# Patient Record
Sex: FEMALE | Race: WHITE | NOT HISPANIC OR LATINO
[De-identification: names, ages, dates, MRNs, and addresses within clinical notes are randomized per-mention and may not be internally consistent; named-entity substitution may affect disease eponyms.]

---

## 2017-01-10 ENCOUNTER — APPOINTMENT (OUTPATIENT)
Dept: OTOLARYNGOLOGY | Facility: CLINIC | Age: 67
End: 2017-01-10

## 2017-01-10 VITALS
DIASTOLIC BLOOD PRESSURE: 72 MMHG | BODY MASS INDEX: 20.38 KG/M2 | HEART RATE: 76 BPM | WEIGHT: 115 LBS | TEMPERATURE: 97.8 F | SYSTOLIC BLOOD PRESSURE: 108 MMHG | HEIGHT: 63 IN

## 2017-01-10 DIAGNOSIS — J34.89 OTHER SPECIFIED DISORDERS OF NOSE AND NASAL SINUSES: ICD-10-CM

## 2017-01-10 DIAGNOSIS — J34.2 DEVIATED NASAL SEPTUM: ICD-10-CM

## 2017-01-10 PROBLEM — Z00.00 ENCOUNTER FOR PREVENTIVE HEALTH EXAMINATION: Status: ACTIVE | Noted: 2017-01-10

## 2017-01-10 RX ORDER — FLUTICASONE PROPIONATE 50 UG/1
50 SPRAY, METERED NASAL TWICE DAILY
Qty: 6 | Refills: 3 | Status: ACTIVE | COMMUNITY
Start: 2017-01-10 | End: 1900-01-01

## 2025-01-31 ENCOUNTER — INPATIENT (INPATIENT)
Facility: HOSPITAL | Age: 75
LOS: 24 days | Discharge: NURSING, REHAB OR LONG-TERM CARE | DRG: 870 | End: 2025-02-25
Attending: INTERNAL MEDICINE | Admitting: STUDENT IN AN ORGANIZED HEALTH CARE EDUCATION/TRAINING PROGRAM
Payer: MEDICARE

## 2025-01-31 VITALS
TEMPERATURE: 99 F | RESPIRATION RATE: 20 BRPM | DIASTOLIC BLOOD PRESSURE: 77 MMHG | OXYGEN SATURATION: 97 % | WEIGHT: 119.93 LBS | SYSTOLIC BLOOD PRESSURE: 116 MMHG | HEART RATE: 113 BPM

## 2025-01-31 DIAGNOSIS — R13.10 DYSPHAGIA, UNSPECIFIED: ICD-10-CM

## 2025-01-31 DIAGNOSIS — R53.2 FUNCTIONAL QUADRIPLEGIA: ICD-10-CM

## 2025-01-31 DIAGNOSIS — N39.0 URINARY TRACT INFECTION, SITE NOT SPECIFIED: ICD-10-CM

## 2025-01-31 DIAGNOSIS — C71.9 MALIGNANT NEOPLASM OF BRAIN, UNSPECIFIED: ICD-10-CM

## 2025-01-31 DIAGNOSIS — I48.20 CHRONIC ATRIAL FIBRILLATION, UNSPECIFIED: ICD-10-CM

## 2025-01-31 DIAGNOSIS — Z51.5 ENCOUNTER FOR PALLIATIVE CARE: ICD-10-CM

## 2025-01-31 DIAGNOSIS — G93.41 METABOLIC ENCEPHALOPATHY: ICD-10-CM

## 2025-01-31 DIAGNOSIS — Z29.9 ENCOUNTER FOR PROPHYLACTIC MEASURES, UNSPECIFIED: ICD-10-CM

## 2025-01-31 DIAGNOSIS — R65.10 SYSTEMIC INFLAMMATORY RESPONSE SYNDROME (SIRS) OF NON-INFECTIOUS ORIGIN WITHOUT ACUTE ORGAN DYSFUNCTION: ICD-10-CM

## 2025-01-31 DIAGNOSIS — R56.9 UNSPECIFIED CONVULSIONS: ICD-10-CM

## 2025-01-31 LAB
-  CTX-M RESISTANCE MARKER: SIGNIFICANT CHANGE UP
-  ESBL: SIGNIFICANT CHANGE UP
A1C WITH ESTIMATED AVERAGE GLUCOSE RESULT: 6.3 % — HIGH (ref 4–5.6)
ADD ON TEST-SPECIMEN IN LAB: SIGNIFICANT CHANGE UP
ALBUMIN SERPL ELPH-MCNC: 3.6 G/DL — SIGNIFICANT CHANGE UP (ref 3.3–5)
ALP SERPL-CCNC: 157 U/L — HIGH (ref 40–120)
ALT FLD-CCNC: 40 U/L — SIGNIFICANT CHANGE UP (ref 10–45)
ANION GAP SERPL CALC-SCNC: 11 MMOL/L — SIGNIFICANT CHANGE UP (ref 5–17)
ANION GAP SERPL CALC-SCNC: 11 MMOL/L — SIGNIFICANT CHANGE UP (ref 5–17)
APPEARANCE UR: ABNORMAL
APTT BLD: 32.6 SEC — SIGNIFICANT CHANGE UP (ref 24.5–35.6)
AST SERPL-CCNC: 19 U/L — SIGNIFICANT CHANGE UP (ref 10–40)
BASOPHILS # BLD AUTO: 0.04 K/UL — SIGNIFICANT CHANGE UP (ref 0–0.2)
BASOPHILS # BLD AUTO: 0.05 K/UL — SIGNIFICANT CHANGE UP (ref 0–0.2)
BASOPHILS NFR BLD AUTO: 0.2 % — SIGNIFICANT CHANGE UP (ref 0–2)
BASOPHILS NFR BLD AUTO: 0.3 % — SIGNIFICANT CHANGE UP (ref 0–2)
BILIRUB SERPL-MCNC: 0.6 MG/DL — SIGNIFICANT CHANGE UP (ref 0.2–1.2)
BILIRUB UR-MCNC: NEGATIVE — SIGNIFICANT CHANGE UP
BUN SERPL-MCNC: 20 MG/DL — SIGNIFICANT CHANGE UP (ref 7–23)
BUN SERPL-MCNC: 23 MG/DL — SIGNIFICANT CHANGE UP (ref 7–23)
CALCIUM SERPL-MCNC: 8.2 MG/DL — LOW (ref 8.4–10.5)
CALCIUM SERPL-MCNC: 8.9 MG/DL — SIGNIFICANT CHANGE UP (ref 8.4–10.5)
CHLORIDE SERPL-SCNC: 107 MMOL/L — SIGNIFICANT CHANGE UP (ref 96–108)
CHLORIDE SERPL-SCNC: 108 MMOL/L — SIGNIFICANT CHANGE UP (ref 96–108)
CO2 SERPL-SCNC: 25 MMOL/L — SIGNIFICANT CHANGE UP (ref 22–31)
CO2 SERPL-SCNC: 28 MMOL/L — SIGNIFICANT CHANGE UP (ref 22–31)
COLOR SPEC: YELLOW — SIGNIFICANT CHANGE UP
CREAT SERPL-MCNC: 0.47 MG/DL — LOW (ref 0.5–1.3)
CREAT SERPL-MCNC: 0.52 MG/DL — SIGNIFICANT CHANGE UP (ref 0.5–1.3)
DIFF PNL FLD: ABNORMAL
E COLI DNA BLD POS QL NAA+NON-PROBE: SIGNIFICANT CHANGE UP
EGFR: 100 ML/MIN/1.73M2 — SIGNIFICANT CHANGE UP
EGFR: 97 ML/MIN/1.73M2 — SIGNIFICANT CHANGE UP
EOSINOPHIL # BLD AUTO: 0.02 K/UL — SIGNIFICANT CHANGE UP (ref 0–0.5)
EOSINOPHIL # BLD AUTO: 0.07 K/UL — SIGNIFICANT CHANGE UP (ref 0–0.5)
EOSINOPHIL NFR BLD AUTO: 0.1 % — SIGNIFICANT CHANGE UP (ref 0–6)
EOSINOPHIL NFR BLD AUTO: 0.4 % — SIGNIFICANT CHANGE UP (ref 0–6)
ESTIMATED AVERAGE GLUCOSE: 134 MG/DL — HIGH (ref 68–114)
FLUAV AG NPH QL: SIGNIFICANT CHANGE UP
FLUBV AG NPH QL: SIGNIFICANT CHANGE UP
GAS PNL BLDV: SIGNIFICANT CHANGE UP
GLUCOSE SERPL-MCNC: 118 MG/DL — HIGH (ref 70–99)
GLUCOSE SERPL-MCNC: 133 MG/DL — HIGH (ref 70–99)
GLUCOSE UR QL: NEGATIVE MG/DL — SIGNIFICANT CHANGE UP
GRAM STN FLD: ABNORMAL
HCT VFR BLD CALC: 50.5 % — HIGH (ref 34.5–45)
HCT VFR BLD CALC: 52.3 % — HIGH (ref 34.5–45)
HGB BLD-MCNC: 15.3 G/DL — SIGNIFICANT CHANGE UP (ref 11.5–15.5)
HGB BLD-MCNC: 16.3 G/DL — HIGH (ref 11.5–15.5)
IMM GRANULOCYTES NFR BLD AUTO: 1.1 % — HIGH (ref 0–0.9)
IMM GRANULOCYTES NFR BLD AUTO: 1.5 % — HIGH (ref 0–0.9)
INR BLD: 1.04 — SIGNIFICANT CHANGE UP (ref 0.85–1.16)
KETONES UR-MCNC: NEGATIVE MG/DL — SIGNIFICANT CHANGE UP
LACTATE SERPL-SCNC: 1.5 MMOL/L — SIGNIFICANT CHANGE UP (ref 0.5–2)
LEUKOCYTE ESTERASE UR-ACNC: ABNORMAL
LIDOCAIN IGE QN: 32 U/L — SIGNIFICANT CHANGE UP (ref 7–60)
LYMPHOCYTES # BLD AUTO: 0.73 K/UL — LOW (ref 1–3.3)
LYMPHOCYTES # BLD AUTO: 1.88 K/UL — SIGNIFICANT CHANGE UP (ref 1–3.3)
LYMPHOCYTES # BLD AUTO: 3.9 % — LOW (ref 13–44)
LYMPHOCYTES # BLD AUTO: 9.7 % — LOW (ref 13–44)
MAGNESIUM SERPL-MCNC: 2.1 MG/DL — SIGNIFICANT CHANGE UP (ref 1.6–2.6)
MAGNESIUM SERPL-MCNC: 2.3 MG/DL — SIGNIFICANT CHANGE UP (ref 1.6–2.6)
MCHC RBC-ENTMCNC: 26.9 PG — LOW (ref 27–34)
MCHC RBC-ENTMCNC: 27.1 PG — SIGNIFICANT CHANGE UP (ref 27–34)
MCHC RBC-ENTMCNC: 30.3 G/DL — LOW (ref 32–36)
MCHC RBC-ENTMCNC: 31.2 G/DL — LOW (ref 32–36)
MCV RBC AUTO: 86.4 FL — SIGNIFICANT CHANGE UP (ref 80–100)
MCV RBC AUTO: 89.4 FL — SIGNIFICANT CHANGE UP (ref 80–100)
METHOD TYPE: SIGNIFICANT CHANGE UP
MONOCYTES # BLD AUTO: 0.68 K/UL — SIGNIFICANT CHANGE UP (ref 0–0.9)
MONOCYTES # BLD AUTO: 0.87 K/UL — SIGNIFICANT CHANGE UP (ref 0–0.9)
MONOCYTES NFR BLD AUTO: 3.6 % — SIGNIFICANT CHANGE UP (ref 2–14)
MONOCYTES NFR BLD AUTO: 4.5 % — SIGNIFICANT CHANGE UP (ref 2–14)
NEUTROPHILS # BLD AUTO: 16.25 K/UL — HIGH (ref 1.8–7.4)
NEUTROPHILS # BLD AUTO: 17.15 K/UL — HIGH (ref 1.8–7.4)
NEUTROPHILS NFR BLD AUTO: 83.6 % — HIGH (ref 43–77)
NEUTROPHILS NFR BLD AUTO: 91.1 % — HIGH (ref 43–77)
NITRITE UR-MCNC: POSITIVE
NRBC # BLD: 0 /100 WBCS — SIGNIFICANT CHANGE UP (ref 0–0)
NRBC # BLD: 0 /100 WBCS — SIGNIFICANT CHANGE UP (ref 0–0)
NRBC BLD-RTO: 0 /100 WBCS — SIGNIFICANT CHANGE UP (ref 0–0)
NRBC BLD-RTO: 0 /100 WBCS — SIGNIFICANT CHANGE UP (ref 0–0)
NT-PROBNP SERPL-SCNC: 175 PG/ML — SIGNIFICANT CHANGE UP (ref 0–300)
PH UR: 5.5 — SIGNIFICANT CHANGE UP (ref 5–8)
PHOSPHATE SERPL-MCNC: 2.9 MG/DL — SIGNIFICANT CHANGE UP (ref 2.5–4.5)
PLATELET # BLD AUTO: 236 K/UL — SIGNIFICANT CHANGE UP (ref 150–400)
PLATELET # BLD AUTO: 248 K/UL — SIGNIFICANT CHANGE UP (ref 150–400)
POTASSIUM SERPL-MCNC: 3.8 MMOL/L — SIGNIFICANT CHANGE UP (ref 3.5–5.3)
POTASSIUM SERPL-MCNC: 4.3 MMOL/L — SIGNIFICANT CHANGE UP (ref 3.5–5.3)
POTASSIUM SERPL-SCNC: 3.8 MMOL/L — SIGNIFICANT CHANGE UP (ref 3.5–5.3)
POTASSIUM SERPL-SCNC: 4.3 MMOL/L — SIGNIFICANT CHANGE UP (ref 3.5–5.3)
PROT SERPL-MCNC: 6.3 G/DL — SIGNIFICANT CHANGE UP (ref 6–8.3)
PROT UR-MCNC: NEGATIVE MG/DL — SIGNIFICANT CHANGE UP
PROTHROM AB SERPL-ACNC: 12 SEC — SIGNIFICANT CHANGE UP (ref 9.9–13.4)
RBC # BLD: 5.65 M/UL — HIGH (ref 3.8–5.2)
RBC # BLD: 6.05 M/UL — HIGH (ref 3.8–5.2)
RBC # FLD: 17.6 % — HIGH (ref 10.3–14.5)
RBC # FLD: 18.2 % — HIGH (ref 10.3–14.5)
RSV RNA NPH QL NAA+NON-PROBE: SIGNIFICANT CHANGE UP
SARS-COV-2 RNA SPEC QL NAA+PROBE: SIGNIFICANT CHANGE UP
SODIUM SERPL-SCNC: 144 MMOL/L — SIGNIFICANT CHANGE UP (ref 135–145)
SODIUM SERPL-SCNC: 146 MMOL/L — HIGH (ref 135–145)
SP GR SPEC: 1.02 — SIGNIFICANT CHANGE UP (ref 1–1.03)
SPECIMEN SOURCE: SIGNIFICANT CHANGE UP
SPECIMEN SOURCE: SIGNIFICANT CHANGE UP
TROPONIN T, HIGH SENSITIVITY RESULT: 18 NG/L — SIGNIFICANT CHANGE UP (ref 0–51)
UROBILINOGEN FLD QL: 0.2 MG/DL — SIGNIFICANT CHANGE UP (ref 0.2–1)
VALPROATE SERPL-MCNC: 7.4 UG/ML — LOW (ref 50–100)
WBC # BLD: 18.83 K/UL — HIGH (ref 3.8–10.5)
WBC # BLD: 19.41 K/UL — HIGH (ref 3.8–10.5)
WBC # FLD AUTO: 18.83 K/UL — HIGH (ref 3.8–10.5)
WBC # FLD AUTO: 19.41 K/UL — HIGH (ref 3.8–10.5)

## 2025-01-31 PROCEDURE — 99291 CRITICAL CARE FIRST HOUR: CPT

## 2025-01-31 PROCEDURE — 71045 X-RAY EXAM CHEST 1 VIEW: CPT | Mod: 26

## 2025-01-31 PROCEDURE — 70450 CT HEAD/BRAIN W/O DYE: CPT | Mod: 26

## 2025-01-31 PROCEDURE — 99283 EMERGENCY DEPT VISIT LOW MDM: CPT

## 2025-01-31 PROCEDURE — 99222 1ST HOSP IP/OBS MODERATE 55: CPT

## 2025-01-31 PROCEDURE — 99223 1ST HOSP IP/OBS HIGH 75: CPT

## 2025-01-31 RX ORDER — DEXTROSE 50 % IN WATER 50 %
15 SYRINGE (ML) INTRAVENOUS ONCE
Refills: 0 | Status: DISCONTINUED | OUTPATIENT
Start: 2025-01-31 | End: 2025-02-25

## 2025-01-31 RX ORDER — ACETAMINOPHEN 500 MG/5ML
1000 LIQUID (ML) ORAL ONCE
Refills: 0 | Status: COMPLETED | OUTPATIENT
Start: 2025-01-31 | End: 2025-01-31

## 2025-01-31 RX ORDER — DILTIAZEM HYDROCHLORIDE 240 MG/1
10 TABLET, EXTENDED RELEASE ORAL ONCE
Refills: 0 | Status: COMPLETED | OUTPATIENT
Start: 2025-01-31 | End: 2025-01-31

## 2025-01-31 RX ORDER — INSULIN LISPRO 100 U/ML
INJECTION, SOLUTION INTRAVENOUS; SUBCUTANEOUS
Refills: 0 | Status: DISCONTINUED | OUTPATIENT
Start: 2025-01-31 | End: 2025-02-03

## 2025-01-31 RX ORDER — METOPROLOL SUCCINATE 50 MG/1
5 TABLET, EXTENDED RELEASE ORAL EVERY 8 HOURS
Refills: 0 | Status: DISCONTINUED | OUTPATIENT
Start: 2025-01-31 | End: 2025-01-31

## 2025-01-31 RX ORDER — DEXTROSE 50 % IN WATER 50 %
25 SYRINGE (ML) INTRAVENOUS ONCE
Refills: 0 | Status: DISCONTINUED | OUTPATIENT
Start: 2025-01-31 | End: 2025-02-25

## 2025-01-31 RX ORDER — DEXAMETHASONE 0.5 MG/1
4 TABLET ORAL EVERY 6 HOURS
Refills: 0 | Status: DISCONTINUED | OUTPATIENT
Start: 2025-01-31 | End: 2025-02-25

## 2025-01-31 RX ORDER — INFLUENZA A VIRUS A/IDAHO/07/2018 (H1N1) ANTIGEN (MDCK CELL DERIVED, PROPIOLACTONE INACTIVATED, INFLUENZA A VIRUS A/INDIANA/08/2018 (H3N2) ANTIGEN (MDCK CELL DERIVED, PROPIOLACTONE INACTIVATED), INFLUENZA B VIRUS B/SINGAPORE/INFTT-16-0610/2016 ANTIGEN (MDCK CELL DERIVED, PROPIOLACTONE INACTIVATED), INFLUENZA B VIRUS B/IOWA/06/2017 ANTIGEN (MDCK CELL DERIVED, PROPIOLACTONE INACTIVATED) 15; 15; 15; 15 UG/.5ML; UG/.5ML; UG/.5ML; UG/.5ML
0.5 INJECTION, SUSPENSION INTRAMUSCULAR ONCE
Refills: 0 | Status: DISCONTINUED | OUTPATIENT
Start: 2025-01-31 | End: 2025-02-25

## 2025-01-31 RX ORDER — SODIUM CHLORIDE 9 G/1000ML
1000 INJECTION, SOLUTION INTRAVENOUS
Refills: 0 | Status: DISCONTINUED | OUTPATIENT
Start: 2025-01-31 | End: 2025-02-25

## 2025-01-31 RX ORDER — LORAZEPAM 4 MG/ML
2 VIAL (ML) INJECTION EVERY 4 HOURS
Refills: 0 | Status: DISCONTINUED | OUTPATIENT
Start: 2025-01-31 | End: 2025-01-31

## 2025-01-31 RX ORDER — ERTAPENEM SODIUM 1 G/1
1000 INJECTION, POWDER, LYOPHILIZED, FOR SOLUTION INTRAMUSCULAR; INTRAVENOUS EVERY 24 HOURS
Refills: 0 | Status: DISCONTINUED | OUTPATIENT
Start: 2025-01-31 | End: 2025-02-01

## 2025-01-31 RX ORDER — METOPROLOL SUCCINATE 50 MG/1
5 TABLET, EXTENDED RELEASE ORAL ONCE
Refills: 0 | Status: COMPLETED | OUTPATIENT
Start: 2025-01-31 | End: 2025-01-31

## 2025-01-31 RX ORDER — CEFTRIAXONE 500 MG/1
1000 INJECTION, POWDER, FOR SOLUTION INTRAMUSCULAR; INTRAVENOUS EVERY 24 HOURS
Refills: 0 | Status: DISCONTINUED | OUTPATIENT
Start: 2025-01-31 | End: 2025-01-31

## 2025-01-31 RX ORDER — GLUCAGON 3 MG/1
1 POWDER NASAL ONCE
Refills: 0 | Status: DISCONTINUED | OUTPATIENT
Start: 2025-01-31 | End: 2025-02-25

## 2025-01-31 RX ORDER — LORAZEPAM 4 MG/ML
2 VIAL (ML) INJECTION EVERY 4 HOURS
Refills: 0 | Status: DISCONTINUED | OUTPATIENT
Start: 2025-01-31 | End: 2025-02-06

## 2025-01-31 RX ORDER — ERTAPENEM SODIUM 1 G/1
1000 INJECTION, POWDER, LYOPHILIZED, FOR SOLUTION INTRAMUSCULAR; INTRAVENOUS EVERY 24 HOURS
Refills: 0 | Status: DISCONTINUED | OUTPATIENT
Start: 2025-01-31 | End: 2025-01-31

## 2025-01-31 RX ORDER — CEFTRIAXONE 500 MG/1
2000 INJECTION, POWDER, FOR SOLUTION INTRAMUSCULAR; INTRAVENOUS EVERY 24 HOURS
Refills: 0 | Status: DISCONTINUED | OUTPATIENT
Start: 2025-01-31 | End: 2025-01-31

## 2025-01-31 RX ORDER — DEXAMETHASONE 0.5 MG/1
20 TABLET ORAL ONCE
Refills: 0 | Status: COMPLETED | OUTPATIENT
Start: 2025-01-31 | End: 2025-01-31

## 2025-01-31 RX ORDER — DEXTROSE 50 % IN WATER 50 %
12.5 SYRINGE (ML) INTRAVENOUS ONCE
Refills: 0 | Status: DISCONTINUED | OUTPATIENT
Start: 2025-01-31 | End: 2025-02-25

## 2025-01-31 RX ORDER — METOPROLOL SUCCINATE 50 MG/1
5 TABLET, EXTENDED RELEASE ORAL EVERY 8 HOURS
Refills: 0 | Status: DISCONTINUED | OUTPATIENT
Start: 2025-01-31 | End: 2025-02-01

## 2025-01-31 RX ORDER — HYDROMORPHONE/SOD CHLOR,ISO/PF 2 MG/10 ML
0.5 SYRINGE (ML) INJECTION EVERY 4 HOURS
Refills: 0 | Status: DISCONTINUED | OUTPATIENT
Start: 2025-01-31 | End: 2025-02-06

## 2025-01-31 RX ORDER — CEFTRIAXONE 500 MG/1
1000 INJECTION, POWDER, FOR SOLUTION INTRAMUSCULAR; INTRAVENOUS ONCE
Refills: 0 | Status: COMPLETED | OUTPATIENT
Start: 2025-01-31 | End: 2025-01-31

## 2025-01-31 RX ORDER — INSULIN LISPRO 100 U/ML
INJECTION, SOLUTION INTRAVENOUS; SUBCUTANEOUS AT BEDTIME
Refills: 0 | Status: DISCONTINUED | OUTPATIENT
Start: 2025-01-31 | End: 2025-02-03

## 2025-01-31 RX ADMIN — Medication 1000 MILLIGRAM(S): at 04:08

## 2025-01-31 RX ADMIN — DILTIAZEM HYDROCHLORIDE 10 MILLIGRAM(S): 240 TABLET, EXTENDED RELEASE ORAL at 03:42

## 2025-01-31 RX ADMIN — METOPROLOL SUCCINATE 5 MILLIGRAM(S): 50 TABLET, EXTENDED RELEASE ORAL at 05:46

## 2025-01-31 RX ADMIN — Medication 1000 MILLILITER(S): at 05:37

## 2025-01-31 RX ADMIN — DEXAMETHASONE 4 MILLIGRAM(S): 0.5 TABLET ORAL at 10:09

## 2025-01-31 RX ADMIN — DEXAMETHASONE 110 MILLIGRAM(S): 0.5 TABLET ORAL at 04:15

## 2025-01-31 RX ADMIN — DEXAMETHASONE 4 MILLIGRAM(S): 0.5 TABLET ORAL at 16:47

## 2025-01-31 RX ADMIN — DILTIAZEM HYDROCHLORIDE 10 MILLIGRAM(S): 240 TABLET, EXTENDED RELEASE ORAL at 02:57

## 2025-01-31 RX ADMIN — METOPROLOL SUCCINATE 5 MILLIGRAM(S): 50 TABLET, EXTENDED RELEASE ORAL at 20:25

## 2025-01-31 RX ADMIN — METOPROLOL SUCCINATE 5 MILLIGRAM(S): 50 TABLET, EXTENDED RELEASE ORAL at 07:47

## 2025-01-31 RX ADMIN — Medication 1000 MILLIGRAM(S): at 05:29

## 2025-01-31 RX ADMIN — CEFTRIAXONE 1000 MILLIGRAM(S): 500 INJECTION, POWDER, FOR SOLUTION INTRAMUSCULAR; INTRAVENOUS at 05:30

## 2025-01-31 RX ADMIN — Medication 55 MILLIGRAM(S): at 16:47

## 2025-01-31 RX ADMIN — ERTAPENEM SODIUM 120 MILLIGRAM(S): 1 INJECTION, POWDER, LYOPHILIZED, FOR SOLUTION INTRAMUSCULAR; INTRAVENOUS at 16:59

## 2025-01-31 RX ADMIN — DEXAMETHASONE 4 MILLIGRAM(S): 0.5 TABLET ORAL at 22:09

## 2025-01-31 RX ADMIN — CEFTRIAXONE 100 MILLIGRAM(S): 500 INJECTION, POWDER, FOR SOLUTION INTRAMUSCULAR; INTRAVENOUS at 01:36

## 2025-01-31 RX ADMIN — Medication 1000 MILLILITER(S): at 01:07

## 2025-01-31 RX ADMIN — Medication 40 MILLIGRAM(S): at 10:08

## 2025-01-31 RX ADMIN — Medication 52.5 MILLIGRAM(S): at 10:33

## 2025-01-31 RX ADMIN — Medication 400 MILLIGRAM(S): at 01:36

## 2025-01-31 NOTE — H&P ADULT - ATTENDING COMMENTS
73 yo female w PMH GBM on hospice care (diagnosed in 2024, follows at [facility] under Dr. Hernandez), admitted for encephalopathy likely 2/2 neurological cancer disease progression and UTI.  Per son at bedside the patient was doing okay until yesterday when she was more difficult to arouse and was not taking PO.     Physical exam notable for a elderly female, appears very comfortable, not responsive to verbal stimuli, grimaces to tactile stimuli, PERRL, dry MM, CTABL, no retractions, tachycardiac and irregular, no LE edema, unable to assess orientation. Work up s/f WBC 19.41-->19.83, Hgb 16.3 which is like concentrated, plt 248, Na 146, electrolytes otherwise wnl, normal kidney function, nml liver function, lactate normal, torponin nml, . VBG CO2 36, UA positive w/ nitrate, trace LE and WBC 14. Low valporic acid level.     CTH: Suspicion for a large infiltrating mass in the right cerebral hemisphere, involving right parietal and temporal lobes, with extension into the anterior inferior right frontal lobe, Extensive mass effect in the right cerebral hemisphere with 1.2 cm of  midline shift to the left. Mild dilatation of the left lateral ventricle is suspicious for hydrocephalus due to ventricular entrapment.    #AMS 2/2 Sepsis and GBM disease progression   - c/w ceftriaxone 1g q24   - FU urine culture and blood cx     #GBM - advanced disease as noted by CT scan. Per discussion with neurosurgery this morning who expressed there is no surgical intervention available and this is advanced disease progression. Recommend a conservative approach   - palliative care consult   - agree to c/w steroids to help with swelling   - epilepsy consult for EEG and valproate titration   - Iss while on steroids    #afib w/ RVR - known afib, now in RVR likely i/s/o sepsis   - c/w lopressor 5mg IV q8 hr   - CHADVASC 2 - currently NPO but if within GOC sohuld be on AC, if made hospice will defer AC     Rest of plan as above

## 2025-01-31 NOTE — CONSULT NOTE ADULT - ASSESSMENT
74 F with advanced GBM, previously on hospice for 3-4 weeks, encephalopathy, dysphagia, functional quadriplegia, encounter for palliative care.

## 2025-01-31 NOTE — ED ADULT NURSE NOTE - NSFALLRISKINTERV_ED_ALL_ED
Assistance OOB with selected safe patient handling equipment if applicable/Assistance with ambulation/Communicate fall risk and risk factors to all staff, patient, and family/Monitor gait and stability/Monitor for mental status changes and reorient to person, place, and time, as needed/Provide visual cue: yellow wristband, yellow gown, etc/Reinforce activity limits and safety measures with patient and family/Toileting schedule using arm’s reach rule for commode and bathroom/Use of alarms - bed, stretcher, chair and/or video monitoring/Call bell, personal items and telephone in reach/Instruct patient to call for assistance before getting out of bed/chair/stretcher/Non-slip footwear applied when patient is off stretcher/[place] to call system/Physically safe environment - no spills, clutter or unnecessary equipment/Purposeful Proactive Rounding/Room/bathroom lighting operational, light cord in reach

## 2025-01-31 NOTE — ED PROVIDER NOTE - PROGRESS NOTE DETAILS
spoke with Dr. Berger from [facility] home hosp[facility] - they will reach out to family, as son states he wants to revoke hospice care. NSG consulted - pt with large mass with mass effect pt noted to be tachycardic, repeat EKG - c/w afib. per son at bedside pt had similar problem a few weeks ago and was started on metoprolol   HR improved after some diltiazem per NSG - pt can be admitted to medicine and they will follow. recommend decadron 20mg.

## 2025-01-31 NOTE — H&P ADULT - PROBLEM SELECTOR PLAN 2
-takes valproic acid Pt seizures characterized by right hand shaking. Appears to be having focal seizures.     Plan  -c/w valproic acid   -f/u valproic acid levels  -c/w ativan 0,5mg q4h prn for seizures Pt seizures characterized by right hand shaking. Appears to be having focal seizures.     Plan  -c/w valproic acid   -f/u valproic acid levels 7.4,  01/31 00h55  -c/w ativan 0,5mg q4h prn for seizures  -vEEG w/ epilepsy consult Pt seizures characterized by right hand shaking. Appears to be having focal seizures.     Plan  -c/w valproic acid 500mg BID, switched to 250mg x6h   -f/u valproic acid levels 7.4,  01/31 00h55  -c/w ativan 0,5mg q4h prn for seizures  -vEEG w/ epilepsy consult Meeting SIRS 2/4 (HR>90, WBC>12k) likely iso of UTI vs. cancer. s/p 1L NS in ED. Pt obtunded GSC score 3. VBG CO2 36, not retaining which would be leading to AMS. Pt afebrile       Plan  -c/w CTX 1g x2 more days  -IVF as needed  -f/u blood cultures   -NSGY consult

## 2025-01-31 NOTE — H&P ADULT - NSHPPHYSICALEXAM_GEN_ALL_CORE
T(C): 36.7 (01-31-25 @ 06:17), Max: 38.3 (01-31-25 @ 01:09)  HR: 132 (01-31-25 @ 07:18) (71 - 140)  BP: 127/78 (01-31-25 @ 07:18) (100/74 - 151/112)  RR: 18 (01-31-25 @ 07:18) (16 - 21)  SpO2: 95% (01-31-25 @ 07:18) (94% - 98%)    CONSTITUTIONAL: Well groomed, no apparent distress  EYES: PERRLA and symmetric, EOMI, No conjunctival or scleral injection, non-icteric  ENMT: Oral mucosa with moist membranes. Normal dentition; no pharyngeal injection or exudates             NECK: Supple, symmetric and without tracheal deviation   RESP: No respiratory distress, no use of accessory muscles; CTA b/l, no WRR  CV: RRR, +S1S2, no MRG; no JVD; no peripheral edema  GI: Soft, NT, ND, no rebound, no guarding; no palpable masses; no hepatosplenomegaly; no hernia palpated  LYMPH: No cervical LAD or tenderness; no axillary LAD or tenderness; no inguinal LAD or tenderness  MSK: Normal gait; No digital clubbing or cyanosis; examination of the (head/neck/spine/ribs/pelvis, RUE, LUE, RLE, LLE) without misalignment,            Normal ROM without pain, no spinal tenderness, normal muscle strength/tone  SKIN: No rashes or ulcers noted; no subcutaneous nodules or induration palpable  NEURO: CN II-XII intact; normal reflexes in upper and lower extremities, sensation intact in upper and lower extremities b/l to light touch   PSYCH: Appropriate insight/judgment; A+O x 3, mood and affect appropriate, recent/remote memory intact T(C): 36.7 (01-31-25 @ 06:17), Max: 38.3 (01-31-25 @ 01:09)  HR: 132 (01-31-25 @ 07:18) (71 - 140)  BP: 127/78 (01-31-25 @ 07:18) (100/74 - 151/112)  RR: 18 (01-31-25 @ 07:18) (16 - 21)  SpO2: 95% (01-31-25 @ 07:18) (94% - 98%)    CONSTITUTIONAL: obtunded   EYES: non-icteric, reactive pupils, right-celeste gaze   ENMT: Oral mucosa with moist membranes.   NECK: pt head tilted to right  RESP: No respiratory distress, no use of accessory muscles; CTA b/l, no WRR  CV: irregular rate, no peripheral edema  GI: Soft, NT, ND, no rebound, no guarding; no palpable masses; no hepatosplenomegaly; no hernia palpated  SKIN: No rashes or ulcers noted; no subcutaneous nodules or induration palpable  NEURO: unable to assess

## 2025-01-31 NOTE — ED PROVIDER NOTE - CLINICAL SUMMARY MEDICAL DECISION MAKING FREE TEXT BOX
AMS, lethargic per EMS, hx of GBM, possible ICH vs oversedation with lorazepam, hypoxic, possible aspiration. ~96% on 4L NC  -check labs  -ekg  -cxr  -ct head  -ivf

## 2025-01-31 NOTE — H&P ADULT - NSHPLABSRESULTS_GEN_ALL_CORE
.  LABS:                         16.3   19.41 )-----------( 248      ( 2025 00:49 )             52.3         146[H]  |  107  |  23  ----------------------------<  118[H]  4.3   |  28  |  0.52    Ca    8.9      2025 00:49  Mg     2.3         TPro  6.3  /  Alb  3.6  /  TBili  0.6  /  DBili  x   /  AST  19  /  ALT  40  /  AlkPhos  157[H]      PT/INR - ( 2025 00:49 )   PT: 12.0 sec;   INR: 1.04          PTT - ( 2025 00:49 )  PTT:32.6 sec  Urinalysis Basic - ( 2025 04:07 )    Color: Yellow / Appearance: Cloudy / S.024 / pH: x  Gluc: x / Ketone: Negative mg/dL  / Bili: Negative / Urobili: 0.2 mg/dL   Blood: x / Protein: Negative mg/dL / Nitrite: Positive   Leuk Esterase: Trace / RBC: 3 /HPF / WBC 14 /HPF   Sq Epi: x / Non Sq Epi: 2 /HPF / Bacteria: Many /HPF            Lactate, Blood: 1.5 mmol/L ( @ 00:49)      RADIOLOGY, EKG & ADDITIONAL TESTS: Reviewed.

## 2025-01-31 NOTE — H&P ADULT - PROBLEM SELECTOR PLAN 4
Afib seen on EKG. Home med:   Today, HR ranging . responding to lopressor 5. R/o retention, constipation, pain Afib seen on EKG. Home med:   Today, HR ranging . responding to lopressor 5. R/o retention, constipation, pain    Plan  -c/w home metoprolol, will convert to IV as pt obtunded---> 5mg q8h lopressor IVP  -bladder scans q6h   -consider john (currently on primafit) if pt retaining   -last BM yesterday, make sure pt having BMs  -pain control with dilaudid 0.5 q4h PRN Afib w/ rvr seen on EKG. Home med: metoprolol 12.5 BID   Today, HR ranging . responding to lopressor 5. R/o retention, constipation, pain    Plan  -c/w home metoprolol, will convert to IV as pt obtunded---> 5mg q8h lopressor IVP  -bladder scans q6h   -consider john (currently on primafit) if pt retaining   -last BM yesterday, make sure pt having BMs  -pain control with dilaudid 0.5 q4h PRN UA shows WBC and bacteria.       Plan  -c/w CTX 1g x2 more days   -f/u urine cx  -f/u blood cx

## 2025-01-31 NOTE — CONSULT NOTE ADULT - ASSESSMENT
73 yo female w PMH GBM on hospice care (diagnosed in 2024, follows at [facility] under Dr. Hernandez, with reported attempted resection of tumor, on chemotherapy with last chemo 2 months ago) who presents with obtundation. ?RUE shaking and worsening mental status concerning for seizure. Level subtherapeutic on arrival likely due to underdosing med.    Recommendations:  - Give VPA 1g x1 now  - Continue on VPA 500mg BID  - Start vEEG  - Continue GOC  - Neurosurgery recs re. steroids   - Ativan 2mg IV for GTCs > 2 min only  - Neurological assessment Q8hrs  - Seizure & Fall precautions  - Maintain Mg> 2 mmol/l    Seen and discussed with Dr Kemp  75 yo female w PMH GBM on hospice care (diagnosed in 2024, follows at [facility] under Dr. Hernandez, with reported attempted resection of tumor, on chemotherapy with last chemo 2 months ago) who presents with obtundation. ?RUE shaking and worsening mental status concerning for seizure. Level subtherapeutic on arrival likely due to underdosing med.    Recommendations:  - Give VPA 1g x1 now  - Continue on VPA 500mg BID  - Start vEEG to assess for subcliniacl seizures  - Continue GOC  - Neurosurgery recs re. steroids   - Ativan 2mg IV for GTCs > 2 min only  - Neurological assessment Q8hrs  - Seizure & Fall precautions  - Maintain Mg> 2 mmol/l    Seen and discussed with Dr Kemp

## 2025-01-31 NOTE — ED PROVIDER NOTE - CARE PLAN
1 Principal Discharge DX:	Fever  Secondary Diagnosis:	AMS (altered mental status)  Secondary Diagnosis:	GBM (glioblastoma multiforme)  Secondary Diagnosis:	Rapid atrial fibrillation

## 2025-01-31 NOTE — CONSULT NOTE ADULT - SUBJECTIVE AND OBJECTIVE BOX
NEUROLOGY CONSULT    HPI:   75 yo female w PMH GBM on hospice care (diagnosed in 2024, follows at [facility] under Dr. Hernandez, with reported attempted resection of tumor, on chemotherapy with last chemo 2 months ago). Per son, she was speaking a few words yesterday and was able to eat lunch, but become unresponsive and had iwob yesterday evening. She has intermittent shaking of the right hand and had an episode yesterday for which she was given 1 mg of ativan (instead of 0.5 ativan per son, which he believes is contributing to her AMS, last dose 5:30 pm 1/30).     A decision was made to bring Ms Valentino to the ER due to her being tachypneic, alongside her worsening lethargy. Of note, pt was admitted at [facility] 3 weeks ago for first time seizures, on valproic acid BID, dexamethasone 2 mg BID. Pt originally did not want treatment for GBS 2 months ago but changed her mind 4 weeks ago when she felt that she was worsening.     Epilepsy HPI:  Apparently had new onset seizure in January 3rd and has been on VPA 500mg BID (per son at bedside has been giving once a day) and compliant. No other new medications that could potentially be interacting per son. Oncologist has prescribing the seizure meds, unclear if she has a primary neurologist.       MEDICATIONS  Home Medications:  dexAMETHasone 2 mg oral tablet: 1 tab(s) orally 2 times a day (31 Jan 2025 09:14)  LORazepam 0.5 mg oral tablet: 0.5 tab(s) orally every 4 hours as needed for  agitation (31 Jan 2025 09:10)  metoprolol succinate 25 mg oral capsule, extended release: 0.5 cap(s) orally 2 times a day (31 Jan 2025 09:10)  valproic acid: 500 milligram(s) orally 2 times a day Take 10mL by mouth every 12 hours (31 Jan 2025 09:09)    MEDICATIONS  (STANDING):  cefTRIAXone   IVPB 2000 milliGRAM(s) IV Intermittent every 24 hours  dexAMETHasone  Injectable 4 milliGRAM(s) IV Push every 6 hours  dextrose 5%. 1000 milliLiter(s) (50 mL/Hr) IV Continuous <Continuous>  dextrose 5%. 1000 milliLiter(s) (100 mL/Hr) IV Continuous <Continuous>  dextrose 50% Injectable 25 Gram(s) IV Push once  dextrose 50% Injectable 12.5 Gram(s) IV Push once  dextrose 50% Injectable 25 Gram(s) IV Push once  glucagon  Injectable 1 milliGRAM(s) IntraMuscular once  insulin lispro (ADMELOG) corrective regimen sliding scale   SubCutaneous three times a day before meals  insulin lispro (ADMELOG) corrective regimen sliding scale   SubCutaneous at bedtime  metoprolol tartrate Injectable 5 milliGRAM(s) IV Push every 8 hours  pantoprazole  Injectable 40 milliGRAM(s) IV Push every 24 hours  valproate sodium   IVPB 250 milliGRAM(s) IV Intermittent every 6 hours    MEDICATIONS  (PRN):  dextrose Oral Gel 15 Gram(s) Oral once PRN Blood Glucose LESS THAN 70 milliGRAM(s)/deciliter  HYDROmorphone  Injectable 0.5 milliGRAM(s) IV Push every 4 hours PRN Moderate Pain (4 - 6)  LORazepam   Injectable 2 milliGRAM(s) IV Push every 4 hours PRN seizures      FAMILY HISTORY:    SOCIAL HISTORY: negative for tobacco, alcohol, or ilicit drug use.    Allergies    No Known Allergies    Intolerances        Neurologic:  -Mental status: Awake eyes closed. On initial evaluation able to say "shannon", and follows some commands.   -Cranial nerves:   II: BTT present on the R.  III, IV, VI: R gaze preference. Does not cross midline. Pupils equally round and reactive to light  Motor: LUE, and LLE flaccid 0/5. RUE and RLE 2/5. w/ high frequency low amplitude tremor on the RUE   Sensation: grimace to pain RUE RLE.         LABS:                        15.3   18.83 )-----------( 236      ( 31 Jan 2025 05:30 )             50.5     01-31    144  |  108  |  20  ----------------------------<  133[H]  3.8   |  25  |  0.47[L]    Ca    8.2[L]      31 Jan 2025 05:30  Phos  2.9     01-31  Mg     2.1     01-31    TPro  6.3  /  Alb  3.6  /  TBili  0.6  /  DBili  x   /  AST  19  /  ALT  40  /  AlkPhos  157[H]  01-31    Hemoglobin A1C:   Vitamin B12   PT/INR - ( 31 Jan 2025 00:49 )   PT: 12.0 sec;   INR: 1.04          PTT - ( 31 Jan 2025 00:49 )  PTT:32.6 sec  CAPILLARY BLOOD GLUCOSE      POCT Blood Glucose.: 131 mg/dL (31 Jan 2025 13:01)      Urinalysis Basic - ( 31 Jan 2025 05:30 )    Color: x / Appearance: x / SG: x / pH: x  Gluc: 133 mg/dL / Ketone: x  / Bili: x / Urobili: x   Blood: x / Protein: x / Nitrite: x   Leuk Esterase: x / RBC: x / WBC x   Sq Epi: x / Non Sq Epi: x / Bacteria: x            Microbiology:    Urinalysis with Rflx Culture (collected 31 Jan 2025 04:07)    Culture - Blood (collected 31 Jan 2025 00:43)  Source: .Blood BLOOD  Gram Stain (31 Jan 2025 14:58):    Growth in aerobic bottle: Gram Negative Rods    Growth in anaerobic bottle: Gram Negative Rods  Preliminary Report (31 Jan 2025 14:58):    Growth in aerobic bottle: Gram Negative Rods    Growth in anaerobic bottle: Gram Negative Rods    Culture - Blood (collected 31 Jan 2025 00:43)  Source: .Blood BLOOD  Gram Stain (31 Jan 2025 14:26):    Growth in aerobic bottle: Gram Negative Rods    Growth in anaerobic bottle: Gram Negative Rods  Preliminary Report (31 Jan 2025 14:26):    Growth in aerobic bottle: Gram Negative Rods    Growth in anaerobic bottle: Gram Negative Rods    Direct identification is available within approximately 3-5    hours either by Blood Panel Multiplexed PCR or Direct    MALDI-TOF. Details: [email or web address]        RADIOLOGY, EKG AND ADDITIONAL TESTS: Reviewed.           NEUROLOGY CONSULT    HPI:   75 yo female w PMH GBM on hospice care (diagnosed in 2024, follows at [facility] under Dr. Hernandez, with reported attempted resection of tumor, on chemotherapy with last chemo 2 months ago). Per son, she was speaking a few words yesterday and was able to eat lunch, but become unresponsive and had iwob yesterday evening. She has intermittent shaking of the right hand and had an episode yesterday for which she was given 1 mg of ativan (instead of 0.5 ativan per son, which he believes is contributing to her AMS, last dose 5:30 pm 1/30).     A decision was made to bring Ms Valentino to the ER due to her being tachypneic, alongside her worsening lethargy. Of note, pt was admitted at [facility] 3 weeks ago for first time seizures, on valproic acid BID, dexamethasone 2 mg BID. Pt originally did not want treatment for GBS 2 months ago but changed her mind 4 weeks ago when she felt that she was worsening.     Epilepsy HPI:  First seizure as per family was January 3rd and has been on VPA 500mg BID (per son at bedside has been giving once a day) and compliant. No other new medications that could potentially be interacting per son. Oncologist has prescribing the seizure meds, unclear if she has a primary neurologist.       MEDICATIONS  Home Medications:  dexAMETHasone 2 mg oral tablet: 1 tab(s) orally 2 times a day (31 Jan 2025 09:14)  LORazepam 0.5 mg oral tablet: 0.5 tab(s) orally every 4 hours as needed for  agitation (31 Jan 2025 09:10)  metoprolol succinate 25 mg oral capsule, extended release: 0.5 cap(s) orally 2 times a day (31 Jan 2025 09:10)  valproic acid: 500 milligram(s) orally 2 times a day Take 10mL by mouth every 12 hours (31 Jan 2025 09:09)    MEDICATIONS  (STANDING):  cefTRIAXone   IVPB 2000 milliGRAM(s) IV Intermittent every 24 hours  dexAMETHasone  Injectable 4 milliGRAM(s) IV Push every 6 hours  dextrose 5%. 1000 milliLiter(s) (50 mL/Hr) IV Continuous <Continuous>  dextrose 5%. 1000 milliLiter(s) (100 mL/Hr) IV Continuous <Continuous>  dextrose 50% Injectable 25 Gram(s) IV Push once  dextrose 50% Injectable 12.5 Gram(s) IV Push once  dextrose 50% Injectable 25 Gram(s) IV Push once  glucagon  Injectable 1 milliGRAM(s) IntraMuscular once  insulin lispro (ADMELOG) corrective regimen sliding scale   SubCutaneous three times a day before meals  insulin lispro (ADMELOG) corrective regimen sliding scale   SubCutaneous at bedtime  metoprolol tartrate Injectable 5 milliGRAM(s) IV Push every 8 hours  pantoprazole  Injectable 40 milliGRAM(s) IV Push every 24 hours  valproate sodium   IVPB 250 milliGRAM(s) IV Intermittent every 6 hours    MEDICATIONS  (PRN):  dextrose Oral Gel 15 Gram(s) Oral once PRN Blood Glucose LESS THAN 70 milliGRAM(s)/deciliter  HYDROmorphone  Injectable 0.5 milliGRAM(s) IV Push every 4 hours PRN Moderate Pain (4 - 6)  LORazepam   Injectable 2 milliGRAM(s) IV Push every 4 hours PRN seizures      FAMILY HISTORY:    SOCIAL HISTORY: negative for tobacco, alcohol, or ilicit drug use.    Allergies    No Known Allergies    Intolerances        Neurologic:  -Mental status: Awake eyes closed. On initial evaluation able to say "shannon", and follows some commands.   -Cranial nerves:   II: BTT present on the R.  III, IV, VI: R gaze preference. Does not cross midline. Pupils equally round and reactive to light  Motor: LUE, and LLE flaccid 0/5. RUE and RLE 2/5. w/ high frequency low amplitude tremor on the RUE   Sensation: grimace to pain RUE RLE.         LABS:                        15.3   18.83 )-----------( 236      ( 31 Jan 2025 05:30 )             50.5     01-31    144  |  108  |  20  ----------------------------<  133[H]  3.8   |  25  |  0.47[L]    Ca    8.2[L]      31 Jan 2025 05:30  Phos  2.9     01-31  Mg     2.1     01-31    TPro  6.3  /  Alb  3.6  /  TBili  0.6  /  DBili  x   /  AST  19  /  ALT  40  /  AlkPhos  157[H]  01-31    Hemoglobin A1C:   Vitamin B12   PT/INR - ( 31 Jan 2025 00:49 )   PT: 12.0 sec;   INR: 1.04          PTT - ( 31 Jan 2025 00:49 )  PTT:32.6 sec  CAPILLARY BLOOD GLUCOSE      POCT Blood Glucose.: 131 mg/dL (31 Jan 2025 13:01)      Urinalysis Basic - ( 31 Jan 2025 05:30 )    Color: x / Appearance: x / SG: x / pH: x  Gluc: 133 mg/dL / Ketone: x  / Bili: x / Urobili: x   Blood: x / Protein: x / Nitrite: x   Leuk Esterase: x / RBC: x / WBC x   Sq Epi: x / Non Sq Epi: x / Bacteria: x            Microbiology:    Urinalysis with Rflx Culture (collected 31 Jan 2025 04:07)    Culture - Blood (collected 31 Jan 2025 00:43)  Source: .Blood BLOOD  Gram Stain (31 Jan 2025 14:58):    Growth in aerobic bottle: Gram Negative Rods    Growth in anaerobic bottle: Gram Negative Rods  Preliminary Report (31 Jan 2025 14:58):    Growth in aerobic bottle: Gram Negative Rods    Growth in anaerobic bottle: Gram Negative Rods    Culture - Blood (collected 31 Jan 2025 00:43)  Source: .Blood BLOOD  Gram Stain (31 Jan 2025 14:26):    Growth in aerobic bottle: Gram Negative Rods    Growth in anaerobic bottle: Gram Negative Rods  Preliminary Report (31 Jan 2025 14:26):    Growth in aerobic bottle: Gram Negative Rods    Growth in anaerobic bottle: Gram Negative Rods    Direct identification is available within approximately 3-5    hours either by Blood Panel Multiplexed PCR or Direct    MALDI-TOF. Details: [email or web address]        RADIOLOGY, EKG AND ADDITIONAL TESTS: Reviewed.           NEUROLOGY CONSULT    HPI:   73 yo female w PMH GBM on hospice care (diagnosed in 2024, follows at [facility] under Dr. Hernandez, with reported attempted resection of tumor, on chemotherapy with last chemo 2 months ago). Per son, she was speaking a few words yesterday and was able to eat lunch, but become unresponsive and had iwob yesterday evening. She has intermittent shaking of the right hand and had an episode yesterday for which she was given 1 mg of ativan (instead of 0.5 ativan per son, which he believes is contributing to her AMS, last dose 5:30 pm 1/30).     A decision was made to bring Ms Valentino to the ER due to her being tachypneic, alongside her worsening lethargy. Of note, pt was admitted at [facility] 3 weeks ago for first time seizures, on valproic acid BID, dexamethasone 2 mg BID. Pt originally did not want treatment for GBS 2 months ago but changed her mind 4 weeks ago when she felt that she was worsening.     Epilepsy HPI:  First seizure as per family was January 3rd and has been on VPA 500mg BID (per son at bedside has been giving once a day) and compliant. No other new medications that could potentially be interacting per son. Oncologist has prescribing the seizure meds, unclear if she has a primary neurologist.       MEDICATIONS  Home Medications:  dexAMETHasone 2 mg oral tablet: 1 tab(s) orally 2 times a day (31 Jan 2025 09:14)  LORazepam 0.5 mg oral tablet: 0.5 tab(s) orally every 4 hours as needed for  agitation (31 Jan 2025 09:10)  metoprolol succinate 25 mg oral capsule, extended release: 0.5 cap(s) orally 2 times a day (31 Jan 2025 09:10)  valproic acid: 500 milligram(s) orally 2 times a day Take 10mL by mouth every 12 hours (31 Jan 2025 09:09)    MEDICATIONS  (STANDING):  cefTRIAXone   IVPB 2000 milliGRAM(s) IV Intermittent every 24 hours  dexAMETHasone  Injectable 4 milliGRAM(s) IV Push every 6 hours  dextrose 5%. 1000 milliLiter(s) (50 mL/Hr) IV Continuous <Continuous>  dextrose 5%. 1000 milliLiter(s) (100 mL/Hr) IV Continuous <Continuous>  dextrose 50% Injectable 25 Gram(s) IV Push once  dextrose 50% Injectable 12.5 Gram(s) IV Push once  dextrose 50% Injectable 25 Gram(s) IV Push once  glucagon  Injectable 1 milliGRAM(s) IntraMuscular once  insulin lispro (ADMELOG) corrective regimen sliding scale   SubCutaneous three times a day before meals  insulin lispro (ADMELOG) corrective regimen sliding scale   SubCutaneous at bedtime  metoprolol tartrate Injectable 5 milliGRAM(s) IV Push every 8 hours  pantoprazole  Injectable 40 milliGRAM(s) IV Push every 24 hours  valproate sodium   IVPB 250 milliGRAM(s) IV Intermittent every 6 hours    MEDICATIONS  (PRN):  dextrose Oral Gel 15 Gram(s) Oral once PRN Blood Glucose LESS THAN 70 milliGRAM(s)/deciliter  HYDROmorphone  Injectable 0.5 milliGRAM(s) IV Push every 4 hours PRN Moderate Pain (4 - 6)  LORazepam   Injectable 2 milliGRAM(s) IV Push every 4 hours PRN seizures      FAMILY HISTORY:    SOCIAL HISTORY: negative for tobacco, alcohol, or ilicit drug use.    Allergies    No Known Allergies    Intolerances        Neurologic:  -Mental status: Awake eyes closed. On initial evaluation able to say "shannon", and follows some commands.   -Cranial nerves:   II: BTT present on the R.  III, IV, VI: R gaze deviation. Does not cross midline. Pupils equally round and reactive to light  Motor: LUE, and LLE flaccid 0/5. RUE and RLE 2/5. w/ high frequency low amplitude tremor on the RUE   Sensation: grimace to pain RUE RLE.         LABS:                        15.3   18.83 )-----------( 236      ( 31 Jan 2025 05:30 )             50.5     01-31    144  |  108  |  20  ----------------------------<  133[H]  3.8   |  25  |  0.47[L]    Ca    8.2[L]      31 Jan 2025 05:30  Phos  2.9     01-31  Mg     2.1     01-31    TPro  6.3  /  Alb  3.6  /  TBili  0.6  /  DBili  x   /  AST  19  /  ALT  40  /  AlkPhos  157[H]  01-31    Hemoglobin A1C:   Vitamin B12   PT/INR - ( 31 Jan 2025 00:49 )   PT: 12.0 sec;   INR: 1.04          PTT - ( 31 Jan 2025 00:49 )  PTT:32.6 sec  CAPILLARY BLOOD GLUCOSE      POCT Blood Glucose.: 131 mg/dL (31 Jan 2025 13:01)      Urinalysis Basic - ( 31 Jan 2025 05:30 )    Color: x / Appearance: x / SG: x / pH: x  Gluc: 133 mg/dL / Ketone: x  / Bili: x / Urobili: x   Blood: x / Protein: x / Nitrite: x   Leuk Esterase: x / RBC: x / WBC x   Sq Epi: x / Non Sq Epi: x / Bacteria: x            Microbiology:    Urinalysis with Rflx Culture (collected 31 Jan 2025 04:07)    Culture - Blood (collected 31 Jan 2025 00:43)  Source: .Blood BLOOD  Gram Stain (31 Jan 2025 14:58):    Growth in aerobic bottle: Gram Negative Rods    Growth in anaerobic bottle: Gram Negative Rods  Preliminary Report (31 Jan 2025 14:58):    Growth in aerobic bottle: Gram Negative Rods    Growth in anaerobic bottle: Gram Negative Rods    Culture - Blood (collected 31 Jan 2025 00:43)  Source: .Blood BLOOD  Gram Stain (31 Jan 2025 14:26):    Growth in aerobic bottle: Gram Negative Rods    Growth in anaerobic bottle: Gram Negative Rods  Preliminary Report (31 Jan 2025 14:26):    Growth in aerobic bottle: Gram Negative Rods    Growth in anaerobic bottle: Gram Negative Rods    Direct identification is available within approximately 3-5    hours either by Blood Panel Multiplexed PCR or Direct    MALDI-TOF. Details: [email or web address]        RADIOLOGY, EKG AND ADDITIONAL TESTS: Reviewed.

## 2025-01-31 NOTE — CONSULT NOTE ADULT - CONVERSATION DETAILS
Extensive discussion at bedside regarding advanced nature of GBM, no further viable interventions available and code status. Recommended DNR/DNI. MOLST form discussed. Although family does not feel comfortable filling out form at this time, no overt objections at the time of discussion to a comfort based approach with recommendation for no CPR and no intubation at the time of decompensation and death. Discussed utilizing opiates and benzo's for symptom based care with goals of comfort, with no objections and expressed understanding. MEWS exempt.

## 2025-01-31 NOTE — H&P ADULT - PROBLEM SELECTOR PLAN 1
last chemo ~2months ago, follows at [facility].     Plan  -MRI brain  -palliative care consult   -NSGY recs last chemo ~2months ago, follows at [facility].     Plan  -MRI brain  -f/u records from [facility]   -c/w decadron 4mg q6h (home med: decadron 2mg BID)   -palliative care consult   -NSGY recs last chemo ~2months ago, follows at [facility].     Plan  -MRI brain  -f/u records from [facility]   -c/w decadron 4mg q6h (home med: decadron 2mg BID)   -c/w mISS while inpatient for high sugars 2/2 decadron   -palliative care consult   -NSGY recs

## 2025-01-31 NOTE — H&P ADULT - HISTORY OF PRESENT ILLNESS
73 yo female w PMH GBM on hospice care (diagnosed in 2024, follows at [facility] under Dr. Hernandez, with reported attempted resection of tumor, on chemotherapy with last chemo 2 months ago). Son reports pt has had increasing weakness for 2 weeks, at baseline is conversant and more alert. Since yesterday was noted to be lethargic, and son reports pt has had right hand shaking with pt being given intermittent doses of ativan (unknown total dose, last dose 5:30 pm 1/30). A decision was made to bring Ms Valentino to the ER due to her being tachypneic, alongside her worsening lethargy. Of note, pt was admitted at [facility] 3 weeks ago for first time GTC, on Valproic Acid (unknown dose) BID. Otherwise takes dexamethasone 2 mg BID.       ED course:  Vitals: HR 92, 126/88, 4L NC 94% O2 RR 17   Labs: WBC 19.41, Hgb 16.3, Plt 248. Na 146, , UA pos for bacteria and WBC  VGB: pH 7.54, PCO2 venous 36, pO2 64, 93.6% O2 sat   EKG: afib   Imaging:   CXR clear   CTH shows large infiltrating mass in the right cerebral hemisphere involving right parietal and temporal lobes with extension into the anterior inferior frontal lobe. Extensive mass effect in the right cerebral hemisphere with 1.2 cm of midline shift to the left. Mild dilatation of the left lateral ventricle is suspicious for hydrocephalus due to ventricular entrapment   Interventions: ofirmev x1, CTX 1gx1, decadron 20mg x1, diltiazem 10mg x2, lopressor 5mg x1, 1L NSx1   Consults: MIKE   73 yo female w PMH GBM on hospice care (diagnosed in 2024, follows at [facility] under Dr. Hernandez, with reported attempted resection of tumor, on chemotherapy with last chemo 2 months ago). Son reports pt has had increasing weakness for 2 weeks, at baseline is conversant and more alert. Since yesterday was noted to be lethargic, and son reports pt has had right hand shaking with pt being given intermittent doses of ativan (unknown total dose, last dose 5:30 pm 1/30). A decision was made to bring Ms Valentino to the ER due to her being tachypneic, alongside her worsening lethargy. Of note, pt was admitted at [facility] 3 weeks ago for first time GTC, on Valproic Acid (unknown dose) BID. Otherwise takes dexamethasone 2 mg BID.       ED course:  Vitals: HR 92, 126/88, 4L NC 94% O2 RR 17   Labs: WBC 19.41, Hgb 16.3, Plt 248. Na 146, , UA pos for bacteria and WBC, Full RVP negative   VGB: pH 7.54, PCO2 venous 36, pO2 64, 93.6% O2 sat   EKG: afib   Imaging:   CXR clear   CTH shows large infiltrating mass in the right cerebral hemisphere involving right parietal and temporal lobes with extension into the anterior inferior frontal lobe. Extensive mass effect in the right cerebral hemisphere with 1.2 cm of midline shift to the left. Mild dilatation of the left lateral ventricle is suspicious for hydrocephalus due to ventricular entrapment   Interventions: ofirmev x1, CTX 1gx1, decadron 20mg x1, diltiazem 10mg x2, lopressor 5mg x1, 1L NSx1   Consults: MIKE   73 yo female w PMH GBM on hospice care (diagnosed in 2024, follows at [facility] under Dr. Hernandez, with reported attempted resection of tumor, on chemotherapy with last chemo 2 months ago). Per son, she was speaking a few words yesterday and was able to eat lunch, but become unresponsive and had iwob yesterday evening. She has intermittent shaking of the right hand and had an episode yesterday for which she was given 1 mg of ativan (instead of 0.5 ativan per son, which he believes is contributing to her AMS, last dose 5:30 pm 1/30).     A decision was made to bring Ms Valentino to the ER due to her being tachypneic, alongside her worsening lethargy. Of note, pt was admitted at [facility] 3 weeks ago for first time seizures, on valproic acid BID, dexamethasone 2 mg BID. Pt originally did not want treatment for GBS 2 months ago but changed her mind 4 weeks ago when she felt that she was worsening.     ED course:  Vitals: HR 92, 126/88, 4L NC 94% O2 RR 17   Labs: WBC 19.41, Hgb 16.3, Plt 248. Na 146, , UA pos for bacteria and WBC, Full RVP negative   VGB: pH 7.54, PCO2 venous 36, pO2 64, 93.6% O2 sat   EKG: afib   Imaging:   CXR clear   CTH shows large infiltrating mass in the right cerebral hemisphere involving right parietal and temporal lobes with extension into the anterior inferior frontal lobe. Extensive mass effect in the right cerebral hemisphere with 1.2 cm of midline shift to the left. Mild dilatation of the left lateral ventricle is suspicious for hydrocephalus due to ventricular entrapment   Interventions: ofirmev x1, CTX 1gx1, decadron 20mg x1, diltiazem 10mg x2, lopressor 5mg x1, 1L NSx1   Consults: MIKE   73 yo female w PMH GBM on hospice care (diagnosed in 2024, follows at [facility] under Dr. Hernandez, with reported attempted resection of tumor, on chemotherapy with last chemo 2 months ago). Per son, she was speaking a few words yesterday and was able to eat lunch, but become unresponsive and had iwob yesterday evening. She has intermittent shaking of the right hand and had an episode yesterday for which she was given 1 mg of ativan (instead of 0.5 ativan per son, which he believes is contributing to her AMS, last dose 5:30 pm 1/30).     A decision was made to bring Ms Valentino to the ER due to her being tachypneic, alongside her worsening lethargy. Of note, pt was admitted at [facility] 3 weeks ago for first time seizures, on valproic acid BID, dexamethasone 2 mg BID. Pt originally did not want treatment for GBS 2 months ago but changed her mind 4 weeks ago when she felt that she was worsening.     ED course:  Vitals: HR 92, 126/88, 4L NC 94% O2 RR 17   Labs: WBC 19.41, Hgb 16.3, Plt 248. Na 146, , UA pos for bacteria and WBC, Full RVP negative   VGB: pH 7.54, PCO2 venous 36, pO2 64, 93.6% O2 sat   EKG: afib   Imaging: CXR clear ; CTH shows large infiltrating mass in the right cerebral hemisphere involving right parietal and temporal lobes with extension into the anterior inferior frontal lobe. Extensive mass effect in the right cerebral hemisphere with 1.2 cm of midline shift to the left. Mild dilatation of the left lateral ventricle is suspicious for hydrocephalus due to ventricular entrapment   Interventions: Ofirmev x1, CTX 1gx1, decadron 20mg x1, diltiazem 10mg x2, lopressor 5mg x1, 1L NSx1   Consults: MIKE

## 2025-01-31 NOTE — ED ADULT TRIAGE NOTE - PRO INTERPRETER NEED 2
MEDICATION: Atorvastatin    Hydrochlorothiazide    Metoprolol    Amlodipine    Losartan    LAST SEEN: 1/6/21    NEXT APPOINTMENT: 7/7/21    LAST REFILLED:10/23/20      
English

## 2025-01-31 NOTE — H&P ADULT - ASSESSMENT
75 yo female w PMH GBM on hospice care (diagnosed in 2024, follows at [facility] under Dr. Hernandez, with reported attempted resection of tumor, on chemotherapy with last chemo 2 months ago) 75 yo female w PMH GBM on hospice care (diagnosed in 2024, follows at [facility] under Dr. Hernandez, with reported attempted resection of tumor, on chemotherapy with last chemo 2 months ago), admitted for ams likely 2/2 disease progresion

## 2025-01-31 NOTE — CONSULT NOTE ADULT - PROBLEM SELECTOR RECOMMENDATION 9
- Imaging with extensive GBM.  - Likely progression of underlying disease given overall deterioration.  - NSX input appreciated.  - No NSX interventions offered.  - Prognosis is very poor.  - Patient was previously on home hospice services.   - To be admitted to medicine service for symptom based care and treatment of GNR bacteremia.  - Also started on Decadron for brain shift and significant swelling.  - If condition deteriorates, and patient has respiratory distress, Dilaudid 0.5mg IV Q2 hours prn is appropriate.

## 2025-01-31 NOTE — PATIENT PROFILE ADULT - FALL HARM RISK - HARM RISK INTERVENTIONS

## 2025-01-31 NOTE — CONSULT NOTE ADULT - ATTENDING COMMENTS
Plan as above  Discussed with family member at bedside.  Agreeable to EEG to assess for subclinical seizures

## 2025-01-31 NOTE — ED PROVIDER NOTE - OBJECTIVE STATEMENT
74F hx GBM (last chemo ~2months ago), seizure, BIBEMS for unresponsiveness. per EMS pt lethargic tonight. states earlier in the day she was eating.  EMS states nurse gave pt lorazepam today. unsure how many milligrams were administered or how many times. upon their arrival pt hypoxic to 91%, placed on NRB.   home meds: lorazepam, dexamethasone, valproic acid.

## 2025-01-31 NOTE — ED ADULT TRIAGE NOTE - SPO2 (%)
Patient: Luca Carolina Jr.    Procedure Summary     Date:  03/22/19 Room / Location:   [facility] /  [facility]    Anesthesia Start:  0943 Anesthesia Stop:  1035    Procedures:       ESOPHAGOGASTRODUODENOSCOPY (N/A Esophagus)      COLONOSCOPY to cecum/TI (N/A ) Diagnosis:       Gastroesophageal reflux disease, esophagitis presence not specified      Encounter for screening colonoscopy      (Gastroesophageal reflux disease, esophagitis presence not specified [K21.9])      (Encounter for screening colonoscopy [Z12.11])    Surgeon:  Georges Zacarias Jr., MD Provider:  Shelby Harvey MD    Anesthesia Type:  MAC ASA Status:  2          Anesthesia Type: MAC  Last vitals  BP   118/70 (03/22/19 1034)   Temp   36.7 °C (98 °F) (03/22/19 1034)   Pulse   71 (03/22/19 1034)   Resp   12 (03/22/19 1034)     SpO2   96 % (03/22/19 1034)     Post Anesthesia Care and Evaluation    Patient location during evaluation: PHASE II  Level of consciousness: awake and alert  Anesthetic complications: No anesthetic complications      
97

## 2025-01-31 NOTE — ED ADULT NURSE REASSESSMENT NOTE - NS ED NURSE REASSESS COMMENT FT1
[facility] ([phone])
Pt still non-verbal but withdraws to pain. Bilateral pupils are reactive. Pt still have intermittent fast Afib at 150's, assigned admitting physician aware. Vital signs updated.

## 2025-01-31 NOTE — ED PROVIDER NOTE - CADM POA CENTRAL LINE
72yo M with hx of 12cm hepatic cyst for years followed in [place] with q month imaging.  hx of hpylori gastriti tx with quad tx pateint states he was compliant but breath test still positive.  he no longer has abd pain. There has been no associated  dysphagia,  odynophagia, anorexia, weight loss,  anemia, occult blood in stool, rectal bleeding, melena,  nausea, vomiting, abdominal pain, GERD, postprandial fullness, early satiety, abdominal distention, bloating, change in bowel habits, change in stool caliber, diarrhea, constipation,
No

## 2025-01-31 NOTE — H&P ADULT - PROBLEM SELECTOR PLAN 6
F: tolerating oral  E: replete PRN Mg <2, K > 4  DVT:   N:   GI: none  Code Status: full code F: tolerating oral  E: replete PRN Mg <2, K > 4  DVT:   N: pantoprazole 40mg daily while on steroids   GI: none  Code Status: full code

## 2025-01-31 NOTE — ED ADULT NURSE NOTE - OBJECTIVE STATEMENT
Pt is a 74 year old female BIBEMS from home. Pt has hx of GBM and seizure , baseline orientation is x3. Pt brought in due to altered mental status presented by being lethargic and not responding verbally.    Upon ed arrival, pt hypoxic at 91% and only responds to pain. Pt is also febrile at 100.4F rectally. Dr. Esposito at bedside assessing the pt.

## 2025-01-31 NOTE — CHART NOTE - NSCHARTNOTEFT_GEN_A_CORE
Dr. Honeycutt discussed patient's treatement history and current symptoms with patient's son and with Sheivy via phone. We explained that given patient's decision to forego chemotherapy, progression of GBM and her subsequent decline in neurological exam is expected. Dr. Honeycutt discussed that surgical intervention would likely shorten patient's life, and recommended comfort measures. Patient's son expressed understanding and agreement. Dr. Honeycutt discussed patient's treatment course and current symptoms with patient's son and with Sheivy via phone. We explained that given patient's decision to forego chemotherapy, progression of GBM and her subsequent decline in neurological exam is expected. Dr. Honeycutt discussed that surgical intervention would likely shorten patient's life, and recommended comfort measures. Patient's son expressed understanding and agreement.

## 2025-01-31 NOTE — CONSULT NOTE ADULT - PROBLEM SELECTOR RECOMMENDATION 2
- Currently obtunded.  - Likely multifactorial, in the setting of worsening GBM and new GNR bacteremia.  - Prognosis is very poor.  - Supportive care.  - Patient is receiving IV Ativan prn seizures as well as IV Valproate.
Female

## 2025-01-31 NOTE — H&P ADULT - PROBLEM SELECTOR PLAN 3
UA shows WBC and bacteria.       Plan  -f/u urine cx UA shows WBC and bacteria.       Plan  -c/w CTX 1g x2 more days   -f/u urine cx UA shows WBC and bacteria.       Plan  -c/w CTX 1g x2 more days   -f/u urine cx  -f/u blood cx Pt seizures characterized by right hand shaking. Appears to be having focal seizures.     Plan  -c/w valproic acid 500mg BID, switched to 250mg x6h   -f/u valproic acid levels 7.4,  01/31 00h55  -c/w ativan 0,5mg q4h prn for seizures  -vEEG w/ epilepsy consult

## 2025-01-31 NOTE — ED ADULT TRIAGE NOTE - CHIEF COMPLAINT QUOTE
Son states: "My mother has GBS. Today in the afternoon we noticed she is less responsive. Her breathing is usually 18 breaths per minute, in the afternoon it went up to 36 per minute. She was also given Ativan by mouth multiple times today. She was on hospice care and I want to revoke it."

## 2025-01-31 NOTE — CONSULT NOTE ADULT - SUBJECTIVE AND OBJECTIVE BOX
HISTORY OF PRESENT ILLNESS:   75 yo female w PMH GBM on hospice care (diagnosed in April 2024, follows at [facility] under Dr. Hernandez, with reported attempted resection of tumor, on chemotherapy with last chemo 2 months ago) BIBEMS for AMS since yesterday. Son reports pt has had increasing weakness for 2 weeks, at baseline is conversant and more alert. Since yesterday was noted to be lethargic, and son reports pt has had right hand shaking with pt being given intermittent doses of ativan (unknown total dose, last dose 5:30 pm 1/30). A decision was made to bring Ms Valentino to the ER due to her being tachypneic, alongside her worsening lethargy. Of note, pt was admitted at [facility] 3 weeks ago for first time GTC, on Valproic Acid (unknown dose) BID. Otherwise takes dexamethasone 2 mg BID.     PAST MEDICAL & SURGICAL HISTORY:  GBM (glioblastoma multiforme)  Seizure    FAMILY HISTORY:      SOCIAL HISTORY:  Tobacco Use: Unable to obtain 2/2 mental status  EtOH use: Unable to obtain 2/2 mental status  Substance: Unable to obtain 2/2 mental status    Allergies    No Known Allergies    Intolerances        REVIEW OF SYSTEMS:  General:	no recent illnesses, no recent wt gain/loss, no chills  Skin/Breast:  no rash, lumps, new moles, erythema, tenderness  Ophthalmologic:  no change in vision, diplopia, pain, redness, tearing, dry eyes	  ENMT:	no hearing loss, tinnitus, ear pain, vertigo, nasal congestion, epistaxis, sore throat  Respiratory and Thorax: no coughing, wheezing, recent URI, shortness of breath	  Cardiovascular: no chest pain, QUINTERO, leg swelling, irregular rhythm   Gastrointestinal:	no abd pain, nausea, vomiting, diarrhea, constipation, bloody stool, heartburn  Genitourinary: no frequency, dysuria, hematuria  Musculoskeletal:	no joint pain, no joint swelling, no tenderness  Neurological:	 see HPI  Psychiatric:	no confusion, no anxiousness, no depression   Hematology/Lymphatics:	no brusing, easy bleeding, LAD  Endocrine:  	no excess urination/thirst, heat/cold intolerance  Allergic/Immunologic:  no urticaria, sneezing, recurrent infections      MEDICATIONS:  Antibiotics:    Neuro:    Anticoagulation:    OTHER:  dexAMETHasone  IVPB 20 milliGRAM(s) IV Intermittent Once    IVF:      Vital Signs Last 24 Hrs  T(C): 38.3 (31 Jan 2025 01:09), Max: 38.3 (31 Jan 2025 01:09)  T(F): 100.9 (31 Jan 2025 01:09), Max: 100.9 (31 Jan 2025 01:09)  HR: 97 (31 Jan 2025 02:04) (97 - 113)  BP: 132/72 (31 Jan 2025 02:04) (116/77 - 132/72)  BP(mean): --  RR: 20 (31 Jan 2025 00:38) (20 - 20)  SpO2: 97% (31 Jan 2025 02:04) (97% - 97%)    Parameters below as of 31 Jan 2025 02:04  Patient On (Oxygen Delivery Method): nasal cannula  O2 Flow (L/min): 4      PHYSICAL EXAM:  GEN: laying in bed, appears lethargic and mottled   NEURO:   AOx0, does not follow commands. PERRL 4 mm. LUE withdraws to noxious stimuli. Remainder of extremities with trace spontaneous movements, R>L.   CV: tachycardic  PULM: symmetric chest rise noted    LABS:                        16.3   19.41 )-----------( 248      ( 31 Jan 2025 00:49 )             52.3     01-31    146[H]  |  107  |  23  ----------------------------<  118[H]  4.3   |  28  |  0.52    Ca    8.9      31 Jan 2025 00:49  Mg     2.3     01-31    TPro  6.3  /  Alb  3.6  /  TBili  0.6  /  DBili  x   /  AST  19  /  ALT  40  /  AlkPhos  157[H]  01-31    PT/INR - ( 31 Jan 2025 00:49 )   PT: 12.0 sec;   INR: 1.04          PTT - ( 31 Jan 2025 00:49 )  PTT:32.6 sec  Urinalysis Basic - ( 31 Jan 2025 00:49 )    Color: x / Appearance: x / SG: x / pH: x  Gluc: 118 mg/dL / Ketone: x  / Bili: x / Urobili: x   Blood: x / Protein: x / Nitrite: x   Leuk Esterase: x / RBC: x / WBC x   Sq Epi: x / Non Sq Epi: x / Bacteria: x      CULTURES:      RADIOLOGY & ADDITIONAL STUDIES:  ACC: [id number] EXAM:  CT BRAIN   ORDERED BY: ALEC CORDERO     PROCEDURE DATE:  01/31/2025          INTERPRETATION:  CLINICAL INFORMATION: Altered mental status.    Glioblastoma.    COMPARISON: None.    CONTRAST:  IV Contrast: NONE  .    TECHNIQUE:  Serial axial images were obtained from the skull base to the   vertex using multi-slice helical technique. Sagittal and coronal   reformats were obtained.    FINDINGS:    VENTRICLES AND SULCI: Partial effacement of the right lateral ventricle.    Mild dilatation of the left lateral ventricle suspicious for   hydrocephalus due to ventricular entrapment.  INTRA-AXIAL: Postsurgical changes in the lateral aspect of the right   temporal lobe.  Suspicion for a large infiltrating mass in the right   cerebral hemisphere, involving right parietal and temporal lobes, with   extension into the anterior inferior right frontal lobe.  Extensive mass   effect in the right right cerebral hemisphere with 1.2 cm of midline   shift to the left.  No acute intracranial hemorrhage or evidence of acute   cerebral infarction.  EXTRA-AXIAL: No acute subarachnoid or subdural hemorrhage.    VISUALIZED SINUSES:  Clear.  TYMPANOMASTOID CAVITIES:  Clear.  VISUALIZED ORBITS: Normal.  CALVARIUM: Old right parietal-temporal craniotomy.    MISCELLANEOUS: None.      IMPRESSION:  Suspicion for a large infiltrating mass in the right cerebral hemisphere,   involving right parietal and temporal lobes, with extension into the   anterior inferior right frontal lobe.  Contrast-enhanced MRI is   recommended for further evaluation.    Extensive mass effect in the right cerebral hemisphere with 1.2 cm of   midline shift to the left.    Mild dilatation of the left lateral ventricle is suspicious for   hydrocephalus due to ventricular entrapment.    Findings were discussed with Dr. ALEC CORDERO [phone] 1/31/2025   1:49 AM by Dr. Cervantes with read back confirmation.    --- End of Report ---          PIPPA CERVANTES MD; Resident Radiologist  This document has been electronically signed.  SHILO CAROLNIA MD; Attending Radiologist  This document has been electronically signed. Jan 31 2025  2:00AM    Assessment:  75 yo female w PMH GBM on hospice care (diagnosed in April 2024, follows at [facility] under Dr. Hernandez, with reported attempted resection of tumor, on chemotherapy with last chemo 2 months ago) BIBEMS for AMS since yesterday. Son reports pt has had increasing weakness for 2 weeks, at baseline is conversant and more alert. Since yesterday was noted to be lethargic, and son reports pt has had right hand shaking with pt being given intermittent doses of ativan (unknown total dose, last dose 5:30 pm 1/30). A decision was made to bring Ms Valentino to the ER due to her being tachypneic, alongside her worsening lethargy. Of note, pt was admitted at [facility] 3 weeks ago for first time GTC, on Valproic Acid (unknown dose) BID. Otherwise takes dexamethasone 2 mg BID. On exam, pt is obtunded, AOx0, not following commands, LUE withdraws to noxious otherwise has trace spontaneous movements. Of note, observed to be febrile and tachycardic to 120s, with WBC 19.41 with left shift. Na 146.  Neurosurgery was consulted for AMS in the setting of known GBM patient, alongside CTH findings of a right sided cerebral hemisphere mass with mass effect and significant vasogenic edema, and right to left midline shift.     Plan:  - No acute neurosurgical intervention at this time.  - Recommend palliative consult for goals of care discussion. If within GOC, please obtain MRI Brain for further evaluation of tumor burden.  - Maintain Na >145.  - Give 20 mg dexamethasone now, and 4 mg every 6 hours afterwards.  - Remainder of care as per ED.    Thank you for the consultation.     Discussed with Dr. Honeycutt  HISTORY OF PRESENT ILLNESS:   73 yo female w PMH GBM on hospice care (diagnosed in 2024, follows at [facility] under Dr. Hernandez, with reported attempted resection of tumor, on chemotherapy with last chemo 2 months ago) BIBEMS for AMS since yesterday. Son reports pt has had increasing weakness for 2 weeks, at baseline is conversant and more alert. Since yesterday was noted to be lethargic, and son reports pt has had right hand shaking with pt being given intermittent doses of ativan (unknown total dose, last dose 5:30 pm 1/30). A decision was made to bring Ms Valentino to the ER due to her being tachypneic, alongside her worsening lethargy. Of note, pt was admitted at [facility] 3 weeks ago for first time GTC, on Valproic Acid (unknown dose) BID. Otherwise takes dexamethasone 2 mg BID.     PAST MEDICAL & SURGICAL HISTORY:  GBM (glioblastoma multiforme)  Seizure    FAMILY HISTORY:      SOCIAL HISTORY:  Tobacco Use: Unable to obtain 2/2 mental status  EtOH use: Unable to obtain 2/2 mental status  Substance: Unable to obtain 2/2 mental status    Allergies    No Known Allergies    Intolerances        REVIEW OF SYSTEMS:  General:	no recent illnesses, no recent wt gain/loss, no chills  Skin/Breast:  no rash, lumps, new moles, erythema, tenderness  Ophthalmologic:  no change in vision, diplopia, pain, redness, tearing, dry eyes	  ENMT:	no hearing loss, tinnitus, ear pain, vertigo, nasal congestion, epistaxis, sore throat  Respiratory and Thorax: no coughing, wheezing, recent URI, shortness of breath	  Cardiovascular: no chest pain, QUINTERO, leg swelling, irregular rhythm   Gastrointestinal:	no abd pain, nausea, vomiting, diarrhea, constipation, bloody stool, heartburn  Genitourinary: no frequency, dysuria, hematuria  Musculoskeletal:	no joint pain, no joint swelling, no tenderness  Neurological:	 see HPI  Psychiatric:	no confusion, no anxiousness, no depression   Hematology/Lymphatics:	no brusing, easy bleeding, LAD  Endocrine:  	no excess urination/thirst, heat/cold intolerance  Allergic/Immunologic:  no urticaria, sneezing, recurrent infections      MEDICATIONS:  Antibiotics:    Neuro:    Anticoagulation:    OTHER:  dexAMETHasone  IVPB 20 milliGRAM(s) IV Intermittent Once    IVF:      Vital Signs Last 24 Hrs  T(C): 38.3 (31 Jan 2025 01:09), Max: 38.3 (31 Jan 2025 01:09)  T(F): 100.9 (31 Jan 2025 01:09), Max: 100.9 (31 Jan 2025 01:09)  HR: 97 (31 Jan 2025 02:04) (97 - 113)  BP: 132/72 (31 Jan 2025 02:04) (116/77 - 132/72)  BP(mean): --  RR: 20 (31 Jan 2025 00:38) (20 - 20)  SpO2: 97% (31 Jan 2025 02:04) (97% - 97%)    Parameters below as of 31 Jan 2025 02:04  Patient On (Oxygen Delivery Method): nasal cannula  O2 Flow (L/min): 4      PHYSICAL EXAM:  GEN: laying in bed, appears lethargic and mottled   NEURO:   AOx0, does not follow commands. PERRL 4 mm. LUE withdraws to noxious stimuli. Remainder of extremities with trace spontaneous movements, R>L.   CV: tachycardic  PULM: symmetric chest rise noted    LABS:                        16.3   19.41 )-----------( 248      ( 31 Jan 2025 00:49 )             52.3     01-31    146[H]  |  107  |  23  ----------------------------<  118[H]  4.3   |  28  |  0.52    Ca    8.9      31 Jan 2025 00:49  Mg     2.3     01-31    TPro  6.3  /  Alb  3.6  /  TBili  0.6  /  DBili  x   /  AST  19  /  ALT  40  /  AlkPhos  157[H]  01-31    PT/INR - ( 31 Jan 2025 00:49 )   PT: 12.0 sec;   INR: 1.04          PTT - ( 31 Jan 2025 00:49 )  PTT:32.6 sec  Urinalysis Basic - ( 31 Jan 2025 00:49 )    Color: x / Appearance: x / SG: x / pH: x  Gluc: 118 mg/dL / Ketone: x  / Bili: x / Urobili: x   Blood: x / Protein: x / Nitrite: x   Leuk Esterase: x / RBC: x / WBC x   Sq Epi: x / Non Sq Epi: x / Bacteria: x      CULTURES:      RADIOLOGY & ADDITIONAL STUDIES:  ACC: [id number] EXAM:  CT BRAIN   ORDERED BY: ALEC CORDERO     PROCEDURE DATE:  01/31/2025          INTERPRETATION:  CLINICAL INFORMATION: Altered mental status.    Glioblastoma.    COMPARISON: None.    CONTRAST:  IV Contrast: NONE  .    TECHNIQUE:  Serial axial images were obtained from the skull base to the   vertex using multi-slice helical technique. Sagittal and coronal   reformats were obtained.    FINDINGS:    VENTRICLES AND SULCI: Partial effacement of the right lateral ventricle.    Mild dilatation of the left lateral ventricle suspicious for   hydrocephalus due to ventricular entrapment.  INTRA-AXIAL: Postsurgical changes in the lateral aspect of the right   temporal lobe.  Suspicion for a large infiltrating mass in the right   cerebral hemisphere, involving right parietal and temporal lobes, with   extension into the anterior inferior right frontal lobe.  Extensive mass   effect in the right right cerebral hemisphere with 1.2 cm of midline   shift to the left.  No acute intracranial hemorrhage or evidence of acute   cerebral infarction.  EXTRA-AXIAL: No acute subarachnoid or subdural hemorrhage.    VISUALIZED SINUSES:  Clear.  TYMPANOMASTOID CAVITIES:  Clear.  VISUALIZED ORBITS: Normal.  CALVARIUM: Old right parietal-temporal craniotomy.    MISCELLANEOUS: None.      IMPRESSION:  Suspicion for a large infiltrating mass in the right cerebral hemisphere,   involving right parietal and temporal lobes, with extension into the   anterior inferior right frontal lobe.  Contrast-enhanced MRI is   recommended for further evaluation.    Extensive mass effect in the right cerebral hemisphere with 1.2 cm of   midline shift to the left.    Mild dilatation of the left lateral ventricle is suspicious for   hydrocephalus due to ventricular entrapment.    Findings were discussed with Dr. ALEC CORDERO [phone] 1/31/2025   1:49 AM by Dr. Cervantes with read back confirmation.    --- End of Report ---          PIPPA CERVANTES MD; Resident Radiologist  This document has been electronically signed.  SHILO CAROLINA MD; Attending Radiologist  This document has been electronically signed. Jan 31 2025  2:00AM    Assessment:  73 yo female w PMH GBM on hospice care (diagnosed in 2024, follows at [facility] under Dr. Hernandez, with reported attempted resection of tumor, on chemotherapy with last chemo 2 months ago) BIBEMS for AMS since yesterday. Son reports pt has had increasing weakness for 2 weeks, at baseline is conversant and more alert. Since yesterday was noted to be lethargic, and son reports pt has had right hand shaking with pt being given intermittent doses of ativan (unknown total dose, last dose 5:30 pm 1/30). A decision was made to bring Ms Valentino to the ER due to her being tachypneic, alongside her worsening lethargy. Of note, pt was admitted at [facility] 3 weeks ago for first time GTC, on Valproic Acid (unknown dose) BID. Otherwise takes dexamethasone 2 mg BID. On exam, pt is obtunded, AOx0, not following commands, LUE withdraws to noxious otherwise has trace spontaneous movements. Of note, observed to be febrile and tachycardic to 120s, with WBC 19.41 with left shift. Na 146.  Neurosurgery was consulted for AMS in the setting of known GBM patient, alongside CTH findings of a right sided cerebral hemisphere mass with mass effect and significant vasogenic edema, and right to left midline shift.     Plan:  - No acute neurosurgical intervention at this time.  - Recommend palliative consult for goals of care discussion. If within GOC, please obtain MRI Brain for further evaluation of tumor burden.  - Maintain Na >145.  - Give 20 mg dexamethasone now, and 4 mg every 6 hours afterwards.  - Remainder of care as per ED.    Thank you for the consultation.     Discussed with Dr. Honeycutt  HISTORY OF PRESENT ILLNESS:   73 yo female w PMH GBM on hospice care (diagnosed in 2024, follows at [facility] under Dr. Hernandez, with reported attempted resection of tumor, on chemotherapy with last chemo 2 months ago) BIBEMS for AMS since yesterday. Son reports pt has had increasing weakness for 2 weeks, at baseline is conversant and more alert. Since yesterday was noted to be lethargic, and son reports pt has had right hand shaking with pt being given intermittent doses of ativan (unknown total dose, last dose 5:30 pm 1/30). A decision was made to bring Ms Valentino to the ER due to her being tachypneic, alongside her worsening lethargy. Of note, pt was admitted at [facility] 3 weeks ago for first time GTC, on Valproic Acid (unknown dose) BID. Otherwise takes dexamethasone 2 mg BID.     PAST MEDICAL & SURGICAL HISTORY:  GBM (glioblastoma multiforme)  Seizure    FAMILY HISTORY:      SOCIAL HISTORY:  Tobacco Use: Unable to obtain 2/2 mental status  EtOH use: Unable to obtain 2/2 mental status  Substance: Unable to obtain 2/2 mental status    Allergies    No Known Allergies    Intolerances        REVIEW OF SYSTEMS:  General:	no recent illnesses, no recent wt gain/loss, no chills  Skin/Breast:  no rash, lumps, new moles, erythema, tenderness  Ophthalmologic:  no change in vision, diplopia, pain, redness, tearing, dry eyes	  ENMT:	no hearing loss, tinnitus, ear pain, vertigo, nasal congestion, epistaxis, sore throat  Respiratory and Thorax: no coughing, wheezing, recent URI, shortness of breath	  Cardiovascular: no chest pain, QUINTERO, leg swelling, irregular rhythm   Gastrointestinal:	no abd pain, nausea, vomiting, diarrhea, constipation, bloody stool, heartburn  Genitourinary: no frequency, dysuria, hematuria  Musculoskeletal:	no joint pain, no joint swelling, no tenderness  Neurological:	 see HPI  Psychiatric:	no confusion, no anxiousness, no depression   Hematology/Lymphatics:	no brusing, easy bleeding, LAD  Endocrine:  	no excess urination/thirst, heat/cold intolerance  Allergic/Immunologic:  no urticaria, sneezing, recurrent infections      MEDICATIONS:  Antibiotics:    Neuro:    Anticoagulation:    OTHER:  dexAMETHasone  IVPB 20 milliGRAM(s) IV Intermittent Once    IVF:      Vital Signs Last 24 Hrs  T(C): 38.3 (31 Jan 2025 01:09), Max: 38.3 (31 Jan 2025 01:09)  T(F): 100.9 (31 Jan 2025 01:09), Max: 100.9 (31 Jan 2025 01:09)  HR: 97 (31 Jan 2025 02:04) (97 - 113)  BP: 132/72 (31 Jan 2025 02:04) (116/77 - 132/72)  BP(mean): --  RR: 20 (31 Jan 2025 00:38) (20 - 20)  SpO2: 97% (31 Jan 2025 02:04) (97% - 97%)    Parameters below as of 31 Jan 2025 02:04  Patient On (Oxygen Delivery Method): nasal cannula  O2 Flow (L/min): 4      PHYSICAL EXAM:  GEN: laying in bed, appears lethargic and mottled   NEURO:   AOx0, does not follow commands. PERRL 4 mm. LUE withdraws to noxious stimuli. Remainder of extremities with trace spontaneous movements, R>L.   CV: tachycardic  PULM: symmetric chest rise noted    LABS:                        16.3   19.41 )-----------( 248      ( 31 Jan 2025 00:49 )             52.3     01-31    146[H]  |  107  |  23  ----------------------------<  118[H]  4.3   |  28  |  0.52    Ca    8.9      31 Jan 2025 00:49  Mg     2.3     01-31    TPro  6.3  /  Alb  3.6  /  TBili  0.6  /  DBili  x   /  AST  19  /  ALT  40  /  AlkPhos  157[H]  01-31    PT/INR - ( 31 Jan 2025 00:49 )   PT: 12.0 sec;   INR: 1.04          PTT - ( 31 Jan 2025 00:49 )  PTT:32.6 sec  Urinalysis Basic - ( 31 Jan 2025 00:49 )    Color: x / Appearance: x / SG: x / pH: x  Gluc: 118 mg/dL / Ketone: x  / Bili: x / Urobili: x   Blood: x / Protein: x / Nitrite: x   Leuk Esterase: x / RBC: x / WBC x   Sq Epi: x / Non Sq Epi: x / Bacteria: x      CULTURES:      RADIOLOGY & ADDITIONAL STUDIES:  ACC: [id number] EXAM:  CT BRAIN   ORDERED BY: ALEC CORDEOR     PROCEDURE DATE:  01/31/2025          INTERPRETATION:  CLINICAL INFORMATION: Altered mental status.    Glioblastoma.    COMPARISON: None.    CONTRAST:  IV Contrast: NONE  .    TECHNIQUE:  Serial axial images were obtained from the skull base to the   vertex using multi-slice helical technique. Sagittal and coronal   reformats were obtained.    FINDINGS:    VENTRICLES AND SULCI: Partial effacement of the right lateral ventricle.    Mild dilatation of the left lateral ventricle suspicious for   hydrocephalus due to ventricular entrapment.  INTRA-AXIAL: Postsurgical changes in the lateral aspect of the right   temporal lobe.  Suspicion for a large infiltrating mass in the right   cerebral hemisphere, involving right parietal and temporal lobes, with   extension into the anterior inferior right frontal lobe.  Extensive mass   effect in the right right cerebral hemisphere with 1.2 cm of midline   shift to the left.  No acute intracranial hemorrhage or evidence of acute   cerebral infarction.  EXTRA-AXIAL: No acute subarachnoid or subdural hemorrhage.    VISUALIZED SINUSES:  Clear.  TYMPANOMASTOID CAVITIES:  Clear.  VISUALIZED ORBITS: Normal.  CALVARIUM: Old right parietal-temporal craniotomy.    MISCELLANEOUS: None.      IMPRESSION:  Suspicion for a large infiltrating mass in the right cerebral hemisphere,   involving right parietal and temporal lobes, with extension into the   anterior inferior right frontal lobe.  Contrast-enhanced MRI is   recommended for further evaluation.    Extensive mass effect in the right cerebral hemisphere with 1.2 cm of   midline shift to the left.    Mild dilatation of the left lateral ventricle is suspicious for   hydrocephalus due to ventricular entrapment.    Findings were discussed with Dr. ALEC CORDERO [phone] 1/31/2025   1:49 AM by Dr. Cervantes with read back confirmation.    --- End of Report ---          PIPPA CERVANTES MD; Resident Radiologist  This document has been electronically signed.  SHILO CAROLINA MD; Attending Radiologist  This document has been electronically signed. Jan 31 2025  2:00AM    Assessment:  73 yo female w PMH GBM on hospice care (diagnosed in 2024, follows at [facility] under Dr. Hernandez, with reported attempted resection of tumor, on chemotherapy with last chemo 2 months ago) BIBEMS for AMS since yesterday. Son reports pt has had increasing weakness for 2 weeks, at baseline is conversant and more alert. Since yesterday was noted to be lethargic, and son reports pt has had right hand shaking with pt being given intermittent doses of ativan (unknown total dose, last dose 5:30 pm 1/30). A decision was made to bring Ms Valentino to the ER due to her being tachypneic, alongside her worsening lethargy. Of note, pt was admitted at [facility] 3 weeks ago for first time GTC, on Valproic Acid (unknown dose) BID. Otherwise takes dexamethasone 2 mg BID. On exam, pt is obtunded, AOx0, not following commands, LUE withdraws to noxious otherwise has trace spontaneous movements. Of note, observed to be febrile and tachycardic to 120s, with WBC 19.41 with left shift. Na 146.  Neurosurgery was consulted for AMS in the setting of known GBM patient, alongside CTH findings of a right sided cerebral hemisphere mass with mass effect and significant vasogenic edema, and right to left midline shift.     Plan:  - No acute neurosurgical intervention at this time.  - Recommend palliative consult for goals of care discussion. If within GOC, please obtain MRI Brain for further evaluation of tumor burden.  - Please obtain records from [facility] for comparison of imaging.   - Maintain Na >145.  - Give 20 mg dexamethasone now, and 4 mg every 6 hours afterwards.  - Remainder of care as per ED.    Thank you for the consultation.     Discussed with Dr. Honeycutt  HISTORY OF PRESENT ILLNESS:   73 yo female w PMH GBM on hospice care (diagnosed in 2024, follows at [facility] under Dr. Hernandez, with reported attempted resection of tumor, on chemotherapy with last chemo 2 months ago) BIBEMS for AMS since yesterday. Son reports pt has had increasing weakness for 2 weeks, at baseline is conversant and more alert. Since yesterday was noted to be lethargic, and son reports pt has had right hand shaking with pt being given intermittent doses of ativan (unknown total dose, last dose 5:30 pm 1/30). A decision was made to bring Ms Valentino to the ER due to her being tachypneic, alongside her worsening lethargy. Of note, pt was admitted at [facility] 3 weeks ago for first time GTC, on Valproic Acid (unknown dose) BID. Otherwise takes dexamethasone 2 mg BID.     PAST MEDICAL & SURGICAL HISTORY:  GBM (glioblastoma multiforme)  Seizure    FAMILY HISTORY:      SOCIAL HISTORY:  Tobacco Use: Unable to obtain 2/2 mental status  EtOH use: Unable to obtain 2/2 mental status  Substance: Unable to obtain 2/2 mental status    Allergies    No Known Allergies    Intolerances        REVIEW OF SYSTEMS:  General:	no recent illnesses, no recent wt gain/loss, no chills  Skin/Breast:  no rash, lumps, new moles, erythema, tenderness  Ophthalmologic:  no change in vision, diplopia, pain, redness, tearing, dry eyes	  ENMT:	no hearing loss, tinnitus, ear pain, vertigo, nasal congestion, epistaxis, sore throat  Respiratory and Thorax: no coughing, wheezing, recent URI, shortness of breath	  Cardiovascular: no chest pain, QUINTERO, leg swelling, irregular rhythm   Gastrointestinal:	no abd pain, nausea, vomiting, diarrhea, constipation, bloody stool, heartburn  Genitourinary: no frequency, dysuria, hematuria  Musculoskeletal:	no joint pain, no joint swelling, no tenderness  Neurological:	 see HPI  Psychiatric:	no confusion, no anxiousness, no depression   Hematology/Lymphatics:	no brusing, easy bleeding, LAD  Endocrine:  	no excess urination/thirst, heat/cold intolerance  Allergic/Immunologic:  no urticaria, sneezing, recurrent infections      MEDICATIONS:  Antibiotics:    Neuro:    Anticoagulation:    OTHER:  dexAMETHasone  IVPB 20 milliGRAM(s) IV Intermittent Once    IVF:      Vital Signs Last 24 Hrs  T(C): 38.3 (31 Jan 2025 01:09), Max: 38.3 (31 Jan 2025 01:09)  T(F): 100.9 (31 Jan 2025 01:09), Max: 100.9 (31 Jan 2025 01:09)  HR: 97 (31 Jan 2025 02:04) (97 - 113)  BP: 132/72 (31 Jan 2025 02:04) (116/77 - 132/72)  BP(mean): --  RR: 20 (31 Jan 2025 00:38) (20 - 20)  SpO2: 97% (31 Jan 2025 02:04) (97% - 97%)    Parameters below as of 31 Jan 2025 02:04  Patient On (Oxygen Delivery Method): nasal cannula  O2 Flow (L/min): 4      PHYSICAL EXAM:  GEN: laying in bed, appears lethargic and mottled   NEURO:   AOx0, does not follow commands. PERRL 4 mm. LUE withdraws to noxious stimuli. Remainder of extremities with trace spontaneous movements, R>L.   CV: tachycardic  PULM: symmetric chest rise noted    LABS:                        16.3   19.41 )-----------( 248      ( 31 Jan 2025 00:49 )             52.3     01-31    146[H]  |  107  |  23  ----------------------------<  118[H]  4.3   |  28  |  0.52    Ca    8.9      31 Jan 2025 00:49  Mg     2.3     01-31    TPro  6.3  /  Alb  3.6  /  TBili  0.6  /  DBili  x   /  AST  19  /  ALT  40  /  AlkPhos  157[H]  01-31    PT/INR - ( 31 Jan 2025 00:49 )   PT: 12.0 sec;   INR: 1.04          PTT - ( 31 Jan 2025 00:49 )  PTT:32.6 sec  Urinalysis Basic - ( 31 Jan 2025 00:49 )    Color: x / Appearance: x / SG: x / pH: x  Gluc: 118 mg/dL / Ketone: x  / Bili: x / Urobili: x   Blood: x / Protein: x / Nitrite: x   Leuk Esterase: x / RBC: x / WBC x   Sq Epi: x / Non Sq Epi: x / Bacteria: x      CULTURES:      RADIOLOGY & ADDITIONAL STUDIES:  ACC: [id number] EXAM:  CT BRAIN   ORDERED BY: ALEC CORDERO     PROCEDURE DATE:  01/31/2025          INTERPRETATION:  CLINICAL INFORMATION: Altered mental status.    Glioblastoma.    COMPARISON: None.    CONTRAST:  IV Contrast: NONE  .    TECHNIQUE:  Serial axial images were obtained from the skull base to the   vertex using multi-slice helical technique. Sagittal and coronal   reformats were obtained.    FINDINGS:    VENTRICLES AND SULCI: Partial effacement of the right lateral ventricle.    Mild dilatation of the left lateral ventricle suspicious for   hydrocephalus due to ventricular entrapment.  INTRA-AXIAL: Postsurgical changes in the lateral aspect of the right   temporal lobe.  Suspicion for a large infiltrating mass in the right   cerebral hemisphere, involving right parietal and temporal lobes, with   extension into the anterior inferior right frontal lobe.  Extensive mass   effect in the right right cerebral hemisphere with 1.2 cm of midline   shift to the left.  No acute intracranial hemorrhage or evidence of acute   cerebral infarction.  EXTRA-AXIAL: No acute subarachnoid or subdural hemorrhage.    VISUALIZED SINUSES:  Clear.  TYMPANOMASTOID CAVITIES:  Clear.  VISUALIZED ORBITS: Normal.  CALVARIUM: Old right parietal-temporal craniotomy.    MISCELLANEOUS: None.      IMPRESSION:  Suspicion for a large infiltrating mass in the right cerebral hemisphere,   involving right parietal and temporal lobes, with extension into the   anterior inferior right frontal lobe.  Contrast-enhanced MRI is   recommended for further evaluation.    Extensive mass effect in the right cerebral hemisphere with 1.2 cm of   midline shift to the left.    Mild dilatation of the left lateral ventricle is suspicious for   hydrocephalus due to ventricular entrapment.    Findings were discussed with Dr. ALEC CORDERO [phone] 1/31/2025   1:49 AM by Dr. Cervantes with read back confirmation.    --- End of Report ---          PIPPA CERVANTES MD; Resident Radiologist  This document has been electronically signed.  SHILO CAROLINA MD; Attending Radiologist  This document has been electronically signed. Jan 31 2025  2:00AM    Assessment:  73 yo female w PMH GBM on hospice care (diagnosed in 2024, follows at [facility] under Dr. Hernandez, with reported attempted resection of tumor, on chemotherapy with last chemo 2 months ago) BIBEMS for AMS since yesterday. Son reports pt has had increasing weakness for 2 weeks, at baseline is conversant and more alert. Since yesterday was noted to be lethargic, and son reports pt has had right hand shaking with pt being given intermittent doses of ativan (unknown total dose, last dose 5:30 pm 1/30). A decision was made to bring Ms Valentino to the ER due to her being tachypneic, alongside her worsening lethargy. Of note, pt was admitted at [facility] 3 weeks ago for first time GTC, on Valproic Acid (unknown dose) BID. Otherwise takes dexamethasone 2 mg BID. On exam, pt is obtunded, AOx0, not following commands, LUE withdraws to noxious otherwise has trace spontaneous movements. Of note, observed to be febrile and tachycardic to 120s, with WBC 19.41 with left shift. Na 146.  Neurosurgery was consulted for AMS in the setting of GBM, alongside CTH findings of a right sided cerebral hemisphere mass with mass effect and significant vasogenic edema, and right to left midline shift.     Plan:  - No acute neurosurgical intervention at this time.  - Recommend palliative consult for goals of care discussion. If within GOC, please obtain MRI Brain for further evaluation of tumor burden.  - Please obtain records from [facility] for comparison of imaging.   - Maintain Na >145.  - Give 20 mg dexamethasone now, and 4 mg every 6 hours afterwards.  - Remainder of care as per ED.    Thank you for the consultation.     Discussed with Dr. Honeycutt

## 2025-01-31 NOTE — CONSULT NOTE ADULT - PROBLEM SELECTOR RECOMMENDATION 5
.  Complex medical decision making / symptom management in the setting of advanced GBM and encephalopathy, previously on hospice services.    Will continue to follow for ongoing GOC discussion / titration of palliative regimen. Emotional support provided, questions answered.  Active Psychosocial Referrals:  [x]Social Work/Case management []PT/OT []Chaplaincy []Hospice  []Patient/Family Support []Holistic RN []Massage Therapy []Music Therapy []Ethics  Coping: [] well [] with difficulty [] poor coping [] unable to assess  Support system: [] strong [] adequate [] inadequate    For new or uncontrolled symptoms, please call Palliative Care at [phone]). The service is available 24/7 (including nights & weekends) to provide symptom management recommendations over the phone as appropriate

## 2025-01-31 NOTE — CHART NOTE - NSCHARTNOTEFT_GEN_A_CORE
Infectious Diseases Anti-infective Approval Note    Medication:  Ertapenem  Dose:  1 g  Route:  IV  Frequency: q24h  Duration**:  7d  Purpose:  (check one)       Empiric pending cultures:       Empiric, no culture data:       Final duration:  X    Dose may be adjusted as needed for alterations in renal function.    *THIS IS NOT AN INFECTIOUS DISEASES CONSULTATION*    **Indicates duration of approval, not necessarily duration of treatment

## 2025-01-31 NOTE — CONSULT NOTE ADULT - TIME BILLING
Emotional Support/Supportive Care and Clarification of Potential Disease Trajectory related to  Assessment of Symptom [place] and Palliative regimen  Education and Monitoring of Opiates including titration and management of high risk medications  Discharge Facilitation with ongoing coordination  Exploration of GOC/Advanced Directives including HCP designation, code status, and hospice eligibility    Time exclusive of ACP discussion  Time inclusive of chart review, medication ordering, discussion with primary team, clinical documentation, and communication with family/caregiver

## 2025-01-31 NOTE — CONSULT NOTE ADULT - SUBJECTIVE AND OBJECTIVE BOX
[facility] Geriatrics and Palliative Care  Tra Potter, Palliative Care Attending  Contact Info: Call [phone] (HEAL Line) or message on [employer] Teams    HPI:   75 yo female w PMH GBM on hospice care (diagnosed in 2024, follows at [facility] under Dr. Hernandez, with reported attempted resection of tumor, on chemotherapy with last chemo 2 months ago). Per son, she was speaking a few words yesterday and was able to eat lunch, but become unresponsive and had iwob yesterday evening. She has intermittent shaking of the right hand and had an episode yesterday for which she was given 1 mg of ativan (instead of 0.5 ativan per son, which he believes is contributing to her AMS, last dose 5:30 pm 1/30).     A decision was made to bring Ms Valentino to the ER due to her being tachypneic, alongside her worsening lethargy. Of note, pt was admitted at [facility] 3 weeks ago for first time seizures, on valproic acid BID, dexamethasone 2 mg BID. Pt originally did not want treatment for GBS 2 months ago but changed her mind 4 weeks ago when she felt that she was worsening.     ED course:  Vitals: HR 92, 126/88, 4L NC 94% O2 RR 17   Labs: WBC 19.41, Hgb 16.3, Plt 248. Na 146, , UA pos for bacteria and WBC, Full RVP negative   VGB: pH 7.54, PCO2 venous 36, pO2 64, 93.6% O2 sat   EKG: afib   Imaging: CXR clear ; CTH shows large infiltrating mass in the right cerebral hemisphere involving right parietal and temporal lobes with extension into the anterior inferior frontal lobe. Extensive mass effect in the right cerebral hemisphere with 1.2 cm of midline shift to the left. Mild dilatation of the left lateral ventricle is suspicious for hydrocephalus due to ventricular entrapment   Interventions: Ofirmev x1, CTX 1gx1, decadron 20mg x1, diltiazem 10mg x2, lopressor 5mg x1, 1L NSx1   Consults: MIKE  (31 Jan 2025 07:33)    PERTINENT PM/SXH:   GBM (glioblastoma multiforme)    Seizure        FAMILY HISTORY:    ITEMS NOT CHECKED ARE NOT PRESENT    SOCIAL HISTORY:   Significant other/partner:  []  Children:  [x]   Substance hx:  []   Tobacco hx:  []   Alcohol hx: []   Home Opioid hx:  [] I-Stop Reference No:  - no active Rx's / see chart note  Living Situation: [x]Home  []Long term care  []Rehab []Other  Yazidi/Spiritual practice: ; Role of organized Mu-ism [ ] important [ ] some [x ] unable to assess  Coping: [ ] well [ ] with difficulty [ ] poor coping [x unable to assess  Support system: [ ] strong [ ] adequate x[ ] inadequate    ADVANCE DIRECTIVES:    []MOLST  []Living Will  DECISION MAKER(s):  [] Health Care Proxy(s)  [x] Surrogate(s)  [] Guardian           Name(s)/Phone Number(s): Shirley daniel). Total of 7 children    BASELINE (I)ADLs (prior to admission):  [place]: []Total  [] Moderate [x]Dependent    ALLERGIES:  No Known Allergies    MEDICATIONS  (STANDING):  cefTRIAXone   IVPB 2000 milliGRAM(s) IV Intermittent every 24 hours  dexAMETHasone  Injectable 4 milliGRAM(s) IV Push every 6 hours  dextrose 5%. 1000 milliLiter(s) (50 mL/Hr) IV Continuous <Continuous>  dextrose 5%. 1000 milliLiter(s) (100 mL/Hr) IV Continuous <Continuous>  dextrose 50% Injectable 25 Gram(s) IV Push once  dextrose 50% Injectable 12.5 Gram(s) IV Push once  dextrose 50% Injectable 25 Gram(s) IV Push once  glucagon  Injectable 1 milliGRAM(s) IntraMuscular once  insulin lispro (ADMELOG) corrective regimen sliding scale   SubCutaneous three times a day before meals  insulin lispro (ADMELOG) corrective regimen sliding scale   SubCutaneous at bedtime  metoprolol tartrate Injectable 5 milliGRAM(s) IV Push every 8 hours  pantoprazole  Injectable 40 milliGRAM(s) IV Push every 24 hours  valproate sodium   IVPB 250 milliGRAM(s) IV Intermittent every 6 hours    MEDICATIONS  (PRN):  dextrose Oral Gel 15 Gram(s) Oral once PRN Blood Glucose LESS THAN 70 milliGRAM(s)/deciliter  HYDROmorphone  Injectable 0.5 milliGRAM(s) IV Push every 4 hours PRN Moderate Pain (4 - 6)  LORazepam   Injectable 2 milliGRAM(s) IV Push every 4 hours PRN seizures    PRESENT SYMPTOMS: x[]Unable to obtain due to poor mentation/encephalopathy  Source if other than patient:  []Family   []Team     Pain: [ ] yes [x ] no  QOL impact -   Location -                    Aggravating Factors -  Quality -  Radiation -  Timing -  Severity (0-10 scale) -   Minimal Acceptable Level (0-10 scale) -    PAIN AD Score:  [email or web address] (press ctrl +  left click to view)    Dyspnea:                           [ ]Mild  [ ]Moderate [ ]Severe  Anxiety:                             [ ]Mild [ ]Moderate [ ]Severe  Fatigue:                             [ ]Mild [ ]Moderate [ ]Severe  Nausea:                             [ ]Mild [ ]Moderate [ ]Severe  Loss of Appetite:             [ ]Mild [ ]Moderate [ ]Severe  Constipation:                   [ ]Mild [ ]Moderate [ ]Severe    Other Symptoms:  [x ]All other review of systems negative     Palliative Performance Status Version 2: 10 %    http://npcrc.org/files/news/palliative_performance_scale_ppsv2.pdf    PHYSICAL EXAM:  GENERAL:  [ ]Alert  [ ]Oriented x   [ ]Lethargic  [ ]Cachexia  [x ]Unarousable  [ ]Verbal  [ ]Non-Verbal  Behavioral:   [ ]Anxiety  [ ]Delirium [ ]Agitation [ ]Cooperative [x] unarousable  HEENT:  [ ]Normal   [x ]Dry mouth   [ ]ET Tube/Trach  [ ]Oral lesions  PULMONARY:   [ x]Clear []Tachypnea  []Audible excessive secretions   [ ]Rhonchi        [ ]Right [ ]Left [ ]Bilateral  [ ]Crackles        [ ]Right [ ]Left [ ]Bilateral  [ ]Wheezing     [ ]Right [ ]Left [ ]Bilateral  CARDIOVASCULAR:    [x ]Regular [ ]Irregular [ ]Tachy  [ ]Mumtaz [ ]Murmur [ ]Other  GASTROINTESTINAL:  [x ]Soft  [ ]Distended   [x ]+BS  [x ]Non tender [ ]Tender  [ ]PEG [ ]OGT/ NGT  Last BM:     GENITOURINARY:  [ ]Normal [ ] Incontinent   [ ]Oliguria/Anuria   [x ][place]  MUSCULOSKELETAL:   [ ]Normal   [ ]Weakness  [ x]Bed/Wheelchair bound [ ]Edema  NEUROLOGIC:   [ ]No focal deficits  [x ]Cognitive impairment  [ ]Dysphagia [ ]Dysarthria [ ]Paresis [ ]Encephalopathic   SKIN:   [x ]Normal   [ ]Pressure ulcer(s)  [ ]Rash    CRITICAL CARE:  [ ]Shock Present  [ ]Septic [ ]Cardiogenic [ ]Neurologic [ ]Hypovolemic  [ ]Vasopressors [ ]Inotropes   [ ]Respiratory failure present [ ]Mechanical Ventilation [ ]Non-invasive ventilatory support [ ]High-Flow  [ ]Acute  [ ]Chronic [ ]Hypoxic  [ ]Hypercarbic  [ ]Other organ failure    Vital Signs Last 24 Hrs  T(C): 36.5 (31 Jan 2025 14:01), Max: 38.3 (31 Jan 2025 01:09)  T(F): 97.7 (31 Jan 2025 14:01), Max: 100.9 (31 Jan 2025 01:09)  HR: 117 (31 Jan 2025 14:01) (71 - 140)  BP: 93/54 (31 Jan 2025 14:01) (93/54 - 151/112)  BP(mean): 102 (31 Jan 2025 03:15) (80 - 105)  RR: 18 (31 Jan 2025 14:01) (16 - 21)  SpO2: 97% (31 Jan 2025 14:01) (94% - 98%)    Parameters below as of 31 Jan 2025 14:01  Patient On (Oxygen Delivery Method): nasal cannula  O2 Flow (L/min): 2   I&O's Summary    30 Jan 2025 07:01  -  31 Jan 2025 07:00  --------------------------------------------------------  IN: 0 mL / OUT: 300 mL / NET: -300 mL        LABS:                        15.3   18.83 )-----------( 236      ( 31 Jan 2025 05:30 )             50.5   01-31    144  |  108  |  20  ----------------------------<  133[H]  3.8   |  25  |  0.47[L]    Ca    8.2[L]      31 Jan 2025 05:30  Phos  2.9     01-31  Mg     2.1     01-31    TPro  6.3  /  Alb  3.6  /  TBili  0.6  /  DBili  x   /  AST  19  /  ALT  40  /  AlkPhos  157[H]  01-31    Urinalysis Basic - ( 31 Jan 2025 05:30 )    Color: x / Appearance: x / SG: x / pH: x  Gluc: 133 mg/dL / Ketone: x  / Bili: x / Urobili: x   Blood: x / Protein: x / Nitrite: x   Leuk Esterase: x / RBC: x / WBC x   Sq Epi: x / Non Sq Epi: x / Bacteria: x      RADIOLOGY & ADDITIONAL STUDIES:      PROTEIN CALORIE MALNUTRITION PRESENT: [ ]mild [ ]moderate [ ]severe [ ]underweight [ ]morbid obesity  [ ]PPSV2 < or = to 30% [ ]significant weight loss  [ ]poor nutritional intake [ ]catabolic state [ ]anasarca     Artificial Nutrition [ ]     REFERRALS:  [x]Social Work  [ ]Case management [ ]PT/OT [ ]Chaplaincy  [ ]Hospice  [ ]Patient/Family Support    DISCUSSION OF CASE: Family - to obtain additional history and to provide emotional support; ( ) -

## 2025-02-01 DIAGNOSIS — G40.901 EPILEPSY, UNSPECIFIED, NOT INTRACTABLE, WITH STATUS EPILEPTICUS: ICD-10-CM

## 2025-02-01 LAB
ALBUMIN SERPL ELPH-MCNC: 2.4 G/DL — LOW (ref 3.3–5)
ALBUMIN SERPL ELPH-MCNC: 2.9 G/DL — LOW (ref 3.3–5)
ALP SERPL-CCNC: 127 U/L — HIGH (ref 40–120)
ALP SERPL-CCNC: 150 U/L — HIGH (ref 40–120)
ALT FLD-CCNC: 34 U/L — SIGNIFICANT CHANGE UP (ref 10–45)
ALT FLD-CCNC: SIGNIFICANT CHANGE UP (ref 10–45)
ANION GAP SERPL CALC-SCNC: 13 MMOL/L — SIGNIFICANT CHANGE UP (ref 5–17)
ANION GAP SERPL CALC-SCNC: 15 MMOL/L — SIGNIFICANT CHANGE UP (ref 5–17)
AST SERPL-CCNC: 18 U/L — SIGNIFICANT CHANGE UP (ref 10–40)
AST SERPL-CCNC: SIGNIFICANT CHANGE UP (ref 10–40)
BASOPHILS # BLD AUTO: 0.02 K/UL — SIGNIFICANT CHANGE UP (ref 0–0.2)
BASOPHILS # BLD AUTO: 0.03 K/UL — SIGNIFICANT CHANGE UP (ref 0–0.2)
BASOPHILS NFR BLD AUTO: 0.1 % — SIGNIFICANT CHANGE UP (ref 0–2)
BASOPHILS NFR BLD AUTO: 0.2 % — SIGNIFICANT CHANGE UP (ref 0–2)
BILIRUB SERPL-MCNC: 0.4 MG/DL — SIGNIFICANT CHANGE UP (ref 0.2–1.2)
BILIRUB SERPL-MCNC: 0.4 MG/DL — SIGNIFICANT CHANGE UP (ref 0.2–1.2)
BLD GP AB SCN SERPL QL: NEGATIVE — SIGNIFICANT CHANGE UP
BUN SERPL-MCNC: 32 MG/DL — HIGH (ref 7–23)
BUN SERPL-MCNC: 33 MG/DL — HIGH (ref 7–23)
CALCIUM SERPL-MCNC: 8.2 MG/DL — LOW (ref 8.4–10.5)
CALCIUM SERPL-MCNC: 8.9 MG/DL — SIGNIFICANT CHANGE UP (ref 8.4–10.5)
CHLORIDE SERPL-SCNC: 104 MMOL/L — SIGNIFICANT CHANGE UP (ref 96–108)
CHLORIDE SERPL-SCNC: 109 MMOL/L — HIGH (ref 96–108)
CO2 SERPL-SCNC: 25 MMOL/L — SIGNIFICANT CHANGE UP (ref 22–31)
CO2 SERPL-SCNC: 25 MMOL/L — SIGNIFICANT CHANGE UP (ref 22–31)
CREAT SERPL-MCNC: 0.54 MG/DL — SIGNIFICANT CHANGE UP (ref 0.5–1.3)
CREAT SERPL-MCNC: 0.54 MG/DL — SIGNIFICANT CHANGE UP (ref 0.5–1.3)
EGFR: 97 ML/MIN/1.73M2 — SIGNIFICANT CHANGE UP
EGFR: 97 ML/MIN/1.73M2 — SIGNIFICANT CHANGE UP
EOSINOPHIL # BLD AUTO: 0 K/UL — SIGNIFICANT CHANGE UP (ref 0–0.5)
EOSINOPHIL # BLD AUTO: 0 K/UL — SIGNIFICANT CHANGE UP (ref 0–0.5)
EOSINOPHIL NFR BLD AUTO: 0 % — SIGNIFICANT CHANGE UP (ref 0–6)
EOSINOPHIL NFR BLD AUTO: 0 % — SIGNIFICANT CHANGE UP (ref 0–6)
GAS PNL BLDA: SIGNIFICANT CHANGE UP
GLUCOSE SERPL-MCNC: 137 MG/DL — HIGH (ref 70–99)
GLUCOSE SERPL-MCNC: 138 MG/DL — HIGH (ref 70–99)
HCT VFR BLD CALC: 42.9 % — SIGNIFICANT CHANGE UP (ref 34.5–45)
HCT VFR BLD CALC: 48.3 % — HIGH (ref 34.5–45)
HGB BLD-MCNC: 14.7 G/DL — SIGNIFICANT CHANGE UP (ref 11.5–15.5)
HGB BLD-MCNC: 15.2 G/DL — SIGNIFICANT CHANGE UP (ref 11.5–15.5)
IMM GRANULOCYTES NFR BLD AUTO: 1.5 % — HIGH (ref 0–0.9)
IMM GRANULOCYTES NFR BLD AUTO: 1.9 % — HIGH (ref 0–0.9)
LACTATE SERPL-SCNC: 2.3 MMOL/L — HIGH (ref 0.5–2)
LYMPHOCYTES # BLD AUTO: 1.01 K/UL — SIGNIFICANT CHANGE UP (ref 1–3.3)
LYMPHOCYTES # BLD AUTO: 1.17 K/UL — SIGNIFICANT CHANGE UP (ref 1–3.3)
LYMPHOCYTES # BLD AUTO: 6.5 % — LOW (ref 13–44)
LYMPHOCYTES # BLD AUTO: 6.6 % — LOW (ref 13–44)
MAGNESIUM SERPL-MCNC: 2.2 MG/DL — SIGNIFICANT CHANGE UP (ref 1.6–2.6)
MAGNESIUM SERPL-MCNC: 2.4 MG/DL — SIGNIFICANT CHANGE UP (ref 1.6–2.6)
MCHC RBC-ENTMCNC: 28.3 PG — SIGNIFICANT CHANGE UP (ref 27–34)
MCHC RBC-ENTMCNC: 30 PG — SIGNIFICANT CHANGE UP (ref 27–34)
MCHC RBC-ENTMCNC: 31.5 G/DL — LOW (ref 32–36)
MCHC RBC-ENTMCNC: 34.3 G/DL — SIGNIFICANT CHANGE UP (ref 32–36)
MCV RBC AUTO: 87.6 FL — SIGNIFICANT CHANGE UP (ref 80–100)
MCV RBC AUTO: 89.9 FL — SIGNIFICANT CHANGE UP (ref 80–100)
MONOCYTES # BLD AUTO: 0.43 K/UL — SIGNIFICANT CHANGE UP (ref 0–0.9)
MONOCYTES # BLD AUTO: 0.45 K/UL — SIGNIFICANT CHANGE UP (ref 0–0.9)
MONOCYTES NFR BLD AUTO: 2.5 % — SIGNIFICANT CHANGE UP (ref 2–14)
MONOCYTES NFR BLD AUTO: 2.8 % — SIGNIFICANT CHANGE UP (ref 2–14)
MRSA PCR RESULT.: NEGATIVE — SIGNIFICANT CHANGE UP
NEUTROPHILS # BLD AUTO: 13.82 K/UL — HIGH (ref 1.8–7.4)
NEUTROPHILS # BLD AUTO: 15.79 K/UL — HIGH (ref 1.8–7.4)
NEUTROPHILS NFR BLD AUTO: 88.7 % — HIGH (ref 43–77)
NEUTROPHILS NFR BLD AUTO: 89.2 % — HIGH (ref 43–77)
NRBC # BLD: 0 /100 WBCS — SIGNIFICANT CHANGE UP (ref 0–0)
NRBC # BLD: 0 /100 WBCS — SIGNIFICANT CHANGE UP (ref 0–0)
NRBC BLD-RTO: 0 /100 WBCS — SIGNIFICANT CHANGE UP (ref 0–0)
NRBC BLD-RTO: 0 /100 WBCS — SIGNIFICANT CHANGE UP (ref 0–0)
PHOSPHATE SERPL-MCNC: 3.6 MG/DL — SIGNIFICANT CHANGE UP (ref 2.5–4.5)
PHOSPHATE SERPL-MCNC: 3.8 MG/DL — SIGNIFICANT CHANGE UP (ref 2.5–4.5)
PLATELET # BLD AUTO: 195 K/UL — SIGNIFICANT CHANGE UP (ref 150–400)
PLATELET # BLD AUTO: 234 K/UL — SIGNIFICANT CHANGE UP (ref 150–400)
POTASSIUM SERPL-MCNC: 3.8 MMOL/L — SIGNIFICANT CHANGE UP (ref 3.5–5.3)
POTASSIUM SERPL-MCNC: SIGNIFICANT CHANGE UP (ref 3.5–5.3)
POTASSIUM SERPL-SCNC: 3.8 MMOL/L — SIGNIFICANT CHANGE UP (ref 3.5–5.3)
POTASSIUM SERPL-SCNC: SIGNIFICANT CHANGE UP (ref 3.5–5.3)
PROT SERPL-MCNC: 5.5 G/DL — LOW (ref 6–8.3)
PROT SERPL-MCNC: 6.1 G/DL — SIGNIFICANT CHANGE UP (ref 6–8.3)
RBC # BLD: 4.9 M/UL — SIGNIFICANT CHANGE UP (ref 3.8–5.2)
RBC # BLD: 5.37 M/UL — HIGH (ref 3.8–5.2)
RBC # FLD: 17 % — HIGH (ref 10.3–14.5)
RBC # FLD: 17.1 % — HIGH (ref 10.3–14.5)
RH IG SCN BLD-IMP: POSITIVE — SIGNIFICANT CHANGE UP
S AUREUS DNA NOSE QL NAA+PROBE: NEGATIVE — SIGNIFICANT CHANGE UP
SODIUM SERPL-SCNC: 144 MMOL/L — SIGNIFICANT CHANGE UP (ref 135–145)
SODIUM SERPL-SCNC: 147 MMOL/L — HIGH (ref 135–145)
VALPROATE SERPL-MCNC: 17.2 UG/ML — LOW (ref 50–100)
WBC # BLD: 15.57 K/UL — HIGH (ref 3.8–10.5)
WBC # BLD: 17.7 K/UL — HIGH (ref 3.8–10.5)
WBC # FLD AUTO: 15.57 K/UL — HIGH (ref 3.8–10.5)
WBC # FLD AUTO: 17.7 K/UL — HIGH (ref 3.8–10.5)

## 2025-02-01 PROCEDURE — 99291 CRITICAL CARE FIRST HOUR: CPT | Mod: 25

## 2025-02-01 PROCEDURE — 95718 EEG PHYS/QHP 2-12 HR W/VEEG: CPT

## 2025-02-01 PROCEDURE — 43752 NASAL/OROGASTRIC W/TUBE PLMT: CPT

## 2025-02-01 PROCEDURE — 71045 X-RAY EXAM CHEST 1 VIEW: CPT | Mod: 26,76

## 2025-02-01 PROCEDURE — 36600 WITHDRAWAL OF ARTERIAL BLOOD: CPT | Mod: 59

## 2025-02-01 PROCEDURE — 31500 INSERT EMERGENCY AIRWAY: CPT

## 2025-02-01 PROCEDURE — 74019 RADEX ABDOMEN 2 VIEWS: CPT | Mod: 26

## 2025-02-01 RX ORDER — NOREPINEPHRINE BITARTRATE 8 MG
0.05 SOLUTION INTRAVENOUS
Qty: 8 | Refills: 0 | Status: DISCONTINUED | OUTPATIENT
Start: 2025-02-01 | End: 2025-02-02

## 2025-02-01 RX ORDER — PHENTOLAMINE MESYLATE
5 POWDER (GRAM) MISCELLANEOUS ONCE
Refills: 0 | Status: COMPLETED | OUTPATIENT
Start: 2025-02-01 | End: 2025-02-01

## 2025-02-01 RX ORDER — LORAZEPAM 4 MG/ML
2 VIAL (ML) INJECTION ONCE
Refills: 0 | Status: DISCONTINUED | OUTPATIENT
Start: 2025-02-01 | End: 2025-02-01

## 2025-02-01 RX ORDER — FENTANYL CITRATE-0.9 % NACL/PF 100MCG/2ML
0.5 SYRINGE (ML) INTRAVENOUS
Qty: 2500 | Refills: 0 | Status: DISCONTINUED | OUTPATIENT
Start: 2025-02-01 | End: 2025-02-04

## 2025-02-01 RX ORDER — ERTAPENEM SODIUM 1 G/1
1000 INJECTION, POWDER, LYOPHILIZED, FOR SOLUTION INTRAMUSCULAR; INTRAVENOUS EVERY 24 HOURS
Refills: 0 | Status: DISCONTINUED | OUTPATIENT
Start: 2025-02-01 | End: 2025-02-06

## 2025-02-01 RX ORDER — METOPROLOL SUCCINATE 50 MG/1
2.5 TABLET, EXTENDED RELEASE ORAL ONCE
Refills: 0 | Status: COMPLETED | OUTPATIENT
Start: 2025-02-01 | End: 2025-02-01

## 2025-02-01 RX ORDER — HYDROMORPHONE/SOD CHLOR,ISO/PF 2 MG/10 ML
0.2 SYRINGE (ML) INJECTION ONCE
Refills: 0 | Status: DISCONTINUED | OUTPATIENT
Start: 2025-02-01 | End: 2025-02-01

## 2025-02-01 RX ORDER — SODIUM CHLORIDE 9 G/1000ML
1000 INJECTION, SOLUTION INTRAVENOUS ONCE
Refills: 0 | Status: COMPLETED | OUTPATIENT
Start: 2025-02-01 | End: 2025-02-01

## 2025-02-01 RX ORDER — PROPOFOL 10 MG/ML
50 INJECTION, EMULSION INTRAVENOUS
Qty: 1000 | Refills: 0 | Status: DISCONTINUED | OUTPATIENT
Start: 2025-02-01 | End: 2025-02-05

## 2025-02-01 RX ORDER — METOPROLOL SUCCINATE 50 MG/1
2.5 TABLET, EXTENDED RELEASE ORAL ONCE
Refills: 0 | Status: COMPLETED | OUTPATIENT
Start: 2025-02-01 | End: 2025-02-02

## 2025-02-01 RX ORDER — HYPROMELLOSE 0.4 %
1 DROPS OPHTHALMIC (EYE) EVERY 12 HOURS
Refills: 0 | Status: DISCONTINUED | OUTPATIENT
Start: 2025-02-01 | End: 2025-02-25

## 2025-02-01 RX ADMIN — METOPROLOL SUCCINATE 2.5 MILLIGRAM(S): 50 TABLET, EXTENDED RELEASE ORAL at 22:43

## 2025-02-01 RX ADMIN — Medication 0.2 MILLIGRAM(S): at 09:38

## 2025-02-01 RX ADMIN — Medication 57.5 MILLIGRAM(S): at 16:00

## 2025-02-01 RX ADMIN — Medication 2 MILLIGRAM(S): at 09:02

## 2025-02-01 RX ADMIN — Medication 0.2 MILLIGRAM(S): at 10:30

## 2025-02-01 RX ADMIN — DEXAMETHASONE 4 MILLIGRAM(S): 0.5 TABLET ORAL at 04:37

## 2025-02-01 RX ADMIN — Medication 5 MILLIGRAM(S): at 20:37

## 2025-02-01 RX ADMIN — SODIUM CHLORIDE 1000 MILLILITER(S): 9 INJECTION, SOLUTION INTRAVENOUS at 09:10

## 2025-02-01 RX ADMIN — PROPOFOL 15.7 MICROGRAM(S)/KG/MIN: 10 INJECTION, EMULSION INTRAVENOUS at 14:16

## 2025-02-01 RX ADMIN — Medication 1 DROP(S): at 17:31

## 2025-02-01 RX ADMIN — Medication 40 MILLIGRAM(S): at 10:40

## 2025-02-01 RX ADMIN — METOPROLOL SUCCINATE 5 MILLIGRAM(S): 50 TABLET, EXTENDED RELEASE ORAL at 14:08

## 2025-02-01 RX ADMIN — ERTAPENEM SODIUM 120 MILLIGRAM(S): 1 INJECTION, POWDER, LYOPHILIZED, FOR SOLUTION INTRAMUSCULAR; INTRAVENOUS at 14:09

## 2025-02-01 RX ADMIN — PROPOFOL 15.7 MICROGRAM(S)/KG/MIN: 10 INJECTION, EMULSION INTRAVENOUS at 20:46

## 2025-02-01 RX ADMIN — Medication 1 APPLICATION(S): at 17:31

## 2025-02-01 RX ADMIN — Medication 2 MILLIGRAM(S): at 08:41

## 2025-02-01 RX ADMIN — Medication 2.61 MICROGRAM(S)/KG/HR: at 12:17

## 2025-02-01 RX ADMIN — Medication 15 MILLILITER(S): at 17:31

## 2025-02-01 RX ADMIN — DEXAMETHASONE 4 MILLIGRAM(S): 0.5 TABLET ORAL at 16:00

## 2025-02-01 RX ADMIN — Medication 55 MILLIGRAM(S): at 03:27

## 2025-02-01 RX ADMIN — Medication 70 MILLIGRAM(S): at 09:10

## 2025-02-01 RX ADMIN — NOREPINEPHRINE BITARTRATE 4.89 MICROGRAM(S)/KG/MIN: 8 SOLUTION at 16:00

## 2025-02-01 RX ADMIN — DEXAMETHASONE 4 MILLIGRAM(S): 0.5 TABLET ORAL at 10:40

## 2025-02-01 RX ADMIN — DEXAMETHASONE 4 MILLIGRAM(S): 0.5 TABLET ORAL at 22:42

## 2025-02-01 RX ADMIN — METOPROLOL SUCCINATE 5 MILLIGRAM(S): 50 TABLET, EXTENDED RELEASE ORAL at 04:37

## 2025-02-01 NOTE — PROGRESS NOTE ADULT - SUBJECTIVE AND OBJECTIVE BOX
**ACCEPTED STEPUP FROM [facility] to [facility]***    HOSPITAL COURSE: 73 yo female w PMH GBM on hospice care (diagnosed in 2024, follows at [facility] under Dr. Hernandez, with reported attempted resection of tumor, on chemotherapy with last chemo 2 months ago). Per son, she was speaking a few words on 1/31 and was able to eat lunch, but become unresponsive and had iwob that evening. She has intermittent shaking of the right hand and had an episode 1/31 for which she was given 1 mg of ativan (instead of 0.5 ativan per son, which he believes is contributing to her AMS, last dose 5:30 pm 1/30). She was brought in for tachypnea, lethargy. Of note, pt was admitted at [facility] 3 weeks ago for first time seizures, on valproic acid BID, dexamethasone 2 mg BID. Pt originally did not want treatment for GBS 2 months ago but changed her mind 4 weeks ago when she felt that she was worsening. Per chart review patient was able to say her name yesterday, had baseline strength 2/5 RUE/RLL and 0/5 LUE/LLE. Prior to my evaluation of the pt I was stopped by the epilepsy team who informed me she was in status epilepticus on vEEG. Ativan and valproic acid was ordered at that point discussion with patients daughters Dyan and Deyvi who confirmed that previous talks of comfort care/hospice were no longer the families wishes and they would want everything done to preserve her life including chest compressions and intubation. Discussed poor prognosis with intubation/compressions which both daughters understood, however reported they were following their fathers wishes he told them before sabba, and requested us to not disturb him. Upon evaluation pt was obtunded with tremors to RUE, response to noxious stimuli but otherwise no response. Pupils were responsive with saccadic movement. She was slumped to her right sided with gurgling breath sounds.  At that point rapid was called and patient was inevitably intubated. Transferred to [facility]. Discussed with pts daughters post procedure poor prognosis and advised to have formal family meeting to clarify and document wishes.       INTERVAL EVENTS: PHILLIP    SUBJECTIVE / INTERVAL HPI: Patient seen and examined at bedside. intubated.    ROS: negative unless otherwise stated above.    VITAL SIGNS:  Vital Signs Last 24 Hrs  T(C): 36.4 (01 Feb 2025 21:16), Max: 37.6 (01 Feb 2025 08:45)  T(F): 97.5 (01 Feb 2025 21:16), Max: 99.6 (01 Feb 2025 08:45)  HR: 85 (01 Feb 2025 21:00) (75 - 113)  BP: 122/67 (01 Feb 2025 21:00) (86/53 - 182/103)  BP(mean): 88 (01 Feb 2025 21:00) (64 - 135)  RR: 12 (01 Feb 2025 21:00) (12 - 30)  SpO2: 98% (01 Feb 2025 21:00) (87% - 100%)    Parameters below as of 01 Feb 2025 21:00  Patient On (Oxygen Delivery Method): ventilator    O2 Concentration (%): 40      01-31-25 @ 07:01  -  02-01-25 @ 07:00  --------------------------------------------------------  IN: 0 mL / OUT: 275 mL / NET: -275 mL    02-01-25 @ 07:01  -  02-01-25 @ 22:13  --------------------------------------------------------  IN: 307.2 mL / OUT: 0 mL / NET: 307.2 mL        PHYSICAL EXAM:    CONSTITUTIONAL: sedated  EYES: non-icteric, reactive pupils  ENMT: ET tube  RESP: CTA b/l, no WRR  CV: irregular rate, no peripheral edema  GI: Soft, NT, ND, no rebound, no guarding; no palpable masses; no hepatosplenomegaly; no hernia palpated  SKIN: No rashes or ulcers noted; no subcutaneous nodules or induration palpable  NEURO: aaox0 on sedation    MEDICATIONS:  MEDICATIONS  (STANDING):  artificial  tears Solution 1 Drop(s) Both EYES every 12 hours  chlorhexidine 0.12% Liquid 15 milliLiter(s) Oral Mucosa every 12 hours  chlorhexidine 2% Cloths 1 Application(s) Topical <User Schedule>  dexAMETHasone  Injectable 4 milliGRAM(s) IV Push every 6 hours  dextrose 5%. 1000 milliLiter(s) (50 mL/Hr) IV Continuous <Continuous>  dextrose 5%. 1000 milliLiter(s) (100 mL/Hr) IV Continuous <Continuous>  dextrose 50% Injectable 25 Gram(s) IV Push once  dextrose 50% Injectable 12.5 Gram(s) IV Push once  dextrose 50% Injectable 25 Gram(s) IV Push once  ertapenem  IVPB 1000 milliGRAM(s) IV Intermittent every 24 hours  fentaNYL   Infusion. 0.5 MICROgram(s)/kG/Hr (2.61 mL/Hr) IV Continuous <Continuous>  glucagon  Injectable 1 milliGRAM(s) IntraMuscular once  influenza  Vaccine (HIGH DOSE) 0.5 milliLiter(s) IntraMuscular once  insulin lispro (ADMELOG) corrective regimen sliding scale   SubCutaneous three times a day before meals  insulin lispro (ADMELOG) corrective regimen sliding scale   SubCutaneous at bedtime  norepinephrine Infusion 0.05 MICROgram(s)/kG/Min (4.89 mL/Hr) IV Continuous <Continuous>  pantoprazole  Injectable 40 milliGRAM(s) IV Push every 24 hours  petrolatum Ophthalmic Ointment 1 Application(s) Both EYES every 12 hours  propofol Infusion. 50 MICROgram(s)/kG/Min (15.7 mL/Hr) IV Continuous <Continuous>  valproate sodium   IVPB 750 milliGRAM(s) IV Intermittent every 12 hours    MEDICATIONS  (PRN):  dextrose Oral Gel 15 Gram(s) Oral once PRN Blood Glucose LESS THAN 70 milliGRAM(s)/deciliter  HYDROmorphone  Injectable 0.5 milliGRAM(s) IV Push every 4 hours PRN Moderate Pain (4 - 6)  LORazepam   Injectable 2 milliGRAM(s) IV Push every 4 hours PRN seizures      ALLERGIES:  Allergies    No Known Allergies    Intolerances        LABS:                        14.7   15.57 )-----------( 195      ( 01 Feb 2025 08:49 )             42.9     02-01    144  |  104  |  32[H]  ----------------------------<  138[H]  See Note   |  25  |  0.54    Ca    8.2[L]      01 Feb 2025 08:49  Phos  3.6     02-01  Mg     2.2     02-01    TPro  5.5[L]  /  Alb  2.4[L]  /  TBili  0.4  /  DBili  x   /  AST  See Note  /  ALT  See Note  /  AlkPhos  127[H]  02-01    PT/INR - ( 31 Jan 2025 00:49 )   PT: 12.0 sec;   INR: 1.04          PTT - ( 31 Jan 2025 00:49 )  PTT:32.6 sec  Urinalysis Basic - ( 01 Feb 2025 08:49 )    Color: x / Appearance: x / SG: x / pH: x  Gluc: 138 mg/dL / Ketone: x  / Bili: x / Urobili: x   Blood: x / Protein: x / Nitrite: x   Leuk Esterase: x / RBC: x / WBC x   Sq Epi: x / Non Sq Epi: x / Bacteria: x      CAPILLARY BLOOD GLUCOSE      POCT Blood Glucose.: 138 mg/dL (01 Feb 2025 17:22)      RADIOLOGY & ADDITIONAL TESTS: Reviewed.

## 2025-02-01 NOTE — PROGRESS NOTE ADULT - ASSESSMENT
73 yo female w PMH GBM on hospice care (diagnosed in 2024, follows at [facility] under Dr. Hernandez, with reported attempted resection of tumor, on chemotherapy with last chemo 2 months ago), admitted for ams likely 2/2 disease progression. On the morning of 2/1 pt was found obtunded and status epilepticus on vEEG. Family at bedside making medical decisions (daughters ector and mele) confirmed after family discussion they would like full code. Decision was made to intubate the patient and transfer her 7east.

## 2025-02-01 NOTE — RAPID RESPONSE TEAM SUMMARY - NSOTHERINTERVENTIONSRRT_GEN_ALL_CORE
Rapid response called by medicine resident as epilepsy notified that patient was in status epilepticus on vEEG upon arrival of RRT patient on zoll, HDS but with snoring respirations, given Ativan 2mg and per discussion w family patient full code. CC Attending Efrain notified and present. Patient intubated for airway protection, started on propofol for status and transported to MICU for ongoing care     ****INCOMPLETE NOTE****

## 2025-02-01 NOTE — RAPID RESPONSE TEAM SUMMARY - NSMEDICATIONSRRT_GEN_ALL_CORE
Ativan 2mg x 2   Keppra 2G  Depakote IVBP   1 L LR   Intubated w Etomidate 20mg   Propofol post intubation   Dilaudid post intubation

## 2025-02-01 NOTE — PROGRESS NOTE ADULT - PROBLEM SELECTOR PLAN 7
F: As needed  E: replete PRN Mg <2, K > 4  DVT:   N: pantoprazole 40mg daily while on steroids   GI: none  Code Status: full code

## 2025-02-01 NOTE — PROGRESS NOTE ADULT - ASSESSMENT
73 yo female w PMH GBM on hospice care (diagnosed in 2024, follows at [facility] under Dr. Hernandez, with reported attempted resection of tumor, on chemotherapy with last chemo 2 months ago) who presents with altered mentation. Epilepsy following for seizures. On exam today, patient noted to have right gaze deviation with RUE flexion and facial twitching.  EEG bedside consistent with seizure and EEG overnight reviewed, consistent with non convulsive status epilepticus. Previous load of Depakote not administered as per chart review.  Discussed goc with daughters beside who indicated wanting to be aggressive with management.     Recommendations  - Ativan 2mg STAT  - Load with depakote 2000mg STAT  - c/w depakote 500mg BID for now  - c/w vEEG  - Discussed with medicine resident   - Will monitor EEG for improvement 73 yo female w PMH GBM on hospice care (diagnosed in 2024, follows at [facility] under Dr. Hernandez, with reported attempted resection of tumor, on chemotherapy with last chemo 2 months ago) who presents with altered mentation. Epilepsy following for seizures. On exam today, patient noted to have right gaze deviation with RUE flexion and facial twitching.  EEG bedside consistent with seizure and EEG overnight reviewed, consistent with non convulsive status epilepticus. Previous load of Depakote not administered as per chart review.  Discussed goc with daughters beside who indicated wanting to be aggressive with management.     Recommendations  - Ativan 2mg STAT  - Load with Depakote 2000mg STAT  - c/w Depakote 500mg BID for now  - c/w vEEG  - Discussed with medicine resident   - Will monitor EEG for improvement 73 yo female w PMH GBM on hospice care (diagnosed in 2024, follows at [facility] under Dr. Hernandez, with reported attempted resection of tumor, on chemotherapy with last chemo 2 months ago) who presents with altered mentation. Epilepsy following for seizures. On exam today, patient noted to have right gaze deviation with RUE flexion and facial twitching.  EEG bedside consistent with seizure and EEG overnight reviewed, consistent with non convulsive status epilepticus. Previous load of Depakote not administered as per chart review.  Discussed goc with daughters beside who indicated wanting to be aggressive with management.     Recommendations  - Ativan 2mg STAT  - Load with Depakote 2000mg STAT  - Then increase Depakote to 750mg BID  - c/w vEEG  - Discussed with medicine resident   - Will monitor EEG for improvement

## 2025-02-01 NOTE — PROGRESS NOTE ADULT - SUBJECTIVE AND OBJECTIVE BOX
HOSPITAL COURSE: 73 yo female w PMH GBM on hospice care (diagnosed in 2024, follows at [facility] under Dr. Hernandez, with reported attempted resection of tumor, on chemotherapy with last chemo 2 months ago). Per son, she was speaking a few words on 1/31 and was able to eat lunch, but become unresponsive and had iwob that evening. She has intermittent shaking of the right hand and had an episode 1/31 for which she was given 1 mg of ativan (instead of 0.5 ativan per son, which he believes is contributing to her AMS, last dose 5:30 pm 1/30). She was brought in for tachypnea, lethargy. Of note, pt was admitted at [facility] 3 weeks ago for first time seizures, on valproic acid BID, dexamethasone 2 mg BID. Pt originally did not want treatment for GBS 2 months ago but changed her mind 4 weeks ago when she felt that she was worsening. Per chart       PHYSICAL EXAM:  Constitutional: resting comfortably in bed; NAD  Head: NC/AT  Eyes: EOMI, anicteric sclera  ENT: no nasal discharge; MMM  Neck: supple  Respiratory: CTA B/L; no W/R/R  Cardiac: RRR; no M/R/G  Gastrointestinal: soft, NT/ND; no rebound or guarding  Extremities: WWP, no clubbing or cyanosis; no peripheral edema  Musculoskeletal: NROM x4; no joint swelling, tenderness or erythema  Dermatologic: skin warm, dry and intact; no rashes, wounds, or scars  Neurologic: AAOx3; no focal deficits  Psychiatric: affect and characteristics of appearance, verbalizations, behaviors are appropriate        RADIOLOGY & ADDITIONAL TESTS: Reviewed   HOSPITAL COURSE: 75 yo female w PMH GBM on hospice care (diagnosed in 2024, follows at [facility] under Dr. Hernandez, with reported attempted resection of tumor, on chemotherapy with last chemo 2 months ago). Per son, she was speaking a few words on 1/31 and was able to eat lunch, but become unresponsive and had iwob that evening. She has intermittent shaking of the right hand and had an episode 1/31 for which she was given 1 mg of ativan (instead of 0.5 ativan per son, which he believes is contributing to her AMS, last dose 5:30 pm 1/30). She was brought in for tachypnea, lethargy. Of note, pt was admitted at [facility] 3 weeks ago for first time seizures, on valproic acid BID, dexamethasone 2 mg BID. Pt originally did not want treatment for GBS 2 months ago but changed her mind 4 weeks ago when she felt that she was worsening. Per chart review patient was able to say her name yesterday, had baseline strength 2/5 RUE/RLL and 0/5 LUE/LLE. Prior to my evaluation of the pt I was stopped by the epilepsy team who informed me she was in status epilepticus on vEEG. Ativan and valproic acid was ordered at that point discussion with patients daughters Dyan and Deyvi who confirmed that previous talks of comfort care/hospice were no longer the families wishes and they would want everything done to preserve her life including chest compressions and intubation. Discussed poor prognosis with intubation/compressions which both daughters understood, however reported they were following their fathers wishes he told them before sabbath, and requested us to not disturb him. Upon evaluation pt was obtunded with tremors to RUE, response to noxious stimuli but otherwise no response. Pupils were responsive with saccadic movement. She was slumped to her right sided with gurgling breath sounds.  At that point rapid was called and patient was inevitably intubated. Transferred to [facility]. Discussed with pts daughters post procedure poor prognosis and advised to have formal family meeting to clarify and document wishes.       PHYSICAL EXAM:  Constitutional: Obtunded  Head: NC/AT  Eyes: Does not open spontaneously, saccadic movemnets  ENT: gurgling  Respiratory: Decreased air movement to upper air fields with wet lung sounds  Cardiac: RRR  Gastrointestinal: soft  Extremities: WWP  Neurologic: Obtunded, shaking to RUE and RLE. Moves RUE to painful stimuli, no purposeful movements        RADIOLOGY & ADDITIONAL TESTS: Reviewed

## 2025-02-01 NOTE — PROGRESS NOTE ADULT - ASSESSMENT
75 yo female w PMH GBM on hospice care (diagnosed in 2024, follows at [facility] under Dr. Hernandez, with reported attempted resection of tumor, on chemotherapy with last chemo 2 months ago), admitted for ams likely 2/2 disease progresion. After admission to [facility], Upon evaluation pt was obtunded with tremors to RUE, response to noxious stimuli but otherwise no response. Pupils were responsive with saccadic movement. She was slumped to her right sided with gurgling breath sounds.  At that point rapid was called and patient was inevitably intubated. Transferred to [facility]. Discussed with pts daughters post procedure poor prognosis and advised to have formal family meeting to clarify and document wishes.       NEURO  #seizures  #AMS  Now on sedation and intubated.  -c/w valproic acid 750 q12  -c/w ativan 0,5mg q4h prn for seizures  - on prop and fent  - eeg    #GBM  last chemo ~2months ago, follows at [facility].    -c/w decadron 4 IV q6h  -palliative care consult   -NSGY recs.      PULM  #intubated  - intubated on 2/1  - [facility]    GI  - npo in setting of seizures    RENAL  - currently with normal renal function    ID  #SIRS  Systemic inflammatory response syndrome (SIRS).   ·  Plan: Meeting SIRS 2/4 (HR>90, WBC>12k) likely iso of UTI.   Bcx with ESBL E. coli  -c/w Ertapenem  -IVF as needed    F: none  E: replete K<4, Mg<2  N: npo    VTE Prophylaxis: SCDs  GI: ppi while intubated  C: Full Code  D: micu       73 yo female w PMH GBM on hospice care (diagnosed in 2024, follows at [facility] under Dr. Hernandez, with reported attempted resection of tumor, on chemotherapy with last chemo 2 months ago), admitted for ams likely 2/2 disease progresion. After admission to [facility] [place], Upon evaluation pt was obtunded with tremors to RUE, response to noxious stimuli but otherwise no response. Pupils were responsive with saccadic movement. She was slumped to her right sided with gurgling breath sounds.  At that point rapid was called and patient was inevitably intubated. Transferred to [facility]. Discussed with pts daughters post procedure poor prognosis and advised to have formal family meeting to clarify and document wishes.       NEURO  #seizures  #AMS  Now on sedation and intubated.  -c/w valproic acid 750 q12  -c/w ativan 0,5mg q4h prn for seizures  - on prop and fent  - eeg    #GBM  last chemo ~2months ago, follows at [facility].    -c/w decadron 4 IV q6h  -palliative care consult   -NSGY recs.      CARDIAC  #Chronic atrial fibrillation.    Home med: metoprolol 12.5 BID   - received lopressor earlier in admission  - not on AC    Plan  -c/w home metoprolol, will convert to IV as pt obtunded---> 5mg q8h lopressor IVP  -bladder scans q6h   -consider john (currently on primafit) if pt retaining   -last BM yesterday, make sure pt having BMs  -pain control with dilaudid 0.5 q4h PRN.    PULM  #intubated  - intubated on 2/1  - [facility]    GI  - npo in setting of seizures    RENAL  - currently with normal renal function    ID  #SIRS  Systemic inflammatory response syndrome (SIRS).   ·  Plan: Meeting SIRS 2/4 (HR>90, WBC>12k) likely iso of UTI.   Bcx with ESBL E. coli  -c/w Ertapenem  -IVF as needed    F: none  E: replete K<4, Mg<2  N: npo    VTE Prophylaxis: SCDs  GI: ppi while intubated  C: Full Code  D: micu

## 2025-02-01 NOTE — PROGRESS NOTE ADULT - ATTENDING COMMENTS
74F with GBM on hospice care admitted 1/31 for progressive AMS, multifactorial given likely disease progression with large R cerebral infiltrating mass and midline shift, as well as sepsis 2/2 E coli bacteremia and concern for seizures. Per chart review, received valproic acid  (at 9AM) and 750 (at 5PM) on 1/31, this morning with NCSE for which patient was intubated after discussion with family at bedside as per resident documentation. Patient was transferred to MICU this morning prior to my evaluation.

## 2025-02-01 NOTE — PROGRESS NOTE ADULT - SUBJECTIVE AND OBJECTIVE BOX
Neurology Progress Note    Interval History:          PAST MEDICAL & SURGICAL HISTORY:  GBM (glioblastoma multiforme)    Seizure            Medications:  dexAMETHasone  Injectable 4 milliGRAM(s) IV Push every 6 hours  dextrose 5%. 1000 milliLiter(s) IV Continuous <Continuous>  dextrose 5%. 1000 milliLiter(s) IV Continuous <Continuous>  dextrose 50% Injectable 25 Gram(s) IV Push once  dextrose 50% Injectable 12.5 Gram(s) IV Push once  dextrose 50% Injectable 25 Gram(s) IV Push once  dextrose Oral Gel 15 Gram(s) Oral once PRN  ertapenem  IVPB 1000 milliGRAM(s) IV Intermittent every 24 hours  glucagon  Injectable 1 milliGRAM(s) IntraMuscular once  HYDROmorphone  Injectable 0.5 milliGRAM(s) IV Push every 4 hours PRN  influenza  Vaccine (HIGH DOSE) 0.5 milliLiter(s) IntraMuscular once  insulin lispro (ADMELOG) corrective regimen sliding scale   SubCutaneous three times a day before meals  insulin lispro (ADMELOG) corrective regimen sliding scale   SubCutaneous at bedtime  lactated ringers Bolus 1000 milliLiter(s) IV Bolus once  LORazepam   Injectable 2 milliGRAM(s) IV Push every 4 hours PRN  metoprolol tartrate Injectable 5 milliGRAM(s) IV Push every 8 hours  norepinephrine Infusion 0.05 MICROgram(s)/kG/Min IV Continuous <Continuous>  pantoprazole  Injectable 40 milliGRAM(s) IV Push every 24 hours  valproate sodium   IVPB 500 milliGRAM(s) IV Intermittent every 12 hours  valproate sodium   IVPB 2000 milliGRAM(s) IV Intermittent once      Vital Signs Last 24 Hrs  T(C): 36.6 (01 Feb 2025 06:05), Max: 36.8 (31 Jan 2025 19:26)  T(F): 97.9 (01 Feb 2025 06:05), Max: 98.3 (31 Jan 2025 19:26)  HR: 91 (01 Feb 2025 06:05) (88 - 117)  BP: 147/89 (01 Feb 2025 06:05) (93/54 - 168/118)  BP(mean): 101 (01 Feb 2025 04:48) (94 - 135)  RR: 16 (01 Feb 2025 04:52) (16 - 19)  SpO2: 94% (01 Feb 2025 06:05) (87% - 98%)    Parameters below as of 01 Feb 2025 06:05  Patient On (Oxygen Delivery Method): nasal cannula  O2 Flow (L/min): 2      Neurological Exam:   Mental status: Awake, alert and oriented x3.  Recent and remote memory intact.  Naming, repetition and comprehension intact.  Attention/concentration intact.  No dysarthria, no aphasia.  Fund of knowledge appropriate.    Cranial nerves: Pupils equally round and reactive to light, visual fields full, no nystagmus, extraocular muscles intact, V1 through V3 intact bilaterally and symmetric, face symmetric, hearing intact to finger rub, palate elevation symmetric, tongue was midline.  Motor:  MRC grading 5/5 b/l UE/LE.   strength 5/5.  Normal tone and bulk.  No abnormal movements.    Sensation: Intact to light touch, proprioception, and pinprick.   Coordination: No dysmetria on finger-to-nose and heel-to-shin.  No dysdiadokinesia.  Reflexes: 2+ in bilateral UE/LE, downgoing toes bilaterally. (-) Reece.  Gait: Narrow and steady. No ataxia.  Romberg negative    Labs:  CBC Full  -  ( 01 Feb 2025 06:46 )  WBC Count : 17.70 K/uL  RBC Count : 5.37 M/uL  Hemoglobin : 15.2 g/dL  Hematocrit : 48.3 %  Platelet Count - Automated : 234 K/uL  Mean Cell Volume : 89.9 fl  Mean Cell Hemoglobin : 28.3 pg  Mean Cell Hemoglobin Concentration : 31.5 g/dL  Auto Neutrophil # : 15.79 K/uL  Auto Lymphocyte # : 1.17 K/uL  Auto Monocyte # : 0.45 K/uL  Auto Eosinophil # : 0.00 K/uL  Auto Basophil # : 0.03 K/uL  Auto Neutrophil % : 89.2 %  Auto Lymphocyte % : 6.6 %  Auto Monocyte % : 2.5 %  Auto Eosinophil % : 0.0 %  Auto Basophil % : 0.2 %    02-01    147[H]  |  109[H]  |  33[H]  ----------------------------<  137[H]  3.8   |  25  |  0.54    Ca    8.9      01 Feb 2025 06:46  Phos  3.8     02-01  Mg     2.4     02-01    TPro  6.1  /  Alb  2.9[L]  /  TBili  0.4  /  DBili  x   /  AST  18  /  ALT  34  /  AlkPhos  150[H]  02-01    LIVER FUNCTIONS - ( 01 Feb 2025 06:46 )  Alb: 2.9 g/dL / Pro: 6.1 g/dL / ALK PHOS: 150 U/L / ALT: 34 U/L / AST: 18 U/L / GGT: x           PT/INR - ( 31 Jan 2025 00:49 )   PT: 12.0 sec;   INR: 1.04          PTT - ( 31 Jan 2025 00:49 )  PTT:32.6 sec  Urinalysis Basic - ( 01 Feb 2025 06:46 )    Color: x / Appearance: x / SG: x / pH: x  Gluc: 137 mg/dL / Ketone: x  / Bili: x / Urobili: x   Blood: x / Protein: x / Nitrite: x   Leuk Esterase: x / RBC: x / WBC x   Sq Epi: x / Non Sq Epi: x / Bacteria: x         Neurology Progress Note    Interval History:  Overnight, no acute events. Per chart review, patient did not get loaded with valproic acid. Noted to be in non convulsive status this morning. Daughters bedside report patient has not been responsive to them where as before she was more responsive. They want to be aggressive in managing her seizures.       PAST MEDICAL & SURGICAL HISTORY:  GBM (glioblastoma multiforme)    Seizure      Medications:  dexAMETHasone  Injectable 4 milliGRAM(s) IV Push every 6 hours  dextrose 5%. 1000 milliLiter(s) IV Continuous <Continuous>  dextrose 5%. 1000 milliLiter(s) IV Continuous <Continuous>  dextrose 50% Injectable 25 Gram(s) IV Push once  dextrose 50% Injectable 12.5 Gram(s) IV Push once  dextrose 50% Injectable 25 Gram(s) IV Push once  dextrose Oral Gel 15 Gram(s) Oral once PRN  ertapenem  IVPB 1000 milliGRAM(s) IV Intermittent every 24 hours  glucagon  Injectable 1 milliGRAM(s) IntraMuscular once  HYDROmorphone  Injectable 0.5 milliGRAM(s) IV Push every 4 hours PRN  influenza  Vaccine (HIGH DOSE) 0.5 milliLiter(s) IntraMuscular once  insulin lispro (ADMELOG) corrective regimen sliding scale   SubCutaneous three times a day before meals  insulin lispro (ADMELOG) corrective regimen sliding scale   SubCutaneous at bedtime  lactated ringers Bolus 1000 milliLiter(s) IV Bolus once  LORazepam   Injectable 2 milliGRAM(s) IV Push every 4 hours PRN  metoprolol tartrate Injectable 5 milliGRAM(s) IV Push every 8 hours  norepinephrine Infusion 0.05 MICROgram(s)/kG/Min IV Continuous <Continuous>  pantoprazole  Injectable 40 milliGRAM(s) IV Push every 24 hours  valproate sodium   IVPB 500 milliGRAM(s) IV Intermittent every 12 hours  valproate sodium   IVPB 2000 milliGRAM(s) IV Intermittent once      Vital Signs Last 24 Hrs  T(C): 36.6 (01 Feb 2025 06:05), Max: 36.8 (31 Jan 2025 19:26)  T(F): 97.9 (01 Feb 2025 06:05), Max: 98.3 (31 Jan 2025 19:26)  HR: 91 (01 Feb 2025 06:05) (88 - 117)  BP: 147/89 (01 Feb 2025 06:05) (93/54 - 168/118)  BP(mean): 101 (01 Feb 2025 04:48) (94 - 135)  RR: 16 (01 Feb 2025 04:52) (16 - 19)  SpO2: 94% (01 Feb 2025 06:05) (87% - 98%)    Parameters below as of 01 Feb 2025 06:05  Patient On (Oxygen Delivery Method): nasal cannula  O2 Flow (L/min): 2      Neurological Exam:   Mental status: Appears minimally responsive even to noxious stimuli, does not follow commands  Eyes: Right gaze deviation  Motor:  Right sided arm flexion and facial twitching  Sensation: Does not withdraw to painful stimuli  Reflexes: 2+ in bilateral UE/LE, upgoing bilaterally.   Gait: Deferred    Labs:  CBC Full  -  ( 01 Feb 2025 06:46 )  WBC Count : 17.70 K/uL  RBC Count : 5.37 M/uL  Hemoglobin : 15.2 g/dL  Hematocrit : 48.3 %  Platelet Count - Automated : 234 K/uL  Mean Cell Volume : 89.9 fl  Mean Cell Hemoglobin : 28.3 pg  Mean Cell Hemoglobin Concentration : 31.5 g/dL  Auto Neutrophil # : 15.79 K/uL  Auto Lymphocyte # : 1.17 K/uL  Auto Monocyte # : 0.45 K/uL  Auto Eosinophil # : 0.00 K/uL  Auto Basophil # : 0.03 K/uL  Auto Neutrophil % : 89.2 %  Auto Lymphocyte % : 6.6 %  Auto Monocyte % : 2.5 %  Auto Eosinophil % : 0.0 %  Auto Basophil % : 0.2 %    02-01    147[H]  |  109[H]  |  33[H]  ----------------------------<  137[H]  3.8   |  25  |  0.54    Ca    8.9      01 Feb 2025 06:46  Phos  3.8     02-01  Mg     2.4     02-01    TPro  6.1  /  Alb  2.9[L]  /  TBili  0.4  /  DBili  x   /  AST  18  /  ALT  34  /  AlkPhos  150[H]  02-01    LIVER FUNCTIONS - ( 01 Feb 2025 06:46 )  Alb: 2.9 g/dL / Pro: 6.1 g/dL / ALK PHOS: 150 U/L / ALT: 34 U/L / AST: 18 U/L / GGT: x           PT/INR - ( 31 Jan 2025 00:49 )   PT: 12.0 sec;   INR: 1.04          PTT - ( 31 Jan 2025 00:49 )  PTT:32.6 sec  Urinalysis Basic - ( 01 Feb 2025 06:46 )    Color: x / Appearance: x / SG: x / pH: x  Gluc: 137 mg/dL / Ketone: x  / Bili: x / Urobili: x   Blood: x / Protein: x / Nitrite: x   Leuk Esterase: x / RBC: x / WBC x   Sq Epi: x / Non Sq Epi: x / Bacteria: x

## 2025-02-01 NOTE — PROGRESS NOTE ADULT - ATTENDING COMMENTS
GBM, not surgical candidate. Family wants full code.  Status epilepticus  vEEG  intubated  ESBL bacteremia on ertapenem, risk benefit of seizure threshold. discuss with ID any alternate agent.

## 2025-02-01 NOTE — EEG REPORT - NS EEG TEXT BOX
Inpatient Continuous video-Electroencephalogram    Patient Name:	ORVILLE SOLANO    :	1950  MRN:	[id number]    Study Start Date/Time:	2025, 11:28:13 PM  Study End Date/Time: in progress    Referred by:  Dr Zuniga    Brief Clinical History:  ORVILLE SOLANO is a 74 year old Female; study performed to investigate for seizures or markers of epilepsy.   Technologist notes: -  Diagnosis Code:  R56.9 convulsions/seizure    Pertinent Medication:  Depakote 500 mg bid    Acquisition Details:  Electroencephalography was acquired using a minimum of 21 channels on an [employer] [facility] system v 9.3.1 with electrode placement according to the standard International 10-20 system following ACNS (American Clinical Neurophysiology Society) guidelines.  Anterior temporal T1 and T2 electrodes were utilized whenever possible.  The [employer]TEK automated spike & seizure detections were all reviewed in detail, in addition to the entire raw EEG.    Findings:  Day 1:  2025, 11:28:13 PM to next morning at 07:00 AM   Meds:Depakote 500 mg bid  Background:  continuous, with predominantly theta/delta frequencies.  Generalized Slowing:  None  Symmetry/Focality: Continuous (90+%)   fronto-parietal  polymorphic delta/theta.   Voltage:  Normal (20+ uV)  Organization:  Absent  Posterior Dominant Rhythm:  7-8 Hz poorly regulated  Sleep:  Rudimentary but likely N2 transients present.  Variability:   Yes		Reactivity:  Yes    Spontaneous Activity:  Occasional ( >1/hr < 1/min)   to frequent Epileptiform sharp waves ( msec) maximal over right frontal region  Events:  1.Right frontotemporal onset focal seizures  Times: 2025 00:56, 2:14, 4:58, 6:13  Duration: ~60 seconds  Clinical: No overt clinical changes noted on video  Electrographic: There is development of alpha frequencies over the right frontotemporal region becoming spiky and higher in amplitude, then become slower in rhythmic theta range before offset          2.Left frontotemporal onset focal seizures  Times: 2025 3:12, 4:40  Duration: 20-30 seconds and 2 minutes  Clinical: No overt clinical changes noted on video  Electrographic: There are increase in left frontotemporal faster frequencies, evolving to left frontotemporal sharp wave discharges embedded in delta wave, increasing in rhythmicity then dissipating before offset    3.Left temporal focal motor status epilepticus  Time: start at 5:03 Until end of recording on this date (about 2 hours)  Clinical: Initially overt clinical changes noted on video, as seizure evolves medical provider noted right gaze deviation and right hemibody clonic activity  Electrographic: There is development of left frontotemporal blunted sharp waves at 3Hz, followed by development of left temporal spiky alpha and beta frequencies with spread to left parasagittal region. Activity becomes higher in amplitude with embedded left temporal spikes/sharps. Pattern fluctuates and continues until end of recording.  Provocations:  •	Hyperventilation: was not performed.  •	Photic stimulation: was not performed.  Daily Summary:      •	This finding indicatinve of focal epilepsy with seizures arising from bilateral fronto-temporal  regions with progression to left temporal focal status epilepticus  •	Occasional to frequent epileptiform discharges over right frontal indicating underlying epileptic potential in this region  •	Severe generalized slowing indicating similar degree of underlying diffuse cerebral dysfunction  •	Continuous right fronto-parietal focal slowing indicating underlying focal cerebral dysfunction          Katelynn Lundy MD  CNP Fellow            Inpatient Continuous video-Electroencephalogram    Patient Name:	ORVILLE SOLANO    :	1950  MRN:	[id number]    Study Start Date/Time:	2025, 11:28:13 PM  Study End Date/Time: in progress    Referred by:  Dr Zuniga    Brief Clinical History:  ORVILLE SOLANO is a 74 year old Female; study performed to investigate for seizures or markers of epilepsy.       Diagnosis Code:  R56.9 convulsions/seizure    Pertinent Medication:  Depakote 500 mg bid    Acquisition Details:  Electroencephalography was acquired using a minimum of 21 channels on an [employer] system v 9.3.1 with electrode placement according to the standard International 10-20 system following ACNS (American Clinical Neurophysiology Society) guidelines.  Anterior temporal T1 and T2 electrodes were utilized whenever possible.  The XLTEK automated spike & seizure detections were all reviewed in detail, in addition to the entire raw EEG.    Findings:  Day 1:  2025, 11:28:13 PM to next morning at 07:00 AM   Meds: Depakote 500 mg bid  Background:  continuous, with predominantly theta/delta frequencies.  Generalized Slowing:  None  Symmetry/Focality: Continuous (90+%)   fronto-parietal  polymorphic delta/theta.   Voltage:  Normal (20+ uV)  Organization:  Absent  Posterior Dominant Rhythm:  7-8 Hz poorly regulated  Sleep:  Rudimentary but likely N2 transients present.  Variability:   Yes		Reactivity:  Yes    Spontaneous Activity:  Occasional ( >1/hr < 1/min)   to frequent Epileptiform sharp waves ( msec) maximal over right frontal region  Events:  1.Right frontotemporal onset focal seizures  Times: 2025 00:56, 2:14, 4:58, 6:13  Duration: ~60 seconds  Clinical: No overt clinical changes noted on video  Electrographic: There is development of alpha frequencies over the right frontotemporal region becoming spiky and higher in amplitude, then become slower in rhythmic theta range before offset    2.Left frontotemporal onset focal seizures  Times: 2025 3:12, 4:40  Duration: 20-30 seconds and 2 minutes  Clinical: No overt clinical changes noted on video  Electrographic: There are increase in left frontotemporal faster frequencies, evolving to left frontotemporal sharp wave discharges embedded in delta wave, increasing in rhythmicity then dissipating before offset    3.Left temporal focal motor status epilepticus  Time: start at 5:03 Until end of recording on this date (about 2 hours)  Clinical: Initially overt clinical changes noted on video, as seizure evolves medical provider noted right gaze deviation and right hemibody clonic activity  Electrographic: There is development of left frontotemporal blunted sharp waves at 3Hz, followed by development of left temporal spiky alpha and beta frequencies with spread to left parasagittal region. Activity becomes higher in amplitude with embedded left temporal spikes/sharps. Pattern fluctuates and continues until end of recording.    Provocations:  •	Hyperventilation: was not performed.  •	Photic stimulation: was not performed.    Daily Summary:      •	These findings are indicative of a focal epilepsy with seizures arising from bilateral fronto-temporal regions with progression to left temporal focal status epilepticus  •	Occasional to frequent epileptiform discharges over right frontal indicating underlying epileptic potential in this region  •	Severe generalized slowing indicating similar degree of underlying diffuse cerebral dysfunction  •	Continuous right fronto-parietal focal slowing indicating underlying focal cerebral dysfunction      Katelynn Lundy MD  CNP Fellow    Findings of EEG were communicated to primary team. Patient subsequently received IV lorazepam followed by VPA load.

## 2025-02-02 LAB
-  AMPICILLIN/SULBACTAM: SIGNIFICANT CHANGE UP
-  AMPICILLIN: SIGNIFICANT CHANGE UP
-  CEFAZOLIN: SIGNIFICANT CHANGE UP
-  CEFEPIME: SIGNIFICANT CHANGE UP
-  CEFTRIAXONE: SIGNIFICANT CHANGE UP
-  CIPROFLOXACIN: SIGNIFICANT CHANGE UP
-  ERTAPENEM: SIGNIFICANT CHANGE UP
-  GENTAMICIN: SIGNIFICANT CHANGE UP
-  IMIPENEM: SIGNIFICANT CHANGE UP
-  LEVOFLOXACIN: SIGNIFICANT CHANGE UP
-  MEROPENEM: SIGNIFICANT CHANGE UP
-  PIPERACILLIN/TAZOBACTAM: SIGNIFICANT CHANGE UP
-  TOBRAMYCIN: SIGNIFICANT CHANGE UP
-  TRIMETHOPRIM/SULFAMETHOXAZOLE: SIGNIFICANT CHANGE UP
ALBUMIN SERPL ELPH-MCNC: 2.7 G/DL — LOW (ref 3.3–5)
ALP SERPL-CCNC: 113 U/L — SIGNIFICANT CHANGE UP (ref 40–120)
ALT FLD-CCNC: 25 U/L — SIGNIFICANT CHANGE UP (ref 10–45)
ANION GAP SERPL CALC-SCNC: 12 MMOL/L — SIGNIFICANT CHANGE UP (ref 5–17)
AST SERPL-CCNC: 13 U/L — SIGNIFICANT CHANGE UP (ref 10–40)
BASOPHILS # BLD AUTO: 0.04 K/UL — SIGNIFICANT CHANGE UP (ref 0–0.2)
BASOPHILS NFR BLD AUTO: 0.2 % — SIGNIFICANT CHANGE UP (ref 0–2)
BILIRUB SERPL-MCNC: 0.3 MG/DL — SIGNIFICANT CHANGE UP (ref 0.2–1.2)
BLD GP AB SCN SERPL QL: NEGATIVE — SIGNIFICANT CHANGE UP
BUN SERPL-MCNC: 42 MG/DL — HIGH (ref 7–23)
CALCIUM SERPL-MCNC: 8.5 MG/DL — SIGNIFICANT CHANGE UP (ref 8.4–10.5)
CHLORIDE SERPL-SCNC: 109 MMOL/L — HIGH (ref 96–108)
CO2 SERPL-SCNC: 27 MMOL/L — SIGNIFICANT CHANGE UP (ref 22–31)
CREAT SERPL-MCNC: 0.6 MG/DL — SIGNIFICANT CHANGE UP (ref 0.5–1.3)
CULTURE RESULTS: ABNORMAL
CULTURE RESULTS: ABNORMAL
EGFR: 94 ML/MIN/1.73M2 — SIGNIFICANT CHANGE UP
EOSINOPHIL # BLD AUTO: 0 K/UL — SIGNIFICANT CHANGE UP (ref 0–0.5)
EOSINOPHIL NFR BLD AUTO: 0 % — SIGNIFICANT CHANGE UP (ref 0–6)
GLUCOSE SERPL-MCNC: 149 MG/DL — HIGH (ref 70–99)
HCT VFR BLD CALC: 44 % — SIGNIFICANT CHANGE UP (ref 34.5–45)
HCT VFR BLD CALC: 44.6 % — SIGNIFICANT CHANGE UP (ref 34.5–45)
HGB BLD-MCNC: 13.4 G/DL — SIGNIFICANT CHANGE UP (ref 11.5–15.5)
HGB BLD-MCNC: 13.5 G/DL — SIGNIFICANT CHANGE UP (ref 11.5–15.5)
IMM GRANULOCYTES NFR BLD AUTO: 1.2 % — HIGH (ref 0–0.9)
LACTATE SERPL-SCNC: 1.8 MMOL/L — SIGNIFICANT CHANGE UP (ref 0.5–2)
LYMPHOCYTES # BLD AUTO: 1.32 K/UL — SIGNIFICANT CHANGE UP (ref 1–3.3)
LYMPHOCYTES # BLD AUTO: 7.5 % — LOW (ref 13–44)
MAGNESIUM SERPL-MCNC: 2.4 MG/DL — SIGNIFICANT CHANGE UP (ref 1.6–2.6)
MCHC RBC-ENTMCNC: 27.1 PG — SIGNIFICANT CHANGE UP (ref 27–34)
MCHC RBC-ENTMCNC: 27.1 PG — SIGNIFICANT CHANGE UP (ref 27–34)
MCHC RBC-ENTMCNC: 30.3 G/DL — LOW (ref 32–36)
MCHC RBC-ENTMCNC: 30.5 G/DL — LOW (ref 32–36)
MCV RBC AUTO: 89.1 FL — SIGNIFICANT CHANGE UP (ref 80–100)
MCV RBC AUTO: 89.4 FL — SIGNIFICANT CHANGE UP (ref 80–100)
METHOD TYPE: SIGNIFICANT CHANGE UP
MONOCYTES # BLD AUTO: 0.66 K/UL — SIGNIFICANT CHANGE UP (ref 0–0.9)
MONOCYTES NFR BLD AUTO: 3.7 % — SIGNIFICANT CHANGE UP (ref 2–14)
NEUTROPHILS # BLD AUTO: 15.46 K/UL — HIGH (ref 1.8–7.4)
NEUTROPHILS NFR BLD AUTO: 87.4 % — HIGH (ref 43–77)
NRBC # BLD: 0 /100 WBCS — SIGNIFICANT CHANGE UP (ref 0–0)
NRBC # BLD: 0 /100 WBCS — SIGNIFICANT CHANGE UP (ref 0–0)
NRBC BLD-RTO: 0 /100 WBCS — SIGNIFICANT CHANGE UP (ref 0–0)
NRBC BLD-RTO: 0 /100 WBCS — SIGNIFICANT CHANGE UP (ref 0–0)
ORGANISM # SPEC MICROSCOPIC CNT: ABNORMAL
ORGANISM # SPEC MICROSCOPIC CNT: ABNORMAL
ORGANISM # SPEC MICROSCOPIC CNT: SIGNIFICANT CHANGE UP
PHOSPHATE SERPL-MCNC: 3.9 MG/DL — SIGNIFICANT CHANGE UP (ref 2.5–4.5)
PLATELET # BLD AUTO: 197 K/UL — SIGNIFICANT CHANGE UP (ref 150–400)
PLATELET # BLD AUTO: 199 K/UL — SIGNIFICANT CHANGE UP (ref 150–400)
POTASSIUM SERPL-MCNC: 4.1 MMOL/L — SIGNIFICANT CHANGE UP (ref 3.5–5.3)
POTASSIUM SERPL-SCNC: 4.1 MMOL/L — SIGNIFICANT CHANGE UP (ref 3.5–5.3)
PROT SERPL-MCNC: 5.1 G/DL — LOW (ref 6–8.3)
RBC # BLD: 4.94 M/UL — SIGNIFICANT CHANGE UP (ref 3.8–5.2)
RBC # BLD: 4.99 M/UL — SIGNIFICANT CHANGE UP (ref 3.8–5.2)
RBC # FLD: 15.9 % — HIGH (ref 10.3–14.5)
RBC # FLD: 16.2 % — HIGH (ref 10.3–14.5)
RH IG SCN BLD-IMP: POSITIVE — SIGNIFICANT CHANGE UP
SODIUM SERPL-SCNC: 148 MMOL/L — HIGH (ref 135–145)
SPECIMEN SOURCE: SIGNIFICANT CHANGE UP
SPECIMEN SOURCE: SIGNIFICANT CHANGE UP
WBC # BLD: 15.88 K/UL — HIGH (ref 3.8–10.5)
WBC # BLD: 17.7 K/UL — HIGH (ref 3.8–10.5)
WBC # FLD AUTO: 15.88 K/UL — HIGH (ref 3.8–10.5)
WBC # FLD AUTO: 17.7 K/UL — HIGH (ref 3.8–10.5)

## 2025-02-02 PROCEDURE — 76604 US EXAM CHEST: CPT | Mod: 26

## 2025-02-02 PROCEDURE — 93308 TTE F-UP OR LMTD: CPT | Mod: 26

## 2025-02-02 PROCEDURE — 95720 EEG PHY/QHP EA INCR W/VEEG: CPT

## 2025-02-02 PROCEDURE — 99231 SBSQ HOSP IP/OBS SF/LOW 25: CPT

## 2025-02-02 PROCEDURE — 99291 CRITICAL CARE FIRST HOUR: CPT | Mod: 25

## 2025-02-02 RX ORDER — SODIUM CHLORIDE 9 G/1000ML
1000 INJECTION, SOLUTION INTRAVENOUS ONCE
Refills: 0 | Status: COMPLETED | OUTPATIENT
Start: 2025-02-02 | End: 2025-02-02

## 2025-02-02 RX ORDER — DIGOXIN 250 UG/1
500 TABLET ORAL ONCE
Refills: 0 | Status: COMPLETED | OUTPATIENT
Start: 2025-02-02 | End: 2025-02-02

## 2025-02-02 RX ORDER — SODIUM CHLORIDE 9 G/1000ML
1000 INJECTION, SOLUTION INTRAVENOUS
Refills: 0 | Status: COMPLETED | OUTPATIENT
Start: 2025-02-02 | End: 2025-02-02

## 2025-02-02 RX ORDER — PHENYLEPHRINE HCL IN 0.9% NACL 0.5 MG/5ML
0.1 SYRINGE (ML) INTRAVENOUS
Qty: 40 | Refills: 0 | Status: DISCONTINUED | OUTPATIENT
Start: 2025-02-02 | End: 2025-02-03

## 2025-02-02 RX ADMIN — DEXAMETHASONE 4 MILLIGRAM(S): 0.5 TABLET ORAL at 04:34

## 2025-02-02 RX ADMIN — PROPOFOL 15.7 MICROGRAM(S)/KG/MIN: 10 INJECTION, EMULSION INTRAVENOUS at 03:24

## 2025-02-02 RX ADMIN — Medication 1 APPLICATION(S): at 18:08

## 2025-02-02 RX ADMIN — Medication 40 MILLIGRAM(S): at 10:37

## 2025-02-02 RX ADMIN — Medication 1 APPLICATION(S): at 06:57

## 2025-02-02 RX ADMIN — PROPOFOL 15.7 MICROGRAM(S)/KG/MIN: 10 INJECTION, EMULSION INTRAVENOUS at 09:08

## 2025-02-02 RX ADMIN — Medication 1 DROP(S): at 06:50

## 2025-02-02 RX ADMIN — Medication 1 APPLICATION(S): at 06:50

## 2025-02-02 RX ADMIN — DIGOXIN 500 MICROGRAM(S): 250 TABLET ORAL at 02:22

## 2025-02-02 RX ADMIN — Medication 15 MILLILITER(S): at 06:50

## 2025-02-02 RX ADMIN — SODIUM CHLORIDE 100 MILLILITER(S): 9 INJECTION, SOLUTION INTRAVENOUS at 11:03

## 2025-02-02 RX ADMIN — Medication 15 MILLILITER(S): at 18:07

## 2025-02-02 RX ADMIN — ERTAPENEM SODIUM 120 MILLIGRAM(S): 1 INJECTION, POWDER, LYOPHILIZED, FOR SOLUTION INTRAMUSCULAR; INTRAVENOUS at 13:38

## 2025-02-02 RX ADMIN — DEXAMETHASONE 4 MILLIGRAM(S): 0.5 TABLET ORAL at 10:37

## 2025-02-02 RX ADMIN — Medication 1 DROP(S): at 18:08

## 2025-02-02 RX ADMIN — METOPROLOL SUCCINATE 2.5 MILLIGRAM(S): 50 TABLET, EXTENDED RELEASE ORAL at 00:05

## 2025-02-02 RX ADMIN — SODIUM CHLORIDE 1000 MILLILITER(S): 9 INJECTION, SOLUTION INTRAVENOUS at 11:03

## 2025-02-02 RX ADMIN — PROPOFOL 15.7 MICROGRAM(S)/KG/MIN: 10 INJECTION, EMULSION INTRAVENOUS at 19:25

## 2025-02-02 RX ADMIN — Medication 1.96 MICROGRAM(S)/KG/MIN: at 02:21

## 2025-02-02 RX ADMIN — Medication 57.5 MILLIGRAM(S): at 04:34

## 2025-02-02 RX ADMIN — SODIUM CHLORIDE 100 MILLILITER(S): 9 INJECTION, SOLUTION INTRAVENOUS at 23:05

## 2025-02-02 RX ADMIN — Medication 1200 MILLIGRAM(S): at 18:06

## 2025-02-02 RX ADMIN — DEXAMETHASONE 4 MILLIGRAM(S): 0.5 TABLET ORAL at 16:08

## 2025-02-02 RX ADMIN — DEXAMETHASONE 4 MILLIGRAM(S): 0.5 TABLET ORAL at 22:26

## 2025-02-02 NOTE — EEG REPORT - NS EEG TEXT BOX
[facility]  Inpatient Epilepsy Monitoring Unit video-Electroencephalography Report    Acquisition Details:  Electroencephalography was acquired using a minimum of 21 channels on an [employer] [facility] system v 9.3.1 with electrode placement according to the standard International 10-20 system following ACNS (American Clinical Neurophysiology Society) guidelines for Long-Term Video EEG monitoring.  Anterior temporal T1 and T2 electrodes were utilized whenever possible.   The XLTEK automated spike & seizure detections were all reviewed in detail, in addition to extensive portions of raw EEG.  Specially-trained nurses were available for seizure-related events.  Continuous live-time video monitoring of the patients for seizure-related and safety events was performed by specially-trained technicians.      Day 2:  2/1/2025, 7:00 AM to next morning at 07:00 AM   Meds: Depakote 750 mg bid, propofol drip  Background: initially  continuous, asymmetric with predominantly delta / theta frequencies(slower over the right hemisphere) until 9:16 AM. Following background became suppressed with asynchronous bursts of theta/delta activity superimposed with sharp wave discharges and 80 % suppression background  Generalized Slowing:  None  Symmetry/Focality: Continuous (90+%)  left and independent right fronto-parietal/temporal polymorphic delta/theta.   Voltage:  Normal (20+ uV) during bursts   Organization:  Absent  Posterior Dominant Rhythm:  Initially 7-8 Hz poorly regulated, absent after background becomes suppressed  Sleep:  Absent.  Variability:   Yes		Reactivity:  Initially, Yes    Spontaneous Activity:  Abundant left hemispheric lateralized rhythmic delta activity superimposed with epileptiform sharp waves ( msec) maximal over left fronto temporal region until 9:16 AM. Following there were bursts contained nearly continuous periodic right frontotemporal (LPDs) and rare left frontotemporal epileptiform discharges  Events:        1.	Left temporal focal motor status epilepticus  Time: start at 5:03 AM on 2/1(Prior day’s report) Until 8:44 AM on 2/1  Clinical: Right gaze deviation and right hemibody clonic activity  Electrographic: There is development of left frontotemporal blunted sharp waves at 3Hz, followed by development of left temporal spiky alpha and beta frequencies with spread to left parasagittal region. Activity becomes higher in amplitude with embedded left temporal spikes/sharps. Pattern fluctuates and eventually slows to 1 Hz prior to offset. Post ictally there is relative polymorphic slowing and attenuation.   Provocations:  •	Hyperventilation: was not performed.  •	Photic stimulation: was not performed.  Daily Summary:      •	These findings are indicative of a focal epilepsy with left temporal focal status epilepticus which resolved after IV lorazepam and valproic acid and following sedative medication, as well as focal cortical hyperexcitability over the R>L frontotemporal regions  •	Background mainly burst suppression with ratio 10:1, indicating of severe diffuse /multifocal cerebral dysfunction of which IV sedative medications may be contributing to the level of dysfunction  •	Severe generalized and multifocal slowing indicating similar degree of underlying diffuse and multifocal cerebral dysfunction    Katelynn Lundy MD  CNP Fellow      Velma Rojas DO  Attending Neurologist, [facility]

## 2025-02-02 NOTE — PROGRESS NOTE ADULT - SUBJECTIVE AND OBJECTIVE BOX
CC: Patient is a 74y old  Female who presents with a chief complaint of AMS (01 Feb 2025 13:41)      INTERVAL EVENTS: PHILLIP    SUBJECTIVE / INTERVAL HPI: Patient seen and examined at bedside.     ROS: negative unless otherwise stated above.    VITAL SIGNS:  Vital Signs Last 24 Hrs  T(C): 36.2 (02 Feb 2025 05:02), Max: 37.6 (01 Feb 2025 08:45)  T(F): 97.2 (02 Feb 2025 05:02), Max: 99.6 (01 Feb 2025 08:45)  HR: 101 (02 Feb 2025 07:00) (75 - 126)  BP: 107/60 (02 Feb 2025 07:00) (86/53 - 182/103)  BP(mean): 79 (02 Feb 2025 07:00) (64 - 112)  RR: 12 (02 Feb 2025 07:00) (12 - 30)  SpO2: 100% (02 Feb 2025 07:00) (97% - 100%)    Parameters below as of 02 Feb 2025 07:00  Patient On (Oxygen Delivery Method): ventilator    O2 Concentration (%): 40      02-01-25 @ 07:01  -  02-02-25 @ 07:00  --------------------------------------------------------  IN: 562.2 mL / OUT: 400 mL / NET: 162.2 mL    02-02-25 @ 07:01  -  02-02-25 @ 07:45  --------------------------------------------------------  IN: 24.9 mL / OUT: 0 mL / NET: 24.9 mL        PHYSICAL EXAM:    General: NAD  HEENT: MMM  Neck: supple  Cardiovascular: +S1/S2; RRR  Respiratory: CTA B/L; no W/R/R  Gastrointestinal: soft, NT/ND  Extremities: WWP; no edema, clubbing or cyanosis  Vascular: 2+ radial, DP/PT pulses B/L  Neurological: AAOx3; no focal deficits    MEDICATIONS:  MEDICATIONS  (STANDING):  artificial  tears Solution 1 Drop(s) Both EYES every 12 hours  chlorhexidine 0.12% Liquid 15 milliLiter(s) Oral Mucosa every 12 hours  chlorhexidine 2% Cloths 1 Application(s) Topical <User Schedule>  dexAMETHasone  Injectable 4 milliGRAM(s) IV Push every 6 hours  dextrose 5%. 1000 milliLiter(s) (50 mL/Hr) IV Continuous <Continuous>  dextrose 5%. 1000 milliLiter(s) (100 mL/Hr) IV Continuous <Continuous>  dextrose 50% Injectable 25 Gram(s) IV Push once  dextrose 50% Injectable 12.5 Gram(s) IV Push once  dextrose 50% Injectable 25 Gram(s) IV Push once  ertapenem  IVPB 1000 milliGRAM(s) IV Intermittent every 24 hours  fentaNYL   Infusion. 0.5 MICROgram(s)/kG/Hr (2.61 mL/Hr) IV Continuous <Continuous>  glucagon  Injectable 1 milliGRAM(s) IntraMuscular once  influenza  Vaccine (HIGH DOSE) 0.5 milliLiter(s) IntraMuscular once  insulin lispro (ADMELOG) corrective regimen sliding scale   SubCutaneous three times a day before meals  insulin lispro (ADMELOG) corrective regimen sliding scale   SubCutaneous at bedtime  pantoprazole  Injectable 40 milliGRAM(s) IV Push every 24 hours  petrolatum Ophthalmic Ointment 1 Application(s) Both EYES every 12 hours  phenylephrine    Infusion 0.1 MICROgram(s)/kG/Min (1.96 mL/Hr) IV Continuous <Continuous>  propofol Infusion. 50 MICROgram(s)/kG/Min (15.7 mL/Hr) IV Continuous <Continuous>  valproate sodium   IVPB 750 milliGRAM(s) IV Intermittent every 12 hours    MEDICATIONS  (PRN):  dextrose Oral Gel 15 Gram(s) Oral once PRN Blood Glucose LESS THAN 70 milliGRAM(s)/deciliter  HYDROmorphone  Injectable 0.5 milliGRAM(s) IV Push every 4 hours PRN Moderate Pain (4 - 6)  LORazepam   Injectable 2 milliGRAM(s) IV Push every 4 hours PRN seizures      ALLERGIES:  Allergies    No Known Allergies    Intolerances        LABS:                        13.5   17.70 )-----------( 199      ( 02 Feb 2025 05:30 )             44.6     02-02    148[H]  |  109[H]  |  42[H]  ----------------------------<  149[H]  4.1   |  27  |  0.60    Ca    8.5      02 Feb 2025 05:30  Phos  3.9     02-02  Mg     2.4     02-02    TPro  5.1[L]  /  Alb  2.7[L]  /  TBili  0.3  /  DBili  x   /  AST  13  /  ALT  25  /  AlkPhos  113  02-02      Urinalysis Basic - ( 02 Feb 2025 05:30 )    Color: x / Appearance: x / SG: x / pH: x  Gluc: 149 mg/dL / Ketone: x  / Bili: x / Urobili: x   Blood: x / Protein: x / Nitrite: x   Leuk Esterase: x / RBC: x / WBC x   Sq Epi: x / Non Sq Epi: x / Bacteria: x      CAPILLARY BLOOD GLUCOSE      POCT Blood Glucose.: 132 mg/dL (02 Feb 2025 06:12)      RADIOLOGY & ADDITIONAL TESTS: Reviewed. CC: Patient is a 74y old  Female who presents with a chief complaint of AMS (01 Feb 2025 13:41)      INTERVAL EVENTS: o/n: HR sustaining 130s, gave lopressor 2.5 IVP x1, SC for 400cc urine, switching levo to phenyl due to tachycardia. gave dig 500mcg x1 for HR sustained in 130-140's.    SUBJECTIVE / INTERVAL HPI: Patient seen and examined at bedside. Patient sedated at bedside.     ROS: negative unless otherwise stated above.    VITAL SIGNS:  Vital Signs Last 24 Hrs  T(C): 36.2 (02 Feb 2025 05:02), Max: 37.6 (01 Feb 2025 08:45)  T(F): 97.2 (02 Feb 2025 05:02), Max: 99.6 (01 Feb 2025 08:45)  HR: 101 (02 Feb 2025 07:00) (75 - 126)  BP: 107/60 (02 Feb 2025 07:00) (86/53 - 182/103)  BP(mean): 79 (02 Feb 2025 07:00) (64 - 112)  RR: 12 (02 Feb 2025 07:00) (12 - 30)  SpO2: 100% (02 Feb 2025 07:00) (97% - 100%)    Parameters below as of 02 Feb 2025 07:00  Patient On (Oxygen Delivery Method): ventilator    O2 Concentration (%): 40      02-01-25 @ 07:01  -  02-02-25 @ 07:00  --------------------------------------------------------  IN: 562.2 mL / OUT: 400 mL / NET: 162.2 mL    02-02-25 @ 07:01  -  02-02-25 @ 07:45  --------------------------------------------------------  IN: 24.9 mL / OUT: 0 mL / NET: 24.9 mL        PHYSICAL EXAM:    General: NAD  HEENT: MMM  Neck: supple  Cardiovascular: +S1/S2; RRR  Respiratory: CTA B/L; no W/R/R  Gastrointestinal: soft, NT/ND  Extremities: WWP; trace pit edema to the knees b/l, no clubbing or cyanosis  Vascular: 2+ radial, DP/PT pulses B/L  Neurological: AAOx?    MEDICATIONS:  MEDICATIONS  (STANDING):  artificial  tears Solution 1 Drop(s) Both EYES every 12 hours  chlorhexidine 0.12% Liquid 15 milliLiter(s) Oral Mucosa every 12 hours  chlorhexidine 2% Cloths 1 Application(s) Topical <User Schedule>  dexAMETHasone  Injectable 4 milliGRAM(s) IV Push every 6 hours  dextrose 5%. 1000 milliLiter(s) (50 mL/Hr) IV Continuous <Continuous>  dextrose 5%. 1000 milliLiter(s) (100 mL/Hr) IV Continuous <Continuous>  dextrose 50% Injectable 25 Gram(s) IV Push once  dextrose 50% Injectable 12.5 Gram(s) IV Push once  dextrose 50% Injectable 25 Gram(s) IV Push once  ertapenem  IVPB 1000 milliGRAM(s) IV Intermittent every 24 hours  fentaNYL   Infusion. 0.5 MICROgram(s)/kG/Hr (2.61 mL/Hr) IV Continuous <Continuous>  glucagon  Injectable 1 milliGRAM(s) IntraMuscular once  influenza  Vaccine (HIGH DOSE) 0.5 milliLiter(s) IntraMuscular once  insulin lispro (ADMELOG) corrective regimen sliding scale   SubCutaneous three times a day before meals  insulin lispro (ADMELOG) corrective regimen sliding scale   SubCutaneous at bedtime  pantoprazole  Injectable 40 milliGRAM(s) IV Push every 24 hours  petrolatum Ophthalmic Ointment 1 Application(s) Both EYES every 12 hours  phenylephrine    Infusion 0.1 MICROgram(s)/kG/Min (1.96 mL/Hr) IV Continuous <Continuous>  propofol Infusion. 50 MICROgram(s)/kG/Min (15.7 mL/Hr) IV Continuous <Continuous>  valproate sodium   IVPB 750 milliGRAM(s) IV Intermittent every 12 hours    MEDICATIONS  (PRN):  dextrose Oral Gel 15 Gram(s) Oral once PRN Blood Glucose LESS THAN 70 milliGRAM(s)/deciliter  HYDROmorphone  Injectable 0.5 milliGRAM(s) IV Push every 4 hours PRN Moderate Pain (4 - 6)  LORazepam   Injectable 2 milliGRAM(s) IV Push every 4 hours PRN seizures      ALLERGIES:  Allergies    No Known Allergies    Intolerances        LABS:                        13.5   17.70 )-----------( 199      ( 02 Feb 2025 05:30 )             44.6     02-02    148[H]  |  109[H]  |  42[H]  ----------------------------<  149[H]  4.1   |  27  |  0.60    Ca    8.5      02 Feb 2025 05:30  Phos  3.9     02-02  Mg     2.4     02-02    TPro  5.1[L]  /  Alb  2.7[L]  /  TBili  0.3  /  DBili  x   /  AST  13  /  ALT  25  /  AlkPhos  113  02-02      Urinalysis Basic - ( 02 Feb 2025 05:30 )    Color: x / Appearance: x / SG: x / pH: x  Gluc: 149 mg/dL / Ketone: x  / Bili: x / Urobili: x   Blood: x / Protein: x / Nitrite: x   Leuk Esterase: x / RBC: x / WBC x   Sq Epi: x / Non Sq Epi: x / Bacteria: x      CAPILLARY BLOOD GLUCOSE      POCT Blood Glucose.: 132 mg/dL (02 Feb 2025 06:12)      RADIOLOGY & ADDITIONAL TESTS: Reviewed.

## 2025-02-02 NOTE — DIETITIAN INITIAL EVALUATION ADULT - ADD RECOMMEND
-Initiate nutrition within 24-48 hrs as medically able   *With propofol @12.5 ml/hr, recommend Jevity 1.2 with goal rate of 43 ml/hr with LPS x2/day from 9460-3407 to provide 774 ml tube feed, 1459 calories, 73 gProt., and 625 ml free water.    *Without propofol, recommend Jevity 1.2 with goal rate of 63 ml/hr with LPS x1/day from 0415-6770 to provide 1134 ml tube feed, 1461 calories, 78 gProt., and 915 ml free water. This is 23 nonprotein calories and 1.56 gProt. per kg ideal body weight 50 kg.   -Monitor pressor and propofol demands    *Goal MAP >65 and lactate <2.0 for generally safe provision of nutrition    *Trend TGs q3d while on propofol   -Align nutrition with goals of care at all times  -Weekly wts  -Monitor chemistry, GI function, and skin integrity

## 2025-02-02 NOTE — DIETITIAN INITIAL EVALUATION ADULT - PERTINENT LABORATORY DATA
02-02    148[H]  |  109[H]  |  42[H]  ----------------------------<  149[H]  4.1   |  27  |  0.60    Ca    8.5      02 Feb 2025 05:30  Phos  3.9     02-02  Mg     2.4     02-02    TPro  5.1[L]  /  Alb  2.7[L]  /  TBili  0.3  /  DBili  x   /  AST  13  /  ALT  25  /  AlkPhos  113  02-02  POCT Blood Glucose.: 121 mg/dL (02-02-25 @ 10:42)  A1C with Estimated Average Glucose Result: 6.3 % (01-31-25 @ 05:30)

## 2025-02-02 NOTE — PROGRESS NOTE ADULT - ASSESSMENT
73 yo female w PMH GBM on hospice care (diagnosed in 2024, follows at [facility] under Dr. Hernandez, with reported attempted resection of tumor, on chemotherapy with last chemo 2 months ago) who presents with altered mentation. Epilepsy following for seizures. On exam today, patient noted to have right gaze deviation with RUE flexion and facial twitching.  EEG shows burst suppression with left temporal focal status epilepticus. Previous load of Depakote given with serum VPA level 17.2 on 2/1/25.       Recommendations  - Load with Depakote 1200mg STAT  - Then increase Depakote maintanence dose to 500mg Q^H  - Continue vEEG monitoring        Discussed with Attending Dr. Connelly and MICU team 73 yo female w PMH GBM on hospice care (diagnosed in 2024, follows at [facility] under Dr. Hernandez, with reported attempted resection of tumor, on chemotherapy with last chemo 2 months ago) who presents with altered mentation. Epilepsy following for seizures. On exam today, patient noted to have right gaze deviation with RUE flexion and facial twitching.  EEG shows burst suppression with left temporal focal status epilepticus. Previous load of Depakote given with serum VPA level 17.2 on 2/1/25.       Recommendations  - Load with Depakote 1200mg STAT  - Then increase Depakote maintanence dose to 500mg Q6H  - Continue vEEG monitoring        Discussed with Attending Dr. Connelly and MICU team 73 yo female w PMH GBM on hospice care (diagnosed in 2024, follows at [facility] under Dr. Hernandez, with reported attempted resection of tumor, on chemotherapy with last chemo 2 months ago) who presents with altered mentation. Epilepsy following for seizures. On exam today, patient noted to have right gaze deviation with RUE flexion and facial twitching.  EEG shows burst suppression with left temporal focal status epilepticus. Previous load of Depakote given with serum VPA level 17.2 on 2/1/25.       Recommendations  - Load with Depakote 1200mg STAT  - Then increase Depakote maintenance dose to 500mg Q6H  - Continue vEEG monitoring        Discussed with Attending Dr. Rojas and MICU team

## 2025-02-02 NOTE — DIETITIAN INITIAL EVALUATION ADULT - PERTINENT MEDS FT
MEDICATIONS  (STANDING):  artificial  tears Solution 1 Drop(s) Both EYES every 12 hours  chlorhexidine 0.12% Liquid 15 milliLiter(s) Oral Mucosa every 12 hours  chlorhexidine 2% Cloths 1 Application(s) Topical <User Schedule>  dexAMETHasone  Injectable 4 milliGRAM(s) IV Push every 6 hours  dextrose 5%. 1000 milliLiter(s) (50 mL/Hr) IV Continuous <Continuous>  dextrose 5%. 1000 milliLiter(s) (100 mL/Hr) IV Continuous <Continuous>  dextrose 5%. 1000 milliLiter(s) (100 mL/Hr) IV Continuous <Continuous>  dextrose 50% Injectable 25 Gram(s) IV Push once  dextrose 50% Injectable 12.5 Gram(s) IV Push once  dextrose 50% Injectable 25 Gram(s) IV Push once  ertapenem  IVPB 1000 milliGRAM(s) IV Intermittent every 24 hours  fentaNYL   Infusion. 0.5 MICROgram(s)/kG/Hr (2.61 mL/Hr) IV Continuous <Continuous>  glucagon  Injectable 1 milliGRAM(s) IntraMuscular once  influenza  Vaccine (HIGH DOSE) 0.5 milliLiter(s) IntraMuscular once  insulin lispro (ADMELOG) corrective regimen sliding scale   SubCutaneous three times a day before meals  insulin lispro (ADMELOG) corrective regimen sliding scale   SubCutaneous at bedtime  pantoprazole  Injectable 40 milliGRAM(s) IV Push every 24 hours  petrolatum Ophthalmic Ointment 1 Application(s) Both EYES every 12 hours  phenylephrine    Infusion 0.1 MICROgram(s)/kG/Min (1.96 mL/Hr) IV Continuous <Continuous>  propofol Infusion. 50 MICROgram(s)/kG/Min (15.7 mL/Hr) IV Continuous <Continuous>  valproate sodium   IVPB 750 milliGRAM(s) IV Intermittent every 12 hours    MEDICATIONS  (PRN):  dextrose Oral Gel 15 Gram(s) Oral once PRN Blood Glucose LESS THAN 70 milliGRAM(s)/deciliter  HYDROmorphone  Injectable 0.5 milliGRAM(s) IV Push every 4 hours PRN Moderate Pain (4 - 6)  LORazepam   Injectable 2 milliGRAM(s) IV Push every 4 hours PRN seizures

## 2025-02-02 NOTE — PROGRESS NOTE ADULT - SUBJECTIVE AND OBJECTIVE BOX
EPILEPSY PROGRESS NOTE:  Pt is intubated and sedated, discussed goc with daughters beside who indicated wanting to be aggressive with management.       REVIEW OF SYSTEMS:  LUI pt is intubated and sedated    MEDICATIONS:   MEDICATIONS  (STANDING):  artificial  tears Solution 1 Drop(s) Both EYES every 12 hours  chlorhexidine 0.12% Liquid 15 milliLiter(s) Oral Mucosa every 12 hours  chlorhexidine 2% Cloths 1 Application(s) Topical <User Schedule>  dexAMETHasone  Injectable 4 milliGRAM(s) IV Push every 6 hours  dextrose 5%. 1000 milliLiter(s) (50 mL/Hr) IV Continuous <Continuous>  dextrose 5%. 1000 milliLiter(s) (100 mL/Hr) IV Continuous <Continuous>  dextrose 5%. 1000 milliLiter(s) (100 mL/Hr) IV Continuous <Continuous>  dextrose 50% Injectable 25 Gram(s) IV Push once  dextrose 50% Injectable 12.5 Gram(s) IV Push once  dextrose 50% Injectable 25 Gram(s) IV Push once  ertapenem  IVPB 1000 milliGRAM(s) IV Intermittent every 24 hours  fentaNYL   Infusion. 0.5 MICROgram(s)/kG/Hr (2.61 mL/Hr) IV Continuous <Continuous>  glucagon  Injectable 1 milliGRAM(s) IntraMuscular once  influenza  Vaccine (HIGH DOSE) 0.5 milliLiter(s) IntraMuscular once  insulin lispro (ADMELOG) corrective regimen sliding scale   SubCutaneous three times a day before meals  insulin lispro (ADMELOG) corrective regimen sliding scale   SubCutaneous at bedtime  pantoprazole  Injectable 40 milliGRAM(s) IV Push every 24 hours  petrolatum Ophthalmic Ointment 1 Application(s) Both EYES every 12 hours  phenylephrine    Infusion 0.1 MICROgram(s)/kG/Min (1.96 mL/Hr) IV Continuous <Continuous>  propofol Infusion. 50 MICROgram(s)/kG/Min (15.7 mL/Hr) IV Continuous <Continuous>  valproate sodium   IVPB 750 milliGRAM(s) IV Intermittent every 12 hours    MEDICATIONS  (PRN):  dextrose Oral Gel 15 Gram(s) Oral once PRN Blood Glucose LESS THAN 70 milliGRAM(s)/deciliter  HYDROmorphone  Injectable 0.5 milliGRAM(s) IV Push every 4 hours PRN Moderate Pain (4 - 6)  LORazepam   Injectable 2 milliGRAM(s) IV Push every 4 hours PRN seizures      VITAL SIGNS:  Vital Signs Last 24 Hrs  T(C): 36.8 (02 Feb 2025 14:22), Max: 36.8 (02 Feb 2025 14:22)  T(F): 98.2 (02 Feb 2025 14:22), Max: 98.2 (02 Feb 2025 14:22)  HR: 88 (02 Feb 2025 14:00) (76 - 126)  BP: 111/63 (02 Feb 2025 14:00) (89/57 - 144/85)  BP(mean): 80 (02 Feb 2025 14:00) (67 - 107)  RR: 12 (02 Feb 2025 14:00) (12 - 13)  SpO2: 96% (02 Feb 2025 14:00) (96% - 100%)    Parameters below as of 02 Feb 2025 14:00  Patient On (Oxygen Delivery Method): ventilator    O2 Concentration (%): 21    PHYSICAL EXAM:  Constitutional: Appearance is consistent with chronologic age   Neuro: Intubated, Sedated, does not follow commands  Eyes: Right gaze deviation  Sensation: Does not withdraw to painful stimuli  Reflexes: 1+ in bilateral UE/LE, upgoing bilaterally.   Gait: Deferred    LABS:  CBC Full  -  ( 02 Feb 2025 05:30 )  WBC Count : 17.70 K/uL  RBC Count : 4.99 M/uL  Hemoglobin : 13.5 g/dL  Hematocrit : 44.6 %  Platelet Count - Automated : 199 K/uL  Mean Cell Volume : 89.4 fl  Mean Cell Hemoglobin : 27.1 pg  Mean Cell Hemoglobin Concentration : 30.3 g/dL  Auto Neutrophil # : 15.46 K/uL  Auto Lymphocyte # : 1.32 K/uL  Auto Monocyte # : 0.66 K/uL  Auto Eosinophil # : 0.00 K/uL  Auto Basophil # : 0.04 K/uL  Auto Neutrophil % : 87.4 %  Auto Lymphocyte % : 7.5 %  Auto Monocyte % : 3.7 %  Auto Eosinophil % : 0.0 %  Auto Basophil % : 0.2 %    02-02    148[H]  |  109[H]  |  42[H]  ----------------------------<  149[H]  4.1   |  27  |  0.60    Ca    8.5      02 Feb 2025 05:30  Phos  3.9     02-02  Mg     2.4     02-02    TPro  5.1[L]  /  Alb  2.7[L]  /  TBili  0.3  /  DBili  x   /  AST  13  /  ALT  25  /  AlkPhos  113  02-02    LIVER FUNCTIONS - ( 02 Feb 2025 05:30 )  Alb: 2.7 g/dL / Pro: 5.1 g/dL / ALK PHOS: 113 U/L / ALT: 25 U/L / AST: 13 U/L / GGT: x             EEG:  Day 2:  2/1/2025, 7:00 AM to next morning at 07:00 AM   Meds: Depakote 750 mg bid, propofol drip  Background: initially  continuous, asymmetric with predominantly delta / theta frequencies(slower over the right hemisphere) until 9:16 AM. Following background became suppressed with asynchronous bursts of theta/delta activity superimposed with sharp wave discharges and 80 % suppression background  Generalized Slowing:  None  Symmetry/Focality: Continuous (90+%)  left and independent right fronto-parietal/temporal polymorphic delta/theta.   Voltage:  Normal (20+ uV) during bursts   Organization:  Absent  Posterior Dominant Rhythm:  Initially 7-8 Hz poorly regulated, absent after background becomes suppressed  Sleep:  Absent.  Variability:   Yes		Reactivity:  Initially, Yes    Spontaneous Activity:  Abundant left hemispheric lateralized rhythmic delta activity superimposed with epileptiform sharp waves ( msec) maximal over left fronto temporal region until 9:16 AM. Following there were bursts contained nearly continuous periodic right frontotemporal (LPDs) and rare left frontotemporal epileptiform discharges  Events:        1.	Left temporal focal motor status epilepticus  Time: start at 5:03 AM on 2/1(Prior day’s report) Until 8:44 AM on 2/1  Clinical: Right gaze deviation and right hemibody clonic activity  Electrographic: There is development of left frontotemporal blunted sharp waves at 3Hz, followed by development of left temporal spiky alpha and beta frequencies with spread to left parasagittal region. Activity becomes higher in amplitude with embedded left temporal spikes/sharps. Pattern fluctuates and eventually slows to 1 Hz prior to offset. Post ictally there is relative polymorphic slowing and attenuation.   Provocations:  •	Hyperventilation: was not performed.  •	Photic stimulation: was not performed.  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

## 2025-02-02 NOTE — DIETITIAN INITIAL EVALUATION ADULT - PROBLEM SELECTOR PLAN 5
Afib w/ rvr seen on EKG. Home med: metoprolol 12.5 BID   Today, HR ranging . responding to lopressor 5. R/o retention, constipation, pain    Plan  -c/w home metoprolol, will convert to IV as pt obtunded---> 5mg q8h lopressor IVP  -bladder scans q6h   -consider john (currently on primafit) if pt retaining   -last BM yesterday, make sure pt having BMs  -pain control with dilaudid 0.5 q4h PRN

## 2025-02-02 NOTE — PROGRESS NOTE ADULT - ATTENDING COMMENTS
This is a 73 yo woman w PMH GBM on hospice care admitted with AMS, likely in setting of breakthrough seizures. EEG improving from prior day but recurrence of seizures with weaning of sedation.    Would increase propofol and give VPA load with goal of VPA level 100. Will monitor clinically and carefully titrate sedation. Rest of plan as above.

## 2025-02-02 NOTE — PROCEDURE NOTE - NSUS ED ADDITIONAL DETAIL1 FT
b/l basilar consolidations   A-line predominant
Limited study given difficult views   Overall grossly normal LV and RV function   collapsed IVC

## 2025-02-02 NOTE — PROGRESS NOTE ADULT - ASSESSMENT
73 yo female w PMH GBM on hospice care (diagnosed in 2024, follows at [facility] under Dr. Hernandez, with reported attempted resection of tumor, on chemotherapy with last chemo 2 months ago), admitted for ams likely 2/2 disease progresion. After admission to [facility], Upon evaluation pt was obtunded with tremors to RUE, response to noxious stimuli but otherwise no response. Pupils were responsive with saccadic movement. She was slumped to her right sided with gurgling breath sounds.  At that point rapid was called and patient was inevitably intubated. Transferred to [facility]. Discussed with pts daughters post procedure poor prognosis and advised to have formal family meeting to clarify and document wishes.       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 There is evidence of focal cortical hyperexcitability, more prominent in the right than the left frontotemporal regions. The background pattern shows a burst suppression ratio of 10:1, indicating severe diffuse or multifocal cerebral dysfunction, potentially exacerbated by IV sedatives. Additionally, there is severe generalized and multifocal slowing, further supporting significant underlying cerebral dysfunction.    Plan:   2/2  s/p valproic acid loading dose 1200 mg   -new valproic acid maintenance dose  500 mg q6   -c/w ativan 0,5mg q4h prn for seizures  - Continue vEEG monitoring  -ctw sedation with prop and fent    #GBM  last chemo ~2months ago, follows at [facility].    -c/w decadron 4 IV q6h  -palliative care consult   -NSGY recs.      CARDIAC  #Chronic atrial fibrillation.    Home med: metoprolol 12.5 BID   - received lopressor earlier in admission  - not on AC    Plan  -bladder scans q6h   -consider john (currently on primafit) if pt retaining   -, make sure pt having BMs  -pain control with dilaudid 0.5 q4h PRN.    PULM  #intubated  - intubated on 2/1  - [facility]    GI  - npo in setting of seizures    RENAL  - currently with normal renal function    ID  #SIRS  Systemic inflammatory response syndrome (SIRS).   ·  Plan: Meeting SIRS 2/4 (HR>90, WBC>12k) likely iso of UTI.   Bcx with ESBL E. coli  -c/w Ertapenem 1 g qd   -IVF as needed    F: none  E: replete K<4, Mg<2  N: npo    VTE Prophylaxis: SCDs  GI: ppi while intubated  C: Full Code  D: micu

## 2025-02-02 NOTE — DIETITIAN INITIAL EVALUATION ADULT - OTHER INFO
75 yo female w PMH GBM on hospice care (diagnosed in 2024, follows at [facility] under Dr. Hernandez, with reported attempted resection of tumor, on chemotherapy with last chemo 2 months ago), admitted for ams likely 2/2 disease progression.     P 75 yo female w PMH GBM on hospice care (diagnosed in 2024, follows at [facility] under Dr. Hernandez, with reported attempted resection of tumor, on chemotherapy with last chemo 2 months ago), admitted for ams likely 2/2 disease progression.     Pt assessed in room on 7EA. Rx and labs reviewed. Pt presents with mild orbital wasting; limited nutrition focused physical examination in setting of critical condition and intubation. Pt received intubated and sedated with vent set to VC-AC, MAP 92, phenylephrine gtt, and propofol @12.5 ml/hr (330 calories/day). Pt with significant hx for glioblastoma was on hospice care PTA; ongoing goals of care discussions. No plans to feed at this time; see recs below. No other reports GI distress or further nutritional concerns at this time. RDN to remain available, see recommendations below.     Pain: No non-verbal indicators   GI: Abdomen non-distended/non-tender, +BS x4, last bowel movement secondary to  Skin: Warm/Dry/Intact, +1 generalized

## 2025-02-02 NOTE — DIETITIAN INITIAL EVALUATION ADULT - OTHER CALCULATIONS
Needs determined using ideal body weight 50 kg (104%IBW) adjusted for intubation and critical condition.

## 2025-02-02 NOTE — DIETITIAN INITIAL EVALUATION ADULT - PROBLEM SELECTOR PLAN 3
Pt seizures characterized by right hand shaking. Appears to be having focal seizures.     Plan  -c/w valproic acid 500mg BID, switched to 250mg x6h   -f/u valproic acid levels 7.4,  01/31 00h55  -c/w ativan 0,5mg q4h prn for seizures  -vEEG w/ epilepsy consult

## 2025-02-02 NOTE — DIETITIAN INITIAL EVALUATION ADULT - PROBLEM SELECTOR PLAN 6
F: tolerating oral  E: replete PRN Mg <2, K > 4  DVT:   N: pantoprazole 40mg daily while on steroids   GI: none  Code Status: full code

## 2025-02-02 NOTE — DIETITIAN INITIAL EVALUATION ADULT - PROBLEM SELECTOR PLAN 2
Meeting SIRS 2/4 (HR>90, WBC>12k) likely iso of UTI vs. cancer. s/p 1L NS in ED. Pt obtunded GSC score 3. VBG CO2 36, not retaining which would be leading to AMS. Pt afebrile       Plan  -c/w CTX 1g x2 more days  -IVF as needed  -f/u blood cultures   -NSGY consult

## 2025-02-03 ENCOUNTER — TRANSCRIPTION ENCOUNTER (OUTPATIENT)
Age: 75
End: 2025-02-03

## 2025-02-03 DIAGNOSIS — Z71.89 OTHER SPECIFIED COUNSELING: ICD-10-CM

## 2025-02-03 LAB
ALBUMIN SERPL ELPH-MCNC: 2.6 G/DL — LOW (ref 3.3–5)
ALP SERPL-CCNC: 104 U/L — SIGNIFICANT CHANGE UP (ref 40–120)
ALT FLD-CCNC: 22 U/L — SIGNIFICANT CHANGE UP (ref 10–45)
ANION GAP SERPL CALC-SCNC: 8 MMOL/L — SIGNIFICANT CHANGE UP (ref 5–17)
AST SERPL-CCNC: SIGNIFICANT CHANGE UP (ref 10–40)
BASOPHILS # BLD AUTO: 0.01 K/UL — SIGNIFICANT CHANGE UP (ref 0–0.2)
BASOPHILS NFR BLD AUTO: 0.1 % — SIGNIFICANT CHANGE UP (ref 0–2)
BILIRUB SERPL-MCNC: 0.5 MG/DL — SIGNIFICANT CHANGE UP (ref 0.2–1.2)
BUN SERPL-MCNC: 22 MG/DL — SIGNIFICANT CHANGE UP (ref 7–23)
CALCIUM SERPL-MCNC: 8.4 MG/DL — SIGNIFICANT CHANGE UP (ref 8.4–10.5)
CHLORIDE SERPL-SCNC: 104 MMOL/L — SIGNIFICANT CHANGE UP (ref 96–108)
CO2 SERPL-SCNC: 27 MMOL/L — SIGNIFICANT CHANGE UP (ref 22–31)
CREAT SERPL-MCNC: 0.4 MG/DL — LOW (ref 0.5–1.3)
EGFR: 104 ML/MIN/1.73M2 — SIGNIFICANT CHANGE UP
EOSINOPHIL # BLD AUTO: 0 K/UL — SIGNIFICANT CHANGE UP (ref 0–0.5)
EOSINOPHIL NFR BLD AUTO: 0 % — SIGNIFICANT CHANGE UP (ref 0–6)
GLUCOSE SERPL-MCNC: 204 MG/DL — HIGH (ref 70–99)
HCT VFR BLD CALC: 41.7 % — SIGNIFICANT CHANGE UP (ref 34.5–45)
HGB BLD-MCNC: 13.6 G/DL — SIGNIFICANT CHANGE UP (ref 11.5–15.5)
IMM GRANULOCYTES NFR BLD AUTO: 1 % — HIGH (ref 0–0.9)
LYMPHOCYTES # BLD AUTO: 0.84 K/UL — LOW (ref 1–3.3)
LYMPHOCYTES # BLD AUTO: 5.7 % — LOW (ref 13–44)
MAGNESIUM SERPL-MCNC: 2.1 MG/DL — SIGNIFICANT CHANGE UP (ref 1.6–2.6)
MCHC RBC-ENTMCNC: 28.8 PG — SIGNIFICANT CHANGE UP (ref 27–34)
MCHC RBC-ENTMCNC: 32.6 G/DL — SIGNIFICANT CHANGE UP (ref 32–36)
MCV RBC AUTO: 88.2 FL — SIGNIFICANT CHANGE UP (ref 80–100)
MONOCYTES # BLD AUTO: 0.6 K/UL — SIGNIFICANT CHANGE UP (ref 0–0.9)
MONOCYTES NFR BLD AUTO: 4.1 % — SIGNIFICANT CHANGE UP (ref 2–14)
NEUTROPHILS # BLD AUTO: 13.2 K/UL — HIGH (ref 1.8–7.4)
NEUTROPHILS NFR BLD AUTO: 89.1 % — HIGH (ref 43–77)
NRBC # BLD: 0 /100 WBCS — SIGNIFICANT CHANGE UP (ref 0–0)
NRBC BLD-RTO: 0 /100 WBCS — SIGNIFICANT CHANGE UP (ref 0–0)
PHOSPHATE SERPL-MCNC: 2.3 MG/DL — LOW (ref 2.5–4.5)
PLATELET # BLD AUTO: 155 K/UL — SIGNIFICANT CHANGE UP (ref 150–400)
POTASSIUM SERPL-MCNC: 4.1 MMOL/L — SIGNIFICANT CHANGE UP (ref 3.5–5.3)
POTASSIUM SERPL-SCNC: 4.1 MMOL/L — SIGNIFICANT CHANGE UP (ref 3.5–5.3)
PROT SERPL-MCNC: 5 G/DL — LOW (ref 6–8.3)
RBC # BLD: 4.73 M/UL — SIGNIFICANT CHANGE UP (ref 3.8–5.2)
RBC # FLD: 15.9 % — HIGH (ref 10.3–14.5)
SODIUM SERPL-SCNC: 139 MMOL/L — SIGNIFICANT CHANGE UP (ref 135–145)
WBC # BLD: 14.8 K/UL — HIGH (ref 3.8–10.5)
WBC # FLD AUTO: 14.8 K/UL — HIGH (ref 3.8–10.5)

## 2025-02-03 PROCEDURE — 95720 EEG PHY/QHP EA INCR W/VEEG: CPT

## 2025-02-03 PROCEDURE — 99232 SBSQ HOSP IP/OBS MODERATE 35: CPT

## 2025-02-03 PROCEDURE — 99233 SBSQ HOSP IP/OBS HIGH 50: CPT

## 2025-02-03 PROCEDURE — 99233 SBSQ HOSP IP/OBS HIGH 50: CPT | Mod: GC

## 2025-02-03 RX ORDER — SODIUM CHLORIDE 3 G/100ML
500 INJECTION, SOLUTION INTRAVENOUS
Refills: 0 | Status: DISCONTINUED | OUTPATIENT
Start: 2025-02-03 | End: 2025-02-04

## 2025-02-03 RX ORDER — SODIUM CHLORIDE 3 G/100ML
100 INJECTION, SOLUTION INTRAVENOUS ONCE
Refills: 0 | Status: COMPLETED | OUTPATIENT
Start: 2025-02-03 | End: 2025-02-03

## 2025-02-03 RX ORDER — LACOSAMIDE 150 MG/1
200 TABLET, FILM COATED ORAL ONCE
Refills: 0 | Status: DISCONTINUED | OUTPATIENT
Start: 2025-02-03 | End: 2025-02-03

## 2025-02-03 RX ORDER — LACOSAMIDE 150 MG/1
100 TABLET, FILM COATED ORAL EVERY 12 HOURS
Refills: 0 | Status: DISCONTINUED | OUTPATIENT
Start: 2025-02-03 | End: 2025-02-03

## 2025-02-03 RX ORDER — SODIUM PHOSPHATE,DIBASIC DIHYD
15 POWDER (GRAM) MISCELLANEOUS ONCE
Refills: 0 | Status: COMPLETED | OUTPATIENT
Start: 2025-02-03 | End: 2025-02-03

## 2025-02-03 RX ORDER — INSULIN LISPRO 100 U/ML
INJECTION, SOLUTION INTRAVENOUS; SUBCUTANEOUS EVERY 6 HOURS
Refills: 0 | Status: DISCONTINUED | OUTPATIENT
Start: 2025-02-03 | End: 2025-02-11

## 2025-02-03 RX ORDER — LACOSAMIDE 150 MG/1
100 TABLET, FILM COATED ORAL EVERY 12 HOURS
Refills: 0 | Status: DISCONTINUED | OUTPATIENT
Start: 2025-02-03 | End: 2025-02-05

## 2025-02-03 RX ADMIN — Medication 1 APPLICATION(S): at 06:38

## 2025-02-03 RX ADMIN — Medication 55 MILLIGRAM(S): at 00:01

## 2025-02-03 RX ADMIN — Medication 40 MILLIGRAM(S): at 09:03

## 2025-02-03 RX ADMIN — SODIUM CHLORIDE 600 MILLILITER(S): 3 INJECTION, SOLUTION INTRAVENOUS at 17:36

## 2025-02-03 RX ADMIN — Medication 15 MILLILITER(S): at 17:36

## 2025-02-03 RX ADMIN — INSULIN LISPRO 1: 100 INJECTION, SOLUTION INTRAVENOUS; SUBCUTANEOUS at 06:25

## 2025-02-03 RX ADMIN — Medication 15 MILLILITER(S): at 06:25

## 2025-02-03 RX ADMIN — DEXAMETHASONE 4 MILLIGRAM(S): 0.5 TABLET ORAL at 04:04

## 2025-02-03 RX ADMIN — DEXAMETHASONE 4 MILLIGRAM(S): 0.5 TABLET ORAL at 09:03

## 2025-02-03 RX ADMIN — Medication 1 DROP(S): at 17:50

## 2025-02-03 RX ADMIN — INSULIN LISPRO 1: 100 INJECTION, SOLUTION INTRAVENOUS; SUBCUTANEOUS at 11:12

## 2025-02-03 RX ADMIN — LACOSAMIDE 120 MILLIGRAM(S): 150 TABLET, FILM COATED ORAL at 21:50

## 2025-02-03 RX ADMIN — SODIUM CHLORIDE 30 MILLILITER(S): 3 INJECTION, SOLUTION INTRAVENOUS at 19:07

## 2025-02-03 RX ADMIN — PROPOFOL 15.7 MICROGRAM(S)/KG/MIN: 10 INJECTION, EMULSION INTRAVENOUS at 06:26

## 2025-02-03 RX ADMIN — Medication 1 DROP(S): at 06:37

## 2025-02-03 RX ADMIN — DEXAMETHASONE 4 MILLIGRAM(S): 0.5 TABLET ORAL at 21:52

## 2025-02-03 RX ADMIN — ERTAPENEM SODIUM 120 MILLIGRAM(S): 1 INJECTION, POWDER, LYOPHILIZED, FOR SOLUTION INTRAMUSCULAR; INTRAVENOUS at 13:10

## 2025-02-03 RX ADMIN — Medication 55 MILLIGRAM(S): at 17:35

## 2025-02-03 RX ADMIN — Medication 55 MILLIGRAM(S): at 06:25

## 2025-02-03 RX ADMIN — Medication 1 APPLICATION(S): at 06:24

## 2025-02-03 RX ADMIN — LACOSAMIDE 140 MILLIGRAM(S): 150 TABLET, FILM COATED ORAL at 15:05

## 2025-02-03 RX ADMIN — DEXAMETHASONE 4 MILLIGRAM(S): 0.5 TABLET ORAL at 16:16

## 2025-02-03 RX ADMIN — Medication 1 APPLICATION(S): at 17:37

## 2025-02-03 RX ADMIN — PROPOFOL 15.7 MICROGRAM(S)/KG/MIN: 10 INJECTION, EMULSION INTRAVENOUS at 15:10

## 2025-02-03 RX ADMIN — Medication 55 MILLIGRAM(S): at 13:10

## 2025-02-03 RX ADMIN — Medication 62.5 MILLIMOLE(S): at 09:03

## 2025-02-03 NOTE — PROGRESS NOTE ADULT - ATTENDING COMMENTS
GBM with mass effect, status epilepticus with metabolic encephalopathy, acute hypoxemic respiraotyr failure  physical as above  hypertonic saline to bring Na over 145  continue decadron  continue analeptics  continue MV

## 2025-02-03 NOTE — PROGRESS NOTE ADULT - SUBJECTIVE AND OBJECTIVE BOX
[facility]  Naresh Meeks, Palliative Care Attending  Contact Info: Call [phone] (HEAL Line) or message on Microsoft Teams (Naresh Meeks)    SUBJECTIVE AND OBJECTIVE:  INTERVAL HPI/OVERNIGHT EVENTS: Interval events noted. Unable to participate in interview due to sedation/intubation. See patient's PRN use for the past 24hrs noted below. Comprehensive symptom assessment and GOC exploration as noted below. Extensive time spent discussing plan of care with family.    ALLERGIES:  No Known Allergies    MEDICATIONS  (STANDING):  artificial  tears Solution 1 Drop(s) Both EYES every 12 hours  chlorhexidine 0.12% Liquid 15 milliLiter(s) Oral Mucosa every 12 hours  chlorhexidine 2% Cloths 1 Application(s) Topical <User Schedule>  dexAMETHasone  Injectable 4 milliGRAM(s) IV Push every 6 hours  dextrose 5%. 1000 milliLiter(s) (50 mL/Hr) IV Continuous <Continuous>  dextrose 5%. 1000 milliLiter(s) (100 mL/Hr) IV Continuous <Continuous>  dextrose 50% Injectable 25 Gram(s) IV Push once  dextrose 50% Injectable 12.5 Gram(s) IV Push once  dextrose 50% Injectable 25 Gram(s) IV Push once  ertapenem  IVPB 1000 milliGRAM(s) IV Intermittent every 24 hours  fentaNYL   Infusion. 0.5 MICROgram(s)/kG/Hr (2.61 mL/Hr) IV Continuous <Continuous>  glucagon  Injectable 1 milliGRAM(s) IntraMuscular once  influenza  Vaccine (HIGH DOSE) 0.5 milliLiter(s) IntraMuscular once  insulin lispro (ADMELOG) corrective regimen sliding scale   SubCutaneous three times a day before meals  insulin lispro (ADMELOG) corrective regimen sliding scale   SubCutaneous at bedtime  lacosamide IVPB 100 milliGRAM(s) IV Intermittent every 12 hours  pantoprazole  Injectable 40 milliGRAM(s) IV Push every 24 hours  petrolatum Ophthalmic Ointment 1 Application(s) Both EYES every 12 hours  propofol Infusion. 50 MICROgram(s)/kG/Min (15.7 mL/Hr) IV Continuous <Continuous>  sodium chloride 3%. 500 milliLiter(s) (30 mL/Hr) IV Continuous <Continuous>  valproate sodium   IVPB 500 milliGRAM(s) IV Intermittent every 6 hours    MEDICATIONS  (PRN):  dextrose Oral Gel 15 Gram(s) Oral once PRN Blood Glucose LESS THAN 70 milliGRAM(s)/deciliter  HYDROmorphone  Injectable 0.5 milliGRAM(s) IV Push every 4 hours PRN Moderate Pain (4 - 6)  LORazepam   Injectable 2 milliGRAM(s) IV Push every 4 hours PRN seizures      Analgesic Use (Scheduled and PRNs) for past 24 hours:    lacosamide IVPB   140 mL/Hr IV Intermittent (02-03-25 @ 15:05)    propofol Infusion.   15.7 mL/Hr IV Continuous (02-02-25 @ 19:26)    valproate sodium   IVPB   55 mL/Hr IV Intermittent (02-03-25 @ 17:35)   55 mL/Hr IV Intermittent (02-03-25 @ 13:10)   55 mL/Hr IV Intermittent (02-03-25 @ 06:25)   55 mL/Hr IV Intermittent (02-03-25 @ 00:01)    valproate sodium Injectable   1200 milliGRAM(s) IV Push (02-02-25 @ 18:06)      ITEMS UNCHECKED ARE NOT PRESENT  PRESENT SYMPTOMS/REVIEW OF SYSTEMS: []Unable to obtain due to poor mentation   Source if other than patient:  []Family   []Team         Vital Signs Last 24 Hrs  T(C): 36.2 (03 Feb 2025 13:22), Max: 36.4 (02 Feb 2025 23:02)  T(F): 97.1 (03 Feb 2025 13:22), Max: 97.6 (02 Feb 2025 23:02)  HR: 80 (03 Feb 2025 17:00) (73 - 91)  BP: 98/63 (03 Feb 2025 17:00) (92/52 - 132/70)  BP(mean): 75 (03 Feb 2025 17:00) (67 - 100)  RR: 16 (03 Feb 2025 17:00) (12 - 25)  SpO2: 95% (03 Feb 2025 17:00) (93% - 98%)    Parameters below as of 03 Feb 2025 17:00  Patient On (Oxygen Delivery Method): ventilator    O2 Concentration (%): 21    LABS: Personally reviewed and interpreted                        13.6   14.80 )-----------( 155      ( 03 Feb 2025 06:13 )             41.7   02-03    139  |  104  |  22  ----------------------------<  204[H]  4.1   |  27  |  0.40[L]    Ca    8.4      03 Feb 2025 05:13  Phos  2.3     02-03  Mg     2.1     02-03    TPro  5.0[L]  /  Alb  2.6[L]  /  TBili  0.5  /  DBili  x   /  AST  See Note  /  ALT  22  /  AlkPhos  104  02-03      RADIOLOGY & ADDITIONAL STUDIES: Personally reviewed and interpreted  None new    DISCUSSION OF CASE: Family - to provide updates and emotional support; Primary Team/RN - to discuss plan of care [facility]  Naresh Meeks, Palliative Care Attending  Contact Info: Call [phone] (HEAL Line) or message on Microsoft Teams (Naresh Meeks)    SUBJECTIVE AND OBJECTIVE:  INTERVAL HPI/OVERNIGHT EVENTS: Interval events noted. Unable to participate in interview due to sedation/intubation. See patient's PRN use for the past 24hrs noted below. Comprehensive symptom assessment and GOC exploration as noted below. Extensive time spent discussing plan of care with family.    ALLERGIES:  No Known Allergies    MEDICATIONS  (STANDING):  artificial  tears Solution 1 Drop(s) Both EYES every 12 hours  chlorhexidine 0.12% Liquid 15 milliLiter(s) Oral Mucosa every 12 hours  chlorhexidine 2% Cloths 1 Application(s) Topical <User Schedule>  dexAMETHasone  Injectable 4 milliGRAM(s) IV Push every 6 hours  dextrose 5%. 1000 milliLiter(s) (50 mL/Hr) IV Continuous <Continuous>  dextrose 5%. 1000 milliLiter(s) (100 mL/Hr) IV Continuous <Continuous>  dextrose 50% Injectable 25 Gram(s) IV Push once  dextrose 50% Injectable 12.5 Gram(s) IV Push once  dextrose 50% Injectable 25 Gram(s) IV Push once  ertapenem  IVPB 1000 milliGRAM(s) IV Intermittent every 24 hours  fentaNYL   Infusion. 0.5 MICROgram(s)/kG/Hr (2.61 mL/Hr) IV Continuous <Continuous>  glucagon  Injectable 1 milliGRAM(s) IntraMuscular once  influenza  Vaccine (HIGH DOSE) 0.5 milliLiter(s) IntraMuscular once  insulin lispro (ADMELOG) corrective regimen sliding scale   SubCutaneous three times a day before meals  insulin lispro (ADMELOG) corrective regimen sliding scale   SubCutaneous at bedtime  lacosamide IVPB 100 milliGRAM(s) IV Intermittent every 12 hours  pantoprazole  Injectable 40 milliGRAM(s) IV Push every 24 hours  petrolatum Ophthalmic Ointment 1 Application(s) Both EYES every 12 hours  propofol Infusion. 50 MICROgram(s)/kG/Min (15.7 mL/Hr) IV Continuous <Continuous>  sodium chloride 3%. 500 milliLiter(s) (30 mL/Hr) IV Continuous <Continuous>  valproate sodium   IVPB 500 milliGRAM(s) IV Intermittent every 6 hours    MEDICATIONS  (PRN):  dextrose Oral Gel 15 Gram(s) Oral once PRN Blood Glucose LESS THAN 70 milliGRAM(s)/deciliter  HYDROmorphone  Injectable 0.5 milliGRAM(s) IV Push every 4 hours PRN Moderate Pain (4 - 6)  LORazepam   Injectable 2 milliGRAM(s) IV Push every 4 hours PRN seizures      Analgesic Use (Scheduled and PRNs) for past 24 hours:    lacosamide IVPB   140 mL/Hr IV Intermittent (02-03-25 @ 15:05)    propofol Infusion.   15.7 mL/Hr IV Continuous (02-02-25 @ 19:26)    valproate sodium   IVPB   55 mL/Hr IV Intermittent (02-03-25 @ 17:35)   55 mL/Hr IV Intermittent (02-03-25 @ 13:10)   55 mL/Hr IV Intermittent (02-03-25 @ 06:25)   55 mL/Hr IV Intermittent (02-03-25 @ 00:01)    valproate sodium Injectable   1200 milliGRAM(s) IV Push (02-02-25 @ 18:06)      ITEMS UNCHECKED ARE NOT PRESENT  PRESENT SYMPTOMS/REVIEW OF SYSTEMS: [x]Unable to obtain due to poor mentation   Source if other than patient:  []Family   []Team     Pain: [] yes [x] no - see PAINAD  QOL impact -  Location -  Aggravating factors -  Quality -  Radiation -  Timing -  Severity (0-10 scale) -  Minimal acceptable level (0-10 scale) -    Dyspnea:                           []Mild  []Moderate []Severe  Anxiety:                             []Mild []Moderate []Severe  Fatigue:                             []Mild []Moderate []Severe  Nausea:                             []Mild []Moderate []Severe  Loss of appetite:              []Mild []Moderate []Severe  Constipation:                    []Mild []Moderate []Severe    Other Symptoms:  [x]All other review of systems negative     GENERAL:  [] NAD []Alert [x]Lethargic  []Cachexia  [x]Unarousable  []Verbal  [x]Non-Verbal  BEHAVIORAL:   []Anxiety  [x]Delirium []Agitation []Cooperative []Oriented x  HEENT:  []Normal  [] Moist Mucous Membranes []Dry mouth   [x]ET Tube/Trach  []Oral lesions  PULMONARY:   []Clear []Tachypnea  []Audible excessive secretions  []Normal Work of Breathing []Labored Breathing  [x]Rhonchi []Crackles []Wheezing  CARDIOVASCULAR:    [x]Regular Rate [x]Regular Rhythm []Irregular []Tachy  []Mumtaz  GASTROINTESTINAL:  [x]Soft  [x]Distended   [x]+BS  [x]Non tender []Tender  []PEG [x]OGT/ NGT  Last BM:  GENITOURINARY:  []Normal [] Incontinent   []Oliguria/Anuria   [x]Wade  MUSCULOSKELETAL:   []Normal Extremities  [x]Weakness  [x]Bed/Wheelchair bound []Edema  NEUROLOGIC:   []No focal deficits  []Cognitive impairment  [x]Dysphagia []Dysarthria []Paresis [x]Encephalopathic  SKIN:   [x]Normal   []Pressure ulcer(s)  []Rash    CRITICAL CARE:  [ ]Shock Present  [ ]Septic [ ]Cardiogenic [ ]Neurologic [ ]Hypovolemic  [ ] Vasopressors [ ]Inotropes   [x]Respiratory failure present [x]Mechanical Ventilation [ ]Non-invasive ventilatory support [ ]High-Flow  [x]Acute  [ ]Chronic [x]Hypoxic  [ ]Hypercarbic   [ ]Other organ failure    Vital Signs Last 24 Hrs  T(C): 36.2 (03 Feb 2025 13:22), Max: 36.4 (02 Feb 2025 23:02)  T(F): 97.1 (03 Feb 2025 13:22), Max: 97.6 (02 Feb 2025 23:02)  HR: 80 (03 Feb 2025 17:00) (73 - 91)  BP: 98/63 (03 Feb 2025 17:00) (92/52 - 132/70)  BP(mean): 75 (03 Feb 2025 17:00) (67 - 100)  RR: 16 (03 Feb 2025 17:00) (12 - 25)  SpO2: 95% (03 Feb 2025 17:00) (93% - 98%)    Parameters below as of 03 Feb 2025 17:00  Patient On (Oxygen Delivery Method): ventilator    O2 Concentration (%): 21    LABS: Personally reviewed and interpreted                        13.6   14.80 )-----------( 155      ( 03 Feb 2025 06:13 )             41.7   02-03    139  |  104  |  22  ----------------------------<  204[H]  4.1   |  27  |  0.40[L]    Ca    8.4      03 Feb 2025 05:13  Phos  2.3     02-03  Mg     2.1     02-03    TPro  5.0[L]  /  Alb  2.6[L]  /  TBili  0.5  /  DBili  x   /  AST  See Note  /  ALT  22  /  AlkPhos  104  02-03      RADIOLOGY & ADDITIONAL STUDIES: Personally reviewed and interpreted  None new    DISCUSSION OF CASE: Family - to provide updates and emotional support; Primary Team/RN - to discuss plan of care

## 2025-02-03 NOTE — PROGRESS NOTE ADULT - SUBJECTIVE AND OBJECTIVE BOX
Patient is a 74y old  Female who presents with a chief complaint of AMS (03 Feb 2025 12:59)      HOSPITAL COURSE:     OVERNIGHT EVENTS:    SUBJECTIVE:     ROS: otherwise negative      T(C): 36.2 (02-03-25 @ 13:22), Max: 37 (02-02-25 @ 16:26)  HR: 82 (02-03-25 @ 16:00) (73 - 91)  BP: 92/52 (02-03-25 @ 16:00) (92/52 - 132/70)  RR: 23 (02-03-25 @ 16:00) (12 - 25)  SpO2: 97% (02-03-25 @ 16:00) (93% - 98%)  Wt(kg): --Vital Signs Last 24 Hrs  T(C): 36.2 (03 Feb 2025 13:22), Max: 37 (02 Feb 2025 16:26)  T(F): 97.1 (03 Feb 2025 13:22), Max: 98.6 (02 Feb 2025 16:26)  HR: 82 (03 Feb 2025 16:00) (73 - 91)  BP: 92/52 (03 Feb 2025 16:00) (92/52 - 132/70)  BP(mean): 67 (03 Feb 2025 16:00) (67 - 100)  RR: 23 (03 Feb 2025 16:00) (12 - 25)  SpO2: 97% (03 Feb 2025 16:00) (93% - 98%)    Parameters below as of 03 Feb 2025 16:00  Patient On (Oxygen Delivery Method): ventilator    O2 Concentration (%): 21    PHYSICAL EXAM:  Constitutional: resting comfortably in bed; NAD  Head: NC/AT  Eyes: PERRL, EOMI, anicteric sclera  ENT: no nasal discharge; MMM  Neck: supple; no JVD or thyromegaly  Respiratory: CTA B/L; no W/R/R, no retractions  Cardiac: +S1/S2; RRR; no M/R/G  Gastrointestinal: soft, NT/ND; no rebound or guarding; +BSx4  Back: spine midline, no bony tenderness or step-offs; no CVAT B/L  Extremities: WWP, no clubbing or cyanosis; no peripheral edema. Capillary refill <2 sec  Musculoskeletal: NROM x4; no joint swelling, tenderness or erythema  Vascular: 2+ radial, DP/PT pulses B/L  Dermatologic: skin warm, dry and intact; no rashes, wounds, or scars  Lymphatic: no submandibular or cervical LAD  Neurologic: AAOx3; CNII-XII grossly intact; no focal deficits  Psychiatric: affect and characteristics of appearance, verbalizations, behaviors are appropriate    LABS:                        13.6   14.80 )-----------( 155      ( 03 Feb 2025 06:13 )             41.7     02-03    139  |  104  |  22  ----------------------------<  204[H]  4.1   |  27  |  0.40[L]    Ca    8.4      03 Feb 2025 05:13  Phos  2.3     02-03  Mg     2.1     02-03    TPro  5.0[L]  /  Alb  2.6[L]  /  TBili  0.5  /  DBili  x   /  AST  See Note  /  ALT  22  /  AlkPhos  104  02-03        Urinalysis Basic - ( 03 Feb 2025 05:13 )    Color: x / Appearance: x / SG: x / pH: x  Gluc: 204 mg/dL / Ketone: x  / Bili: x / Urobili: x   Blood: x / Protein: x / Nitrite: x   Leuk Esterase: x / RBC: x / WBC x   Sq Epi: x / Non Sq Epi: x / Bacteria: x      CAPILLARY BLOOD GLUCOSE      POCT Blood Glucose.: 181 mg/dL (03 Feb 2025 10:54)  POCT Blood Glucose.: 188 mg/dL (03 Feb 2025 05:49)  POCT Blood Glucose.: 168 mg/dL (02 Feb 2025 23:10)  POCT Blood Glucose.: 131 mg/dL (02 Feb 2025 16:13)        Urinalysis Basic - ( 03 Feb 2025 05:13 )    Color: x / Appearance: x / SG: x / pH: x  Gluc: 204 mg/dL / Ketone: x  / Bili: x / Urobili: x   Blood: x / Protein: x / Nitrite: x   Leuk Esterase: x / RBC: x / WBC x   Sq Epi: x / Non Sq Epi: x / Bacteria: x        MEDICATIONS  (STANDING):  artificial  tears Solution 1 Drop(s) Both EYES every 12 hours  chlorhexidine 0.12% Liquid 15 milliLiter(s) Oral Mucosa every 12 hours  chlorhexidine 2% Cloths 1 Application(s) Topical <User Schedule>  dexAMETHasone  Injectable 4 milliGRAM(s) IV Push every 6 hours  dextrose 5%. 1000 milliLiter(s) (50 mL/Hr) IV Continuous <Continuous>  dextrose 5%. 1000 milliLiter(s) (100 mL/Hr) IV Continuous <Continuous>  dextrose 50% Injectable 25 Gram(s) IV Push once  dextrose 50% Injectable 12.5 Gram(s) IV Push once  dextrose 50% Injectable 25 Gram(s) IV Push once  ertapenem  IVPB 1000 milliGRAM(s) IV Intermittent every 24 hours  fentaNYL   Infusion. 0.5 MICROgram(s)/kG/Hr (2.61 mL/Hr) IV Continuous <Continuous>  glucagon  Injectable 1 milliGRAM(s) IntraMuscular once  influenza  Vaccine (HIGH DOSE) 0.5 milliLiter(s) IntraMuscular once  insulin lispro (ADMELOG) corrective regimen sliding scale   SubCutaneous three times a day before meals  insulin lispro (ADMELOG) corrective regimen sliding scale   SubCutaneous at bedtime  lacosamide IVPB 100 milliGRAM(s) IV Intermittent every 12 hours  pantoprazole  Injectable 40 milliGRAM(s) IV Push every 24 hours  petrolatum Ophthalmic Ointment 1 Application(s) Both EYES every 12 hours  propofol Infusion. 50 MICROgram(s)/kG/Min (15.7 mL/Hr) IV Continuous <Continuous>  valproate sodium   IVPB 500 milliGRAM(s) IV Intermittent every 6 hours    MEDICATIONS  (PRN):  dextrose Oral Gel 15 Gram(s) Oral once PRN Blood Glucose LESS THAN 70 milliGRAM(s)/deciliter  HYDROmorphone  Injectable 0.5 milliGRAM(s) IV Push every 4 hours PRN Moderate Pain (4 - 6)  LORazepam   Injectable 2 milliGRAM(s) IV Push every 4 hours PRN seizures      RADIOLOGY & ADDITIONAL TESTS: Reviewed   Patient is a 74y old  Female who presents with a chief complaint of AMS (03 Feb 2025 12:59)    OVERNIGHT EVENTS: naeon, off charli and repleted phosphorus    SUBJECTIVE: intubated and sedated    ROS: otherwise negative      T(C): 36.2 (02-03-25 @ 13:22), Max: 37 (02-02-25 @ 16:26)  HR: 82 (02-03-25 @ 16:00) (73 - 91)  BP: 92/52 (02-03-25 @ 16:00) (92/52 - 132/70)  RR: 23 (02-03-25 @ 16:00) (12 - 25)  SpO2: 97% (02-03-25 @ 16:00) (93% - 98%)  Wt(kg): --Vital Signs Last 24 Hrs  T(C): 36.2 (03 Feb 2025 13:22), Max: 37 (02 Feb 2025 16:26)  T(F): 97.1 (03 Feb 2025 13:22), Max: 98.6 (02 Feb 2025 16:26)  HR: 82 (03 Feb 2025 16:00) (73 - 91)  BP: 92/52 (03 Feb 2025 16:00) (92/52 - 132/70)  BP(mean): 67 (03 Feb 2025 16:00) (67 - 100)  RR: 23 (03 Feb 2025 16:00) (12 - 25)  SpO2: 97% (03 Feb 2025 16:00) (93% - 98%)    Parameters below as of 03 Feb 2025 16:00  Patient On (Oxygen Delivery Method): ventilator    O2 Concentration (%): 21    PHYSICAL EXAM:  General: intubated and sedated  HEENT: constricted pupils, anicteric sclera,   Cardiovascular: +S1/S2; RRR; no M/R/G  Respiratory: CTAB; no W/R/R  Gastrointestinal: soft, NT/ND; +BS x4  Extremities: cool extremities; 2+ peripheral pulses bilaterally; no LE edema, 4-5 second cap refill  Skin: normal color and turgor; no rash  Neurologic: unable to assess    LABS:                        13.6   14.80 )-----------( 155      ( 03 Feb 2025 06:13 )             41.7     02-03    139  |  104  |  22  ----------------------------<  204[H]  4.1   |  27  |  0.40[L]    Ca    8.4      03 Feb 2025 05:13  Phos  2.3     02-03  Mg     2.1     02-03    TPro  5.0[L]  /  Alb  2.6[L]  /  TBili  0.5  /  DBili  x   /  AST  See Note  /  ALT  22  /  AlkPhos  104  02-03        Urinalysis Basic - ( 03 Feb 2025 05:13 )    Color: x / Appearance: x / SG: x / pH: x  Gluc: 204 mg/dL / Ketone: x  / Bili: x / Urobili: x   Blood: x / Protein: x / Nitrite: x   Leuk Esterase: x / RBC: x / WBC x   Sq Epi: x / Non Sq Epi: x / Bacteria: x      CAPILLARY BLOOD GLUCOSE      POCT Blood Glucose.: 181 mg/dL (03 Feb 2025 10:54)  POCT Blood Glucose.: 188 mg/dL (03 Feb 2025 05:49)  POCT Blood Glucose.: 168 mg/dL (02 Feb 2025 23:10)  POCT Blood Glucose.: 131 mg/dL (02 Feb 2025 16:13)        Urinalysis Basic - ( 03 Feb 2025 05:13 )    Color: x / Appearance: x / SG: x / pH: x  Gluc: 204 mg/dL / Ketone: x  / Bili: x / Urobili: x   Blood: x / Protein: x / Nitrite: x   Leuk Esterase: x / RBC: x / WBC x   Sq Epi: x / Non Sq Epi: x / Bacteria: x        MEDICATIONS  (STANDING):  artificial  tears Solution 1 Drop(s) Both EYES every 12 hours  chlorhexidine 0.12% Liquid 15 milliLiter(s) Oral Mucosa every 12 hours  chlorhexidine 2% Cloths 1 Application(s) Topical <User Schedule>  dexAMETHasone  Injectable 4 milliGRAM(s) IV Push every 6 hours  dextrose 5%. 1000 milliLiter(s) (50 mL/Hr) IV Continuous <Continuous>  dextrose 5%. 1000 milliLiter(s) (100 mL/Hr) IV Continuous <Continuous>  dextrose 50% Injectable 25 Gram(s) IV Push once  dextrose 50% Injectable 12.5 Gram(s) IV Push once  dextrose 50% Injectable 25 Gram(s) IV Push once  ertapenem  IVPB 1000 milliGRAM(s) IV Intermittent every 24 hours  fentaNYL   Infusion. 0.5 MICROgram(s)/kG/Hr (2.61 mL/Hr) IV Continuous <Continuous>  glucagon  Injectable 1 milliGRAM(s) IntraMuscular once  influenza  Vaccine (HIGH DOSE) 0.5 milliLiter(s) IntraMuscular once  insulin lispro (ADMELOG) corrective regimen sliding scale   SubCutaneous three times a day before meals  insulin lispro (ADMELOG) corrective regimen sliding scale   SubCutaneous at bedtime  lacosamide IVPB 100 milliGRAM(s) IV Intermittent every 12 hours  pantoprazole  Injectable 40 milliGRAM(s) IV Push every 24 hours  petrolatum Ophthalmic Ointment 1 Application(s) Both EYES every 12 hours  propofol Infusion. 50 MICROgram(s)/kG/Min (15.7 mL/Hr) IV Continuous <Continuous>  valproate sodium   IVPB 500 milliGRAM(s) IV Intermittent every 6 hours    MEDICATIONS  (PRN):  dextrose Oral Gel 15 Gram(s) Oral once PRN Blood Glucose LESS THAN 70 milliGRAM(s)/deciliter  HYDROmorphone  Injectable 0.5 milliGRAM(s) IV Push every 4 hours PRN Moderate Pain (4 - 6)  LORazepam   Injectable 2 milliGRAM(s) IV Push every 4 hours PRN seizures      RADIOLOGY & ADDITIONAL TESTS: Reviewed

## 2025-02-03 NOTE — PROGRESS NOTE ADULT - SUBJECTIVE AND OBJECTIVE BOX
Neurology Progress Note    Interval History:  The patient was seen and examined at the bedside.       PAST MEDICAL & SURGICAL HISTORY:  GBM (glioblastoma multiforme)    Seizure            Medications:  artificial  tears Solution 1 Drop(s) Both EYES every 12 hours  chlorhexidine 0.12% Liquid 15 milliLiter(s) Oral Mucosa every 12 hours  chlorhexidine 2% Cloths 1 Application(s) Topical <User Schedule>  dexAMETHasone  Injectable 4 milliGRAM(s) IV Push every 6 hours  dextrose 5%. 1000 milliLiter(s) IV Continuous <Continuous>  dextrose 5%. 1000 milliLiter(s) IV Continuous <Continuous>  dextrose 50% Injectable 25 Gram(s) IV Push once  dextrose 50% Injectable 12.5 Gram(s) IV Push once  dextrose 50% Injectable 25 Gram(s) IV Push once  dextrose Oral Gel 15 Gram(s) Oral once PRN  ertapenem  IVPB 1000 milliGRAM(s) IV Intermittent every 24 hours  fentaNYL   Infusion. 0.5 MICROgram(s)/kG/Hr IV Continuous <Continuous>  glucagon  Injectable 1 milliGRAM(s) IntraMuscular once  HYDROmorphone  Injectable 0.5 milliGRAM(s) IV Push every 4 hours PRN  influenza  Vaccine (HIGH DOSE) 0.5 milliLiter(s) IntraMuscular once  insulin lispro (ADMELOG) corrective regimen sliding scale   SubCutaneous three times a day before meals  insulin lispro (ADMELOG) corrective regimen sliding scale   SubCutaneous at bedtime  lacosamide Injectable 200 milliGRAM(s) IV Push once  lacosamide IVPB 100 milliGRAM(s) IV Intermittent every 12 hours  LORazepam   Injectable 2 milliGRAM(s) IV Push every 4 hours PRN  pantoprazole  Injectable 40 milliGRAM(s) IV Push every 24 hours  petrolatum Ophthalmic Ointment 1 Application(s) Both EYES every 12 hours  phenylephrine    Infusion 0.1 MICROgram(s)/kG/Min IV Continuous <Continuous>  propofol Infusion. 50 MICROgram(s)/kG/Min IV Continuous <Continuous>  valproate sodium   IVPB 500 milliGRAM(s) IV Intermittent every 6 hours      Vital Signs Last 24 Hrs  T(C): 36.1 (03 Feb 2025 10:15), Max: 37 (02 Feb 2025 16:26)  T(F): 96.9 (03 Feb 2025 10:15), Max: 98.6 (02 Feb 2025 16:26)  HR: 84 (03 Feb 2025 11:00) (73 - 103)  BP: 119/73 (03 Feb 2025 11:00) (100/63 - 132/70)  BP(mean): 92 (03 Feb 2025 11:00) (76 - 100)  RR: 12 (03 Feb 2025 11:00) (12 - 25)  SpO2: 96% (03 Feb 2025 11:00) (93% - 98%)    Parameters below as of 03 Feb 2025 11:00  Patient On (Oxygen Delivery Method): ventilator    O2 Concentration (%): 21    Neurological Exam:   Mental status: Intubated and sedated. Not following commands or exhibing purposeful movements.   Cranial nerves: Eyes are midline, slightly dysconjugate. OCR not present. Corneal and gag not present.   Motor and sensory:  No spontaneous movement or withdrawal to painful stimuli   Coordination: unable to assess  Reflexes: Mute toes b/l  Gait: Vunable to assess    Labs:  CBC Full  -  ( 03 Feb 2025 06:13 )  WBC Count : 14.80 K/uL  RBC Count : 4.73 M/uL  Hemoglobin : 13.6 g/dL  Hematocrit : 41.7 %  Platelet Count - Automated : 155 K/uL  Mean Cell Volume : 88.2 fl  Mean Cell Hemoglobin : 28.8 pg  Mean Cell Hemoglobin Concentration : 32.6 g/dL  Auto Neutrophil # : 13.20 K/uL  Auto Lymphocyte # : 0.84 K/uL  Auto Monocyte # : 0.60 K/uL  Auto Eosinophil # : 0.00 K/uL  Auto Basophil # : 0.01 K/uL  Auto Neutrophil % : 89.1 %  Auto Lymphocyte % : 5.7 %  Auto Monocyte % : 4.1 %  Auto Eosinophil % : 0.0 %  Auto Basophil % : 0.1 %    02-03    139  |  104  |  22  ----------------------------<  204[H]  4.1   |  27  |  0.40[L]    Ca    8.4      03 Feb 2025 05:13  Phos  2.3     02-03  Mg     2.1     02-03    TPro  5.0[L]  /  Alb  2.6[L]  /  TBili  0.5  /  DBili  x   /  AST  See Note  /  ALT  22  /  AlkPhos  104  02-03    LIVER FUNCTIONS - ( 03 Feb 2025 05:13 )  Alb: 2.6 g/dL / Pro: 5.0 g/dL / ALK PHOS: 104 U/L / ALT: 22 U/L / AST: See Note / GGT: x             Urinalysis Basic - ( 03 Feb 2025 05:13 )    Color: x / Appearance: x / SG: x / pH: x  Gluc: 204 mg/dL / Ketone: x  / Bili: x / Urobili: x   Blood: x / Protein: x / Nitrite: x   Leuk Esterase: x / RBC: x / WBC x   Sq Epi: x / Non Sq Epi: x / Bacteria: x        Assessment:  This is a 74y Female w/ h/o     Plan: Neurology Progress Note    Interval History:  The patient was seen and examined at the bedside. Intubated and sedated.       PAST MEDICAL & SURGICAL HISTORY:  GBM (glioblastoma multiforme)    Seizure            Medications:  artificial  tears Solution 1 Drop(s) Both EYES every 12 hours  chlorhexidine 0.12% Liquid 15 milliLiter(s) Oral Mucosa every 12 hours  chlorhexidine 2% Cloths 1 Application(s) Topical <User Schedule>  dexAMETHasone  Injectable 4 milliGRAM(s) IV Push every 6 hours  dextrose 5%. 1000 milliLiter(s) IV Continuous <Continuous>  dextrose 5%. 1000 milliLiter(s) IV Continuous <Continuous>  dextrose 50% Injectable 25 Gram(s) IV Push once  dextrose 50% Injectable 12.5 Gram(s) IV Push once  dextrose 50% Injectable 25 Gram(s) IV Push once  dextrose Oral Gel 15 Gram(s) Oral once PRN  ertapenem  IVPB 1000 milliGRAM(s) IV Intermittent every 24 hours  fentaNYL   Infusion. 0.5 MICROgram(s)/kG/Hr IV Continuous <Continuous>  glucagon  Injectable 1 milliGRAM(s) IntraMuscular once  HYDROmorphone  Injectable 0.5 milliGRAM(s) IV Push every 4 hours PRN  influenza  Vaccine (HIGH DOSE) 0.5 milliLiter(s) IntraMuscular once  insulin lispro (ADMELOG) corrective regimen sliding scale   SubCutaneous three times a day before meals  insulin lispro (ADMELOG) corrective regimen sliding scale   SubCutaneous at bedtime  lacosamide Injectable 200 milliGRAM(s) IV Push once  lacosamide IVPB 100 milliGRAM(s) IV Intermittent every 12 hours  LORazepam   Injectable 2 milliGRAM(s) IV Push every 4 hours PRN  pantoprazole  Injectable 40 milliGRAM(s) IV Push every 24 hours  petrolatum Ophthalmic Ointment 1 Application(s) Both EYES every 12 hours  phenylephrine    Infusion 0.1 MICROgram(s)/kG/Min IV Continuous <Continuous>  propofol Infusion. 50 MICROgram(s)/kG/Min IV Continuous <Continuous>  valproate sodium   IVPB 500 milliGRAM(s) IV Intermittent every 6 hours      Vital Signs Last 24 Hrs  T(C): 36.1 (03 Feb 2025 10:15), Max: 37 (02 Feb 2025 16:26)  T(F): 96.9 (03 Feb 2025 10:15), Max: 98.6 (02 Feb 2025 16:26)  HR: 84 (03 Feb 2025 11:00) (73 - 103)  BP: 119/73 (03 Feb 2025 11:00) (100/63 - 132/70)  BP(mean): 92 (03 Feb 2025 11:00) (76 - 100)  RR: 12 (03 Feb 2025 11:00) (12 - 25)  SpO2: 96% (03 Feb 2025 11:00) (93% - 98%)    Parameters below as of 03 Feb 2025 11:00  Patient On (Oxygen Delivery Method): ventilator    O2 Concentration (%): 21    Neurological Exam:   Mental status: Intubated and sedated. Not following commands or exhibing purposeful movements.   Cranial nerves: Eyes are midline, slightly dysconjugate. OCR not present. Corneal and gag not present.   Motor and sensory:  No spontaneous movement or withdrawal to painful stimuli   Coordination: unable to assess  Reflexes: Mute toes b/l  Gait: Vunable to assess    Labs:  CBC Full  -  ( 03 Feb 2025 06:13 )  WBC Count : 14.80 K/uL  RBC Count : 4.73 M/uL  Hemoglobin : 13.6 g/dL  Hematocrit : 41.7 %  Platelet Count - Automated : 155 K/uL  Mean Cell Volume : 88.2 fl  Mean Cell Hemoglobin : 28.8 pg  Mean Cell Hemoglobin Concentration : 32.6 g/dL  Auto Neutrophil # : 13.20 K/uL  Auto Lymphocyte # : 0.84 K/uL  Auto Monocyte # : 0.60 K/uL  Auto Eosinophil # : 0.00 K/uL  Auto Basophil # : 0.01 K/uL  Auto Neutrophil % : 89.1 %  Auto Lymphocyte % : 5.7 %  Auto Monocyte % : 4.1 %  Auto Eosinophil % : 0.0 %  Auto Basophil % : 0.1 %    02-03    139  |  104  |  22  ----------------------------<  204[H]  4.1   |  27  |  0.40[L]    Ca    8.4      03 Feb 2025 05:13  Phos  2.3     02-03  Mg     2.1     02-03    TPro  5.0[L]  /  Alb  2.6[L]  /  TBili  0.5  /  DBili  x   /  AST  See Note  /  ALT  22  /  AlkPhos  104  02-03    LIVER FUNCTIONS - ( 03 Feb 2025 05:13 )  Alb: 2.6 g/dL / Pro: 5.0 g/dL / ALK PHOS: 104 U/L / ALT: 22 U/L / AST: See Note / GGT: x             Urinalysis Basic - ( 03 Feb 2025 05:13 )    Color: x / Appearance: x / SG: x / pH: x  Gluc: 204 mg/dL / Ketone: x  / Bili: x / Urobili: x   Blood: x / Protein: x / Nitrite: x   Leuk Esterase: x / RBC: x / WBC x   Sq Epi: x / Non Sq Epi: x / Bacteria: x        Assessment:  This is a 74y Female w/ h/o     Plan:

## 2025-02-03 NOTE — PROGRESS NOTE ADULT - ASSESSMENT
73 yo female w PMH GBM on hospice care (diagnosed in 2024, follows at [facility] under Dr. Hernandez, with reported attempted resection of tumor, on chemotherapy with last chemo 2 months ago), admitted for ams likely 2/2 disease progresion. After admission to [facility], Upon evaluation pt was obtunded with tremors to RUE, response to noxious stimuli but otherwise no response. Pupils were responsive with saccadic movement. She was slumped to her right sided with gurgling breath sounds.  At that point rapid was called and patient was inevitably intubated. Transferred to [facility]. Discussed with pts daughters post procedure poor prognosis and advised to have formal family meeting to clarify and document wishes.       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 There is evidence of focal cortical hyperexcitability, more prominent in the right than the left frontotemporal regions. The background pattern shows a burst suppression ratio of 10:1, indicating severe diffuse or multifocal cerebral dysfunction, potentially exacerbated by IV sedatives. Additionally, there is severe generalized and multifocal slowing, further supporting significant underlying cerebral dysfunction.    Plan:   s/p valproic acid loading dose 1200 mg, vimpat 200 mg loading  -c/w valproic acid maintenance dose 500 mg q6   -vimpat 100 mg BID  -c/w ativan 0.5mg q4h prn for seizures  -Continue vEEG monitoring  -ctw sedation with prop and fent    #GBM  last chemo ~2months ago, follows at [facility].    -c/w decadron 4 IV q6h  -palliative care consult   -NSGY recs  -maintain Na > 145 to address cerebral edema 2/2 mass effect    CARDIAC  #Chronic atrial fibrillation.    Home med: metoprolol 12.5 BID   - received lopressor earlier in admission  - not on AC    Plan  -bladder scans q6h   -consider john (currently on primafit) if pt retaining   -, make sure pt having BMs  -pain control with dilaudid 0.5 q4h PRN.    PULM  #intubated  - intubated on 2/1  - GOC  - consider trach moving forward    GI  - NPO in setting of seizures  - consider PEG moving forward    RENAL  - currently with normal renal function    #hypernatremia  -maintain Na > 145 as above for mass effect    ID  #SIRS  Systemic inflammatory response syndrome (SIRS).   ·  Plan: Meeting SIRS 2/4 (HR>90, WBC>12k) likely iso of UTI.   Bcx with ESBL E. coli  -c/w Ertapenem 1 g qd   -IVF as needed    F: none  E: replete K<4, Mg<2  N: npo  VTE Prophylaxis: SCDs  GI: ppi while intubated  C: Full Code  D: micu

## 2025-02-03 NOTE — PROGRESS NOTE ADULT - ASSESSMENT
75 yo female w PMH GBM on hospice care (diagnosed in 2024, follows at [facility] under Dr. Hernandez, with reported attempted resection of tumor, on chemotherapy with last chemo 2 months ago) who presents with altered mentation. Epilepsy following for seizures with b/l foci, likely i/s/o tumor as well as considerable mass effect.  Depakote was uptitrated and with addition of propofol with some improvement in EEG, though still with focal status (without generalization).     Recommendations:  - Give Vimpat 200mg x1 now  - Start Vimpat 100mg BID  - C/w VPA 500mg q6hrs  - C/w vEEG  - F/u VPA level   - seizure precautions

## 2025-02-03 NOTE — PROGRESS NOTE ADULT - ASSESSMENT
74 F with advanced GBM, previously on hospice for 3-4 weeks, encephalopathy, dysphagia, functional quadriplegia, encounter for palliative care.     74 F with advanced GBM, previously on hospice for 3-4 weeks, encephalopathy, dysphagia, functional quadriplegia, encounter for palliative care.    ·	ongoing medical updates and exploration of GOC, family is still hoping that patient can make some kind of meaningful recovery

## 2025-02-04 ENCOUNTER — RESULT REVIEW (OUTPATIENT)
Age: 75
End: 2025-02-04

## 2025-02-04 LAB
ADD ON TEST-SPECIMEN IN LAB: SIGNIFICANT CHANGE UP
ALBUMIN SERPL ELPH-MCNC: 2.9 G/DL — LOW (ref 3.3–5)
ALP SERPL-CCNC: 92 U/L — SIGNIFICANT CHANGE UP (ref 40–120)
ALT FLD-CCNC: 20 U/L — SIGNIFICANT CHANGE UP (ref 10–45)
ANION GAP SERPL CALC-SCNC: 10 MMOL/L — SIGNIFICANT CHANGE UP (ref 5–17)
ANION GAP SERPL CALC-SCNC: 10 MMOL/L — SIGNIFICANT CHANGE UP (ref 5–17)
ANION GAP SERPL CALC-SCNC: 9 MMOL/L — SIGNIFICANT CHANGE UP (ref 5–17)
ANION GAP SERPL CALC-SCNC: 9 MMOL/L — SIGNIFICANT CHANGE UP (ref 5–17)
AST SERPL-CCNC: 14 U/L — SIGNIFICANT CHANGE UP (ref 10–40)
BASOPHILS # BLD AUTO: 0.02 K/UL — SIGNIFICANT CHANGE UP (ref 0–0.2)
BASOPHILS NFR BLD AUTO: 0.2 % — SIGNIFICANT CHANGE UP (ref 0–2)
BILIRUB SERPL-MCNC: 0.3 MG/DL — SIGNIFICANT CHANGE UP (ref 0.2–1.2)
BUN SERPL-MCNC: 19 MG/DL — SIGNIFICANT CHANGE UP (ref 7–23)
BUN SERPL-MCNC: 21 MG/DL — SIGNIFICANT CHANGE UP (ref 7–23)
BUN SERPL-MCNC: 23 MG/DL — SIGNIFICANT CHANGE UP (ref 7–23)
BUN SERPL-MCNC: 26 MG/DL — HIGH (ref 7–23)
CALCIUM SERPL-MCNC: 7.8 MG/DL — LOW (ref 8.4–10.5)
CALCIUM SERPL-MCNC: 7.8 MG/DL — LOW (ref 8.4–10.5)
CALCIUM SERPL-MCNC: 8 MG/DL — LOW (ref 8.4–10.5)
CALCIUM SERPL-MCNC: 8.3 MG/DL — LOW (ref 8.4–10.5)
CHLORIDE SERPL-SCNC: 107 MMOL/L — SIGNIFICANT CHANGE UP (ref 96–108)
CHLORIDE SERPL-SCNC: 111 MMOL/L — HIGH (ref 96–108)
CHLORIDE SERPL-SCNC: 115 MMOL/L — HIGH (ref 96–108)
CHLORIDE SERPL-SCNC: 119 MMOL/L — HIGH (ref 96–108)
CO2 SERPL-SCNC: 26 MMOL/L — SIGNIFICANT CHANGE UP (ref 22–31)
CO2 SERPL-SCNC: 29 MMOL/L — SIGNIFICANT CHANGE UP (ref 22–31)
CO2 SERPL-SCNC: 29 MMOL/L — SIGNIFICANT CHANGE UP (ref 22–31)
CO2 SERPL-SCNC: 30 MMOL/L — SIGNIFICANT CHANGE UP (ref 22–31)
CREAT SERPL-MCNC: 0.32 MG/DL — LOW (ref 0.5–1.3)
CREAT SERPL-MCNC: 0.36 MG/DL — LOW (ref 0.5–1.3)
CREAT SERPL-MCNC: 0.38 MG/DL — LOW (ref 0.5–1.3)
CREAT SERPL-MCNC: 0.43 MG/DL — LOW (ref 0.5–1.3)
EGFR: 102 ML/MIN/1.73M2 — SIGNIFICANT CHANGE UP
EGFR: 105 ML/MIN/1.73M2 — SIGNIFICANT CHANGE UP
EGFR: 106 ML/MIN/1.73M2 — SIGNIFICANT CHANGE UP
EGFR: 110 ML/MIN/1.73M2 — SIGNIFICANT CHANGE UP
EOSINOPHIL # BLD AUTO: 0 K/UL — SIGNIFICANT CHANGE UP (ref 0–0.5)
EOSINOPHIL NFR BLD AUTO: 0 % — SIGNIFICANT CHANGE UP (ref 0–6)
GLUCOSE SERPL-MCNC: 144 MG/DL — HIGH (ref 70–99)
GLUCOSE SERPL-MCNC: 148 MG/DL — HIGH (ref 70–99)
GLUCOSE SERPL-MCNC: 192 MG/DL — HIGH (ref 70–99)
GLUCOSE SERPL-MCNC: 241 MG/DL — HIGH (ref 70–99)
HCT VFR BLD CALC: 41.5 % — SIGNIFICANT CHANGE UP (ref 34.5–45)
HGB BLD-MCNC: 13.3 G/DL — SIGNIFICANT CHANGE UP (ref 11.5–15.5)
IMM GRANULOCYTES NFR BLD AUTO: 1.2 % — HIGH (ref 0–0.9)
LYMPHOCYTES # BLD AUTO: 0.66 K/UL — LOW (ref 1–3.3)
LYMPHOCYTES # BLD AUTO: 5.9 % — LOW (ref 13–44)
MAGNESIUM SERPL-MCNC: 2.2 MG/DL — SIGNIFICANT CHANGE UP (ref 1.6–2.6)
MCHC RBC-ENTMCNC: 28.5 PG — SIGNIFICANT CHANGE UP (ref 27–34)
MCHC RBC-ENTMCNC: 32 G/DL — SIGNIFICANT CHANGE UP (ref 32–36)
MCV RBC AUTO: 88.9 FL — SIGNIFICANT CHANGE UP (ref 80–100)
MONOCYTES # BLD AUTO: 0.5 K/UL — SIGNIFICANT CHANGE UP (ref 0–0.9)
MONOCYTES NFR BLD AUTO: 4.5 % — SIGNIFICANT CHANGE UP (ref 2–14)
NEUTROPHILS # BLD AUTO: 9.92 K/UL — HIGH (ref 1.8–7.4)
NEUTROPHILS NFR BLD AUTO: 88.2 % — HIGH (ref 43–77)
NRBC # BLD: 0 /100 WBCS — SIGNIFICANT CHANGE UP (ref 0–0)
NRBC BLD-RTO: 0 /100 WBCS — SIGNIFICANT CHANGE UP (ref 0–0)
PHOSPHATE SERPL-MCNC: 2.2 MG/DL — LOW (ref 2.5–4.5)
PLATELET # BLD AUTO: 142 K/UL — LOW (ref 150–400)
POTASSIUM SERPL-MCNC: 3.8 MMOL/L — SIGNIFICANT CHANGE UP (ref 3.5–5.3)
POTASSIUM SERPL-MCNC: 3.9 MMOL/L — SIGNIFICANT CHANGE UP (ref 3.5–5.3)
POTASSIUM SERPL-MCNC: 4 MMOL/L — SIGNIFICANT CHANGE UP (ref 3.5–5.3)
POTASSIUM SERPL-MCNC: 4 MMOL/L — SIGNIFICANT CHANGE UP (ref 3.5–5.3)
POTASSIUM SERPL-SCNC: 3.8 MMOL/L — SIGNIFICANT CHANGE UP (ref 3.5–5.3)
POTASSIUM SERPL-SCNC: 3.9 MMOL/L — SIGNIFICANT CHANGE UP (ref 3.5–5.3)
POTASSIUM SERPL-SCNC: 4 MMOL/L — SIGNIFICANT CHANGE UP (ref 3.5–5.3)
POTASSIUM SERPL-SCNC: 4 MMOL/L — SIGNIFICANT CHANGE UP (ref 3.5–5.3)
PROT SERPL-MCNC: 4.7 G/DL — LOW (ref 6–8.3)
RBC # BLD: 4.67 M/UL — SIGNIFICANT CHANGE UP (ref 3.8–5.2)
RBC # FLD: 16 % — HIGH (ref 10.3–14.5)
SODIUM SERPL-SCNC: 146 MMOL/L — HIGH (ref 135–145)
SODIUM SERPL-SCNC: 149 MMOL/L — HIGH (ref 135–145)
SODIUM SERPL-SCNC: 154 MMOL/L — HIGH (ref 135–145)
SODIUM SERPL-SCNC: 155 MMOL/L — HIGH (ref 135–145)
WBC # BLD: 11.23 K/UL — HIGH (ref 3.8–10.5)
WBC # FLD AUTO: 11.23 K/UL — HIGH (ref 3.8–10.5)

## 2025-02-04 PROCEDURE — 36000 PLACE NEEDLE IN VEIN: CPT

## 2025-02-04 PROCEDURE — 99233 SBSQ HOSP IP/OBS HIGH 50: CPT

## 2025-02-04 PROCEDURE — 93308 TTE F-UP OR LMTD: CPT | Mod: 26

## 2025-02-04 PROCEDURE — 93971 EXTREMITY STUDY: CPT | Mod: 26,RT

## 2025-02-04 PROCEDURE — 99291 CRITICAL CARE FIRST HOUR: CPT

## 2025-02-04 PROCEDURE — 99232 SBSQ HOSP IP/OBS MODERATE 35: CPT

## 2025-02-04 PROCEDURE — 76937 US GUIDE VASCULAR ACCESS: CPT | Mod: 26,59

## 2025-02-04 PROCEDURE — 95720 EEG PHY/QHP EA INCR W/VEEG: CPT

## 2025-02-04 RX ORDER — SODIUM PHOSPHATE,DIBASIC DIHYD
30 POWDER (GRAM) MISCELLANEOUS ONCE
Refills: 0 | Status: COMPLETED | OUTPATIENT
Start: 2025-02-04 | End: 2025-02-04

## 2025-02-04 RX ORDER — ENOXAPARIN SODIUM 100 MG/ML
40 INJECTION SUBCUTANEOUS EVERY 24 HOURS
Refills: 0 | Status: DISCONTINUED | OUTPATIENT
Start: 2025-02-04 | End: 2025-02-25

## 2025-02-04 RX ORDER — SOD PHOS DI, MONO/K PHOS MONO 250 MG
1 TABLET ORAL ONCE
Refills: 0 | Status: COMPLETED | OUTPATIENT
Start: 2025-02-04 | End: 2025-02-04

## 2025-02-04 RX ADMIN — ERTAPENEM SODIUM 120 MILLIGRAM(S): 1 INJECTION, POWDER, LYOPHILIZED, FOR SOLUTION INTRAMUSCULAR; INTRAVENOUS at 12:41

## 2025-02-04 RX ADMIN — Medication 55 MILLIGRAM(S): at 07:54

## 2025-02-04 RX ADMIN — PROPOFOL 15.7 MICROGRAM(S)/KG/MIN: 10 INJECTION, EMULSION INTRAVENOUS at 09:26

## 2025-02-04 RX ADMIN — Medication 1 APPLICATION(S): at 21:13

## 2025-02-04 RX ADMIN — ENOXAPARIN SODIUM 40 MILLIGRAM(S): 100 INJECTION SUBCUTANEOUS at 18:10

## 2025-02-04 RX ADMIN — DEXAMETHASONE 4 MILLIGRAM(S): 0.5 TABLET ORAL at 23:54

## 2025-02-04 RX ADMIN — Medication 1 DROP(S): at 21:13

## 2025-02-04 RX ADMIN — Medication 20 MILLIEQUIVALENT(S): at 23:57

## 2025-02-04 RX ADMIN — DEXAMETHASONE 4 MILLIGRAM(S): 0.5 TABLET ORAL at 11:19

## 2025-02-04 RX ADMIN — DEXAMETHASONE 4 MILLIGRAM(S): 0.5 TABLET ORAL at 05:03

## 2025-02-04 RX ADMIN — Medication 55 MILLIGRAM(S): at 11:19

## 2025-02-04 RX ADMIN — INSULIN LISPRO 1: 100 INJECTION, SOLUTION INTRAVENOUS; SUBCUTANEOUS at 23:54

## 2025-02-04 RX ADMIN — INSULIN LISPRO 1: 100 INJECTION, SOLUTION INTRAVENOUS; SUBCUTANEOUS at 05:57

## 2025-02-04 RX ADMIN — Medication 1 PACKET(S): at 07:03

## 2025-02-04 RX ADMIN — Medication 1 APPLICATION(S): at 05:56

## 2025-02-04 RX ADMIN — PROPOFOL 15.7 MICROGRAM(S)/KG/MIN: 10 INJECTION, EMULSION INTRAVENOUS at 01:06

## 2025-02-04 RX ADMIN — SODIUM CHLORIDE 30 MILLILITER(S): 3 INJECTION, SOLUTION INTRAVENOUS at 11:19

## 2025-02-04 RX ADMIN — Medication 1 APPLICATION(S): at 09:28

## 2025-02-04 RX ADMIN — Medication 55 MILLIGRAM(S): at 00:46

## 2025-02-04 RX ADMIN — Medication 1 DROP(S): at 09:28

## 2025-02-04 RX ADMIN — Medication 15 MILLILITER(S): at 21:11

## 2025-02-04 RX ADMIN — LACOSAMIDE 120 MILLIGRAM(S): 150 TABLET, FILM COATED ORAL at 21:17

## 2025-02-04 RX ADMIN — Medication 15 MILLILITER(S): at 09:26

## 2025-02-04 RX ADMIN — Medication 40 MILLIGRAM(S): at 09:25

## 2025-02-04 RX ADMIN — DEXAMETHASONE 4 MILLIGRAM(S): 0.5 TABLET ORAL at 17:13

## 2025-02-04 RX ADMIN — INSULIN LISPRO 2: 100 INJECTION, SOLUTION INTRAVENOUS; SUBCUTANEOUS at 17:13

## 2025-02-04 RX ADMIN — LACOSAMIDE 120 MILLIGRAM(S): 150 TABLET, FILM COATED ORAL at 09:25

## 2025-02-04 NOTE — PROGRESS NOTE ADULT - ATTENDING COMMENTS
Seizure activity stopped as per neurology team. Infection does seem to be improving on ertapenem. Will switch seziure medications and start keppra. Wean sedation today and see if she wakes up and will look to possibly extubate. Prognosis guarded.

## 2025-02-04 NOTE — CHART NOTE - NSCHARTNOTEFT_GEN_A_CORE
Reached out by primary team regarding potential interaction between VPA and Carbapenems. Spoke with Dr. Hough who recommends stopping the Depakote. Continue same dose of Vimpat. WIll f/u vEEG, if worsens may need adjustment.

## 2025-02-04 NOTE — PROGRESS NOTE ADULT - SUBJECTIVE AND OBJECTIVE BOX
[facility]  Naresh Meeks, Palliative Care Attending  Contact Info: Call [phone] (HEAL Line) or message on [employer] Teams (Naresh Meeks)    SUBJECTIVE AND OBJECTIVE:  INTERVAL HPI/OVERNIGHT EVENTS: Interval events noted. Unable to participate in interview. See patient's PRN use for the past 24hrs noted below. Comprehensive symptom assessment and GOC exploration as noted below. Extensive time spent discussing plan of care with family.    ALLERGIES:  No Known Allergies    MEDICATIONS  (STANDING):  artificial  tears Solution 1 Drop(s) Both EYES every 12 hours  chlorhexidine 0.12% Liquid 15 milliLiter(s) Oral Mucosa every 12 hours  chlorhexidine 2% Cloths 1 Application(s) Topical <User Schedule>  dexAMETHasone  Injectable 4 milliGRAM(s) IV Push every 6 hours  dextrose 5%. 1000 milliLiter(s) (50 mL/Hr) IV Continuous <Continuous>  dextrose 5%. 1000 milliLiter(s) (100 mL/Hr) IV Continuous <Continuous>  dextrose 50% Injectable 25 Gram(s) IV Push once  dextrose 50% Injectable 12.5 Gram(s) IV Push once  dextrose 50% Injectable 25 Gram(s) IV Push once  enoxaparin Injectable 40 milliGRAM(s) SubCutaneous every 24 hours  ertapenem  IVPB 1000 milliGRAM(s) IV Intermittent every 24 hours  glucagon  Injectable 1 milliGRAM(s) IntraMuscular once  influenza  Vaccine (HIGH DOSE) 0.5 milliLiter(s) IntraMuscular once  insulin lispro (ADMELOG) corrective regimen sliding scale   SubCutaneous every 6 hours  lacosamide IVPB 100 milliGRAM(s) IV Intermittent every 12 hours  pantoprazole  Injectable 40 milliGRAM(s) IV Push every 24 hours  petrolatum Ophthalmic Ointment 1 Application(s) Both EYES every 12 hours  propofol Infusion. 50 MICROgram(s)/kG/Min (15.7 mL/Hr) IV Continuous <Continuous>    MEDICATIONS  (PRN):  dextrose Oral Gel 15 Gram(s) Oral once PRN Blood Glucose LESS THAN 70 milliGRAM(s)/deciliter  HYDROmorphone  Injectable 0.5 milliGRAM(s) IV Push every 4 hours PRN Moderate Pain (4 - 6)  LORazepam   Injectable 2 milliGRAM(s) IV Push every 4 hours PRN seizures      Analgesic Use (Scheduled and PRNs) for past 24 hours:    lacosamide IVPB   120 mL/Hr IV Intermittent (02-04-25 @ 21:17)   120 mL/Hr IV Intermittent (02-04-25 @ 09:25)    propofol Infusion.   15.7 mL/Hr IV Continuous (02-04-25 @ 01:07)    valproate sodium   IVPB   55 mL/Hr IV Intermittent (02-04-25 @ 11:19)   55 mL/Hr IV Intermittent (02-04-25 @ 07:54)   55 mL/Hr IV Intermittent (02-04-25 @ 00:46)      ITEMS UNCHECKED ARE NOT PRESENT  PRESENT SYMPTOMS/REVIEW OF SYSTEMS: [x]Unable to obtain due to poor mentation   Source if other than patient:  []Family   []Team     GENERAL:  [] NAD []Alert [x]Lethargic  []Cachexia  [x]Unarousable  []Verbal  [x]Non-Verbal  BEHAVIORAL:   []Anxiety  [x]Delirium []Agitation []Cooperative []Oriented x  HEENT:  []Normal  [] Moist Mucous Membranes []Dry mouth   [x]ET Tube/Trach  []Oral lesions  PULMONARY:   []Clear []Tachypnea  []Audible excessive secretions  []Normal Work of Breathing []Labored Breathing  [x]Rhonchi []Crackles []Wheezing  CARDIOVASCULAR:    [x]Regular Rate [x]Regular Rhythm []Irregular []Tachy  []Mumtaz  GASTROINTESTINAL:  [x]Soft  [x]Distended   [x]+BS  [x]Non tender []Tender  []PEG [x]OGT/ NGT  Last BM:  GENITOURINARY:  []Normal [] Incontinent   []Oliguria/Anuria   [x]Wade  MUSCULOSKELETAL:   []Normal Extremities  [x]Weakness  [x]Bed/Wheelchair bound []Edema  NEUROLOGIC:   []No focal deficits  []Cognitive impairment  [x]Dysphagia []Dysarthria []Paresis [x]Encephalopathic  SKIN:   [x]Normal   []Pressure ulcer(s)  []Rash    CRITICAL CARE:  [ ]Shock Present  [ ]Septic [ ]Cardiogenic [ ]Neurologic [ ]Hypovolemic  [ ] Vasopressors [ ]Inotropes   [x]Respiratory failure present [x]Mechanical Ventilation [ ]Non-invasive ventilatory support [ ]High-Flow  [x]Acute  [ ]Chronic [x]Hypoxic  [ ]Hypercarbic   [ ]Other organ failure    Vital Signs Last 24 Hrs  T(C): 37.5 (04 Feb 2025 21:16), Max: 37.5 (04 Feb 2025 09:09)  T(F): 99.5 (04 Feb 2025 21:16), Max: 99.5 (04 Feb 2025 09:09)  HR: 105 (04 Feb 2025 21:00) (74 - 105)  BP: 136/77 (04 Feb 2025 20:00) (91/55 - 153/93)  BP(mean): 99 (04 Feb 2025 20:00) (68 - 118)  RR: 17 (04 Feb 2025 21:00) (12 - 19)  SpO2: 95% (04 Feb 2025 21:00) (92% - 100%)    Parameters below as of 04 Feb 2025 21:00  Patient On (Oxygen Delivery Method): ventilator  O2 Concentration (%): 21    LABS: Personally reviewed and interpreted                      13.3   11.23 )-----------( 142      ( 04 Feb 2025 05:30 )             41.5   02-04    155[H]  |  119[H]  |  23  ----------------------------<  148[H]  4.0   |  26  |  0.36[L]    Ca    7.8[L]      04 Feb 2025 14:22  Phos  2.2     02-04  Mg     2.2     02-04  TPro  4.7[L]  /  Alb  2.9[L]  /  TBili  0.3  /  DBili  x   /  AST  14  /  ALT  20  /  AlkPhos  92  02-04    RADIOLOGY & ADDITIONAL STUDIES: Personally reviewed and interpreted  •	These findings are indicative of a bilateral fronto-temporal,right more than left  focal epilepsy which became seizure free after 6 PM  •	Background mainly nearly continuous, indicating of severe diffuse /multifocal cerebral dysfunction of which IV sedative medications may be contributing to the level of dysfunction  •	Severe generalized and multifocal slowing indicating similar degree of underlying diffuse and multifocal cerebral dysfunction    DISCUSSION OF CASE: Family - to provide updates and emotional support; Primary Team/RN - to discuss plan of care

## 2025-02-04 NOTE — PROGRESS NOTE ADULT - ASSESSMENT
75 yo female w PMH GBM on hospice care (diagnosed in 2024, follows at [facility] under Dr. Hernandez, with reported attempted resection of tumor, on chemotherapy with last chemo 2 months ago), admitted for ams likely 2/2 disease progresion. After admission to [facility], Upon evaluation pt was obtunded with tremors to RUE, response to noxious stimuli but otherwise no response. Pupils were responsive with saccadic movement. She was slumped to her right sided with gurgling breath sounds.  At that point rapid was called and patient was inevitably intubated. Transferred to [facility]. Discussed with pts daughters post procedure poor prognosis and advised to have formal family meeting to clarify and document wishes.       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 There is evidence of focal cortical hyperexcitability, more prominent in the right than the left frontotemporal regions. The background pattern shows a burst suppression ratio of 10:1, indicating severe diffuse or multifocal cerebral dysfunction, potentially exacerbated by IV sedatives. Additionally, there is severe generalized and multifocal slowing, further supporting significant underlying cerebral dysfunction.    Plan:   s/p valproic acid loading dose 1200 mg, vimpat 200 mg loading  -d/c VPA given c/f interaction with carbapenem  -vimpat 100 mg BID  -c/w ativan 0.5mg q4h prn for seizures  -Continue vEEG monitoring  -ctw sedation with prop and fent  -weaned prop but patient with twitching, no indication to add keppra per epilepsy, will continue to monitor on vEEG    #GBM  last chemo ~2months ago, follows at [facility].    -c/w decadron 4 IV q6h  -palliative care consult   -NSGY recs  -maintain Na > 145 to address cerebral edema 2/2 mass effect    CARDIAC  #Chronic atrial fibrillation.    Home med: metoprolol 12.5 BID   - received lopressor earlier in admission  - started lovenox, NSGY stated ppx dose will not increase risk of hemorrhage    Plan  -bladder scans q6h   -consider john (currently on primafit) if pt retaining   -make sure pt having BMs  -pain control with dilaudid 0.5 q4h PRN.    PULM  #intubated  - intubated on 2/1  - GOC  - consider trach moving forward    GI  - NPO in setting of seizures  - consider PEG moving forward    RENAL  - currently with normal renal function    #hypernatremia  -maintain Na > 145 as above for mass effect    ID  #SIRS  Systemic inflammatory response syndrome (SIRS).   ·  Plan: Meeting SIRS 2/4 (HR>90, WBC>12k) likely iso of UTI.   Bcx with ESBL E. coli  -c/w Ertapenem 1 g qd   -IVF as needed    F: none  E: replete K<4, Mg<2  N: npo  VTE Prophylaxis: lovenox  GI: ppi while intubated  C: Full Code  D: micu

## 2025-02-04 NOTE — PROGRESS NOTE ADULT - SUBJECTIVE AND OBJECTIVE BOX
Patient is a 74y old  Female who presents with a chief complaint of AMS (04 Feb 2025 11:09)      HOSPITAL COURSE:     OVERNIGHT EVENTS:    SUBJECTIVE:     ROS: otherwise negative      T(C): 37.3 (02-04-25 @ 13:37), Max: 37.5 (02-04-25 @ 09:09)  HR: 94 (02-04-25 @ 16:00) (74 - 94)  BP: 140/81 (02-04-25 @ 16:00) (91/55 - 153/76)  RR: 12 (02-04-25 @ 16:00) (12 - 16)  SpO2: 98% (02-04-25 @ 16:00) (92% - 100%)  Wt(kg): --Vital Signs Last 24 Hrs  T(C): 37.3 (04 Feb 2025 13:37), Max: 37.5 (04 Feb 2025 09:09)  T(F): 99.1 (04 Feb 2025 13:37), Max: 99.5 (04 Feb 2025 09:09)  HR: 94 (04 Feb 2025 16:00) (74 - 94)  BP: 140/81 (04 Feb 2025 16:00) (91/55 - 153/76)  BP(mean): 103 (04 Feb 2025 16:00) (66 - 104)  RR: 12 (04 Feb 2025 16:00) (12 - 16)  SpO2: 98% (04 Feb 2025 16:00) (92% - 100%)    Parameters below as of 04 Feb 2025 16:00  Patient On (Oxygen Delivery Method): ventilator    O2 Concentration (%): 21    PHYSICAL EXAM:  Constitutional: resting comfortably in bed; NAD  Head: NC/AT  Eyes: PERRL, EOMI, anicteric sclera  ENT: no nasal discharge; MMM  Neck: supple; no JVD or thyromegaly  Respiratory: CTA B/L; no W/R/R, no retractions  Cardiac: +S1/S2; RRR; no M/R/G  Gastrointestinal: soft, NT/ND; no rebound or guarding; +BSx4  Back: spine midline, no bony tenderness or step-offs; no CVAT B/L  Extremities: WWP, no clubbing or cyanosis; no peripheral edema. Capillary refill <2 sec  Musculoskeletal: NROM x4; no joint swelling, tenderness or erythema  Vascular: 2+ radial, DP/PT pulses B/L  Dermatologic: skin warm, dry and intact; no rashes, wounds, or scars  Lymphatic: no submandibular or cervical LAD  Neurologic: AAOx3; CNII-XII grossly intact; no focal deficits  Psychiatric: affect and characteristics of appearance, verbalizations, behaviors are appropriate    LABS:                        13.3   11.23 )-----------( 142      ( 04 Feb 2025 05:30 )             41.5     02-04    155[H]  |  119[H]  |  23  ----------------------------<  148[H]  4.0   |  26  |  0.36[L]    Ca    7.8[L]      04 Feb 2025 14:22  Phos  2.2     02-04  Mg     2.2     02-04    TPro  4.7[L]  /  Alb  2.9[L]  /  TBili  0.3  /  DBili  x   /  AST  14  /  ALT  20  /  AlkPhos  92  02-04        Urinalysis Basic - ( 04 Feb 2025 14:22 )    Color: x / Appearance: x / SG: x / pH: x  Gluc: 148 mg/dL / Ketone: x  / Bili: x / Urobili: x   Blood: x / Protein: x / Nitrite: x   Leuk Esterase: x / RBC: x / WBC x   Sq Epi: x / Non Sq Epi: x / Bacteria: x      CAPILLARY BLOOD GLUCOSE      POCT Blood Glucose.: 99 mg/dL (04 Feb 2025 10:54)  POCT Blood Glucose.: 169 mg/dL (04 Feb 2025 05:49)  POCT Blood Glucose.: 148 mg/dL (04 Feb 2025 00:41)        Urinalysis Basic - ( 04 Feb 2025 14:22 )    Color: x / Appearance: x / SG: x / pH: x  Gluc: 148 mg/dL / Ketone: x  / Bili: x / Urobili: x   Blood: x / Protein: x / Nitrite: x   Leuk Esterase: x / RBC: x / WBC x   Sq Epi: x / Non Sq Epi: x / Bacteria: x        MEDICATIONS  (STANDING):  artificial  tears Solution 1 Drop(s) Both EYES every 12 hours  chlorhexidine 0.12% Liquid 15 milliLiter(s) Oral Mucosa every 12 hours  chlorhexidine 2% Cloths 1 Application(s) Topical <User Schedule>  dexAMETHasone  Injectable 4 milliGRAM(s) IV Push every 6 hours  dextrose 5%. 1000 milliLiter(s) (50 mL/Hr) IV Continuous <Continuous>  dextrose 5%. 1000 milliLiter(s) (100 mL/Hr) IV Continuous <Continuous>  dextrose 50% Injectable 25 Gram(s) IV Push once  dextrose 50% Injectable 12.5 Gram(s) IV Push once  dextrose 50% Injectable 25 Gram(s) IV Push once  ertapenem  IVPB 1000 milliGRAM(s) IV Intermittent every 24 hours  glucagon  Injectable 1 milliGRAM(s) IntraMuscular once  influenza  Vaccine (HIGH DOSE) 0.5 milliLiter(s) IntraMuscular once  insulin lispro (ADMELOG) corrective regimen sliding scale   SubCutaneous every 6 hours  lacosamide IVPB 100 milliGRAM(s) IV Intermittent every 12 hours  pantoprazole  Injectable 40 milliGRAM(s) IV Push every 24 hours  petrolatum Ophthalmic Ointment 1 Application(s) Both EYES every 12 hours  propofol Infusion. 50 MICROgram(s)/kG/Min (15.7 mL/Hr) IV Continuous <Continuous>    MEDICATIONS  (PRN):  dextrose Oral Gel 15 Gram(s) Oral once PRN Blood Glucose LESS THAN 70 milliGRAM(s)/deciliter  HYDROmorphone  Injectable 0.5 milliGRAM(s) IV Push every 4 hours PRN Moderate Pain (4 - 6)  LORazepam   Injectable 2 milliGRAM(s) IV Push every 4 hours PRN seizures      RADIOLOGY & ADDITIONAL TESTS: Reviewed   Patient is a 74y old  Female who presents with a chief complaint of AMS (04 Feb 2025 11:09)    HOSPITAL COURSE: Patient with hx of GBM presented to the floors with altered mental status, tachypnea, and lethargy. Found to have ESBL E Coli bacteremia 2/2 to UTI and started on ertapenem. While on RMF, she was found to be in status epilepticus confirmed by VEEG. Immediately dosed with ativan and valproic acid, intubated and upgraded to MICU. Here, she was monitored on VEEG and maintained on vimpat after loading dose, valproic acid was d/c'd given concern for interaction with carbapenems. She was continued on decadron home regimen and maintained at Na > 145 given cerebral edema 2/2 mass effect. Her seizure activity declined and per epilepsy it was safe to start weaning off propofol. Began to wean but patient was twitching, epilepsy said no keppra required and continued to monitor her on vEEG with previous propofol dosing.     OVERNIGHT EVENTS: Na at 146 (goal), infiltrated IVs.     SUBJECTIVE: intubated and sedated    ROS: otherwise negative      T(C): 37.3 (02-04-25 @ 13:37), Max: 37.5 (02-04-25 @ 09:09)  HR: 94 (02-04-25 @ 16:00) (74 - 94)  BP: 140/81 (02-04-25 @ 16:00) (91/55 - 153/76)  RR: 12 (02-04-25 @ 16:00) (12 - 16)  SpO2: 98% (02-04-25 @ 16:00) (92% - 100%)  Wt(kg): --Vital Signs Last 24 Hrs  T(C): 37.3 (04 Feb 2025 13:37), Max: 37.5 (04 Feb 2025 09:09)  T(F): 99.1 (04 Feb 2025 13:37), Max: 99.5 (04 Feb 2025 09:09)  HR: 94 (04 Feb 2025 16:00) (74 - 94)  BP: 140/81 (04 Feb 2025 16:00) (91/55 - 153/76)  BP(mean): 103 (04 Feb 2025 16:00) (66 - 104)  RR: 12 (04 Feb 2025 16:00) (12 - 16)  SpO2: 98% (04 Feb 2025 16:00) (92% - 100%)    Parameters below as of 04 Feb 2025 16:00  Patient On (Oxygen Delivery Method): ventilator    O2 Concentration (%): 21    PHYSICAL EXAM:  General: intubated and sedated  HEENT: constricted pupils, anicteric sclera,   Cardiovascular: +S1/S2; RRR; no M/R/G  Respiratory: CTAB; no W/R/R  Gastrointestinal: soft, NT/ND; +BS x4  Extremities: cool extremities; 2+ peripheral pulses bilaterally; no LE edema, 4-5 second cap refill  Skin: normal color and turgor; no rash  Neurologic: unable to assess    LABS:                        13.3   11.23 )-----------( 142      ( 04 Feb 2025 05:30 )             41.5     02-04    155[H]  |  119[H]  |  23  ----------------------------<  148[H]  4.0   |  26  |  0.36[L]    Ca    7.8[L]      04 Feb 2025 14:22  Phos  2.2     02-04  Mg     2.2     02-04    TPro  4.7[L]  /  Alb  2.9[L]  /  TBili  0.3  /  DBili  x   /  AST  14  /  ALT  20  /  AlkPhos  92  02-04        Urinalysis Basic - ( 04 Feb 2025 14:22 )    Color: x / Appearance: x / SG: x / pH: x  Gluc: 148 mg/dL / Ketone: x  / Bili: x / Urobili: x   Blood: x / Protein: x / Nitrite: x   Leuk Esterase: x / RBC: x / WBC x   Sq Epi: x / Non Sq Epi: x / Bacteria: x      CAPILLARY BLOOD GLUCOSE      POCT Blood Glucose.: 99 mg/dL (04 Feb 2025 10:54)  POCT Blood Glucose.: 169 mg/dL (04 Feb 2025 05:49)  POCT Blood Glucose.: 148 mg/dL (04 Feb 2025 00:41)        Urinalysis Basic - ( 04 Feb 2025 14:22 )    Color: x / Appearance: x / SG: x / pH: x  Gluc: 148 mg/dL / Ketone: x  / Bili: x / Urobili: x   Blood: x / Protein: x / Nitrite: x   Leuk Esterase: x / RBC: x / WBC x   Sq Epi: x / Non Sq Epi: x / Bacteria: x        MEDICATIONS  (STANDING):  artificial  tears Solution 1 Drop(s) Both EYES every 12 hours  chlorhexidine 0.12% Liquid 15 milliLiter(s) Oral Mucosa every 12 hours  chlorhexidine 2% Cloths 1 Application(s) Topical <User Schedule>  dexAMETHasone  Injectable 4 milliGRAM(s) IV Push every 6 hours  dextrose 5%. 1000 milliLiter(s) (50 mL/Hr) IV Continuous <Continuous>  dextrose 5%. 1000 milliLiter(s) (100 mL/Hr) IV Continuous <Continuous>  dextrose 50% Injectable 25 Gram(s) IV Push once  dextrose 50% Injectable 12.5 Gram(s) IV Push once  dextrose 50% Injectable 25 Gram(s) IV Push once  ertapenem  IVPB 1000 milliGRAM(s) IV Intermittent every 24 hours  glucagon  Injectable 1 milliGRAM(s) IntraMuscular once  influenza  Vaccine (HIGH DOSE) 0.5 milliLiter(s) IntraMuscular once  insulin lispro (ADMELOG) corrective regimen sliding scale   SubCutaneous every 6 hours  lacosamide IVPB 100 milliGRAM(s) IV Intermittent every 12 hours  pantoprazole  Injectable 40 milliGRAM(s) IV Push every 24 hours  petrolatum Ophthalmic Ointment 1 Application(s) Both EYES every 12 hours  propofol Infusion. 50 MICROgram(s)/kG/Min (15.7 mL/Hr) IV Continuous <Continuous>    MEDICATIONS  (PRN):  dextrose Oral Gel 15 Gram(s) Oral once PRN Blood Glucose LESS THAN 70 milliGRAM(s)/deciliter  HYDROmorphone  Injectable 0.5 milliGRAM(s) IV Push every 4 hours PRN Moderate Pain (4 - 6)  LORazepam   Injectable 2 milliGRAM(s) IV Push every 4 hours PRN seizures      RADIOLOGY & ADDITIONAL TESTS: Reviewed

## 2025-02-04 NOTE — PROGRESS NOTE ADULT - SUBJECTIVE AND OBJECTIVE BOX
Neurology Progress Note    Interval History:  The patient was seen and examined at the bedside. Remains intubated and sedated.      PAST MEDICAL & SURGICAL HISTORY:  GBM (glioblastoma multiforme)    Seizure            Medications:  artificial  tears Solution 1 Drop(s) Both EYES every 12 hours  chlorhexidine 0.12% Liquid 15 milliLiter(s) Oral Mucosa every 12 hours  chlorhexidine 2% Cloths 1 Application(s) Topical <User Schedule>  dexAMETHasone  Injectable 4 milliGRAM(s) IV Push every 6 hours  dextrose 5%. 1000 milliLiter(s) IV Continuous <Continuous>  dextrose 5%. 1000 milliLiter(s) IV Continuous <Continuous>  dextrose 50% Injectable 25 Gram(s) IV Push once  dextrose 50% Injectable 12.5 Gram(s) IV Push once  dextrose 50% Injectable 25 Gram(s) IV Push once  dextrose Oral Gel 15 Gram(s) Oral once PRN  ertapenem  IVPB 1000 milliGRAM(s) IV Intermittent every 24 hours  glucagon  Injectable 1 milliGRAM(s) IntraMuscular once  HYDROmorphone  Injectable 0.5 milliGRAM(s) IV Push every 4 hours PRN  influenza  Vaccine (HIGH DOSE) 0.5 milliLiter(s) IntraMuscular once  insulin lispro (ADMELOG) corrective regimen sliding scale   SubCutaneous every 6 hours  lacosamide IVPB 100 milliGRAM(s) IV Intermittent every 12 hours  LORazepam   Injectable 2 milliGRAM(s) IV Push every 4 hours PRN  pantoprazole  Injectable 40 milliGRAM(s) IV Push every 24 hours  petrolatum Ophthalmic Ointment 1 Application(s) Both EYES every 12 hours  propofol Infusion. 50 MICROgram(s)/kG/Min IV Continuous <Continuous>  sodium chloride 3%. 500 milliLiter(s) IV Continuous <Continuous>  valproate sodium   IVPB 500 milliGRAM(s) IV Intermittent every 6 hours      Vital Signs Last 24 Hrs  T(C): 37.5 (04 Feb 2025 09:09), Max: 37.5 (04 Feb 2025 09:09)  T(F): 99.5 (04 Feb 2025 09:09), Max: 99.5 (04 Feb 2025 09:09)  HR: 83 (04 Feb 2025 10:00) (74 - 91)  BP: 113/72 (04 Feb 2025 10:00) (91/55 - 133/77)  BP(mean): 88 (04 Feb 2025 10:00) (66 - 99)  RR: 13 (04 Feb 2025 10:00) (12 - 23)  SpO2: 95% (04 Feb 2025 10:00) (92% - 100%)    Parameters below as of 04 Feb 2025 11:00  Patient On (Oxygen Delivery Method): ventilator    O2 Concentration (%): 21    Neurological Exam:   Mental status: Intubated and sedated. Not following commands or exhibing purposeful movements.   Cranial nerves: Eyes are midline, slightly dysconjugate. Pupils 1-2mm and sluggishly reactive. OCR not present. Corneal and gag not present.   Motor and sensory:  No spontaneous movement or withdrawal to painful stimuli   Coordination: unable to assess  Reflexes: Mute toes b/l  Gait: unable to assess      Labs:  CBC Full  -  ( 04 Feb 2025 05:30 )  WBC Count : 11.23 K/uL  RBC Count : 4.67 M/uL  Hemoglobin : 13.3 g/dL  Hematocrit : 41.5 %  Platelet Count - Automated : 142 K/uL  Mean Cell Volume : 88.9 fl  Mean Cell Hemoglobin : 28.5 pg  Mean Cell Hemoglobin Concentration : 32.0 g/dL  Auto Neutrophil # : 9.92 K/uL  Auto Lymphocyte # : 0.66 K/uL  Auto Monocyte # : 0.50 K/uL  Auto Eosinophil # : 0.00 K/uL  Auto Basophil # : 0.02 K/uL  Auto Neutrophil % : 88.2 %  Auto Lymphocyte % : 5.9 %  Auto Monocyte % : 4.5 %  Auto Eosinophil % : 0.0 %  Auto Basophil % : 0.2 %    02-04    149[H]  |  111[H]  |  21  ----------------------------<  192[H]  4.0   |  29  |  0.43[L]    Ca    8.0[L]      04 Feb 2025 05:30  Phos  2.2     02-04  Mg     2.2     02-04    TPro  4.7[L]  /  Alb  2.9[L]  /  TBili  0.3  /  DBili  x   /  AST  14  /  ALT  20  /  AlkPhos  92  02-04    LIVER FUNCTIONS - ( 04 Feb 2025 05:30 )  Alb: 2.9 g/dL / Pro: 4.7 g/dL / ALK PHOS: 92 U/L / ALT: 20 U/L / AST: 14 U/L / GGT: x             Urinalysis Basic - ( 04 Feb 2025 05:30 )    Color: x / Appearance: x / SG: x / pH: x  Gluc: 192 mg/dL / Ketone: x  / Bili: x / Urobili: x   Blood: x / Protein: x / Nitrite: x   Leuk Esterase: x / RBC: x / WBC x   Sq Epi: x / Non Sq Epi: x / Bacteria: x        Assessment:  This is a 74y Female w/ h/o     Plan:

## 2025-02-04 NOTE — EEG REPORT - NS EEG TEXT BOX
[facility] Department of Neurology  Inpatient Epilepsy Monitoring Unit video-Electroencephalography Report    Acquisition Details:  Electroencephalography was acquired using a minimum of 21 channels on an [employer] system v 9.3.1 with electrode placement according to the standard International 10-20 system following ACNS (American Clinical Neurophysiology Society) guidelines for Long-Term Video EEG monitoring.  Anterior temporal T1 and T2 electrodes were utilized whenever possible.   The [employer] automated spike & seizure detections were all reviewed in detail, in addition to extensive portions of raw EEG.  Specially-trained nurses were available for seizure-related events.  Continuous live-time video monitoring of the patients for seizure-related and safety events was performed by specially-trained technicians.      Day 4:  2/3/2025, 7:00 AM to next morning at 07:00 AM   Meds: Depakote 500 mg Q6hr, propofol drip,vimpat 100 mg bid  Background: mainly  nearly continuous (<10% periods of attenuation), asymmetric with predominantly delta / theta frequencies(slower over the right hemisphere)  with periods of continuous  Generalized Slowing:  None  Symmetry/Focality: Continuous (90+%)  left and independent right fronto-parietal/temporal polymorphic delta/theta.    Voltage:  Normal (20+ uV)  Organization:  Rudimentary  Posterior Dominant Rhythm:   7-8 Hz poorly regulated,   Sleep:  Absent.  Variability:   Yes		Reactivity: , Yes    Spontaneous Activity:  Abundant right hemispheric lateralized rhythmic delta activity superimposed with epileptiform sharp waves ( msec) maximal over right fronto temporal region and independent occasional left frontotemporal sharp wave discharges   Events:average 3-6 seizures per hour  from 7 AM until 6 PM which became seizure free after vimpat was started                  1-Left temporal focal onset  Time:7:01 till 7:02 AM,8:42-8:44 AM  Clinical:No overt clinical changes  Electrographic: development off  alpha wave become spikier and faster  with lateralized rhythmic delta superimposed with sharp wave discharges    2-right frontal focal onset  Time:7:24 till 7:28 AM,7:36 till 7:40 AM,7:49 AM till 7:52 AM,8:01 till 8:03,8:09 till 8:11,8:25 till 8:28 AM,8:39-8:42,8:46-8:50  Clinical:no overt clinical change  Electrographic:development of rhythmic theta  follow by delta wave and become spikier until offset,       Provocations:  •	Hyperventilation: was not performed.  •	Photic stimulation: was not performed.    Daily Summary:    •	These findings are indicative of a bilateral fronto-temporal,right more than left  focal epilepsy which became seizure free after 6 PM  •	Background mainly nearly continuous, indicating of severe diffuse /multifocal cerebral dysfunction of which IV sedative medications may be contributing to the level of dysfunction  •	Severe generalized and multifocal slowing indicating similar degree of underlying diffuse and multifocal cerebral dysfunction    Katelynn Lundy MD  CNP Fellow

## 2025-02-04 NOTE — PROGRESS NOTE ADULT - ASSESSMENT
75 yo female w PMH GBM on hospice care (diagnosed in 2024, follows at [facility] under Dr. Hernandez, with reported attempted resection of tumor, on chemotherapy with last chemo 2 months ago) who presents with altered mentation. Epilepsy following for seizures with b/l foci, likely i/s/o tumor as well as considerable mass effect.  vEEG was notable for focal status, Propofol was started, Depakote was uptitrated and Vimpat added on 2/3 with improvement in EEG.      Recommendations:  - C/w Vimpat 100mg BID  - C/w VPA 500mg q6hrs  - Ok to wean sedation slowly from an epilepsy perspective  - C/w vEEG  - seizure precautions    Discussed with Dr Hough.

## 2025-02-04 NOTE — PROGRESS NOTE ADULT - ASSESSMENT
74 F with advanced GBM, previously on hospice for 3-4 weeks, encephalopathy, dysphagia, functional quadriplegia, encounter for palliative care.    ·	ongoing medical updates and emotional support provided to family. preparing for poor outcome while balancing their denzel tradition

## 2025-02-05 LAB
ANION GAP SERPL CALC-SCNC: 9 MMOL/L — SIGNIFICANT CHANGE UP (ref 5–17)
BASOPHILS # BLD AUTO: 0 K/UL — SIGNIFICANT CHANGE UP (ref 0–0.2)
BASOPHILS NFR BLD AUTO: 0 % — SIGNIFICANT CHANGE UP (ref 0–2)
BUN SERPL-MCNC: 24 MG/DL — HIGH (ref 7–23)
CALCIUM SERPL-MCNC: 7.8 MG/DL — LOW (ref 8.4–10.5)
CHLORIDE SERPL-SCNC: 116 MMOL/L — HIGH (ref 96–108)
CO2 SERPL-SCNC: 30 MMOL/L — SIGNIFICANT CHANGE UP (ref 22–31)
CREAT SERPL-MCNC: 0.33 MG/DL — LOW (ref 0.5–1.3)
EGFR: 109 ML/MIN/1.73M2 — SIGNIFICANT CHANGE UP
EOSINOPHIL # BLD AUTO: 0 K/UL — SIGNIFICANT CHANGE UP (ref 0–0.5)
EOSINOPHIL NFR BLD AUTO: 0 % — SIGNIFICANT CHANGE UP (ref 0–6)
GLUCOSE SERPL-MCNC: 221 MG/DL — HIGH (ref 70–99)
HCT VFR BLD CALC: 41.1 % — SIGNIFICANT CHANGE UP (ref 34.5–45)
HGB BLD-MCNC: 12.9 G/DL — SIGNIFICANT CHANGE UP (ref 11.5–15.5)
LYMPHOCYTES # BLD AUTO: 0.64 K/UL — LOW (ref 1–3.3)
LYMPHOCYTES # BLD AUTO: 4.4 % — LOW (ref 13–44)
MAGNESIUM SERPL-MCNC: 2.3 MG/DL — SIGNIFICANT CHANGE UP (ref 1.6–2.6)
MCHC RBC-ENTMCNC: 28.6 PG — SIGNIFICANT CHANGE UP (ref 27–34)
MCHC RBC-ENTMCNC: 31.4 G/DL — LOW (ref 32–36)
MCV RBC AUTO: 91.1 FL — SIGNIFICANT CHANGE UP (ref 80–100)
MONOCYTES # BLD AUTO: 0 K/UL — SIGNIFICANT CHANGE UP (ref 0–0.9)
MONOCYTES NFR BLD AUTO: 0 % — LOW (ref 2–14)
NEUTROPHILS # BLD AUTO: 14.01 K/UL — HIGH (ref 1.8–7.4)
NEUTROPHILS NFR BLD AUTO: 95.6 % — HIGH (ref 43–77)
PHOSPHATE SERPL-MCNC: 3.7 MG/DL — SIGNIFICANT CHANGE UP (ref 2.5–4.5)
PLATELET # BLD AUTO: 157 K/UL — SIGNIFICANT CHANGE UP (ref 150–400)
POTASSIUM SERPL-MCNC: 3.8 MMOL/L — SIGNIFICANT CHANGE UP (ref 3.5–5.3)
POTASSIUM SERPL-SCNC: 3.8 MMOL/L — SIGNIFICANT CHANGE UP (ref 3.5–5.3)
RBC # BLD: 4.51 M/UL — SIGNIFICANT CHANGE UP (ref 3.8–5.2)
RBC # FLD: 16.6 % — HIGH (ref 10.3–14.5)
SODIUM SERPL-SCNC: 155 MMOL/L — HIGH (ref 135–145)
WBC # BLD: 14.65 K/UL — HIGH (ref 3.8–10.5)
WBC # FLD AUTO: 14.65 K/UL — HIGH (ref 3.8–10.5)

## 2025-02-05 PROCEDURE — 93308 TTE F-UP OR LMTD: CPT | Mod: 26

## 2025-02-05 PROCEDURE — 99291 CRITICAL CARE FIRST HOUR: CPT | Mod: 25

## 2025-02-05 PROCEDURE — 76604 US EXAM CHEST: CPT | Mod: 26

## 2025-02-05 PROCEDURE — 95720 EEG PHY/QHP EA INCR W/VEEG: CPT

## 2025-02-05 PROCEDURE — 93010 ELECTROCARDIOGRAM REPORT: CPT

## 2025-02-05 PROCEDURE — 99232 SBSQ HOSP IP/OBS MODERATE 35: CPT

## 2025-02-05 RX ORDER — LACOSAMIDE 150 MG/1
100 TABLET, FILM COATED ORAL ONCE
Refills: 0 | Status: DISCONTINUED | OUTPATIENT
Start: 2025-02-05 | End: 2025-02-05

## 2025-02-05 RX ORDER — LACOSAMIDE 150 MG/1
200 TABLET, FILM COATED ORAL EVERY 12 HOURS
Refills: 0 | Status: DISCONTINUED | OUTPATIENT
Start: 2025-02-05 | End: 2025-02-10

## 2025-02-05 RX ORDER — FUROSEMIDE 10 MG/ML
40 INJECTION INTRAMUSCULAR; INTRAVENOUS ONCE
Refills: 0 | Status: COMPLETED | OUTPATIENT
Start: 2025-02-05 | End: 2025-02-05

## 2025-02-05 RX ADMIN — ERTAPENEM SODIUM 120 MILLIGRAM(S): 1 INJECTION, POWDER, LYOPHILIZED, FOR SOLUTION INTRAMUSCULAR; INTRAVENOUS at 12:14

## 2025-02-05 RX ADMIN — Medication 1 DROP(S): at 21:29

## 2025-02-05 RX ADMIN — DEXAMETHASONE 4 MILLIGRAM(S): 0.5 TABLET ORAL at 12:13

## 2025-02-05 RX ADMIN — Medication 85 MILLIMOLE(S): at 01:08

## 2025-02-05 RX ADMIN — LACOSAMIDE 140 MILLIGRAM(S): 150 TABLET, FILM COATED ORAL at 21:26

## 2025-02-05 RX ADMIN — ENOXAPARIN SODIUM 40 MILLIGRAM(S): 100 INJECTION SUBCUTANEOUS at 18:07

## 2025-02-05 RX ADMIN — Medication 0.5 MILLIGRAM(S): at 12:13

## 2025-02-05 RX ADMIN — Medication 0.5 MILLIGRAM(S): at 17:28

## 2025-02-05 RX ADMIN — Medication 55 MILLIGRAM(S): at 21:27

## 2025-02-05 RX ADMIN — FUROSEMIDE 40 MILLIGRAM(S): 10 INJECTION INTRAMUSCULAR; INTRAVENOUS at 13:18

## 2025-02-05 RX ADMIN — DEXAMETHASONE 4 MILLIGRAM(S): 0.5 TABLET ORAL at 23:32

## 2025-02-05 RX ADMIN — DEXAMETHASONE 4 MILLIGRAM(S): 0.5 TABLET ORAL at 18:08

## 2025-02-05 RX ADMIN — Medication 1 APPLICATION(S): at 09:34

## 2025-02-05 RX ADMIN — INSULIN LISPRO 1: 100 INJECTION, SOLUTION INTRAVENOUS; SUBCUTANEOUS at 05:59

## 2025-02-05 RX ADMIN — DEXAMETHASONE 4 MILLIGRAM(S): 0.5 TABLET ORAL at 07:29

## 2025-02-05 RX ADMIN — Medication 15 MILLILITER(S): at 09:33

## 2025-02-05 RX ADMIN — Medication 55 MILLIGRAM(S): at 15:12

## 2025-02-05 RX ADMIN — Medication 1 APPLICATION(S): at 06:00

## 2025-02-05 RX ADMIN — Medication 0.5 MILLIGRAM(S): at 12:29

## 2025-02-05 RX ADMIN — Medication 0.5 MILLIGRAM(S): at 17:03

## 2025-02-05 RX ADMIN — Medication 20 MILLIEQUIVALENT(S): at 07:29

## 2025-02-05 RX ADMIN — INSULIN LISPRO 1: 100 INJECTION, SOLUTION INTRAVENOUS; SUBCUTANEOUS at 23:30

## 2025-02-05 RX ADMIN — LACOSAMIDE 120 MILLIGRAM(S): 150 TABLET, FILM COATED ORAL at 13:18

## 2025-02-05 RX ADMIN — Medication 1 APPLICATION(S): at 21:30

## 2025-02-05 RX ADMIN — LACOSAMIDE 120 MILLIGRAM(S): 150 TABLET, FILM COATED ORAL at 09:33

## 2025-02-05 RX ADMIN — Medication 40 MILLIGRAM(S): at 09:33

## 2025-02-05 RX ADMIN — Medication 15 MILLILITER(S): at 21:26

## 2025-02-05 RX ADMIN — Medication 1 DROP(S): at 09:34

## 2025-02-05 NOTE — PROGRESS NOTE ADULT - SUBJECTIVE AND OBJECTIVE BOX
Neurology Progress Note    Interval History:  The patient was seen and examined at the bedside. Remains intubated, off sedation since yesterday afternoon without meaningful activity. Yesterday discussion with pharmacy and MICU team re. carbapenem interaction with depakote (see chart note) and decision was made to hold depakote and eval vEEG on Vimpat monotherapy.       PAST MEDICAL & SURGICAL HISTORY:  GBM (glioblastoma multiforme)    Seizure            Medications:  artificial  tears Solution 1 Drop(s) Both EYES every 12 hours  chlorhexidine 0.12% Liquid 15 milliLiter(s) Oral Mucosa every 12 hours  chlorhexidine 2% Cloths 1 Application(s) Topical <User Schedule>  dexAMETHasone  Injectable 4 milliGRAM(s) IV Push every 6 hours  dextrose 5%. 1000 milliLiter(s) IV Continuous <Continuous>  dextrose 5%. 1000 milliLiter(s) IV Continuous <Continuous>  dextrose 50% Injectable 25 Gram(s) IV Push once  dextrose 50% Injectable 12.5 Gram(s) IV Push once  dextrose 50% Injectable 25 Gram(s) IV Push once  dextrose Oral Gel 15 Gram(s) Oral once PRN  enoxaparin Injectable 40 milliGRAM(s) SubCutaneous every 24 hours  ertapenem  IVPB 1000 milliGRAM(s) IV Intermittent every 24 hours  glucagon  Injectable 1 milliGRAM(s) IntraMuscular once  HYDROmorphone  Injectable 0.5 milliGRAM(s) IV Push every 4 hours PRN  influenza  Vaccine (HIGH DOSE) 0.5 milliLiter(s) IntraMuscular once  insulin lispro (ADMELOG) corrective regimen sliding scale   SubCutaneous every 6 hours  lacosamide IVPB 100 milliGRAM(s) IV Intermittent every 12 hours  LORazepam   Injectable 2 milliGRAM(s) IV Push every 4 hours PRN  pantoprazole  Injectable 40 milliGRAM(s) IV Push every 24 hours  petrolatum Ophthalmic Ointment 1 Application(s) Both EYES every 12 hours      Vital Signs Last 24 Hrs  T(C): 37.6 (05 Feb 2025 09:55), Max: 37.7 (05 Feb 2025 06:04)  T(F): 99.7 (05 Feb 2025 09:55), Max: 99.9 (05 Feb 2025 06:04)  HR: 102 (05 Feb 2025 11:00) (79 - 119)  BP: 141/82 (05 Feb 2025 11:00) (110/66 - 170/93)  BP(mean): 104 (05 Feb 2025 11:00) (82 - 123)  RR: 18 (05 Feb 2025 11:00) (12 - 21)  SpO2: 92% (05 Feb 2025 11:00) (92% - 100%)    Parameters below as of 05 Feb 2025 11:00  Patient On (Oxygen Delivery Method): ventilator, CPAP    O2 Concentration (%): 21    Neurological Exam:   Mental status: Intubated off sedated. Not following commands or exhibiting purposeful movements.   Cranial nerves: Eyes are midline, slightly dysconjugate. OCR ?present to the R does not cross midline. Corneal and gag remain absent.  Motor and sensory:  No spontaneous movement or withdrawal to painful stimuli   Coordination: unable to assess  Reflexes: Mute toes b/l  Gait: unable to assess    Labs:  CBC Full  -  ( 05 Feb 2025 06:17 )  WBC Count : 14.65 K/uL  RBC Count : 4.51 M/uL  Hemoglobin : 12.9 g/dL  Hematocrit : 41.1 %  Platelet Count - Automated : 157 K/uL  Mean Cell Volume : 91.1 fl  Mean Cell Hemoglobin : 28.6 pg  Mean Cell Hemoglobin Concentration : 31.4 g/dL  Auto Neutrophil # : 14.01 K/uL  Auto Lymphocyte # : 0.64 K/uL  Auto Monocyte # : 0.00 K/uL  Auto Eosinophil # : 0.00 K/uL  Auto Basophil # : 0.00 K/uL  Auto Neutrophil % : 95.6 %  Auto Lymphocyte % : 4.4 %  Auto Monocyte % : 0.0 %  Auto Eosinophil % : 0.0 %  Auto Basophil % : 0.0 %    02-05    155[H]  |  116[H]  |  24[H]  ----------------------------<  221[H]  3.8   |  30  |  0.33[L]    Ca    7.8[L]      05 Feb 2025 06:17  Phos  3.7     02-05  Mg     2.3     02-05    TPro  4.7[L]  /  Alb  2.9[L]  /  TBili  0.3  /  DBili  x   /  AST  14  /  ALT  20  /  AlkPhos  92  02-04    LIVER FUNCTIONS - ( 04 Feb 2025 05:30 )  Alb: 2.9 g/dL / Pro: 4.7 g/dL / ALK PHOS: 92 U/L / ALT: 20 U/L / AST: 14 U/L / GGT: x             Urinalysis Basic - ( 05 Feb 2025 06:17 )    Color: x / Appearance: x / SG: x / pH: x  Gluc: 221 mg/dL / Ketone: x  / Bili: x / Urobili: x   Blood: x / Protein: x / Nitrite: x   Leuk Esterase: x / RBC: x / WBC x   Sq Epi: x / Non Sq Epi: x / Bacteria: x        Assessment:  This is a 74y Female w/ h/o     Plan: Neurology Progress Note    Interval History:  The patient was seen and examined at the bedside. Remains intubated, off sedation since yesterday afternoon without meaningful activity. Yesterday discussion with pharmacy and MICU team re. carbapenem interaction with depakote (see chart note) and decision was made to hold depakote and eval vEEG on Vimpat monotherapy.       PAST MEDICAL & SURGICAL HISTORY:  GBM (glioblastoma multiforme)    Seizure            Medications:  artificial  tears Solution 1 Drop(s) Both EYES every 12 hours  chlorhexidine 0.12% Liquid 15 milliLiter(s) Oral Mucosa every 12 hours  chlorhexidine 2% Cloths 1 Application(s) Topical <User Schedule>  dexAMETHasone  Injectable 4 milliGRAM(s) IV Push every 6 hours  dextrose 5%. 1000 milliLiter(s) IV Continuous <Continuous>  dextrose 5%. 1000 milliLiter(s) IV Continuous <Continuous>  dextrose 50% Injectable 25 Gram(s) IV Push once  dextrose 50% Injectable 12.5 Gram(s) IV Push once  dextrose 50% Injectable 25 Gram(s) IV Push once  dextrose Oral Gel 15 Gram(s) Oral once PRN  enoxaparin Injectable 40 milliGRAM(s) SubCutaneous every 24 hours  ertapenem  IVPB 1000 milliGRAM(s) IV Intermittent every 24 hours  glucagon  Injectable 1 milliGRAM(s) IntraMuscular once  HYDROmorphone  Injectable 0.5 milliGRAM(s) IV Push every 4 hours PRN  influenza  Vaccine (HIGH DOSE) 0.5 milliLiter(s) IntraMuscular once  insulin lispro (ADMELOG) corrective regimen sliding scale   SubCutaneous every 6 hours  lacosamide IVPB 100 milliGRAM(s) IV Intermittent every 12 hours  LORazepam   Injectable 2 milliGRAM(s) IV Push every 4 hours PRN  pantoprazole  Injectable 40 milliGRAM(s) IV Push every 24 hours  petrolatum Ophthalmic Ointment 1 Application(s) Both EYES every 12 hours      Vital Signs Last 24 Hrs  T(C): 37.6 (05 Feb 2025 09:55), Max: 37.7 (05 Feb 2025 06:04)  T(F): 99.7 (05 Feb 2025 09:55), Max: 99.9 (05 Feb 2025 06:04)  HR: 102 (05 Feb 2025 11:00) (79 - 119)  BP: 141/82 (05 Feb 2025 11:00) (110/66 - 170/93)  BP(mean): 104 (05 Feb 2025 11:00) (82 - 123)  RR: 18 (05 Feb 2025 11:00) (12 - 21)  SpO2: 92% (05 Feb 2025 11:00) (92% - 100%)    Parameters below as of 05 Feb 2025 11:00  Patient On (Oxygen Delivery Method): ventilator, CPAP    O2 Concentration (%): 21    Neurological Exam:   Mental status: Intubated off sedated. Not following commands or exhibiting purposeful movements.   Cranial nerves: Eyes are midline, slightly dysconjugate. Pupils are 3mm and reactive. OCR ?present to the R does not cross midline. Corneal and gag  Motor and sensory:  No spontaneous movement or withdrawal to painful stimuli   Coordination: unable to assess  Reflexes: Mute toes b/l  Gait: unable to assess    Labs:  CBC Full  -  ( 05 Feb 2025 06:17 )  WBC Count : 14.65 K/uL  RBC Count : 4.51 M/uL  Hemoglobin : 12.9 g/dL  Hematocrit : 41.1 %  Platelet Count - Automated : 157 K/uL  Mean Cell Volume : 91.1 fl  Mean Cell Hemoglobin : 28.6 pg  Mean Cell Hemoglobin Concentration : 31.4 g/dL  Auto Neutrophil # : 14.01 K/uL  Auto Lymphocyte # : 0.64 K/uL  Auto Monocyte # : 0.00 K/uL  Auto Eosinophil # : 0.00 K/uL  Auto Basophil # : 0.00 K/uL  Auto Neutrophil % : 95.6 %  Auto Lymphocyte % : 4.4 %  Auto Monocyte % : 0.0 %  Auto Eosinophil % : 0.0 %  Auto Basophil % : 0.0 %    02-05    155[H]  |  116[H]  |  24[H]  ----------------------------<  221[H]  3.8   |  30  |  0.33[L]    Ca    7.8[L]      05 Feb 2025 06:17  Phos  3.7     02-05  Mg     2.3     02-05    TPro  4.7[L]  /  Alb  2.9[L]  /  TBili  0.3  /  DBili  x   /  AST  14  /  ALT  20  /  AlkPhos  92  02-04    LIVER FUNCTIONS - ( 04 Feb 2025 05:30 )  Alb: 2.9 g/dL / Pro: 4.7 g/dL / ALK PHOS: 92 U/L / ALT: 20 U/L / AST: 14 U/L / GGT: x             Urinalysis Basic - ( 05 Feb 2025 06:17 )    Color: x / Appearance: x / SG: x / pH: x  Gluc: 221 mg/dL / Ketone: x  / Bili: x / Urobili: x   Blood: x / Protein: x / Nitrite: x   Leuk Esterase: x / RBC: x / WBC x   Sq Epi: x / Non Sq Epi: x / Bacteria: x        Assessment:  This is a 74y Female w/ h/o     Plan:

## 2025-02-05 NOTE — PROGRESS NOTE ADULT - ATTENDING COMMENTS
Sedation weaned with seizure activity noted on EEG. Patient has had breakthrough on keppra previously so after discussion with epilepsy will plan to add depakote. Patient more swollen today will given dose of lasix today. All other labs and imaging reviewed with infection showing improvement.

## 2025-02-05 NOTE — PROGRESS NOTE ADULT - SUBJECTIVE AND OBJECTIVE BOX
Patient is a 74y old  Female who presents with a chief complaint of AMS (05 Feb 2025 12:01)      INTERVAL HPI/OVERNIGHT EVENTS:   No overnight events   Afebrile, hemodynamically stable     ICU Vital Signs Last 24 Hrs  T(C): 37.6 (05 Feb 2025 09:55), Max: 37.7 (05 Feb 2025 06:04)  T(F): 99.7 (05 Feb 2025 09:55), Max: 99.9 (05 Feb 2025 06:04)  HR: 105 (05 Feb 2025 13:31) (79 - 119)  BP: 157/87 (05 Feb 2025 13:00) (110/66 - 192/105)  BP(mean): 113 (05 Feb 2025 13:00) (82 - 137)  ABP: --  ABP(mean): --  RR: 14 (05 Feb 2025 13:31) (12 - 26)  SpO2: 93% (05 Feb 2025 13:31) (92% - 100%)    O2 Parameters below as of 05 Feb 2025 13:31  Patient On (Oxygen Delivery Method): ventilator, CPAP    O2 Concentration (%): 21      I&O's Summary    04 Feb 2025 07:01  -  05 Feb 2025 07:00  --------------------------------------------------------  IN: 1525.8 mL / OUT: 500 mL / NET: 1025.8 mL    05 Feb 2025 07:01  -  05 Feb 2025 13:43  --------------------------------------------------------  IN: 100 mL / OUT: 200 mL / NET: -100 mL      Mode: CPAP with PS  FiO2: 21  PEEP: 5  PS: 10  MAP: 7  PIP: 15      LABS:                        12.9   14.65 )-----------( 157      ( 05 Feb 2025 06:17 )             41.1     02-05    155[H]  |  116[H]  |  24[H]  ----------------------------<  221[H]  3.8   |  30  |  0.33[L]    Ca    7.8[L]      05 Feb 2025 06:17  Phos  3.7     02-05  Mg     2.3     02-05    TPro  4.7[L]  /  Alb  2.9[L]  /  TBili  0.3  /  DBili  x   /  AST  14  /  ALT  20  /  AlkPhos  92  02-04      Urinalysis Basic - ( 05 Feb 2025 06:17 )    Color: x / Appearance: x / SG: x / pH: x  Gluc: 221 mg/dL / Ketone: x  / Bili: x / Urobili: x   Blood: x / Protein: x / Nitrite: x   Leuk Esterase: x / RBC: x / WBC x   Sq Epi: x / Non Sq Epi: x / Bacteria: x      CAPILLARY BLOOD GLUCOSE      POCT Blood Glucose.: 107 mg/dL (05 Feb 2025 12:17)  POCT Blood Glucose.: 193 mg/dL (05 Feb 2025 05:32)  POCT Blood Glucose.: 161 mg/dL (04 Feb 2025 23:27)  POCT Blood Glucose.: 204 mg/dL (04 Feb 2025 17:08)        RADIOLOGY & ADDITIONAL TESTS:    Consultant(s) Notes Reviewed:  [x ] YES  [ ] NO    MEDICATIONS  (STANDING):  artificial  tears Solution 1 Drop(s) Both EYES every 12 hours  chlorhexidine 0.12% Liquid 15 milliLiter(s) Oral Mucosa every 12 hours  chlorhexidine 2% Cloths 1 Application(s) Topical <User Schedule>  dexAMETHasone  Injectable 4 milliGRAM(s) IV Push every 6 hours  dextrose 5%. 1000 milliLiter(s) (100 mL/Hr) IV Continuous <Continuous>  dextrose 5%. 1000 milliLiter(s) (50 mL/Hr) IV Continuous <Continuous>  dextrose 50% Injectable 25 Gram(s) IV Push once  dextrose 50% Injectable 12.5 Gram(s) IV Push once  dextrose 50% Injectable 25 Gram(s) IV Push once  enoxaparin Injectable 40 milliGRAM(s) SubCutaneous every 24 hours  ertapenem  IVPB 1000 milliGRAM(s) IV Intermittent every 24 hours  glucagon  Injectable 1 milliGRAM(s) IntraMuscular once  influenza  Vaccine (HIGH DOSE) 0.5 milliLiter(s) IntraMuscular once  insulin lispro (ADMELOG) corrective regimen sliding scale   SubCutaneous every 6 hours  lacosamide IVPB 200 milliGRAM(s) IV Intermittent every 12 hours  pantoprazole  Injectable 40 milliGRAM(s) IV Push every 24 hours  petrolatum Ophthalmic Ointment 1 Application(s) Both EYES every 12 hours    MEDICATIONS  (PRN):  dextrose Oral Gel 15 Gram(s) Oral once PRN Blood Glucose LESS THAN 70 milliGRAM(s)/deciliter  HYDROmorphone  Injectable 0.5 milliGRAM(s) IV Push every 4 hours PRN Moderate Pain (4 - 6)  LORazepam   Injectable 2 milliGRAM(s) IV Push every 4 hours PRN seizures      PHYSICAL EXAM:  GENERAL:   HEAD:  Atraumatic, Normocephalic  EYES: EOMI, PERRLA, conjunctiva and sclera clear  NECK: Supple, No JVD, Normal thyroid, no enlarged nodes  NERVOUS SYSTEM:  Alert & Awake.   CHEST/LUNG: B/L good air entry; No rales, rhonchi, or wheezing  HEART: S1S2 normal, no S3, Regular rate and rhythm; No murmurs  ABDOMEN: Soft, Nontender, Nondistended; Bowel sounds present  EXTREMITIES:  2+ Peripheral Pulses, No clubbing, cyanosis, or edema  LYMPH: No lymphadenopathy noted  SKIN: No rashes or lesions    Care Discussed with Consultants/Other Providers [ x] YES  [ ] NO Patient is a 74y old  Female who presents with a chief complaint of AMS (05 Feb 2025 12:01)      INTERVAL HPI/OVERNIGHT EVENTS:   No overnight events   Afebrile, hemodynamically stable       Subjective: Patient seen at bedside still intubated but off all sedation. Responsive to pain stimuli, + gag reflex, and stimulation but not responsive to name, not able to track. Patient does not appear to be in distress. On EEG with findings of seizures per epilepsy at bedside.     ICU Vital Signs Last 24 Hrs  T(C): 37.6 (05 Feb 2025 09:55), Max: 37.7 (05 Feb 2025 06:04)  T(F): 99.7 (05 Feb 2025 09:55), Max: 99.9 (05 Feb 2025 06:04)  HR: 105 (05 Feb 2025 13:31) (79 - 119)  BP: 157/87 (05 Feb 2025 13:00) (110/66 - 192/105)  BP(mean): 113 (05 Feb 2025 13:00) (82 - 137)  ABP: --  ABP(mean): --  RR: 14 (05 Feb 2025 13:31) (12 - 26)  SpO2: 93% (05 Feb 2025 13:31) (92% - 100%)    O2 Parameters below as of 05 Feb 2025 13:31  Patient On (Oxygen Delivery Method): ventilator, CPAP    O2 Concentration (%): 21      I&O's Summary    04 Feb 2025 07:01  -  05 Feb 2025 07:00  --------------------------------------------------------  IN: 1525.8 mL / OUT: 500 mL / NET: 1025.8 mL    05 Feb 2025 07:01  -  05 Feb 2025 13:43  --------------------------------------------------------  IN: 100 mL / OUT: 200 mL / NET: -100 mL      Mode: CPAP with PS  FiO2: 21  PEEP: 5  PS: 10  MAP: 7  PIP: 15      LABS:                        12.9   14.65 )-----------( 157      ( 05 Feb 2025 06:17 )             41.1     02-05    155[H]  |  116[H]  |  24[H]  ----------------------------<  221[H]  3.8   |  30  |  0.33[L]    Ca    7.8[L]      05 Feb 2025 06:17  Phos  3.7     02-05  Mg     2.3     02-05    TPro  4.7[L]  /  Alb  2.9[L]  /  TBili  0.3  /  DBili  x   /  AST  14  /  ALT  20  /  AlkPhos  92  02-04      Urinalysis Basic - ( 05 Feb 2025 06:17 )    Color: x / Appearance: x / SG: x / pH: x  Gluc: 221 mg/dL / Ketone: x  / Bili: x / Urobili: x   Blood: x / Protein: x / Nitrite: x   Leuk Esterase: x / RBC: x / WBC x   Sq Epi: x / Non Sq Epi: x / Bacteria: x      CAPILLARY BLOOD GLUCOSE      POCT Blood Glucose.: 107 mg/dL (05 Feb 2025 12:17)  POCT Blood Glucose.: 193 mg/dL (05 Feb 2025 05:32)  POCT Blood Glucose.: 161 mg/dL (04 Feb 2025 23:27)  POCT Blood Glucose.: 204 mg/dL (04 Feb 2025 17:08)        RADIOLOGY & ADDITIONAL TESTS:    Consultant(s) Notes Reviewed:  [x ] YES  [ ] NO    MEDICATIONS  (STANDING):  artificial  tears Solution 1 Drop(s) Both EYES every 12 hours  chlorhexidine 0.12% Liquid 15 milliLiter(s) Oral Mucosa every 12 hours  chlorhexidine 2% Cloths 1 Application(s) Topical <User Schedule>  dexAMETHasone  Injectable 4 milliGRAM(s) IV Push every 6 hours  dextrose 5%. 1000 milliLiter(s) (100 mL/Hr) IV Continuous <Continuous>  dextrose 5%. 1000 milliLiter(s) (50 mL/Hr) IV Continuous <Continuous>  dextrose 50% Injectable 25 Gram(s) IV Push once  dextrose 50% Injectable 12.5 Gram(s) IV Push once  dextrose 50% Injectable 25 Gram(s) IV Push once  enoxaparin Injectable 40 milliGRAM(s) SubCutaneous every 24 hours  ertapenem  IVPB 1000 milliGRAM(s) IV Intermittent every 24 hours  glucagon  Injectable 1 milliGRAM(s) IntraMuscular once  influenza  Vaccine (HIGH DOSE) 0.5 milliLiter(s) IntraMuscular once  insulin lispro (ADMELOG) corrective regimen sliding scale   SubCutaneous every 6 hours  lacosamide IVPB 200 milliGRAM(s) IV Intermittent every 12 hours  pantoprazole  Injectable 40 milliGRAM(s) IV Push every 24 hours  petrolatum Ophthalmic Ointment 1 Application(s) Both EYES every 12 hours    MEDICATIONS  (PRN):  dextrose Oral Gel 15 Gram(s) Oral once PRN Blood Glucose LESS THAN 70 milliGRAM(s)/deciliter  HYDROmorphone  Injectable 0.5 milliGRAM(s) IV Push every 4 hours PRN Moderate Pain (4 - 6)  LORazepam   Injectable 2 milliGRAM(s) IV Push every 4 hours PRN seizures      PHYSICAL EXAM:  eneral: intubated, not sedated.   HEENT: constricted pupils, minimally reactive light , anicteric sclera,   Cardiovascular: RRR, no m/r/g.   Respiratory: CTAB; no W/R/R  Gastrointestinal: soft, NT/ND; +BS x4  Extremities: cool extremities; 2+ peripheral pulses bilaterally; no LE edema, 4-5 second cap refill  Skin: R hand with blistering and erythema, blanchable. wounds that are dry but appear to be fresh.   Neurologic: RASS- 4.    Care Discussed with Consultants/Other Providers [ x] YES  [ ] NO

## 2025-02-05 NOTE — PROGRESS NOTE ADULT - ASSESSMENT
75 yo female w PMH GBM on hospice care (diagnosed in 2024, follows at [facility] under Dr. Hernandez, with reported attempted resection of tumor, on chemotherapy with last chemo 2 months ago), admitted for ams likely 2/2 disease progression After admission to [facility], Upon evaluation pt was obtunded with tremors to RUE, response to noxious stimuli but otherwise no response. Pupils were responsive with saccadic movement. She was slumped to her right sided with gurgling breath sounds.  At that point rapid was called and patient was inevitably intubated. Transferred to [facility]. Discussed with pts daughters post procedure poor prognosis and advised to have formal family meeting to clarify and document wishes.       NEURO  Intubated. Not sedated. Minimally responsive despite not being on sedation. 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 There is evidence of focal cortical hyperexcitability, more prominent in the right than the left frontotemporal regions. The background pattern shows a burst suppression ratio of 10:1, indicating severe diffuse or multifocal cerebral dysfunction, potentially exacerbated by IV sedatives. Additionally, there is severe generalized and multifocal slowing, further supporting significant underlying cerebral dysfunction.  Continued EEG findings of seizures on 2/5.   Plan:   s/p valproic acid loading dose 1200 mg, vimpat 200 mg loading  -Vimpat 200 BID  -Depakote 500 q6 hrs   -continue on vEEG and f/u epilepsy recs   -c/w ativan 0.5mg q4h prn for seizures    #GBM  last chemo ~2months ago, follows at [facility].  Per Neurosurgery consult on 1/31, no acute interventions.   -c/w decadron 4 IV q6h  -palliative care consult   -maintain Na > 145 to address cerebral edema 2/2 mass effect    CARDIAC  #Chronic atrial fibrillation.    Home med: metoprolol 12.5 BID   - started lovenox ( per NSGY, no increased risk of hemorrhage     Plan  -bladder scans q6h   -Lasix 40 mg IV push given today given net positive   -BMP every 6 hrs   -consider john (currently on primafit) if pt retaining   -make sure pt having BMs  -pain control with dilaudid 0.5 q4h PRN.    PULM  #intubated  - intubated on 2/1  - GOC  - consider trach moving forward    GI  - NPO with tube feeds   - consider PEG moving forward    RENAL  - currently with normal renal function    #hypernatremia  -maintain Na > 145 as above for mass effect    ID  #SIRS  #ESBL Ecoli Bacteremia   Systemic inflammatory response syndrome (SIRS).   Meeting SIRS 2/4 (HR>90, WBC>12k) likely iso of UTI.   Bcx with ESBL E. coli  -c/w Ertapenem 1 g qd   -IVF as needed    F: none  E: replete K<4, Mg<2  N: npo with tube feeds  VTE Prophylaxis: lovenox  GI: ppi while intubated  C: Full Code  D: micu    Lines: Peripheral IV L 22g (2/4) IV L 18 g (2/4)l, NG tube

## 2025-02-05 NOTE — EEG REPORT - NS EEG TEXT BOX
[facility] Department of Neurology  Inpatient Epilepsy Monitoring Unit video-Electroencephalography Report    Acquisition Details:  Electroencephalography was acquired using a minimum of 21 channels on an [employer] Neu[employer] system v 9.3.1 with electrode placement according to the standard International 10-20 system following ACNS (American Clinical Neurophysiology Society) guidelines for Long-Term Video EEG monitoring.  Anterior temporal T1 and T2 electrodes were utilized whenever possible.   The [employer] automated spike & seizure detections were all reviewed in detail, in addition to extensive portions of raw EEG.  Specially-trained nurses were available for seizure-related events.  Continuous live-time video monitoring of the patients for seizure-related and safety events was performed by specially-trained technicians.        Day 4:  2/4/2025, 7:00 AM to next morning at 07:00 AM   Meds: vimpat 100 mg bid, propofol drip until 3 AM  Background: initially  nearly continuous (<10% periods of attenuation), asymmetric with predominantly delta / theta frequencies(slower over the right hemisphere)  and became continuous at 3 PM   Generalized Slowing:  Intermittent frequent sporadic polymorphic delta  Symmetry/Focality: Continuous (90+%)  left and independent right fronto-parietal/temporal polymorphic delta/theta.    Voltage:  Normal (20+ uV)  Organization:  Rudimentary  Posterior Dominant Rhythm:   7-8 Hz poorly regulated,   Sleep:  Absent.  Variability:   Yes		Reactivity: , Yes    Spontaneous Activity:  Abundant right hemispheric lateralized rhythmic delta activity superimposed with epileptiform sharp waves ( msec) maximal over right fronto temporal region and independent occasional left frontotemporal sharp wave discharges   Events:                  1-Left temporal focal onset  Time:15:26, 15:58,16:57,18:00,19:10,20:28,21:37,1:16 AM,4:20 AM  Clinical:No overt clinical changes  Electrographic: development off  alpha wave become spikier and faster  with lateralized rhythmic delta superimposed with sharp wave discharges    2-right frontal focal onset  Time:7:07AM,12:45 PM,14:08, 15:03,15:40,15:53,16:14,16:50,17:28 PM,18:07,18:51,19:33,20:16,20:58,21:41 ,22:20,23:13, 23:55,00:47 AM,2:01 AM,2:43 AM,3:34 AM, 4:15 AM,4:37AM,5:12 AM,5:50, 6:55 AM  Clinical:no overt clinical change  Electrographic:development of rhythmic theta  follow by delta wave and become spikier until offset,       3- Left fronto temporal focal onset with secondary generalization  Time: 20:28 PM till 20:30 PM,5:22 AM till 5:23 AM,6:39 AM till 6:40 AM  Clinical: lip twitching/smacking, chest rising  Electrographic:development of rhythmic theta, 4-5 Hz  follow by faster spikier wave follow by generalized spike wave  discharges follow by left fronto-temporal rhythmic alpha until offset    Provocations:  •	Hyperventilation: was not performed.  •	Photic stimulation: was not performed.  Daily Summary:      •	These findings are indicative of a bilateral fronto-temporal,right more than left  focal epilepsy and left focal onset with secondary generalization seizures  •	Abundant right hemispheric epileptiform sharp waves maximal over right fronto temporal region and occasional left frontotemporal sharp wave discharges indicating high epileptic potential in this region  •	Severe generalized and multifocal slowing indicating similar degree of underlying diffuse and multifocal cerebral dysfunction    Katelynn Lundy MD  CNP Fellow

## 2025-02-05 NOTE — PROGRESS NOTE ADULT - ASSESSMENT
75 yo female w PMH GBM on hospice care (diagnosed in 2024, follows at [facility] under Dr. Hernandez, with reported attempted resection of tumor, on chemotherapy with last chemo 2 months ago) who presents with altered mentation. Epilepsy following for seizures with b/l foci, likely i/s/o tumor as well as considerable mass effect.  vEEG was notable for focal status, Propofol was started, Depakote was uptitrated and Vimpat added on 2/3 with improvement in EEG.  On 2/4 propofol was stopped and later in the evening Depakote was held (due to c/f interaction w/ carbapenem), with return of focal seizures (with 2 episodes of generalization).    Recommendations:  - Increase Vimpat to 200mg BID (check EKG to ensure no heart block)  - Resume Depakote 500mg q6hrs  - Ok to keep off sedation from an epilepsy perspective, will re-review the EEG later today to evaluate need for continued sedation  - C/w vEEG  - seizure precautions    Discussed with Dr Hough.  73 yo female w PMH GBM on hospice care (diagnosed in 2024, follows at [facility] under Dr. Hernandez, with reported attempted resection of tumor, on chemotherapy with last chemo 2 months ago) who presents with altered mentation. Epilepsy following for seizures with b/l foci, likely i/s/o tumor as well as considerable mass effect.  vEEG was notable for focal status, Propofol was started, Depakote was uptitrated and Vimpat added on 2/3 with improvement in EEG.  On 2/4 propofol was stopped and later in the evening Depakote was held (due to c/f interaction w/ carbapenem), with return of focal seizures (with 2 episodes of generalization).    Recommendations:  - Increase Vimpat to 200mg BID (check EKG to ensure no heart block)  - Resume Depakote 500mg q6hrs  - Obtain VPA level  - Ok to keep off sedation from an epilepsy perspective, will re-review the EEG later today to evaluate need for continued sedation  - C/w vEEG  - seizure precautions    Discussed with Dr Hough.  73 yo female w PMH GBM on hospice care (diagnosed in 2024, follows at [facility] under Dr. Hernandez, with reported attempted resection of tumor, on chemotherapy with last chemo 2 months ago) who presents with altered mentation. Epilepsy following for seizures with b/l foci, likely i/s/o tumor as well as considerable mass effect.  vEEG was notable for focal status, Propofol was started, Depakote was uptitrated and Vimpat added on 2/3 with improvement in EEG.  On 2/4 propofol was stopped and later in the evening Depakote was held (due to c/f interaction w/ carbapenem), with return of focal seizures (with 2 episodes of generalization).    Recommendations:  - Increase Vimpat to 200mg BID (check EKG to ensure no heart block)  - Resume Depakote 500mg q6hrs  - Obtain VPA level  - Ok to keep off sedation from an epilepsy perspective, will re-review the EEG later today to evaluate need for continued sedation  - C/w vEEG  - seizure precautions    Discussed with Dr Hough.

## 2025-02-06 LAB
ALBUMIN SERPL ELPH-MCNC: 2.6 G/DL — LOW (ref 3.3–5)
ALBUMIN SERPL ELPH-MCNC: 2.7 G/DL — LOW (ref 3.3–5)
ALP SERPL-CCNC: 103 U/L — SIGNIFICANT CHANGE UP (ref 40–120)
ALP SERPL-CCNC: 110 U/L — SIGNIFICANT CHANGE UP (ref 40–120)
ALT FLD-CCNC: 42 U/L — SIGNIFICANT CHANGE UP (ref 10–45)
ALT FLD-CCNC: 44 U/L — SIGNIFICANT CHANGE UP (ref 10–45)
ANION GAP SERPL CALC-SCNC: 12 MMOL/L — SIGNIFICANT CHANGE UP (ref 5–17)
ANION GAP SERPL CALC-SCNC: 7 MMOL/L — SIGNIFICANT CHANGE UP (ref 5–17)
ANION GAP SERPL CALC-SCNC: 8 MMOL/L — SIGNIFICANT CHANGE UP (ref 5–17)
AST SERPL-CCNC: 25 U/L — SIGNIFICANT CHANGE UP (ref 10–40)
AST SERPL-CCNC: 26 U/L — SIGNIFICANT CHANGE UP (ref 10–40)
BASOPHILS # BLD AUTO: 0.07 K/UL — SIGNIFICANT CHANGE UP (ref 0–0.2)
BASOPHILS NFR BLD AUTO: 0.4 % — SIGNIFICANT CHANGE UP (ref 0–2)
BILIRUB SERPL-MCNC: 0.2 MG/DL — SIGNIFICANT CHANGE UP (ref 0.2–1.2)
BILIRUB SERPL-MCNC: 0.3 MG/DL — SIGNIFICANT CHANGE UP (ref 0.2–1.2)
BUN SERPL-MCNC: 24 MG/DL — HIGH (ref 7–23)
BUN SERPL-MCNC: 26 MG/DL — HIGH (ref 7–23)
BUN SERPL-MCNC: 26 MG/DL — HIGH (ref 7–23)
CALCIUM SERPL-MCNC: 7.9 MG/DL — LOW (ref 8.4–10.5)
CALCIUM SERPL-MCNC: 7.9 MG/DL — LOW (ref 8.4–10.5)
CALCIUM SERPL-MCNC: 8.1 MG/DL — LOW (ref 8.4–10.5)
CHLORIDE SERPL-SCNC: 108 MMOL/L — SIGNIFICANT CHANGE UP (ref 96–108)
CHLORIDE SERPL-SCNC: 109 MMOL/L — HIGH (ref 96–108)
CHLORIDE SERPL-SCNC: 110 MMOL/L — HIGH (ref 96–108)
CO2 SERPL-SCNC: 31 MMOL/L — SIGNIFICANT CHANGE UP (ref 22–31)
CO2 SERPL-SCNC: 31 MMOL/L — SIGNIFICANT CHANGE UP (ref 22–31)
CO2 SERPL-SCNC: 32 MMOL/L — HIGH (ref 22–31)
CREAT SERPL-MCNC: 0.3 MG/DL — LOW (ref 0.5–1.3)
CREAT SERPL-MCNC: 0.34 MG/DL — LOW (ref 0.5–1.3)
CREAT SERPL-MCNC: 0.38 MG/DL — LOW (ref 0.5–1.3)
CULTURE RESULTS: SIGNIFICANT CHANGE UP
EGFR: 105 ML/MIN/1.73M2 — SIGNIFICANT CHANGE UP
EGFR: 108 ML/MIN/1.73M2 — SIGNIFICANT CHANGE UP
EGFR: 111 ML/MIN/1.73M2 — SIGNIFICANT CHANGE UP
EOSINOPHIL # BLD AUTO: 0 K/UL — SIGNIFICANT CHANGE UP (ref 0–0.5)
EOSINOPHIL NFR BLD AUTO: 0 % — SIGNIFICANT CHANGE UP (ref 0–6)
GLUCOSE SERPL-MCNC: 132 MG/DL — HIGH (ref 70–99)
GLUCOSE SERPL-MCNC: 173 MG/DL — HIGH (ref 70–99)
GLUCOSE SERPL-MCNC: 245 MG/DL — HIGH (ref 70–99)
HCT VFR BLD CALC: 41.2 % — SIGNIFICANT CHANGE UP (ref 34.5–45)
HGB BLD-MCNC: 12.4 G/DL — SIGNIFICANT CHANGE UP (ref 11.5–15.5)
IMM GRANULOCYTES NFR BLD AUTO: 4.4 % — HIGH (ref 0–0.9)
LYMPHOCYTES # BLD AUTO: 0.87 K/UL — LOW (ref 1–3.3)
LYMPHOCYTES # BLD AUTO: 5 % — LOW (ref 13–44)
MAGNESIUM SERPL-MCNC: 2.4 MG/DL — SIGNIFICANT CHANGE UP (ref 1.6–2.6)
MCHC RBC-ENTMCNC: 27.2 PG — SIGNIFICANT CHANGE UP (ref 27–34)
MCHC RBC-ENTMCNC: 30.1 G/DL — LOW (ref 32–36)
MCV RBC AUTO: 90.4 FL — SIGNIFICANT CHANGE UP (ref 80–100)
MONOCYTES # BLD AUTO: 0.5 K/UL — SIGNIFICANT CHANGE UP (ref 0–0.9)
MONOCYTES NFR BLD AUTO: 2.8 % — SIGNIFICANT CHANGE UP (ref 2–14)
NEUTROPHILS # BLD AUTO: 15.35 K/UL — HIGH (ref 1.8–7.4)
NEUTROPHILS NFR BLD AUTO: 87.4 % — HIGH (ref 43–77)
NRBC # BLD: 0 /100 WBCS — SIGNIFICANT CHANGE UP (ref 0–0)
NRBC BLD-RTO: 0 /100 WBCS — SIGNIFICANT CHANGE UP (ref 0–0)
PHOSPHATE SERPL-MCNC: 2.3 MG/DL — LOW (ref 2.5–4.5)
PLATELET # BLD AUTO: 180 K/UL — SIGNIFICANT CHANGE UP (ref 150–400)
POTASSIUM SERPL-MCNC: 4.2 MMOL/L — SIGNIFICANT CHANGE UP (ref 3.5–5.3)
POTASSIUM SERPL-MCNC: 4.3 MMOL/L — SIGNIFICANT CHANGE UP (ref 3.5–5.3)
POTASSIUM SERPL-MCNC: 4.4 MMOL/L — SIGNIFICANT CHANGE UP (ref 3.5–5.3)
POTASSIUM SERPL-SCNC: 4.2 MMOL/L — SIGNIFICANT CHANGE UP (ref 3.5–5.3)
POTASSIUM SERPL-SCNC: 4.3 MMOL/L — SIGNIFICANT CHANGE UP (ref 3.5–5.3)
POTASSIUM SERPL-SCNC: 4.4 MMOL/L — SIGNIFICANT CHANGE UP (ref 3.5–5.3)
PROT SERPL-MCNC: 4.7 G/DL — LOW (ref 6–8.3)
PROT SERPL-MCNC: 4.8 G/DL — LOW (ref 6–8.3)
RBC # BLD: 4.56 M/UL — SIGNIFICANT CHANGE UP (ref 3.8–5.2)
RBC # FLD: 16.8 % — HIGH (ref 10.3–14.5)
SODIUM SERPL-SCNC: 147 MMOL/L — HIGH (ref 135–145)
SODIUM SERPL-SCNC: 148 MMOL/L — HIGH (ref 135–145)
SODIUM SERPL-SCNC: 153 MMOL/L — HIGH (ref 135–145)
SPECIMEN SOURCE: SIGNIFICANT CHANGE UP
VALPROATE FREE SERPL-MCNC: 11.5 UG/ML — SIGNIFICANT CHANGE UP (ref 6–22)
VALPROATE SERPL-MCNC: 27.6 UG/ML — LOW (ref 50–100)
WBC # BLD: 17.57 K/UL — HIGH (ref 3.8–10.5)
WBC # FLD AUTO: 17.57 K/UL — HIGH (ref 3.8–10.5)

## 2025-02-06 PROCEDURE — 73206 CT ANGIO UPR EXTRM W/O&W/DYE: CPT | Mod: 26,RT

## 2025-02-06 PROCEDURE — 95720 EEG PHY/QHP EA INCR W/VEEG: CPT

## 2025-02-06 PROCEDURE — 99291 CRITICAL CARE FIRST HOUR: CPT

## 2025-02-06 PROCEDURE — 99232 SBSQ HOSP IP/OBS MODERATE 35: CPT

## 2025-02-06 RX ORDER — CLINDAMYCIN PHOSPHATE 150 MG/ML
900 VIAL (ML) INJECTION EVERY 8 HOURS
Refills: 0 | Status: DISCONTINUED | OUTPATIENT
Start: 2025-02-06 | End: 2025-02-07

## 2025-02-06 RX ORDER — SODIUM CHLORIDE 3 G/100ML
250 INJECTION, SOLUTION INTRAVENOUS ONCE
Refills: 0 | Status: DISCONTINUED | OUTPATIENT
Start: 2025-02-06 | End: 2025-02-06

## 2025-02-06 RX ORDER — SODIUM CHLORIDE 3 G/100ML
250 INJECTION, SOLUTION INTRAVENOUS ONCE
Refills: 0 | Status: COMPLETED | OUTPATIENT
Start: 2025-02-06 | End: 2025-02-07

## 2025-02-06 RX ORDER — LEVETIRACETAM 10 MG/ML
1000 INJECTION, SOLUTION INTRAVENOUS ONCE
Refills: 0 | Status: COMPLETED | OUTPATIENT
Start: 2025-02-06 | End: 2025-02-06

## 2025-02-06 RX ORDER — SODIUM CHLORIDE 3 G/100ML
100 INJECTION, SOLUTION INTRAVENOUS ONCE
Refills: 0 | Status: DISCONTINUED | OUTPATIENT
Start: 2025-02-06 | End: 2025-02-06

## 2025-02-06 RX ORDER — CLINDAMYCIN PHOSPHATE 150 MG/ML
900 VIAL (ML) INJECTION ONCE
Refills: 0 | Status: COMPLETED | OUTPATIENT
Start: 2025-02-06 | End: 2025-02-06

## 2025-02-06 RX ORDER — LORAZEPAM 4 MG/ML
2 VIAL (ML) INJECTION EVERY 4 HOURS
Refills: 0 | Status: DISCONTINUED | OUTPATIENT
Start: 2025-02-06 | End: 2025-02-13

## 2025-02-06 RX ORDER — VANCOMYCIN HCL IN 5 % DEXTROSE 1.5G/250ML
1000 PLASTIC BAG, INJECTION (ML) INTRAVENOUS EVERY 24 HOURS
Refills: 0 | Status: DISCONTINUED | OUTPATIENT
Start: 2025-02-06 | End: 2025-02-07

## 2025-02-06 RX ORDER — LEVETIRACETAM 10 MG/ML
500 INJECTION, SOLUTION INTRAVENOUS EVERY 12 HOURS
Refills: 0 | Status: DISCONTINUED | OUTPATIENT
Start: 2025-02-06 | End: 2025-02-07

## 2025-02-06 RX ADMIN — LEVETIRACETAM 400 MILLIGRAM(S): 10 INJECTION, SOLUTION INTRAVENOUS at 16:27

## 2025-02-06 RX ADMIN — Medication 15 MILLILITER(S): at 09:06

## 2025-02-06 RX ADMIN — LACOSAMIDE 140 MILLIGRAM(S): 150 TABLET, FILM COATED ORAL at 09:10

## 2025-02-06 RX ADMIN — DEXAMETHASONE 4 MILLIGRAM(S): 0.5 TABLET ORAL at 12:04

## 2025-02-06 RX ADMIN — Medication 55 MILLIGRAM(S): at 05:17

## 2025-02-06 RX ADMIN — Medication 1 APPLICATION(S): at 09:14

## 2025-02-06 RX ADMIN — ERTAPENEM SODIUM 120 MILLIGRAM(S): 1 INJECTION, POWDER, LYOPHILIZED, FOR SOLUTION INTRAMUSCULAR; INTRAVENOUS at 13:14

## 2025-02-06 RX ADMIN — Medication 60 MILLIGRAM(S): at 12:03

## 2025-02-06 RX ADMIN — Medication 55 MILLIGRAM(S): at 21:20

## 2025-02-06 RX ADMIN — Medication 100 MILLIGRAM(S): at 21:19

## 2025-02-06 RX ADMIN — DEXAMETHASONE 4 MILLIGRAM(S): 0.5 TABLET ORAL at 18:18

## 2025-02-06 RX ADMIN — LACOSAMIDE 140 MILLIGRAM(S): 150 TABLET, FILM COATED ORAL at 21:55

## 2025-02-06 RX ADMIN — Medication 1 DROP(S): at 09:13

## 2025-02-06 RX ADMIN — Medication 250 MILLIGRAM(S): at 15:53

## 2025-02-06 RX ADMIN — INSULIN LISPRO 2: 100 INJECTION, SOLUTION INTRAVENOUS; SUBCUTANEOUS at 18:17

## 2025-02-06 RX ADMIN — Medication 55 MILLIGRAM(S): at 09:48

## 2025-02-06 RX ADMIN — DEXAMETHASONE 4 MILLIGRAM(S): 0.5 TABLET ORAL at 05:18

## 2025-02-06 RX ADMIN — Medication 55 MILLIGRAM(S): at 15:39

## 2025-02-06 RX ADMIN — Medication 40 MILLIGRAM(S): at 09:08

## 2025-02-06 RX ADMIN — Medication 15 MILLILITER(S): at 21:19

## 2025-02-06 RX ADMIN — Medication 1 APPLICATION(S): at 05:19

## 2025-02-06 RX ADMIN — INSULIN LISPRO 1: 100 INJECTION, SOLUTION INTRAVENOUS; SUBCUTANEOUS at 06:19

## 2025-02-06 RX ADMIN — ENOXAPARIN SODIUM 40 MILLIGRAM(S): 100 INJECTION SUBCUTANEOUS at 18:18

## 2025-02-06 RX ADMIN — LEVETIRACETAM 400 MILLIGRAM(S): 10 INJECTION, SOLUTION INTRAVENOUS at 21:41

## 2025-02-06 RX ADMIN — Medication 100 MILLIGRAM(S): at 14:41

## 2025-02-06 RX ADMIN — Medication 1 DROP(S): at 21:28

## 2025-02-06 RX ADMIN — Medication 1 APPLICATION(S): at 23:29

## 2025-02-06 NOTE — CHART NOTE - NSCHARTNOTEFT_GEN_A_CORE
Admitting Diagnosis:   Patient is a 74y old  Female who presents with a chief complaint of AMS (06 Feb 2025 07:21)      PAST MEDICAL & SURGICAL HISTORY:  GBM (glioblastoma multiforme)      Seizure          Current Nutrition Order:   Diet, NPO with Tube Feed:   Tube Feeding Modality: Nasogastric  Jevity 1.5 Jose Elias (JEVITY1.5)  Total Volume for 24 Hours (mL): 720  Total Number of Cans: 3  Continuous  Starting Tube Feed Rate {mL per Hour}: 10  Increase Tube Feed Rate by (mL): 10     Every hour  Until Goal Tube Feed Rate (mL per Hour): 40  Tube Feed Duration (in Hours): 18  Tube Feed Start Time: 11:00  Tube Feed Stop Time: 06:00  Free Water Flush   Total Volume per Flush (mL): 250   Frequency: Every 8 Hours  Liquid Protein Supplement     Qty per Day:  2 (02-05-25 @ 14:04)      PO Intake: N/A    GI Issues: Abdomen non-distended/non-tender, +BS x4, last bowel movement 2/5    Pain: No non-verbal indicators     Skin Integrity: Warm/Dry/Intact, +1 bilateral knee, +3 RUE         02-05-25 @ 07:01  -  02-06-25 @ 07:00  --------------------------------------------------------  IN: 1270 mL / OUT: 2000 mL / NET: -730 mL        Labs:   02-06    148[H]  |  108  |  26[H]  ----------------------------<  173[H]  4.3   |  32[H]  |  0.38[L]    Ca    8.1[L]      06 Feb 2025 05:00  Phos  2.3     02-06  Mg     2.4     02-06    TPro  4.8[L]  /  Alb  2.7[L]  /  TBili  0.3  /  DBili  x   /  AST  26  /  ALT  42  /  AlkPhos  110  02-06    CAPILLARY BLOOD GLUCOSE      POCT Blood Glucose.: 192 mg/dL (06 Feb 2025 06:09)  POCT Blood Glucose.: 152 mg/dL (05 Feb 2025 23:27)  POCT Blood Glucose.: 135 mg/dL (05 Feb 2025 17:05)  POCT Blood Glucose.: 107 mg/dL (05 Feb 2025 12:17)      Medications:  MEDICATIONS  (STANDING):  artificial  tears Solution 1 Drop(s) Both EYES every 12 hours  chlorhexidine 0.12% Liquid 15 milliLiter(s) Oral Mucosa every 12 hours  chlorhexidine 2% Cloths 1 Application(s) Topical <User Schedule>  dexAMETHasone  Injectable 4 milliGRAM(s) IV Push every 6 hours  dextrose 5%. 1000 milliLiter(s) (100 mL/Hr) IV Continuous <Continuous>  dextrose 5%. 1000 milliLiter(s) (50 mL/Hr) IV Continuous <Continuous>  dextrose 50% Injectable 25 Gram(s) IV Push once  dextrose 50% Injectable 12.5 Gram(s) IV Push once  dextrose 50% Injectable 25 Gram(s) IV Push once  enoxaparin Injectable 40 milliGRAM(s) SubCutaneous every 24 hours  ertapenem  IVPB 1000 milliGRAM(s) IV Intermittent every 24 hours  glucagon  Injectable 1 milliGRAM(s) IntraMuscular once  influenza  Vaccine (HIGH DOSE) 0.5 milliLiter(s) IntraMuscular once  insulin lispro (ADMELOG) corrective regimen sliding scale   SubCutaneous every 6 hours  lacosamide IVPB 200 milliGRAM(s) IV Intermittent every 12 hours  pantoprazole  Injectable 40 milliGRAM(s) IV Push every 24 hours  petrolatum Ophthalmic Ointment 1 Application(s) Both EYES every 12 hours  valproate sodium   IVPB 500 milliGRAM(s) IV Intermittent every 6 hours  valproate sodium Injectable 1000 milliGRAM(s) IV Push once    MEDICATIONS  (PRN):  dextrose Oral Gel 15 Gram(s) Oral once PRN Blood Glucose LESS THAN 70 milliGRAM(s)/deciliter  HYDROmorphone  Injectable 0.5 milliGRAM(s) IV Push every 4 hours PRN Moderate Pain (4 - 6)  LORazepam   Injectable 2 milliGRAM(s) IV Push every 4 hours PRN Seizures    Height for BMI (CENTIMETERS)	157.5 Centimeter(s)  Weight for BMI (lbs)	115.1 lb  Weight for BMI (kg)	52.2 kg  Body Mass Index	21     Weight Change: No new wt since admit. Recommend weigh pt weekly at minimum.     Estimated energy needs:   Weight used for calculations	IBW   Estimated Energy Needs Weight (lbs)	110.2 lb  Estimated Energy Needs Weight (kg)	50 kg  Estimated Energy Needs From (jose elias/kg)	25   Estimated Energy Needs To (jose elias/kg)	30   Estimated Energy Needs Calculated From (jose elias/kg)	1250   Estimated Energy Needs Calculated To (jose elias/kg)	1500     Estimated Protein Needs Weight (lbs)	110.2 lb  Estimated Protein Needs Weight (kg)	50 kg  Estimated Protein Needs From (g/kg)	1.4   Estimated Protein Needs To (g/kg)	1.6   Estimated Protein Needs Calculated From (g/kg)	70   Estimated Protein Needs Calculated To (g/kg)	80     Estimated Fluid Needs Weight (lbs)	110.2 lb  Estimated Fluid Needs Weight (kg)	50 kg  Estimated Fluid Needs From (ml/kg)	25   Estimated Fluid Needs To (ml/kg)	30   Estimated Fluid Needs Calculated From (ml/kg)	1250   Estimated Fluid Needs Calculated To (ml/kg)	1500     Other Calculations	Needs determined using ideal body weight 50 kg (104%IBW) adjusted for intubation and critical condition.     Subjective: 73 yo female w PMH GBM on hospice care (diagnosed in 2024, follows at [facility] under Dr. Hernandez, with reported attempted resection of tumor, on chemotherapy with last chemo 2 months ago), admitted for ams likely 2/2 disease progression. 2/3: Hypertonic saline. 2/4: vEEG, no more seizures. 2/5: Added free water flushes.     Pt care discussed in interdisciplinary care team rounds. Rx and labs reviewed. Pt presents wit    Previous Nutrition Diagnosis:    Active [   ]  Resolved [   ]    If resolved, new PES:     Goal:    Recommendations:    Education:     Risk Level: High [   ] Moderate [   ] Low [   ] Admitting Diagnosis:   Patient is a 74y old  Female who presents with a chief complaint of AMS (06 Feb 2025 07:21)      PAST MEDICAL & SURGICAL HISTORY:  GBM (glioblastoma multiforme)      Seizure          Current Nutrition Order:   Diet, NPO with Tube Feed:   Tube Feeding Modality: Nasogastric  Jevity 1.5 Jose Elias (JEVITY1.5)  Total Volume for 24 Hours (mL): 720  Total Number of Cans: 3  Continuous  Starting Tube Feed Rate {mL per Hour}: 10  Increase Tube Feed Rate by (mL): 10     Every hour  Until Goal Tube Feed Rate (mL per Hour): 40  Tube Feed Duration (in Hours): 18  Tube Feed Start Time: 11:00  Tube Feed Stop Time: 06:00  Free Water Flush   Total Volume per Flush (mL): 250   Frequency: Every 8 Hours  Liquid Protein Supplement     Qty per Day:  2 (02-05-25 @ 14:04)      PO Intake: N/A    GI Issues: Abdomen non-distended/non-tender, +BS x4, last bowel movement 2/5    Pain: No non-verbal indicators     Skin Integrity: Warm/Dry/Intact, +1 bilateral knee, +3 RUE         02-05-25 @ 07:01  -  02-06-25 @ 07:00  --------------------------------------------------------  IN: 1270 mL / OUT: 2000 mL / NET: -730 mL        Labs:   02-06    148[H]  |  108  |  26[H]  ----------------------------<  173[H]  4.3   |  32[H]  |  0.38[L]    Ca    8.1[L]      06 Feb 2025 05:00  Phos  2.3     02-06  Mg     2.4     02-06    TPro  4.8[L]  /  Alb  2.7[L]  /  TBili  0.3  /  DBili  x   /  AST  26  /  ALT  42  /  AlkPhos  110  02-06    CAPILLARY BLOOD GLUCOSE      POCT Blood Glucose.: 192 mg/dL (06 Feb 2025 06:09)  POCT Blood Glucose.: 152 mg/dL (05 Feb 2025 23:27)  POCT Blood Glucose.: 135 mg/dL (05 Feb 2025 17:05)  POCT Blood Glucose.: 107 mg/dL (05 Feb 2025 12:17)      Medications:  MEDICATIONS  (STANDING):  artificial  tears Solution 1 Drop(s) Both EYES every 12 hours  chlorhexidine 0.12% Liquid 15 milliLiter(s) Oral Mucosa every 12 hours  chlorhexidine 2% Cloths 1 Application(s) Topical <User Schedule>  dexAMETHasone  Injectable 4 milliGRAM(s) IV Push every 6 hours  dextrose 5%. 1000 milliLiter(s) (100 mL/Hr) IV Continuous <Continuous>  dextrose 5%. 1000 milliLiter(s) (50 mL/Hr) IV Continuous <Continuous>  dextrose 50% Injectable 25 Gram(s) IV Push once  dextrose 50% Injectable 12.5 Gram(s) IV Push once  dextrose 50% Injectable 25 Gram(s) IV Push once  enoxaparin Injectable 40 milliGRAM(s) SubCutaneous every 24 hours  ertapenem  IVPB 1000 milliGRAM(s) IV Intermittent every 24 hours  glucagon  Injectable 1 milliGRAM(s) IntraMuscular once  influenza  Vaccine (HIGH DOSE) 0.5 milliLiter(s) IntraMuscular once  insulin lispro (ADMELOG) corrective regimen sliding scale   SubCutaneous every 6 hours  lacosamide IVPB 200 milliGRAM(s) IV Intermittent every 12 hours  pantoprazole  Injectable 40 milliGRAM(s) IV Push every 24 hours  petrolatum Ophthalmic Ointment 1 Application(s) Both EYES every 12 hours  valproate sodium   IVPB 500 milliGRAM(s) IV Intermittent every 6 hours  valproate sodium Injectable 1000 milliGRAM(s) IV Push once    MEDICATIONS  (PRN):  dextrose Oral Gel 15 Gram(s) Oral once PRN Blood Glucose LESS THAN 70 milliGRAM(s)/deciliter  HYDROmorphone  Injectable 0.5 milliGRAM(s) IV Push every 4 hours PRN Moderate Pain (4 - 6)  LORazepam   Injectable 2 milliGRAM(s) IV Push every 4 hours PRN Seizures    Height for BMI (CENTIMETERS)	157.5 Centimeter(s)  Weight for BMI (lbs)	115.1 lb  Weight for BMI (kg)	52.2 kg  Body Mass Index	21     Weight Change: No new wt since admit. Recommend weigh pt weekly at minimum.     Estimated energy needs:   Weight used for calculations	IBW   Estimated Energy Needs Weight (lbs)	110.2 lb  Estimated Energy Needs Weight (kg)	50 kg  Estimated Energy Needs From (jose elias/kg)	25   Estimated Energy Needs To (jose elias/kg)	30   Estimated Energy Needs Calculated From (jose elias/kg)	1250   Estimated Energy Needs Calculated To (jose elias/kg)	1500     Estimated Protein Needs Weight (lbs)	110.2 lb  Estimated Protein Needs Weight (kg)	50 kg  Estimated Protein Needs From (g/kg)	1.4   Estimated Protein Needs To (g/kg)	1.6   Estimated Protein Needs Calculated From (g/kg)	70   Estimated Protein Needs Calculated To (g/kg)	80     Estimated Fluid Needs Weight (lbs)	110.2 lb  Estimated Fluid Needs Weight (kg)	50 kg  Estimated Fluid Needs From (ml/kg)	25   Estimated Fluid Needs To (ml/kg)	30   Estimated Fluid Needs Calculated From (ml/kg)	1250   Estimated Fluid Needs Calculated To (ml/kg)	1500     Other Calculations	Needs determined using ideal body weight 50 kg (104%IBW) adjusted for intubation and critical condition.     Subjective: 75 yo female w PMH GBM on hospice care (diagnosed in 2024, follows at [facility] under Dr. Hernandez, with reported attempted resection of tumor, on chemotherapy with last chemo 2 months ago), admitted for ams likely 2/2 disease progression. 2/3: Hypertonic saline. 2/4: vEEG, no more seizures. 2/5: Added free water flushes.     Pt care discussed in interdisciplinary care team rounds. Rx and labs reviewed. Pt remains intubated with vent set to VC-AC, MAP 90, no pressors, and no propofol at time of assessment. Pt continues on enteral nutrition support via nasogastric tube; see recs below. Plan to continue concentrated formula to maintain permissively elevated serum sodium levels (today 148). No propofol at this time assessment, recommend advance to goal. No other reports GI distress or further nutritional concerns at this time. RDN to remain available, see recommendations below.     Previous Nutrition Diagnosis: Inadequate Oral Intake  related to intubation  as evidenced by NPO.     Active [ x ]  Resolved [   ]    Goal: Pt will meet 75% or more of protein/energy needs via most appropriate route for nutrition.     Recommendations:  -Continue enteral nutrition support via nasogastric tube    *Recommend Jevity 1.5 with goal rate of 50 ml/hr with LPS x1/day from 2445-4617 to provide 900 ml tube feed, 1450 calories, 72 gProt., and 684 ml free water. This is 23.2 nonprotein calories and 1.44 gProt. per kg ideal body weight 50 kg.   -Monitor pressor and propofol demands    *Goal MAP >65 and lactate <2.0 for generally safe provision of nutrition   -Align nutrition with goals of care at all times  -Weekly wts  -Monitor chemistry, GI function, and skin integrity     **Orders communicated and pended to MICU team**     Risk Level: High [ x ] Moderate [   ] Low [   ]

## 2025-02-06 NOTE — PROGRESS NOTE ADULT - SUBJECTIVE AND OBJECTIVE BOX
Neurology Progress Note    Interval History:  The patient was seen and examined at the bedside. Remains intubated off sedation, no purposeful movements.       PAST MEDICAL & SURGICAL HISTORY:  GBM (glioblastoma multiforme)    Seizure            Medications:  artificial  tears Solution 1 Drop(s) Both EYES every 12 hours  chlorhexidine 0.12% Liquid 15 milliLiter(s) Oral Mucosa every 12 hours  chlorhexidine 2% Cloths 1 Application(s) Topical <User Schedule>  dexAMETHasone  Injectable 4 milliGRAM(s) IV Push every 6 hours  dextrose 5%. 1000 milliLiter(s) IV Continuous <Continuous>  dextrose 5%. 1000 milliLiter(s) IV Continuous <Continuous>  dextrose 50% Injectable 25 Gram(s) IV Push once  dextrose 50% Injectable 12.5 Gram(s) IV Push once  dextrose 50% Injectable 25 Gram(s) IV Push once  dextrose Oral Gel 15 Gram(s) Oral once PRN  enoxaparin Injectable 40 milliGRAM(s) SubCutaneous every 24 hours  ertapenem  IVPB 1000 milliGRAM(s) IV Intermittent every 24 hours  glucagon  Injectable 1 milliGRAM(s) IntraMuscular once  HYDROmorphone  Injectable 0.5 milliGRAM(s) IV Push every 4 hours PRN  influenza  Vaccine (HIGH DOSE) 0.5 milliLiter(s) IntraMuscular once  insulin lispro (ADMELOG) corrective regimen sliding scale   SubCutaneous every 6 hours  lacosamide IVPB 200 milliGRAM(s) IV Intermittent every 12 hours  LORazepam   Injectable 2 milliGRAM(s) IV Push every 4 hours PRN  pantoprazole  Injectable 40 milliGRAM(s) IV Push every 24 hours  petrolatum Ophthalmic Ointment 1 Application(s) Both EYES every 12 hours  valproate sodium   IVPB 500 milliGRAM(s) IV Intermittent every 6 hours      Vital Signs Last 24 Hrs  T(C): 37.5 (06 Feb 2025 09:04), Max: 37.7 (05 Feb 2025 21:35)  T(F): 99.5 (06 Feb 2025 09:04), Max: 99.9 (05 Feb 2025 21:35)  HR: 94 (06 Feb 2025 11:00) (91 - 123)  BP: 132/80 (06 Feb 2025 11:00) (106/69 - 161/99)  BP(mean): 101 (06 Feb 2025 11:00) (82 - 124)  RR: 11 (06 Feb 2025 11:00) (11 - 23)  SpO2: 95% (06 Feb 2025 11:00) (92% - 98%)    Parameters below as of 06 Feb 2025 11:00  Patient On (Oxygen Delivery Method): BiPAP/CPAP    O2 Concentration (%): 21      Neurological Exam:   Mental status: Intubated off sedated. Not following commands or exhibiting purposeful movements.   Cranial nerves: Eyes are primarily midline, slightly dysconjugate. Intermittently has R gaze, unclear if deviation or preference. Pupils are 3mm and reactive. OCR ?present to the R does not cross midline. Corneal intact.  Motor and sensory:  No spontaneous movement or withdrawal to painful stimuli   Coordination: unable to assess  Reflexes: Mute toes b/l DTRs 2+ on the L, 1+ on the R.  Gait: unable to assess    Labs:  CBC Full  -  ( 06 Feb 2025 05:00 )  WBC Count : 17.57 K/uL  RBC Count : 4.56 M/uL  Hemoglobin : 12.4 g/dL  Hematocrit : 41.2 %  Platelet Count - Automated : 180 K/uL  Mean Cell Volume : 90.4 fl  Mean Cell Hemoglobin : 27.2 pg  Mean Cell Hemoglobin Concentration : 30.1 g/dL  Auto Neutrophil # : 15.35 K/uL  Auto Lymphocyte # : 0.87 K/uL  Auto Monocyte # : 0.50 K/uL  Auto Eosinophil # : 0.00 K/uL  Auto Basophil # : 0.07 K/uL  Auto Neutrophil % : 87.4 %  Auto Lymphocyte % : 5.0 %  Auto Monocyte % : 2.8 %  Auto Eosinophil % : 0.0 %  Auto Basophil % : 0.4 %    02-06    148[H]  |  108  |  26[H]  ----------------------------<  173[H]  4.3   |  32[H]  |  0.38[L]    Ca    8.1[L]      06 Feb 2025 05:00  Phos  2.3     02-06  Mg     2.4     02-06    TPro  4.8[L]  /  Alb  2.7[L]  /  TBili  0.3  /  DBili  x   /  AST  26  /  ALT  42  /  AlkPhos  110  02-06    LIVER FUNCTIONS - ( 06 Feb 2025 05:00 )  Alb: 2.7 g/dL / Pro: 4.8 g/dL / ALK PHOS: 110 U/L / ALT: 42 U/L / AST: 26 U/L / GGT: x             Urinalysis Basic - ( 06 Feb 2025 05:00 )    Color: x / Appearance: x / SG: x / pH: x  Gluc: 173 mg/dL / Ketone: x  / Bili: x / Urobili: x   Blood: x / Protein: x / Nitrite: x   Leuk Esterase: x / RBC: x / WBC x   Sq Epi: x / Non Sq Epi: x / Bacteria: x        Assessment:  This is a 74y Female w/ h/o     Plan:

## 2025-02-06 NOTE — PROGRESS NOTE ADULT - ATTENDING COMMENTS
Patient with continued seizure activity with weaning of the sedation. Discussed with epilepsy will plan to add keppra and if she does not stop then will need to increase propofol dosing. Arm dose have a few more bullous lesions will plan for CT of the arm and add vancomycin and clindamycin for now.

## 2025-02-06 NOTE — PROGRESS NOTE ADULT - SUBJECTIVE AND OBJECTIVE BOX
Patient is a 74y old  Female who presents with a chief complaint of AMS (06 Feb 2025 07:21)      INTERVAL HPI/OVERNIGHT EVENTS:   No overnight events   Afebrile, hemodynamically stable     ICU Vital Signs Last 24 Hrs  T(C): 37.4 (06 Feb 2025 01:04), Max: 37.7 (05 Feb 2025 21:35)  T(F): 99.3 (06 Feb 2025 01:04), Max: 99.9 (05 Feb 2025 21:35)  HR: 97 (06 Feb 2025 07:00) (96 - 123)  BP: 147/89 (06 Feb 2025 07:00) (106/69 - 192/105)  BP(mean): 112 (06 Feb 2025 07:00) (82 - 137)  ABP: --  ABP(mean): --  RR: 23 (06 Feb 2025 07:00) (12 - 26)  SpO2: 96% (06 Feb 2025 07:00) (92% - 98%)    O2 Parameters below as of 06 Feb 2025 07:00  Patient On (Oxygen Delivery Method): ventilator    O2 Concentration (%): 21      I&O's Summary    05 Feb 2025 07:01  -  06 Feb 2025 07:00  --------------------------------------------------------  IN: 1270 mL / OUT: 2000 mL / NET: -730 mL      Mode: AC/ CMV (Assist Control/ Continuous Mandatory Ventilation)  RR (machine): 12  TV (machine): 350  FiO2: 21  PEEP: 5  ITime: 1  MAP: 7  PIP: 14      LABS:                        12.4   17.57 )-----------( 180      ( 06 Feb 2025 05:00 )             41.2     02-06    148[H]  |  108  |  26[H]  ----------------------------<  173[H]  4.3   |  32[H]  |  0.38[L]    Ca    8.1[L]      06 Feb 2025 05:00  Phos  2.3     02-06  Mg     2.4     02-06    TPro  4.8[L]  /  Alb  2.7[L]  /  TBili  0.3  /  DBili  x   /  AST  26  /  ALT  42  /  AlkPhos  110  02-06      Urinalysis Basic - ( 06 Feb 2025 05:00 )    Color: x / Appearance: x / SG: x / pH: x  Gluc: 173 mg/dL / Ketone: x  / Bili: x / Urobili: x   Blood: x / Protein: x / Nitrite: x   Leuk Esterase: x / RBC: x / WBC x   Sq Epi: x / Non Sq Epi: x / Bacteria: x      CAPILLARY BLOOD GLUCOSE      POCT Blood Glucose.: 192 mg/dL (06 Feb 2025 06:09)  POCT Blood Glucose.: 152 mg/dL (05 Feb 2025 23:27)  POCT Blood Glucose.: 135 mg/dL (05 Feb 2025 17:05)  POCT Blood Glucose.: 107 mg/dL (05 Feb 2025 12:17)        RADIOLOGY & ADDITIONAL TESTS:    Consultant(s) Notes Reviewed:  [x ] YES  [ ] NO    MEDICATIONS  (STANDING):  artificial  tears Solution 1 Drop(s) Both EYES every 12 hours  chlorhexidine 0.12% Liquid 15 milliLiter(s) Oral Mucosa every 12 hours  chlorhexidine 2% Cloths 1 Application(s) Topical <User Schedule>  dexAMETHasone  Injectable 4 milliGRAM(s) IV Push every 6 hours  dextrose 5%. 1000 milliLiter(s) (100 mL/Hr) IV Continuous <Continuous>  dextrose 5%. 1000 milliLiter(s) (50 mL/Hr) IV Continuous <Continuous>  dextrose 50% Injectable 25 Gram(s) IV Push once  dextrose 50% Injectable 12.5 Gram(s) IV Push once  dextrose 50% Injectable 25 Gram(s) IV Push once  enoxaparin Injectable 40 milliGRAM(s) SubCutaneous every 24 hours  ertapenem  IVPB 1000 milliGRAM(s) IV Intermittent every 24 hours  glucagon  Injectable 1 milliGRAM(s) IntraMuscular once  influenza  Vaccine (HIGH DOSE) 0.5 milliLiter(s) IntraMuscular once  insulin lispro (ADMELOG) corrective regimen sliding scale   SubCutaneous every 6 hours  lacosamide IVPB 200 milliGRAM(s) IV Intermittent every 12 hours  pantoprazole  Injectable 40 milliGRAM(s) IV Push every 24 hours  petrolatum Ophthalmic Ointment 1 Application(s) Both EYES every 12 hours  valproate sodium   IVPB 500 milliGRAM(s) IV Intermittent every 6 hours    MEDICATIONS  (PRN):  dextrose Oral Gel 15 Gram(s) Oral once PRN Blood Glucose LESS THAN 70 milliGRAM(s)/deciliter  HYDROmorphone  Injectable 0.5 milliGRAM(s) IV Push every 4 hours PRN Moderate Pain (4 - 6)  LORazepam   Injectable 2 milliGRAM(s) IV Push every 4 hours PRN Seizures      PHYSICAL EXAM:  GENERAL:   HEAD:  Atraumatic, Normocephalic  EYES: EOMI, PERRLA, conjunctiva and sclera clear  NECK: Supple, No JVD, Normal thyroid, no enlarged nodes  NERVOUS SYSTEM:  Alert & Awake.   CHEST/LUNG: B/L good air entry; No rales, rhonchi, or wheezing  HEART: S1S2 normal, no S3, Regular rate and rhythm; No murmurs  ABDOMEN: Soft, Nontender, Nondistended; Bowel sounds present  EXTREMITIES:  2+ Peripheral Pulses, No clubbing, cyanosis, or edema  LYMPH: No lymphadenopathy noted  SKIN: No rashes or lesions    Care Discussed with Consultants/Other Providers [ x] YES  [ ] NO Patient is a 74y old  Female who presents with a chief complaint of AMS (06 Feb 2025 07:21)      INTERVAL HPI/OVERNIGHT EVENTS:   No overnight events   Afebrile, hemodynamically stable     Subjective: Patient seen at bedside, minimally responsive despite no longer being sedated but mentally the same as yesterday. Patient with persistent seizures on EEG, speaking with Epilepsy regarding adding third agent vs more sedation. Patient with BM yesterday during the day.     ICU Vital Signs Last 24 Hrs  T(C): 37.4 (06 Feb 2025 01:04), Max: 37.7 (05 Feb 2025 21:35)  T(F): 99.3 (06 Feb 2025 01:04), Max: 99.9 (05 Feb 2025 21:35)  HR: 97 (06 Feb 2025 07:00) (96 - 123)  BP: 147/89 (06 Feb 2025 07:00) (106/69 - 192/105)  BP(mean): 112 (06 Feb 2025 07:00) (82 - 137)  ABP: --  ABP(mean): --  RR: 23 (06 Feb 2025 07:00) (12 - 26)  SpO2: 96% (06 Feb 2025 07:00) (92% - 98%)    O2 Parameters below as of 06 Feb 2025 07:00  Patient On (Oxygen Delivery Method): ventilator    O2 Concentration (%): 21      I&O's Summary    05 Feb 2025 07:01  -  06 Feb 2025 07:00  --------------------------------------------------------  IN: 1270 mL / OUT: 2000 mL / NET: -730 mL      Mode: AC/ CMV (Assist Control/ Continuous Mandatory Ventilation)  RR (machine): 12  TV (machine): 350  FiO2: 21  PEEP: 5  ITime: 1  MAP: 7  PIP: 14      LABS:                        12.4   17.57 )-----------( 180      ( 06 Feb 2025 05:00 )             41.2     02-06    148[H]  |  108  |  26[H]  ----------------------------<  173[H]  4.3   |  32[H]  |  0.38[L]    Ca    8.1[L]      06 Feb 2025 05:00  Phos  2.3     02-06  Mg     2.4     02-06    TPro  4.8[L]  /  Alb  2.7[L]  /  TBili  0.3  /  DBili  x   /  AST  26  /  ALT  42  /  AlkPhos  110  02-06      Urinalysis Basic - ( 06 Feb 2025 05:00 )    Color: x / Appearance: x / SG: x / pH: x  Gluc: 173 mg/dL / Ketone: x  / Bili: x / Urobili: x   Blood: x / Protein: x / Nitrite: x   Leuk Esterase: x / RBC: x / WBC x   Sq Epi: x / Non Sq Epi: x / Bacteria: x      CAPILLARY BLOOD GLUCOSE      POCT Blood Glucose.: 192 mg/dL (06 Feb 2025 06:09)  POCT Blood Glucose.: 152 mg/dL (05 Feb 2025 23:27)  POCT Blood Glucose.: 135 mg/dL (05 Feb 2025 17:05)  POCT Blood Glucose.: 107 mg/dL (05 Feb 2025 12:17)        RADIOLOGY & ADDITIONAL TESTS:    Consultant(s) Notes Reviewed:  [x ] YES  [ ] NO    MEDICATIONS  (STANDING):  artificial  tears Solution 1 Drop(s) Both EYES every 12 hours  chlorhexidine 0.12% Liquid 15 milliLiter(s) Oral Mucosa every 12 hours  chlorhexidine 2% Cloths 1 Application(s) Topical <User Schedule>  dexAMETHasone  Injectable 4 milliGRAM(s) IV Push every 6 hours  dextrose 5%. 1000 milliLiter(s) (100 mL/Hr) IV Continuous <Continuous>  dextrose 5%. 1000 milliLiter(s) (50 mL/Hr) IV Continuous <Continuous>  dextrose 50% Injectable 25 Gram(s) IV Push once  dextrose 50% Injectable 12.5 Gram(s) IV Push once  dextrose 50% Injectable 25 Gram(s) IV Push once  enoxaparin Injectable 40 milliGRAM(s) SubCutaneous every 24 hours  ertapenem  IVPB 1000 milliGRAM(s) IV Intermittent every 24 hours  glucagon  Injectable 1 milliGRAM(s) IntraMuscular once  influenza  Vaccine (HIGH DOSE) 0.5 milliLiter(s) IntraMuscular once  insulin lispro (ADMELOG) corrective regimen sliding scale   SubCutaneous every 6 hours  lacosamide IVPB 200 milliGRAM(s) IV Intermittent every 12 hours  pantoprazole  Injectable 40 milliGRAM(s) IV Push every 24 hours  petrolatum Ophthalmic Ointment 1 Application(s) Both EYES every 12 hours  valproate sodium   IVPB 500 milliGRAM(s) IV Intermittent every 6 hours    MEDICATIONS  (PRN):  dextrose Oral Gel 15 Gram(s) Oral once PRN Blood Glucose LESS THAN 70 milliGRAM(s)/deciliter  HYDROmorphone  Injectable 0.5 milliGRAM(s) IV Push every 4 hours PRN Moderate Pain (4 - 6)  LORazepam   Injectable 2 milliGRAM(s) IV Push every 4 hours PRN Seizures      PHYSICAL EXAM:  General: intubated, not sedated.   HEENT: constricted pupils, minimally reactive light , anicteric sclera,  Cardiovascular: RRR, no m/r/g.   Respiratory: CTAB; no W/R/R  Gastrointestinal: soft, NT/ND; +BS x4  Extremities: cool extremities; 2+ peripheral pulses bilaterally; no LE edema, 4-5 second cap refill  Skin: R hand with less blistering and erythema, blanchable. wounds that are dry within the margins of the lines drawn yesterday   Neurologic: + Gag reflex, responds to pain stimuli, does not respond to name.     Care Discussed with Consultants/Other Providers [ x] YES  [ ] NO

## 2025-02-06 NOTE — PROCEDURE NOTE - A LINES
Right Upper Lobe/Right Middle Lobe/Right Lower Lobe/Left Upper Lobe/Left Lower Lobe
Right Upper Lobe/Right Middle Lobe/Right Lower Lobe/Left Upper Lobe/Left Lower Lobe

## 2025-02-06 NOTE — EEG REPORT - NS EEG TEXT BOX
[facility]  Inpatient Epilepsy Monitoring Unit video-Electroencephalography Report    Acquisition Details:  Electroencephalography was acquired using a minimum of 21 channels on an [employer] system v 9.3.1 with electrode placement according to the standard International 10-20 system following ACNS (American Clinical Neurophysiology Society) guidelines for Long-Term Video EEG monitoring.  Anterior temporal T1 and T2 electrodes were utilized whenever possible.   The [employer]K automated spike & seizure detections were all reviewed in detail, in addition to extensive portions of raw EEG.  Specially-trained nurses were available for seizure-related events.  Continuous live-time video monitoring of the patients for seizure-related and safety events was performed by specially-trained technicians.        Day 5:  2/5/2025, 7:00 AM to next morning at 07:00 AM   Meds: vimpat 200 mg bid, Depakote 500 mg Q6hr  Background: initially  continuous, asymmetric with predominantly delta / theta frequencies(slower over the right hemisphere)  and became continuous at 3 PM   Generalized Slowing:  Intermittent frequent sporadic polymorphic delta  Symmetry/Focality: Continuous (90+%)  left and independent right fronto-parietal/temporal polymorphic delta/theta.    Voltage:  Normal (20+ uV)  Organization:  Rudimentary  Posterior Dominant Rhythm:   7-8 Hz poorly regulated,   Sleep:  Absent.  Variability:   Yes		Reactivity: , Yes    Spontaneous Activity:  Abundant right hemispheric lateralized rhythmic delta activity superimposed with epileptiform sharp waves ( msec) maximal over right fronto temporal region and independent occasional left frontotemporal sharp wave discharges   Events: frequent bilateral fronto-temporal focal onset seizures(right more than left)averages 2-5 per hr  without overt clinical changes      1- Left fronto temporal focal onset with secondary generalization  Time: 8:29 AM,4:38 PM, 6:24 PM, 8:15 PM for less than 3 minutes  Clinical: lip twitching/smacking, chest rising, head shaking  Electrographic:development of rhythmic theta, 4-5 Hz  follow by faster spikier wave follow by generalized spike wave  discharges follow by left fronto-temporal rhythmic alpha until offset    Provocations:  •	Hyperventilation: was not performed.  •	Photic stimulation: was not performed.  Daily Summary:      •	These findings are indicative of a bilateral fronto-temporal,right more than left  focal epilepsy and left focal onset with secondary generalization seizures  •	Abundant right hemispheric epileptiform sharp waves maximal over right fronto temporal region and occasional left frontotemporal sharp wave discharges indicating high epileptic potential in this region  •	Severe generalized and multifocal slowing indicating similar degree of underlying diffuse and multifocal cerebral dysfunction    Katelynn Lundy MD  CNP Fellow

## 2025-02-06 NOTE — PROGRESS NOTE ADULT - ASSESSMENT
73 yo female w PMH GBM on hospice care (diagnosed in 2024, follows at [facility] under Dr. Hernandez, with reported attempted resection of tumor, on chemotherapy with last chemo 2 months ago), admitted for ams likely 2/2 disease progression After admission to [facility], Upon evaluation pt was obtunded with tremors to RUE, response to noxious stimuli but otherwise no response. Pupils were responsive with saccadic movement. She was slumped to her right sided with gurgling breath sounds.  At that point rapid was called and patient was inevitably intubated. Transferred to [facility]. Discussed with pts daughters post procedure poor prognosis and advised to have formal family meeting to clarify and document wishes.       NEURO  Intubated. Not sedated. Minimally responsive despite not being on sedation. 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 There is evidence of focal cortical hyperexcitability, more prominent in the right than the left frontotemporal regions. The background pattern shows a burst suppression ratio of 10:1, indicating severe diffuse or multifocal cerebral dysfunction, potentially exacerbated by IV sedatives. Additionally, there is severe generalized and multifocal slowing, further supporting significant underlying cerebral dysfunction.  Continued EEG findings of seizures on 2/5.   Plan:   s/p valproic acid loading dose 1200 mg, vimpat 200 mg loading  -Vimpat 200 BID  -Depakote 500 q6 hrs   -continue on vEEG and f/u epilepsy recs   -c/w ativan 0.5mg q4h prn for seizures    #GBM  last chemo ~2months ago, follows at [facility].  Per Neurosurgery consult on 1/31, no acute interventions.   -c/w decadron 4 IV q6h  -palliative care consult   -maintain Na > 145 to address cerebral edema 2/2 mass effect    CARDIAC  #Chronic atrial fibrillation.    Home med: metoprolol 12.5 BID   - started lovenox ( per NSGY, no increased risk of hemorrhage     Plan  -bladder scans q6h   -Lasix 40 mg IV push given today given net positive   -BMP every 6 hrs   -consider john (currently on primafit) if pt retaining   -make sure pt having BMs  -pain control with dilaudid 0.5 q4h PRN.    PULM  #intubated  - intubated on 2/1  - GOC  - consider trach moving forward    GI  - NPO with tube feeds   - consider PEG moving forward    RENAL  - currently with normal renal function    #hypernatremia  -maintain Na > 145 as above for mass effect    ID  #SIRS  #ESBL Ecoli Bacteremia   Systemic inflammatory response syndrome (SIRS).   Meeting SIRS 2/4 (HR>90, WBC>12k) likely iso of UTI.   Bcx with ESBL E. coli  -c/w Ertapenem 1 g qd   -IVF as needed    F: none  E: replete K<4, Mg<2  N: npo with tube feeds  VTE Prophylaxis: lovenox  GI: ppi while intubated  C: Full Code  D: micu    Lines: Peripheral IV L 22g (2/4) IV L 18 g (2/4)l, NG tube      73 yo female w PMH GBM on hospice care (diagnosed in 2024, follows at [facility] under Dr. Hernandez, with reported attempted resection of tumor, on chemotherapy with last chemo 2 months ago), admitted for ams likely 2/2 disease progression After admission to [facility], Upon evaluation pt was obtunded with tremors to RUE, response to noxious stimuli but otherwise no response. Pupils were responsive with saccadic movement. She was slumped to her right sided with gurgling breath sounds.  At that point rapid was called and patient was inevitably intubated. Transferred to [facility]. Discussed with pts daughters post procedure poor prognosis and advised to have formal family meeting to clarify and document wishes.       NEURO  Intubated. Not sedated. Minimally responsive despite not being on sedation. 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 There is evidence of focal cortical hyperexcitability, more prominent in the right than the left frontotemporal regions. The background pattern shows a burst suppression ratio of 10:1, indicating severe diffuse or multifocal cerebral dysfunction, potentially exacerbated by IV sedatives. Additionally, there is severe generalized and multifocal slowing, further supporting significant underlying cerebral dysfunction.  Continued EEG findings of seizures on 2/6  Plan:   s/p valproic acid loading dose 1200 mg, vimpat 200 mg loading  -Vimpat 200 BID  -Depakote 500 q6 hrs   -Load with Keppra 1g now then 500 BID thereafter for persistent seizures   -continue on vEEG and f/u epilepsy recs   -c/w ativan 0.5mg q4h prn for seizures    #GBM  last chemo ~2months ago, follows at [facility].  Per Neurosurgery consult on 1/31, no acute interventions.   -c/w decadron 4 IV q6h  -palliative care consult   -maintain Na > 145 to address cerebral edema 2/2 mass effect    CARDIAC  #Chronic atrial fibrillation.    Home med: metoprolol 12.5 BID   - started lovenox ( per NSGY, no increased risk of hemorrhage     Plan  -bladder scans q6h   -Lasix 40 mg IV push given today given net positive   -BMP every 6 hrs   -consider john (currently on primafit) if pt retaining   -make sure pt having BMs  -pain control with dilaudid 0.5 q4h PRN.    PULM  #intubated  - intubated on 2/1  - GOC  - consider trach moving forward    GI  - NPO with tube feeds   - consider PEG moving forward    RENAL  - currently with normal renal function  -Add 1 dose 40 IV Lasix for fluid retention and overload     #hypernatremia  -maintain Na > 145 as above for mass effect but less than 150     ID  #SIRS  #ESBL Ecoli Bacteremia  #R arm cellulitis   Systemic inflammatory response syndrome (SIRS).   Meeting SIRS 2/4 (HR>90, WBC>12k) likely iso of UTI.   Bcx with ESBL E. coli  -c/w Ertapenem 1 g qd (last day today)   -IVF as needed  -Vanc 1g q24 for covg or R arm infection and 1 dose Clindamycin (900)   -plan R arm CT angio with IV contrast    F: none  E: replete K<4, Mg<2  N: npo with tube feeds  VTE Prophylaxis: lovenox  GI: pantoprazole 40 IVP  C: Full Code  D: micu    Lines: Peripheral IV L 22g (2/4) IV L 18 g (2/4)l, NG tube

## 2025-02-06 NOTE — PROCEDURE NOTE - NSUS ED INFORMED CONSENT1
Benefits, risks, and possible complications of procedure explained to patient/caregiver who verbalized understanding and gave verbal consent.
This was an emergent procedure and consent was implied.
Benefits, risks, and possible complications of procedure explained to patient/caregiver who verbalized understanding and gave verbal consent.
This was an emergent procedure and consent was implied.

## 2025-02-06 NOTE — PROGRESS NOTE ADULT - ASSESSMENT
73 yo female w PMH GBM on hospice care (diagnosed in 2024, follows at [facility] under Dr. Hernandez, with reported attempted resection of tumor, on chemotherapy with last chemo 2 months ago) who presents with altered mentation. Epilepsy following for seizures with b/l foci, likely i/s/o tumor as well as considerable mass effect.  vEEG was notable for focal status, Propofol was started, Depakote was uptitrated and Vimpat added on 2/3 with improvement in EEG.  On 2/4 propofol was stopped and later in the evening Depakote was held (due to c/f interaction w/ carbapenem), with return of focal seizures (with 2 episodes of generalization).    Recommendations:  - C/w Vimpat 200mg BID (monitor tele to ensure no heart block)  - VPA level noted. Give Depakote 1g x1 now  - C/w Depakote 500mg q6hrs  - Obtain VPA level tomorrow AM  - Given EEG findings, will either need to restart propofol or add a 3rd epilepsy agent. Please reach out re. MICU team preference.  - C/w vEEG  - seizure precautions    Recommendations communicated to primary team. Discussed with Dr Hough.    75 yo female w PMH GBM on hospice care (diagnosed in 2024, follows at [facility] under Dr. Hernandez, with reported attempted resection of tumor, on chemotherapy with last chemo 2 months ago) who presents with altered mentation. Epilepsy following for seizures with b/l foci, likely i/s/o tumor as well as considerable mass effect.  vEEG was notable for focal status, Propofol was started, Depakote was uptitrated and Vimpat added on 2/3 with improvement in EEG.  On 2/4 propofol was stopped and later in the evening Depakote was held (due to c/f interaction w/ carbapenem), with return of focal seizures (with 2 episodes of generalization).    Recommendations:  - C/w Vimpat 200mg BID (monitor tele to ensure no heart block)  - VPA level noted. Give Depakote 1g x1 now  - C/w Depakote 500mg q6hrs  - Obtain VPA level tomorrow AM  - Start Keppra 1g now  - C/w Keppra 500mg BID  - C/w vEEG  - seizure precautions    Recommendations communicated to primary team. Discussed with Dr Hough.

## 2025-02-06 NOTE — PROCEDURE NOTE - NSUSCPTCODES_ED_ALL
67801 Echocardiography Transthoracic with Image 2D (Echo/FAST)
97941 US Chest (PTX, Pleural Effussion/CHF vs COPD)

## 2025-02-07 LAB
ALBUMIN SERPL ELPH-MCNC: 2.6 G/DL — LOW (ref 3.3–5)
ALP SERPL-CCNC: 101 U/L — SIGNIFICANT CHANGE UP (ref 40–120)
ALT FLD-CCNC: 45 U/L — SIGNIFICANT CHANGE UP (ref 10–45)
AMMONIA BLD-MCNC: 20 UMOL/L — SIGNIFICANT CHANGE UP (ref 11–55)
ANION GAP SERPL CALC-SCNC: 10 MMOL/L — SIGNIFICANT CHANGE UP (ref 5–17)
ANION GAP SERPL CALC-SCNC: 17 MMOL/L — SIGNIFICANT CHANGE UP (ref 5–17)
AST SERPL-CCNC: 25 U/L — SIGNIFICANT CHANGE UP (ref 10–40)
BASOPHILS # BLD AUTO: 0.04 K/UL — SIGNIFICANT CHANGE UP (ref 0–0.2)
BASOPHILS NFR BLD AUTO: 0.3 % — SIGNIFICANT CHANGE UP (ref 0–2)
BILIRUB SERPL-MCNC: 0.2 MG/DL — SIGNIFICANT CHANGE UP (ref 0.2–1.2)
BLD GP AB SCN SERPL QL: NEGATIVE — SIGNIFICANT CHANGE UP
BUN SERPL-MCNC: 20 MG/DL — SIGNIFICANT CHANGE UP (ref 7–23)
BUN SERPL-MCNC: 28 MG/DL — HIGH (ref 7–23)
CALCIUM SERPL-MCNC: 8.1 MG/DL — LOW (ref 8.4–10.5)
CALCIUM SERPL-MCNC: 8.4 MG/DL — SIGNIFICANT CHANGE UP (ref 8.4–10.5)
CHLORIDE SERPL-SCNC: 107 MMOL/L — SIGNIFICANT CHANGE UP (ref 96–108)
CHLORIDE SERPL-SCNC: 114 MMOL/L — HIGH (ref 96–108)
CO2 SERPL-SCNC: 29 MMOL/L — SIGNIFICANT CHANGE UP (ref 22–31)
CO2 SERPL-SCNC: 30 MMOL/L — SIGNIFICANT CHANGE UP (ref 22–31)
CREAT SERPL-MCNC: 0.27 MG/DL — LOW (ref 0.5–1.3)
CREAT SERPL-MCNC: 0.38 MG/DL — LOW (ref 0.5–1.3)
EGFR: 105 ML/MIN/1.73M2 — SIGNIFICANT CHANGE UP
EGFR: 114 ML/MIN/1.73M2 — SIGNIFICANT CHANGE UP
EOSINOPHIL # BLD AUTO: 0 K/UL — SIGNIFICANT CHANGE UP (ref 0–0.5)
EOSINOPHIL NFR BLD AUTO: 0 % — SIGNIFICANT CHANGE UP (ref 0–6)
GLUCOSE SERPL-MCNC: 177 MG/DL — HIGH (ref 70–99)
GLUCOSE SERPL-MCNC: 246 MG/DL — HIGH (ref 70–99)
HCT VFR BLD CALC: 37.5 % — SIGNIFICANT CHANGE UP (ref 34.5–45)
HGB BLD-MCNC: 12 G/DL — SIGNIFICANT CHANGE UP (ref 11.5–15.5)
IMM GRANULOCYTES NFR BLD AUTO: 5.5 % — HIGH (ref 0–0.9)
LYMPHOCYTES # BLD AUTO: 0.72 K/UL — LOW (ref 1–3.3)
LYMPHOCYTES # BLD AUTO: 4.6 % — LOW (ref 13–44)
MAGNESIUM SERPL-MCNC: 2.4 MG/DL — SIGNIFICANT CHANGE UP (ref 1.6–2.6)
MCHC RBC-ENTMCNC: 28.6 PG — SIGNIFICANT CHANGE UP (ref 27–34)
MCHC RBC-ENTMCNC: 32 G/DL — SIGNIFICANT CHANGE UP (ref 32–36)
MCV RBC AUTO: 89.5 FL — SIGNIFICANT CHANGE UP (ref 80–100)
MONOCYTES # BLD AUTO: 0.52 K/UL — SIGNIFICANT CHANGE UP (ref 0–0.9)
MONOCYTES NFR BLD AUTO: 3.3 % — SIGNIFICANT CHANGE UP (ref 2–14)
NEUTROPHILS # BLD AUTO: 13.61 K/UL — HIGH (ref 1.8–7.4)
NEUTROPHILS NFR BLD AUTO: 86.3 % — HIGH (ref 43–77)
NRBC # BLD: 0 /100 WBCS — SIGNIFICANT CHANGE UP (ref 0–0)
NRBC BLD-RTO: 0 /100 WBCS — SIGNIFICANT CHANGE UP (ref 0–0)
PHOSPHATE SERPL-MCNC: 1.9 MG/DL — LOW (ref 2.5–4.5)
PLATELET # BLD AUTO: 201 K/UL — SIGNIFICANT CHANGE UP (ref 150–400)
POTASSIUM SERPL-MCNC: 3.9 MMOL/L — SIGNIFICANT CHANGE UP (ref 3.5–5.3)
POTASSIUM SERPL-MCNC: 4.1 MMOL/L — SIGNIFICANT CHANGE UP (ref 3.5–5.3)
POTASSIUM SERPL-SCNC: 3.9 MMOL/L — SIGNIFICANT CHANGE UP (ref 3.5–5.3)
POTASSIUM SERPL-SCNC: 4.1 MMOL/L — SIGNIFICANT CHANGE UP (ref 3.5–5.3)
PROT SERPL-MCNC: 4.9 G/DL — LOW (ref 6–8.3)
RBC # BLD: 4.19 M/UL — SIGNIFICANT CHANGE UP (ref 3.8–5.2)
RBC # FLD: 17 % — HIGH (ref 10.3–14.5)
RH IG SCN BLD-IMP: POSITIVE — SIGNIFICANT CHANGE UP
SODIUM SERPL-SCNC: 153 MMOL/L — HIGH (ref 135–145)
SODIUM SERPL-SCNC: 154 MMOL/L — HIGH (ref 135–145)
VALPROATE SERPL-MCNC: 21.2 UG/ML — LOW (ref 50–100)
WBC # BLD: 15.75 K/UL — HIGH (ref 3.8–10.5)
WBC # FLD AUTO: 15.75 K/UL — HIGH (ref 3.8–10.5)

## 2025-02-07 PROCEDURE — 99291 CRITICAL CARE FIRST HOUR: CPT

## 2025-02-07 PROCEDURE — 76937 US GUIDE VASCULAR ACCESS: CPT | Mod: 26

## 2025-02-07 PROCEDURE — 99232 SBSQ HOSP IP/OBS MODERATE 35: CPT

## 2025-02-07 PROCEDURE — 95720 EEG PHY/QHP EA INCR W/VEEG: CPT

## 2025-02-07 PROCEDURE — 71045 X-RAY EXAM CHEST 1 VIEW: CPT | Mod: 26,77

## 2025-02-07 PROCEDURE — 36415 COLL VENOUS BLD VENIPUNCTURE: CPT

## 2025-02-07 PROCEDURE — 71045 X-RAY EXAM CHEST 1 VIEW: CPT | Mod: 26

## 2025-02-07 RX ORDER — LEVETIRACETAM 10 MG/ML
1000 INJECTION, SOLUTION INTRAVENOUS EVERY 12 HOURS
Refills: 0 | Status: DISCONTINUED | OUTPATIENT
Start: 2025-02-07 | End: 2025-02-08

## 2025-02-07 RX ORDER — LEVETIRACETAM 10 MG/ML
500 INJECTION, SOLUTION INTRAVENOUS ONCE
Refills: 0 | Status: COMPLETED | OUTPATIENT
Start: 2025-02-07 | End: 2025-02-07

## 2025-02-07 RX ORDER — POTASSIUM PHOSPHATE, MONOBASIC POTASSIUM PHOSPHATE, DIBASIC INJECTION, 236; 224 MG/ML; MG/ML
15 SOLUTION, CONCENTRATE INTRAVENOUS ONCE
Refills: 0 | Status: COMPLETED | OUTPATIENT
Start: 2025-02-07 | End: 2025-02-07

## 2025-02-07 RX ORDER — FUROSEMIDE 10 MG/ML
40 INJECTION INTRAMUSCULAR; INTRAVENOUS ONCE
Refills: 0 | Status: COMPLETED | OUTPATIENT
Start: 2025-02-07 | End: 2025-02-07

## 2025-02-07 RX ADMIN — INSULIN LISPRO 1: 100 INJECTION, SOLUTION INTRAVENOUS; SUBCUTANEOUS at 00:08

## 2025-02-07 RX ADMIN — INSULIN LISPRO 1: 100 INJECTION, SOLUTION INTRAVENOUS; SUBCUTANEOUS at 05:50

## 2025-02-07 RX ADMIN — Medication 1 APPLICATION(S): at 09:50

## 2025-02-07 RX ADMIN — LEVETIRACETAM 400 MILLIGRAM(S): 10 INJECTION, SOLUTION INTRAVENOUS at 09:50

## 2025-02-07 RX ADMIN — DEXAMETHASONE 4 MILLIGRAM(S): 0.5 TABLET ORAL at 23:46

## 2025-02-07 RX ADMIN — Medication 1 APPLICATION(S): at 06:01

## 2025-02-07 RX ADMIN — FUROSEMIDE 40 MILLIGRAM(S): 10 INJECTION INTRAMUSCULAR; INTRAVENOUS at 13:55

## 2025-02-07 RX ADMIN — DEXAMETHASONE 4 MILLIGRAM(S): 0.5 TABLET ORAL at 00:07

## 2025-02-07 RX ADMIN — ENOXAPARIN SODIUM 40 MILLIGRAM(S): 100 INJECTION SUBCUTANEOUS at 19:00

## 2025-02-07 RX ADMIN — LEVETIRACETAM 400 MILLIGRAM(S): 10 INJECTION, SOLUTION INTRAVENOUS at 10:15

## 2025-02-07 RX ADMIN — INSULIN LISPRO 2: 100 INJECTION, SOLUTION INTRAVENOUS; SUBCUTANEOUS at 17:57

## 2025-02-07 RX ADMIN — Medication 15 MILLILITER(S): at 09:52

## 2025-02-07 RX ADMIN — DEXAMETHASONE 4 MILLIGRAM(S): 0.5 TABLET ORAL at 11:49

## 2025-02-07 RX ADMIN — INSULIN LISPRO 1: 100 INJECTION, SOLUTION INTRAVENOUS; SUBCUTANEOUS at 23:45

## 2025-02-07 RX ADMIN — DEXAMETHASONE 4 MILLIGRAM(S): 0.5 TABLET ORAL at 05:53

## 2025-02-07 RX ADMIN — Medication 40 MILLIGRAM(S): at 09:50

## 2025-02-07 RX ADMIN — Medication 1 APPLICATION(S): at 21:36

## 2025-02-07 RX ADMIN — Medication 60 MILLIGRAM(S): at 11:07

## 2025-02-07 RX ADMIN — Medication 55 MILLIGRAM(S): at 21:21

## 2025-02-07 RX ADMIN — Medication 15 MILLILITER(S): at 21:21

## 2025-02-07 RX ADMIN — Medication 55 MILLIGRAM(S): at 02:48

## 2025-02-07 RX ADMIN — SODIUM CHLORIDE 750 MILLILITER(S): 3 INJECTION, SOLUTION INTRAVENOUS at 01:11

## 2025-02-07 RX ADMIN — DEXAMETHASONE 4 MILLIGRAM(S): 0.5 TABLET ORAL at 17:58

## 2025-02-07 RX ADMIN — LACOSAMIDE 140 MILLIGRAM(S): 150 TABLET, FILM COATED ORAL at 21:35

## 2025-02-07 RX ADMIN — Medication 1 DROP(S): at 21:36

## 2025-02-07 RX ADMIN — Medication 55 MILLIGRAM(S): at 16:41

## 2025-02-07 RX ADMIN — LEVETIRACETAM 400 MILLIGRAM(S): 10 INJECTION, SOLUTION INTRAVENOUS at 21:21

## 2025-02-07 RX ADMIN — Medication 1 DROP(S): at 09:51

## 2025-02-07 RX ADMIN — LACOSAMIDE 140 MILLIGRAM(S): 150 TABLET, FILM COATED ORAL at 10:10

## 2025-02-07 RX ADMIN — POTASSIUM PHOSPHATE, MONOBASIC POTASSIUM PHOSPHATE, DIBASIC INJECTION, 62.5 MILLIMOLE(S): 236; 224 SOLUTION, CONCENTRATE INTRAVENOUS at 10:11

## 2025-02-07 RX ADMIN — Medication 55 MILLIGRAM(S): at 09:50

## 2025-02-07 NOTE — EEG REPORT - NS EEG TEXT BOX
[facility]  Inpatient Epilepsy Monitoring Unit video-Electroencephalography Report    Acquisition Details:  Electroencephalography was acquired using a minimum of 21 channels on an [employer] system v 9.3.1 with electrode placement according to the standard International 10-20 system following ACNS (American Clinical Neurophysiology Society) guidelines for Long-Term Video EEG monitoring.  Anterior temporal T1 and T2 electrodes were utilized whenever possible.   The [employer] automated spike & seizure detections were all reviewed in detail, in addition to extensive portions of raw EEG.  Specially-trained nurses were available for seizure-related events.  Continuous live-time video monitoring of the patients for seizure-related and safety events was performed by specially-trained technicians.      Day 6:  2/6/2025, 7:00 AM to next morning at 07:00 AM   Meds: vimpat 200 mg bid, Depakote 500 mg Q6hr,Keppra 500 mg bid  Background:   continuous, asymmetric with predominantly delta / theta frequencies(slower over the right hemisphere)    Generalized Slowing:  Intermittent frequent sporadic polymorphic delta  Symmetry/Focality: Continuous (90+%)  left and independent right fronto-parietal/temporal polymorphic delta/theta.    Voltage:  Normal (20+ uV)  Organization:  Rudimentary  Posterior Dominant Rhythm:   7-8 Hz poorly regulated,   Sleep:  Absent.  Variability:   Yes		Reactivity: , Yes    Spontaneous Activity:  Abundant right hemispheric lateralized rhythmic delta activity superimposed with epileptiform sharp waves ( msec) maximal over right fronto temporal region and independent occasional left frontotemporal sharp wave discharges   Events: frequent bilateral fronto-temporal focal onset seizures, mainly from right side averages 1 per hr after 10 AM without overt clinical changes      1- Left fronto temporal focal onset with secondary generalization  Time: 8:41 AM till 8:48 AM  Clinical: lip twitching/smacking, chest rising,   Electrographic:development of rhythmic theta, 4-5 Hz  follow by faster spikier wave follow by generalized spike wave  discharges follow by left fronto-temporal rhythmic alpha evolving to sharp wave discharges until offset    Provocations:  •	Hyperventilation: was not performed.  •	Photic stimulation: was not performed.    Daily Summary:    •	These findings are indicative of a bilateral fronto-temporal,  mainly in right side  focal epilepsy and left focal onset with secondary generalization seizures  •	Abundant right hemispheric epileptiform sharp waves maximal over right fronto temporal region and occasional left frontotemporal sharp wave discharges indicating high epileptic potential in this region  •	Severe generalized and multifocal slowing indicating similar degree of underlying diffuse and multifocal cerebral dysfunction    Katelynn Lundy MD  CNP Fellow

## 2025-02-07 NOTE — PROGRESS NOTE ADULT - SUBJECTIVE AND OBJECTIVE BOX
Neurology Progress Note    Interval History:  The patient was seen and examined at the bedside. Remains intubated off sedation, no purposeful movements.       PAST MEDICAL & SURGICAL HISTORY:  GBM (glioblastoma multiforme)    Seizure            Medications:  artificial  tears Solution 1 Drop(s) Both EYES every 12 hours  chlorhexidine 0.12% Liquid 15 milliLiter(s) Oral Mucosa every 12 hours  chlorhexidine 2% Cloths 1 Application(s) Topical <User Schedule>  dexAMETHasone  Injectable 4 milliGRAM(s) IV Push every 6 hours  dextrose 5%. 1000 milliLiter(s) IV Continuous <Continuous>  dextrose 5%. 1000 milliLiter(s) IV Continuous <Continuous>  dextrose 50% Injectable 25 Gram(s) IV Push once  dextrose 50% Injectable 12.5 Gram(s) IV Push once  dextrose 50% Injectable 25 Gram(s) IV Push once  dextrose Oral Gel 15 Gram(s) Oral once PRN  enoxaparin Injectable 40 milliGRAM(s) SubCutaneous every 24 hours  glucagon  Injectable 1 milliGRAM(s) IntraMuscular once  influenza  Vaccine (HIGH DOSE) 0.5 milliLiter(s) IntraMuscular once  insulin lispro (ADMELOG) corrective regimen sliding scale   SubCutaneous every 6 hours  lacosamide IVPB 200 milliGRAM(s) IV Intermittent every 12 hours  levETIRAcetam  IVPB 1000 milliGRAM(s) IV Intermittent every 12 hours  LORazepam   Injectable 2 milliGRAM(s) IV Push every 4 hours PRN  pantoprazole  Injectable 40 milliGRAM(s) IV Push every 24 hours  petrolatum Ophthalmic Ointment 1 Application(s) Both EYES every 12 hours  valproate sodium   IVPB 1000 milliGRAM(s) IV Intermittent once  valproate sodium   IVPB 500 milliGRAM(s) IV Intermittent every 6 hours  vancomycin  IVPB 1000 milliGRAM(s) IV Intermittent every 24 hours      Vital Signs Last 24 Hrs  T(C): 37.1 (07 Feb 2025 09:33), Max: 37.4 (06 Feb 2025 17:06)  T(F): 98.8 (07 Feb 2025 09:33), Max: 99.3 (06 Feb 2025 17:06)  HR: 87 (07 Feb 2025 10:00) (80 - 97)  BP: 137/88 (07 Feb 2025 10:00) (108/74 - 151/89)  BP(mean): 106 (07 Feb 2025 10:00) (84 - 116)  RR: 15 (07 Feb 2025 10:00) (9 - 21)  SpO2: 94% (07 Feb 2025 10:00) (93% - 98%)    Parameters below as of 07 Feb 2025 10:00  Patient On (Oxygen Delivery Method): ventilator    O2 Concentration (%): 21      Neurological Exam:   Mental status: Intubated off sedated. Not following commands or exhibiting purposeful movements.   Cranial nerves: Eyes are primarily midline, slightly dysconjugate. Intermittently has R gaze deviation.. Pupils are 3mm and reactive. OCR ?present to the R does not cross midline. Corneal intact. Gag intact  Motor and sensory:  No spontaneous movement or withdrawal to painful stimuli   Coordination: unable to assess  Reflexes: Mute toes b/l DTRs 2+ on the L, 1+ on the R.  Gait: unable to assess    Labs:  CBC Full  -  ( 07 Feb 2025 05:30 )  WBC Count : 15.75 K/uL  RBC Count : 4.19 M/uL  Hemoglobin : 12.0 g/dL  Hematocrit : 37.5 %  Platelet Count - Automated : 201 K/uL  Mean Cell Volume : 89.5 fl  Mean Cell Hemoglobin : 28.6 pg  Mean Cell Hemoglobin Concentration : 32.0 g/dL  Auto Neutrophil # : 13.61 K/uL  Auto Lymphocyte # : 0.72 K/uL  Auto Monocyte # : 0.52 K/uL  Auto Eosinophil # : 0.00 K/uL  Auto Basophil # : 0.04 K/uL  Auto Neutrophil % : 86.3 %  Auto Lymphocyte % : 4.6 %  Auto Monocyte % : 3.3 %  Auto Eosinophil % : 0.0 %  Auto Basophil % : 0.3 %    02-07    153[H]  |  114[H]  |  20  ----------------------------<  177[H]  3.9   |  29  |  0.27[L]    Ca    8.1[L]      07 Feb 2025 05:30  Phos  1.9     02-07  Mg     2.4     02-07    TPro  4.9[L]  /  Alb  2.6[L]  /  TBili  0.2  /  DBili  x   /  AST  25  /  ALT  45  /  AlkPhos  101  02-07    LIVER FUNCTIONS - ( 07 Feb 2025 05:30 )  Alb: 2.6 g/dL / Pro: 4.9 g/dL / ALK PHOS: 101 U/L / ALT: 45 U/L / AST: 25 U/L / GGT: x             Urinalysis Basic - ( 07 Feb 2025 05:30 )    Color: x / Appearance: x / SG: x / pH: x  Gluc: 177 mg/dL / Ketone: x  / Bili: x / Urobili: x   Blood: x / Protein: x / Nitrite: x   Leuk Esterase: x / RBC: x / WBC x   Sq Epi: x / Non Sq Epi: x / Bacteria: x        Assessment:  This is a 74y Female w/ h/o     Plan:

## 2025-02-07 NOTE — PROGRESS NOTE ADULT - SUBJECTIVE AND OBJECTIVE BOX
Patient is a 74y old  Female who presents with a chief complaint of AMS (07 Feb 2025 11:06)      INTERVAL HPI/OVERNIGHT EVENTS:   No overnight events   Afebrile, hemodynamically stable     ICU Vital Signs Last 24 Hrs  T(C): 36.7 (07 Feb 2025 14:44), Max: 37.4 (06 Feb 2025 17:06)  T(F): 98.1 (07 Feb 2025 14:44), Max: 99.3 (06 Feb 2025 17:06)  HR: 98 (07 Feb 2025 16:00) (76 - 98)  BP: 123/81 (07 Feb 2025 16:00) (108/74 - 162/102)  BP(mean): 96 (07 Feb 2025 16:00) (84 - 126)  ABP: --  ABP(mean): --  RR: 14 (07 Feb 2025 16:00) (12 - 21)  SpO2: 95% (07 Feb 2025 16:00) (93% - 98%)    O2 Parameters below as of 07 Feb 2025 16:00  Patient On (Oxygen Delivery Method): ventilator, CPAP    O2 Concentration (%): 21      I&O's Summary    06 Feb 2025 07:01  -  07 Feb 2025 07:00  --------------------------------------------------------  IN: 1430 mL / OUT: 600 mL / NET: 830 mL    07 Feb 2025 07:01  -  07 Feb 2025 16:37  --------------------------------------------------------  IN: 960 mL / OUT: 1400 mL / NET: -440 mL      Mode: PS (Pressure Support)/ Spontaneous  FiO2: 21  PEEP: 5  PS: 10  MAP: 8  PIP: 15      LABS:                        12.0   15.75 )-----------( 201      ( 07 Feb 2025 05:30 )             37.5     02-07    153[H]  |  114[H]  |  20  ----------------------------<  177[H]  3.9   |  29  |  0.27[L]    Ca    8.1[L]      07 Feb 2025 05:30  Phos  1.9     02-07  Mg     2.4     02-07    TPro  4.9[L]  /  Alb  2.6[L]  /  TBili  0.2  /  DBili  x   /  AST  25  /  ALT  45  /  AlkPhos  101  02-07      Urinalysis Basic - ( 07 Feb 2025 05:30 )    Color: x / Appearance: x / SG: x / pH: x  Gluc: 177 mg/dL / Ketone: x  / Bili: x / Urobili: x   Blood: x / Protein: x / Nitrite: x   Leuk Esterase: x / RBC: x / WBC x   Sq Epi: x / Non Sq Epi: x / Bacteria: x      CAPILLARY BLOOD GLUCOSE      POCT Blood Glucose.: 110 mg/dL (07 Feb 2025 11:15)  POCT Blood Glucose.: 194 mg/dL (07 Feb 2025 05:31)  POCT Blood Glucose.: 190 mg/dL (06 Feb 2025 23:50)  POCT Blood Glucose.: 218 mg/dL (06 Feb 2025 18:06)        RADIOLOGY & ADDITIONAL TESTS:    Consultant(s) Notes Reviewed:  [x ] YES  [ ] NO    MEDICATIONS  (STANDING):  artificial  tears Solution 1 Drop(s) Both EYES every 12 hours  chlorhexidine 0.12% Liquid 15 milliLiter(s) Oral Mucosa every 12 hours  chlorhexidine 2% Cloths 1 Application(s) Topical <User Schedule>  dexAMETHasone  Injectable 4 milliGRAM(s) IV Push every 6 hours  dextrose 5%. 1000 milliLiter(s) (50 mL/Hr) IV Continuous <Continuous>  dextrose 5%. 1000 milliLiter(s) (100 mL/Hr) IV Continuous <Continuous>  dextrose 50% Injectable 25 Gram(s) IV Push once  dextrose 50% Injectable 12.5 Gram(s) IV Push once  dextrose 50% Injectable 25 Gram(s) IV Push once  enoxaparin Injectable 40 milliGRAM(s) SubCutaneous every 24 hours  glucagon  Injectable 1 milliGRAM(s) IntraMuscular once  influenza  Vaccine (HIGH DOSE) 0.5 milliLiter(s) IntraMuscular once  insulin lispro (ADMELOG) corrective regimen sliding scale   SubCutaneous every 6 hours  lacosamide IVPB 200 milliGRAM(s) IV Intermittent every 12 hours  levETIRAcetam  IVPB 1000 milliGRAM(s) IV Intermittent every 12 hours  pantoprazole  Injectable 40 milliGRAM(s) IV Push every 24 hours  petrolatum Ophthalmic Ointment 1 Application(s) Both EYES every 12 hours  valproate sodium   IVPB 500 milliGRAM(s) IV Intermittent every 6 hours    MEDICATIONS  (PRN):  dextrose Oral Gel 15 Gram(s) Oral once PRN Blood Glucose LESS THAN 70 milliGRAM(s)/deciliter  LORazepam   Injectable 2 milliGRAM(s) IV Push every 4 hours PRN Seizures      PHYSICAL EXAM:  GENERAL:   HEAD:  Atraumatic, Normocephalic  EYES: EOMI, PERRLA, conjunctiva and sclera clear  NECK: Supple, No JVD, Normal thyroid, no enlarged nodes  NERVOUS SYSTEM:  Alert & Awake.   CHEST/LUNG: B/L good air entry; No rales, rhonchi, or wheezing  HEART: S1S2 normal, no S3, Regular rate and rhythm; No murmurs  ABDOMEN: Soft, Nontender, Nondistended; Bowel sounds present  EXTREMITIES:  2+ Peripheral Pulses, No clubbing, cyanosis, or edema  LYMPH: No lymphadenopathy noted  SKIN: No rashes or lesions    Care Discussed with Consultants/Other Providers [ x] YES  [ ] NO Patient is a 74y old  Female who presents with a chief complaint of AMS (07 Feb 2025 11:06)      INTERVAL HPI/OVERNIGHT EVENTS:   CTA done overnight with wet read not concerning for gas.     Subjective:   Pt seen at bedside, no signs of acute distress. Patient still not currently on sedation. Patient with +gag reflex, able to open eyes to name but minimally responsive otherwise. R arm without signs of spread of infection, appears to be within margins.     ICU Vital Signs Last 24 Hrs  T(C): 36.7 (07 Feb 2025 14:44), Max: 37.4 (06 Feb 2025 17:06)  T(F): 98.1 (07 Feb 2025 14:44), Max: 99.3 (06 Feb 2025 17:06)  HR: 98 (07 Feb 2025 16:00) (76 - 98)  BP: 123/81 (07 Feb 2025 16:00) (108/74 - 162/102)  BP(mean): 96 (07 Feb 2025 16:00) (84 - 126)  ABP: --  ABP(mean): --  RR: 14 (07 Feb 2025 16:00) (12 - 21)  SpO2: 95% (07 Feb 2025 16:00) (93% - 98%)    O2 Parameters below as of 07 Feb 2025 16:00  Patient On (Oxygen Delivery Method): ventilator, CPAP    O2 Concentration (%): 21      I&O's Summary    06 Feb 2025 07:01  -  07 Feb 2025 07:00  --------------------------------------------------------  IN: 1430 mL / OUT: 600 mL / NET: 830 mL    07 Feb 2025 07:01  -  07 Feb 2025 16:37  --------------------------------------------------------  IN: 960 mL / OUT: 1400 mL / NET: -440 mL      Mode: PS (Pressure Support)/ Spontaneous  FiO2: 21  PEEP: 5  PS: 10  MAP: 8  PIP: 15      LABS:                        12.0   15.75 )-----------( 201      ( 07 Feb 2025 05:30 )             37.5     02-07    153[H]  |  114[H]  |  20  ----------------------------<  177[H]  3.9   |  29  |  0.27[L]    Ca    8.1[L]      07 Feb 2025 05:30  Phos  1.9     02-07  Mg     2.4     02-07    TPro  4.9[L]  /  Alb  2.6[L]  /  TBili  0.2  /  DBili  x   /  AST  25  /  ALT  45  /  AlkPhos  101  02-07      Urinalysis Basic - ( 07 Feb 2025 05:30 )    Color: x / Appearance: x / SG: x / pH: x  Gluc: 177 mg/dL / Ketone: x  / Bili: x / Urobili: x   Blood: x / Protein: x / Nitrite: x   Leuk Esterase: x / RBC: x / WBC x   Sq Epi: x / Non Sq Epi: x / Bacteria: x      CAPILLARY BLOOD GLUCOSE      POCT Blood Glucose.: 110 mg/dL (07 Feb 2025 11:15)  POCT Blood Glucose.: 194 mg/dL (07 Feb 2025 05:31)  POCT Blood Glucose.: 190 mg/dL (06 Feb 2025 23:50)  POCT Blood Glucose.: 218 mg/dL (06 Feb 2025 18:06)        RADIOLOGY & ADDITIONAL TESTS:    Consultant(s) Notes Reviewed:  [x ] YES  [ ] NO    MEDICATIONS  (STANDING):  artificial  tears Solution 1 Drop(s) Both EYES every 12 hours  chlorhexidine 0.12% Liquid 15 milliLiter(s) Oral Mucosa every 12 hours  chlorhexidine 2% Cloths 1 Application(s) Topical <User Schedule>  dexAMETHasone  Injectable 4 milliGRAM(s) IV Push every 6 hours  dextrose 5%. 1000 milliLiter(s) (50 mL/Hr) IV Continuous <Continuous>  dextrose 5%. 1000 milliLiter(s) (100 mL/Hr) IV Continuous <Continuous>  dextrose 50% Injectable 25 Gram(s) IV Push once  dextrose 50% Injectable 12.5 Gram(s) IV Push once  dextrose 50% Injectable 25 Gram(s) IV Push once  enoxaparin Injectable 40 milliGRAM(s) SubCutaneous every 24 hours  glucagon  Injectable 1 milliGRAM(s) IntraMuscular once  influenza  Vaccine (HIGH DOSE) 0.5 milliLiter(s) IntraMuscular once  insulin lispro (ADMELOG) corrective regimen sliding scale   SubCutaneous every 6 hours  lacosamide IVPB 200 milliGRAM(s) IV Intermittent every 12 hours  levETIRAcetam  IVPB 1000 milliGRAM(s) IV Intermittent every 12 hours  pantoprazole  Injectable 40 milliGRAM(s) IV Push every 24 hours  petrolatum Ophthalmic Ointment 1 Application(s) Both EYES every 12 hours  valproate sodium   IVPB 500 milliGRAM(s) IV Intermittent every 6 hours    MEDICATIONS  (PRN):  dextrose Oral Gel 15 Gram(s) Oral once PRN Blood Glucose LESS THAN 70 milliGRAM(s)/deciliter  LORazepam   Injectable 2 milliGRAM(s) IV Push every 4 hours PRN Seizures      PHYSICAL EXAM:  General: intubated, not sedated.   HEENT: constricted pupils, minimally reactive light , anicteric sclera,  Cardiovascular: RRR, no m/r/g.   Respiratory: CTAB; no W/R/R  Gastrointestinal: soft, NT/ND; +BS x4  Extremities: cool extremities; 2+ peripheral pulses bilaterally; no LE edema, 4-5 second cap refill  Skin: R hand with less blistering and erythema, blanchable. wounds that are dry within the margins of the lines drawn   Neurologic: + Gag reflex, responds to pain stimuli, does not respond to name.     Care Discussed with Consultants/Other Providers [ x] YES  [ ] NO

## 2025-02-07 NOTE — PROGRESS NOTE ADULT - ASSESSMENT
75 yo female w PMH GBM on hospice care (diagnosed in 2024, follows at [facility] under Dr. Hernandez, with reported attempted resection of tumor, on chemotherapy with last chemo 2 months ago), admitted for ams likely 2/2 disease progression After admission to [facility], Upon evaluation pt was obtunded with tremors to RUE, response to noxious stimuli but otherwise no response. Pupils were responsive with saccadic movement. She was slumped to her right sided with gurgling breath sounds.  At that point rapid was called and patient was inevitably intubated. Transferred to [facility]. Discussed with pts daughters post procedure poor prognosis and advised to have formal family meeting to clarify and document wishes.       NEURO  Intubated. Not sedated. Minimally responsive despite not being on sedation. 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 There is evidence of focal cortical hyperexcitability, more prominent in the right than the left frontotemporal regions. The background pattern shows a burst suppression ratio of 10:1, indicating severe diffuse or multifocal cerebral dysfunction, potentially exacerbated by IV sedatives. Additionally, there is severe generalized and multifocal slowing, further supporting significant underlying cerebral dysfunction.  Continued EEG findings of seizures on 2/6  Plan:   s/p valproic acid loading dose 1200 mg, vimpat 200 mg loading  -Vimpat 200 BID  -Depakote 500 q6 hrs   -Load with Keppra 1g now then 500 BID thereafter for persistent seizures   -continue on vEEG and f/u epilepsy recs   -c/w ativan 0.5mg q4h prn for seizures    #GBM  last chemo ~2months ago, follows at [facility].  Per Neurosurgery consult on 1/31, no acute interventions.   -c/w decadron 4 IV q6h  -palliative care consult   -maintain Na > 145 to address cerebral edema 2/2 mass effect    CARDIAC  #Chronic atrial fibrillation.    Home med: metoprolol 12.5 BID   - started lovenox ( per NSGY, no increased risk of hemorrhage     Plan  -bladder scans q6h   -Lasix 40 mg IV push given today given net positive   -BMP every 6 hrs   -consider john (currently on primafit) if pt retaining   -make sure pt having BMs  -pain control with dilaudid 0.5 q4h PRN.    PULM  #intubated  - intubated on 2/1  - GOC  - consider trach moving forward    GI  - NPO with tube feeds   - consider PEG moving forward    RENAL  - currently with normal renal function  -Add 1 dose 40 IV Lasix for fluid retention and overload     #hypernatremia  -maintain Na > 145 as above for mass effect but less than 150     ID  #SIRS  #ESBL Ecoli Bacteremia  #R arm cellulitis   Systemic inflammatory response syndrome (SIRS).   Meeting SIRS 2/4 (HR>90, WBC>12k) likely iso of UTI.   Bcx with ESBL E. coli  -c/w Ertapenem 1 g qd (last day today)   -IVF as needed  -Vanc 1g q24 for covg or R arm infection and 1 dose Clindamycin (900)   -plan R arm CT angio with IV contrast    F: none  E: replete K<4, Mg<2  N: npo with tube feeds  VTE Prophylaxis: lovenox  GI: pantoprazole 40 IVP  C: Full Code  D: micu    Lines: Peripheral IV L 22g (2/4) IV L 18 g (2/4)l, NG tube      75 yo female w PMH GBM on hospice care (diagnosed in 2024, follows at [facility] under Dr. Hernandez, with reported attempted resection of tumor, on chemotherapy with last chemo 2 months ago), admitted for ams likely 2/2 disease progression After admission to [facility], Upon evaluation pt was obtunded with tremors to RUE, response to noxious stimuli but otherwise no response. Pupils were responsive with saccadic movement. She was slumped to her right sided with gurgling breath sounds.  At that point rapid was called and patient was inevitably intubated. Transferred to [facility]. Discussed with pts daughters post procedure poor prognosis and advised to have formal family meeting to clarify and document wishes.       NEURO  Intubated. Not sedated. Minimally responsive despite not being on sedation. 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 There is evidence of focal cortical hyperexcitability, more prominent in the right than the left frontotemporal regions. The background pattern shows a burst suppression ratio of 10:1, indicating severe diffuse or multifocal cerebral dysfunction, potentially exacerbated by IV sedatives. Additionally, there is severe generalized and multifocal slowing, further supporting significant underlying cerebral dysfunction.  Continued EEG findings of seizures on 2/6  Plan:   s/p valproic acid loading dose 1200 mg, vimpat 200 mg loading  -Vimpat 200 BID  -Depakote 500 q6 hrs   -Load with Keppra 1g now then 500 BID thereafter for persistent seizures   -continue on vEEG and f/u epilepsy recs (currently recommending 1 g depakote and bump Keppra to 1g BID)   -Holding epilepsy recs for now given patient has been getting serum valproic acid levels, but with decreased albumin concentration (this lab finding is protein bound) therefore pending FREE valproic acid levels before adjusting dose.   -c/w ativan 0.5mg q4h prn for seizures  -f/u ammonia levels (hyperammonemia possibel with VPA )     #GBM  last chemo ~2months ago, follows at [facility].  Per Neurosurgery consult on 1/31, no acute interventions.   -c/w decadron 4 IV q6h  -palliative care consult   -maintain Na > 145 to address cerebral edema 2/2 mass effect    CARDIAC  #Chronic atrial fibrillation.    Home med: metoprolol 12.5 BID   - started lovenox ( per NSGY, no increased risk of hemorrhage     Plan  -bladder scans q6h   -Lasix 40 mg IV push given today given net positive   -BMP every 6 hrs   -consider john (currently on primafit) if pt retaining   -make sure pt having BMs  -pain control with dilaudid 0.5 q4h PRN.    PULM  #intubated  - intubated on 2/1  - GOC  - consider trach moving forward    GI  - NPO with tube feeds   - consider PEG moving forward    RENAL  - currently with normal renal function  -Add 1 dose 40 IV Lasix for fluid retention and overload     #hypernatremia  -maintain Na > 145 as above for mass effect but less than 150     ID  #SIRS  #ESBL Ecoli Bacteremia  #R arm cellulitis   #ESBL ecoli UTI   Systemic inflammatory response syndrome (SIRS).   Meeting SIRS 2/4 (HR>90, WBC>12k) likely iso of UTI.   Bcx with ESBL E. coli  -s/p ertapenem tx   -IVF as needed  -d/c vanc given unremarkable CT findings   -plan R arm CT angio with IV contrast    MSK     PT/OT consulted    F: none  E: replete K<4, Mg<2  N: npo with tube feeds  VTE Prophylaxis: lovenox  GI: pantoprazole 40 IVP  C: Full Code  D: micu    Lines: Peripheral IV L 22g (2/4) IV L 18 g (2/4)l, NG tube

## 2025-02-07 NOTE — PROGRESS NOTE ADULT - ATTENDING COMMENTS
Patient still with break through seizures overnight. Will plan to increase keppra after discussion with epilepsy. Still swollen will given 1 dose lasix. CT scan with no gas and arm does look improved will stop antibiotics for now. Respiratory status stable able to tolerate CPAP but still not waking up.

## 2025-02-07 NOTE — PROGRESS NOTE ADULT - ASSESSMENT
75 yo female w PMH GBM (diagnosed in 2024, follows at [facility] under Dr. Hernandez, with reported attempted resection of tumor, on chemotherapy with last chemo 2 months ago) who presents with altered mentation. Epilepsy following for seizures with b/l foci, likely i/s/o tumor as well as considerable mass effect.  vEEG was notable for focal status, Propofol was started, Depakote was uptitrated and Vimpat added on 2/3 with improvement in EEG.  On 2/4 propofol was stopped and later in the evening Depakote was held (due to c/f interaction w/ carbapenem), with return of focal seizures and secondary generalization. Depakote was restarted. Keppra was added. vEEG at present with continued short focal seizures without secondary generalization.     Recommendations:  - C/w Vimpat 200mg BID (monitor tele to ensure no heart block)  - VPA level noted. Give Depakote 1g x1 now  - C/w Depakote 500mg q6hrs  - Obtain VPA level tomorrow AM  - Increase Keppra 1000mg BID  - C/w vEEG  - seizure precautions    Recommendations communicated to primary team. Discussed with Dr Hough.

## 2025-02-08 LAB
ALBUMIN SERPL ELPH-MCNC: 3 G/DL — LOW (ref 3.3–5)
ALBUMIN SERPL ELPH-MCNC: 3 G/DL — LOW (ref 3.3–5)
ALP SERPL-CCNC: 110 U/L — SIGNIFICANT CHANGE UP (ref 40–120)
ALP SERPL-CCNC: 111 U/L — SIGNIFICANT CHANGE UP (ref 40–120)
ALT FLD-CCNC: 49 U/L — HIGH (ref 10–45)
ALT FLD-CCNC: 54 U/L — HIGH (ref 10–45)
AMMONIA BLD-MCNC: 22 UMOL/L — SIGNIFICANT CHANGE UP (ref 11–55)
ANION GAP SERPL CALC-SCNC: 12 MMOL/L — SIGNIFICANT CHANGE UP (ref 5–17)
ANION GAP SERPL CALC-SCNC: 12 MMOL/L — SIGNIFICANT CHANGE UP (ref 5–17)
AST SERPL-CCNC: 26 U/L — SIGNIFICANT CHANGE UP (ref 10–40)
AST SERPL-CCNC: 35 U/L — SIGNIFICANT CHANGE UP (ref 10–40)
BASOPHILS # BLD AUTO: 0.05 K/UL — SIGNIFICANT CHANGE UP (ref 0–0.2)
BASOPHILS NFR BLD AUTO: 0.3 % — SIGNIFICANT CHANGE UP (ref 0–2)
BILIRUB SERPL-MCNC: 0.2 MG/DL — SIGNIFICANT CHANGE UP (ref 0.2–1.2)
BILIRUB SERPL-MCNC: 0.3 MG/DL — SIGNIFICANT CHANGE UP (ref 0.2–1.2)
BUN SERPL-MCNC: 26 MG/DL — HIGH (ref 7–23)
BUN SERPL-MCNC: 26 MG/DL — HIGH (ref 7–23)
CALCIUM SERPL-MCNC: 7.6 MG/DL — LOW (ref 8.4–10.5)
CALCIUM SERPL-MCNC: 8.1 MG/DL — LOW (ref 8.4–10.5)
CHLORIDE SERPL-SCNC: 107 MMOL/L — SIGNIFICANT CHANGE UP (ref 96–108)
CHLORIDE SERPL-SCNC: 109 MMOL/L — HIGH (ref 96–108)
CO2 SERPL-SCNC: 31 MMOL/L — SIGNIFICANT CHANGE UP (ref 22–31)
CO2 SERPL-SCNC: 33 MMOL/L — HIGH (ref 22–31)
CREAT SERPL-MCNC: 0.32 MG/DL — LOW (ref 0.5–1.3)
CREAT SERPL-MCNC: 0.32 MG/DL — LOW (ref 0.5–1.3)
EGFR: 110 ML/MIN/1.73M2 — SIGNIFICANT CHANGE UP
EGFR: 110 ML/MIN/1.73M2 — SIGNIFICANT CHANGE UP
EOSINOPHIL # BLD AUTO: 0 K/UL — SIGNIFICANT CHANGE UP (ref 0–0.5)
EOSINOPHIL NFR BLD AUTO: 0 % — SIGNIFICANT CHANGE UP (ref 0–6)
GLUCOSE SERPL-MCNC: 207 MG/DL — HIGH (ref 70–99)
GLUCOSE SERPL-MCNC: 236 MG/DL — HIGH (ref 70–99)
HCT VFR BLD CALC: 41.1 % — SIGNIFICANT CHANGE UP (ref 34.5–45)
HGB BLD-MCNC: 12.8 G/DL — SIGNIFICANT CHANGE UP (ref 11.5–15.5)
IMM GRANULOCYTES NFR BLD AUTO: 5.9 % — HIGH (ref 0–0.9)
LYMPHOCYTES # BLD AUTO: 0.88 K/UL — LOW (ref 1–3.3)
LYMPHOCYTES # BLD AUTO: 5.9 % — LOW (ref 13–44)
MAGNESIUM SERPL-MCNC: 2.3 MG/DL — SIGNIFICANT CHANGE UP (ref 1.6–2.6)
MCHC RBC-ENTMCNC: 28.3 PG — SIGNIFICANT CHANGE UP (ref 27–34)
MCHC RBC-ENTMCNC: 31.1 G/DL — LOW (ref 32–36)
MCV RBC AUTO: 90.7 FL — SIGNIFICANT CHANGE UP (ref 80–100)
MONOCYTES # BLD AUTO: 0.56 K/UL — SIGNIFICANT CHANGE UP (ref 0–0.9)
MONOCYTES NFR BLD AUTO: 3.7 % — SIGNIFICANT CHANGE UP (ref 2–14)
NEUTROPHILS # BLD AUTO: 12.63 K/UL — HIGH (ref 1.8–7.4)
NEUTROPHILS NFR BLD AUTO: 84.2 % — HIGH (ref 43–77)
NRBC # BLD: 0 /100 WBCS — SIGNIFICANT CHANGE UP (ref 0–0)
NRBC BLD-RTO: 0 /100 WBCS — SIGNIFICANT CHANGE UP (ref 0–0)
PHOSPHATE SERPL-MCNC: 2 MG/DL — LOW (ref 2.5–4.5)
PLATELET # BLD AUTO: 236 K/UL — SIGNIFICANT CHANGE UP (ref 150–400)
POTASSIUM SERPL-MCNC: 4 MMOL/L — SIGNIFICANT CHANGE UP (ref 3.5–5.3)
POTASSIUM SERPL-MCNC: 4.6 MMOL/L — SIGNIFICANT CHANGE UP (ref 3.5–5.3)
POTASSIUM SERPL-SCNC: 4 MMOL/L — SIGNIFICANT CHANGE UP (ref 3.5–5.3)
POTASSIUM SERPL-SCNC: 4.6 MMOL/L — SIGNIFICANT CHANGE UP (ref 3.5–5.3)
PROT SERPL-MCNC: 5 G/DL — LOW (ref 6–8.3)
PROT SERPL-MCNC: 5.1 G/DL — LOW (ref 6–8.3)
RBC # BLD: 4.53 M/UL — SIGNIFICANT CHANGE UP (ref 3.8–5.2)
RBC # FLD: 16.9 % — HIGH (ref 10.3–14.5)
SODIUM SERPL-SCNC: 152 MMOL/L — HIGH (ref 135–145)
SODIUM SERPL-SCNC: 152 MMOL/L — HIGH (ref 135–145)
VALPROATE SERPL-MCNC: 38.6 UG/ML — LOW (ref 50–100)
WBC # BLD: 15 K/UL — HIGH (ref 3.8–10.5)
WBC # FLD AUTO: 15 K/UL — HIGH (ref 3.8–10.5)

## 2025-02-08 PROCEDURE — 99291 CRITICAL CARE FIRST HOUR: CPT

## 2025-02-08 PROCEDURE — 99232 SBSQ HOSP IP/OBS MODERATE 35: CPT

## 2025-02-08 PROCEDURE — 95720 EEG PHY/QHP EA INCR W/VEEG: CPT

## 2025-02-08 PROCEDURE — 71045 X-RAY EXAM CHEST 1 VIEW: CPT | Mod: 26

## 2025-02-08 RX ORDER — SODIUM CHLORIDE 9 G/1000ML
1000 INJECTION, SOLUTION INTRAVENOUS
Refills: 0 | Status: DISCONTINUED | OUTPATIENT
Start: 2025-02-08 | End: 2025-02-09

## 2025-02-08 RX ORDER — LEVETIRACETAM 10 MG/ML
1500 INJECTION, SOLUTION INTRAVENOUS EVERY 12 HOURS
Refills: 0 | Status: DISCONTINUED | OUTPATIENT
Start: 2025-02-08 | End: 2025-02-10

## 2025-02-08 RX ORDER — SODIUM PHOSPHATE,DIBASIC DIHYD
15 POWDER (GRAM) MISCELLANEOUS ONCE
Refills: 0 | Status: COMPLETED | OUTPATIENT
Start: 2025-02-08 | End: 2025-02-08

## 2025-02-08 RX ADMIN — DEXAMETHASONE 4 MILLIGRAM(S): 0.5 TABLET ORAL at 23:58

## 2025-02-08 RX ADMIN — DEXAMETHASONE 4 MILLIGRAM(S): 0.5 TABLET ORAL at 06:09

## 2025-02-08 RX ADMIN — LACOSAMIDE 140 MILLIGRAM(S): 150 TABLET, FILM COATED ORAL at 21:55

## 2025-02-08 RX ADMIN — LEVETIRACETAM 400 MILLIGRAM(S): 10 INJECTION, SOLUTION INTRAVENOUS at 09:10

## 2025-02-08 RX ADMIN — INSULIN LISPRO 1: 100 INJECTION, SOLUTION INTRAVENOUS; SUBCUTANEOUS at 17:49

## 2025-02-08 RX ADMIN — Medication 1 DROP(S): at 09:30

## 2025-02-08 RX ADMIN — LACOSAMIDE 140 MILLIGRAM(S): 150 TABLET, FILM COATED ORAL at 09:29

## 2025-02-08 RX ADMIN — Medication 1 APPLICATION(S): at 06:10

## 2025-02-08 RX ADMIN — Medication 40 MILLIGRAM(S): at 09:10

## 2025-02-08 RX ADMIN — Medication 1 DROP(S): at 21:57

## 2025-02-08 RX ADMIN — INSULIN LISPRO 1: 100 INJECTION, SOLUTION INTRAVENOUS; SUBCUTANEOUS at 06:09

## 2025-02-08 RX ADMIN — DEXAMETHASONE 4 MILLIGRAM(S): 0.5 TABLET ORAL at 17:50

## 2025-02-08 RX ADMIN — SODIUM CHLORIDE 40 MILLILITER(S): 9 INJECTION, SOLUTION INTRAVENOUS at 14:14

## 2025-02-08 RX ADMIN — Medication 1 APPLICATION(S): at 21:58

## 2025-02-08 RX ADMIN — INSULIN LISPRO 1: 100 INJECTION, SOLUTION INTRAVENOUS; SUBCUTANEOUS at 23:57

## 2025-02-08 RX ADMIN — Medication 1 APPLICATION(S): at 09:30

## 2025-02-08 RX ADMIN — ENOXAPARIN SODIUM 40 MILLIGRAM(S): 100 INJECTION SUBCUTANEOUS at 18:05

## 2025-02-08 RX ADMIN — DEXAMETHASONE 4 MILLIGRAM(S): 0.5 TABLET ORAL at 11:47

## 2025-02-08 RX ADMIN — Medication 55 MILLIGRAM(S): at 09:11

## 2025-02-08 RX ADMIN — Medication 55 MILLIGRAM(S): at 16:27

## 2025-02-08 RX ADMIN — Medication 55 MILLIGRAM(S): at 21:55

## 2025-02-08 RX ADMIN — Medication 15 MILLILITER(S): at 21:57

## 2025-02-08 RX ADMIN — Medication 15 MILLILITER(S): at 09:29

## 2025-02-08 RX ADMIN — LEVETIRACETAM 400 MILLIGRAM(S): 10 INJECTION, SOLUTION INTRAVENOUS at 20:43

## 2025-02-08 RX ADMIN — Medication 62.5 MILLIMOLE(S): at 09:10

## 2025-02-08 RX ADMIN — Medication 55 MILLIGRAM(S): at 02:33

## 2025-02-08 NOTE — PROGRESS NOTE ADULT - SUBJECTIVE AND OBJECTIVE BOX
SUBJECTIVE:  ORVILLE SOLANO is doing well this morning. Today is hospital day 8d.     24 Hour Events:   - No acute overnight events.    ALLERGIES:  No Known Allergies    MEDICATIONS:  STANDING MEDICATIONS  artificial  tears Solution 1 Drop(s) Both EYES every 12 hours  chlorhexidine 0.12% Liquid 15 milliLiter(s) Oral Mucosa every 12 hours  chlorhexidine 2% Cloths 1 Application(s) Topical <User Schedule>  dexAMETHasone  Injectable 4 milliGRAM(s) IV Push every 6 hours  dextrose 5%. 1000 milliLiter(s) IV Continuous <Continuous>  dextrose 5%. 1000 milliLiter(s) IV Continuous <Continuous>  dextrose 50% Injectable 25 Gram(s) IV Push once  dextrose 50% Injectable 12.5 Gram(s) IV Push once  dextrose 50% Injectable 25 Gram(s) IV Push once  enoxaparin Injectable 40 milliGRAM(s) SubCutaneous every 24 hours  glucagon  Injectable 1 milliGRAM(s) IntraMuscular once  influenza  Vaccine (HIGH DOSE) 0.5 milliLiter(s) IntraMuscular once  insulin lispro (ADMELOG) corrective regimen sliding scale   SubCutaneous every 6 hours  lacosamide IVPB 200 milliGRAM(s) IV Intermittent every 12 hours  levETIRAcetam  IVPB 1000 milliGRAM(s) IV Intermittent every 12 hours  pantoprazole  Injectable 40 milliGRAM(s) IV Push every 24 hours  petrolatum Ophthalmic Ointment 1 Application(s) Both EYES every 12 hours  valproate sodium   IVPB 500 milliGRAM(s) IV Intermittent every 6 hours    PRN MEDICATIONS  dextrose Oral Gel 15 Gram(s) Oral once PRN  LORazepam   Injectable 2 milliGRAM(s) IV Push every 4 hours PRN    VITALS:   ICU Vital Signs Last 24 Hrs  T(C): 36.9 (08 Feb 2025 05:04), Max: 37.3 (07 Feb 2025 06:03)  T(F): 98.4 (08 Feb 2025 05:04), Max: 99.1 (07 Feb 2025 06:03)  HR: 92 (08 Feb 2025 04:00) (76 - 100)  BP: 134/84 (08 Feb 2025 04:00) (110/78 - 162/102)  BP(mean): 103 (08 Feb 2025 04:00) (89 - 126)  ABP: --  ABP(mean): --  RR: 15 (08 Feb 2025 04:00) (12 - 19)  SpO2: 94% (08 Feb 2025 04:00) (93% - 97%)    O2 Parameters below as of 08 Feb 2025 04:00  Patient On (Oxygen Delivery Method): ventilator    O2 Concentration (%): 21        PHYSICAL EXAM  General: intubated, not sedated.   HEENT: constricted pupils, minimally reactive light , anicteric sclera,  Cardiovascular: RRR, no m/r/g.   Respiratory: CTAB; no W/R/R  Gastrointestinal: soft, NT/ND; +BS x4  Extremities: cool extremities; 2+ peripheral pulses bilaterally; no LE edema, 4-5 second cap refill  Skin: R hand with less blistering and erythema, blanchable. wounds that are dry within the margins of the lines drawn   Neurologic: + Gag reflex, responds to pain stimuli, does not respond to name.     LABS:                        12.0   15.75 )-----------( 201      ( 07 Feb 2025 05:30 )             37.5     02-07    154[H]  |  107  |  28[H]  ----------------------------<  246[H]  4.1   |  30  |  0.38[L]    Ca    8.4      07 Feb 2025 16:16  Phos  1.9     02-07  Mg     2.4     02-07    TPro  4.9[L]  /  Alb  2.6[L]  /  TBili  0.2  /  DBili  x   /  AST  25  /  ALT  45  /  AlkPhos  101  02-07      Urinalysis Basic - ( 07 Feb 2025 16:16 )    Color: x / Appearance: x / SG: x / pH: x  Gluc: 246 mg/dL / Ketone: x  / Bili: x / Urobili: x   Blood: x / Protein: x / Nitrite: x   Leuk Esterase: x / RBC: x / WBC x   Sq Epi: x / Non Sq Epi: x / Bacteria: x    MICRO:  no new    CARDS:  no new      RADIOLOGY:  < from: Xray Chest 1 View- PORTABLE-Urgent (Xray Chest 1 View- PORTABLE-Urgent .) (02.08.25 @ 01:28) >  IMPRESSION:    ET tube terminating 2.5 cm above angelica.    ******PRELIMINARY REPORT******      ******PRELIMINARY REPORT******       < end of copied text >      Lines:  Central line:              Date placed:             Indication:   Intravenous Access:   NG tube:   Wade Catheter:       Phosphorus: AM Sched. Collection: 08-Feb-2025 05:30 (02-07-25 @ 21:59)  Magnesium: AM Sched. Collection: 08-Feb-2025 05:30 (02-07-25 @ 21:59)  Comprehensive Metabolic Panel: AM Sched. Collection: 08-Feb-2025 05:30 (02-07-25 @ 21:59)  Complete Blood Count + Automated Diff: AM Sched. Collection: 08-Feb-2025 05:30 (02-07-25 @ 21:59)  Ammonia, Serum: AM Sched. Collection: 08-Feb-2025 05:30 (02-07-25 @ 21:10)  Valproic Acid Level, Serum: AM Sched. Collection: 08-Feb-2025 05:30 (02-07-25 @ 11:08)     24 Hour Events:   - switched keppra to 1500mg BID     SUBJECTIVE:  ORVILLE SOLANO is somnolent this morning. Intubated.     ALLERGIES:  No Known Allergies    MEDICATIONS:  STANDING MEDICATIONS  artificial  tears Solution 1 Drop(s) Both EYES every 12 hours  chlorhexidine 0.12% Liquid 15 milliLiter(s) Oral Mucosa every 12 hours  chlorhexidine 2% Cloths 1 Application(s) Topical <User Schedule>  dexAMETHasone  Injectable 4 milliGRAM(s) IV Push every 6 hours  dextrose 5%. 1000 milliLiter(s) IV Continuous <Continuous>  dextrose 5%. 1000 milliLiter(s) IV Continuous <Continuous>  dextrose 50% Injectable 25 Gram(s) IV Push once  dextrose 50% Injectable 12.5 Gram(s) IV Push once  dextrose 50% Injectable 25 Gram(s) IV Push once  enoxaparin Injectable 40 milliGRAM(s) SubCutaneous every 24 hours  glucagon  Injectable 1 milliGRAM(s) IntraMuscular once  influenza  Vaccine (HIGH DOSE) 0.5 milliLiter(s) IntraMuscular once  insulin lispro (ADMELOG) corrective regimen sliding scale   SubCutaneous every 6 hours  lacosamide IVPB 200 milliGRAM(s) IV Intermittent every 12 hours  levETIRAcetam  IVPB 1000 milliGRAM(s) IV Intermittent every 12 hours  pantoprazole  Injectable 40 milliGRAM(s) IV Push every 24 hours  petrolatum Ophthalmic Ointment 1 Application(s) Both EYES every 12 hours  valproate sodium   IVPB 500 milliGRAM(s) IV Intermittent every 6 hours    PRN MEDICATIONS  dextrose Oral Gel 15 Gram(s) Oral once PRN  LORazepam   Injectable 2 milliGRAM(s) IV Push every 4 hours PRN    VITALS:   ICU Vital Signs Last 24 Hrs  T(C): 36.9 (08 Feb 2025 05:04), Max: 37.3 (07 Feb 2025 06:03)  T(F): 98.4 (08 Feb 2025 05:04), Max: 99.1 (07 Feb 2025 06:03)  HR: 92 (08 Feb 2025 04:00) (76 - 100)  BP: 134/84 (08 Feb 2025 04:00) (110/78 - 162/102)  BP(mean): 103 (08 Feb 2025 04:00) (89 - 126)  ABP: --  ABP(mean): --  RR: 15 (08 Feb 2025 04:00) (12 - 19)  SpO2: 94% (08 Feb 2025 04:00) (93% - 97%)    O2 Parameters below as of 08 Feb 2025 04:00  Patient On (Oxygen Delivery Method): ventilator    O2 Concentration (%): 21      PHYSICAL EXAM  General: intubated, not sedated.   HEENT: constricted pupils, minimally reactive light , anicteric sclera,  Cardiovascular: RRR, no m/r/g.   Respiratory: CTAB; no W/R/R  Gastrointestinal: soft, NT/ND; +BS x4  Extremities: cool extremities; 2+ peripheral pulses bilaterally; no LE edema, 4-5 second cap refill  Skin: R hand with less blistering and erythema, blanchable. wounds that are dry within the margins of the lines drawn   Neurologic: + Gag reflex, responds to pain stimuli, does not respond to name.     LABS:                        12.0   15.75 )-----------( 201      ( 07 Feb 2025 05:30 )             37.5     02-07    154[H]  |  107  |  28[H]  ----------------------------<  246[H]  4.1   |  30  |  0.38[L]    Ca    8.4      07 Feb 2025 16:16  Phos  1.9     02-07  Mg     2.4     02-07    TPro  4.9[L]  /  Alb  2.6[L]  /  TBili  0.2  /  DBili  x   /  AST  25  /  ALT  45  /  AlkPhos  101  02-07      Urinalysis Basic - ( 07 Feb 2025 16:16 )    Color: x / Appearance: x / SG: x / pH: x  Gluc: 246 mg/dL / Ketone: x  / Bili: x / Urobili: x   Blood: x / Protein: x / Nitrite: x   Leuk Esterase: x / RBC: x / WBC x   Sq Epi: x / Non Sq Epi: x / Bacteria: x    MICRO:  no new    CARDS:  no new      RADIOLOGY:  < from: Xray Chest 1 View- PORTABLE-Urgent (Xray Chest 1 View- PORTABLE-Urgent .) (02.08.25 @ 01:28) >  IMPRESSION:    ET tube terminating 2.5 cm above angelica.    ******PRELIMINARY REPORT******      ******PRELIMINARY REPORT******       < end of copied text >

## 2025-02-08 NOTE — PROGRESS NOTE ADULT - ASSESSMENT
75 yo female w PMH GBM on hospice care (diagnosed in 2024, follows at [facility] under Dr. Hernandez, with reported attempted resection of tumor, on chemotherapy with last chemo 2 months ago), admitted for ams likely 2/2 disease progression After admission to [facility], Upon evaluation pt was obtunded with tremors to RUE, response to noxious stimuli but otherwise no response. Pupils were responsive with saccadic movement. She was slumped to her right sided with gurgling breath sounds.  At that point rapid was called and patient was inevitably intubated. Transferred to [facility]. Discussed with pts daughters post procedure poor prognosis and advised to have formal family meeting to clarify and document wishes.       NEURO  Intubated. Not sedated. Minimally responsive despite not being on sedation. 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 There is evidence of focal cortical hyperexcitability, more prominent in the right than the left frontotemporal regions. The background pattern shows a burst suppression ratio of 10:1, indicating severe diffuse or multifocal cerebral dysfunction, potentially exacerbated by IV sedatives. Additionally, there is severe generalized and multifocal slowing, further supporting significant underlying cerebral dysfunction.  Continued EEG findings of seizures on 2/6  Plan:   s/p valproic acid loading dose 1200 mg, vimpat 200 mg loading  -Vimpat 200 BID  -Depakote 500 q6 hrs   -Load with Keppra 1g now then 500 BID thereafter for persistent seizures   -continue on vEEG and f/u epilepsy recs (currently recommending 1 g depakote and bump Keppra to 1g BID)   -Holding epilepsy recs for now given patient has been getting serum valproic acid levels, but with decreased albumin concentration (this lab finding is protein bound) therefore pending FREE valproic acid levels before adjusting dose.   -c/w ativan 0.5mg q4h prn for seizures  -f/u ammonia levels (hyperammonemia possibel with VPA )     #GBM  last chemo ~2months ago, follows at [facility].  Per Neurosurgery consult on 1/31, no acute interventions.   -c/w decadron 4 IV q6h  -palliative care consult   -maintain Na > 145 to address cerebral edema 2/2 mass effect    CARDIAC  #Chronic atrial fibrillation.    Home med: metoprolol 12.5 BID   - started lovenox ( per NSGY, no increased risk of hemorrhage     Plan  -bladder scans q6h   -Lasix 40 mg IV push given today given net positive   -BMP every 6 hrs   -consider john (currently on primafit) if pt retaining   -make sure pt having BMs  -pain control with dilaudid 0.5 q4h PRN.    PULM  #intubated  - intubated on 2/1  - GOC  - consider trach moving forward    GI  - NPO with tube feeds   - consider PEG moving forward    RENAL  - currently with normal renal function  -Add 1 dose 40 IV Lasix for fluid retention and overload     #hypernatremia  -maintain Na > 145 as above for mass effect but less than 150     ID  #SIRS  #ESBL Ecoli Bacteremia  #R arm cellulitis   #ESBL ecoli UTI   Systemic inflammatory response syndrome (SIRS).   Meeting SIRS 2/4 (HR>90, WBC>12k) likely iso of UTI.   Bcx with ESBL E. coli  -s/p ertapenem tx   -IVF as needed  -d/c vanc given unremarkable CT findings   -plan R arm CT angio with IV contrast    MSK     PT/OT consulted    F: none  E: replete K<4, Mg<2  N: npo with tube feeds  VTE Prophylaxis: lovenox  GI: pantoprazole 40 IVP  C: Full Code  D: micu    Lines: Peripheral IV L 22g (2/4) IV L 18 g (2/4)l, NG tube      73 yo female w PMH GBM on hospice care (diagnosed in 2024, follows at [facility] under Dr. Hernandez, with reported attempted resection of tumor, on chemotherapy with last chemo 2 months ago), admitted for ams likely 2/2 disease progression After admission to [facility], Upon evaluation pt was obtunded with tremors to RUE, response to noxious stimuli but otherwise no response. Pupils were responsive with saccadic movement. She was slumped to her right sided with gurgling breath sounds.  At that point rapid was called and patient was inevitably intubated. Transferred to [facility]. Discussed with pts daughters post procedure poor prognosis and advised to have formal family meeting to clarify and document wishes.       NEURO  Intubated. Not sedated. Minimally responsive despite not being on sedation. 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 There is evidence of focal cortical hyperexcitability, more prominent in the right than the left frontotemporal regions. The background pattern shows a burst suppression ratio of 10:1, indicating severe diffuse or multifocal cerebral dysfunction, potentially exacerbated by IV sedatives. Additionally, there is severe generalized and multifocal slowing, further supporting significant underlying cerebral dysfunction.  Continued EEG findings of seizures on 2/6  Plan:   s/p valproic acid loading dose 1200 mg, vimpat 200 mg loading  - epilepsy consulted, appreciate recs      - Increase Keppra 1500mg BID      - C/w Vimpat 200mg BID (monitor tele to ensure no heart block)      - VPA level noted, can hold off on additional dose today      - C/w Depakote 500mg q6hrs      - Obtain VPA level tomorrow AM      - scalp rest for vEEG; restart again tomorrow AM      - seizure precautions  - f/u CT Head non-contrast    #GBM  last chemo ~2months ago, follows at [facility].  Per Neurosurgery consult on 1/31, no acute interventions.   -c/w decadron 4 IV q6h  -palliative care consult   -maintain Na > 145 to address cerebral edema 2/2 mass effect    CARDIAC  #Chronic atrial fibrillation.    Home med: metoprolol 12.5 BID   - c/w Lovenox ( per NSGY, no increased risk of hemorrhage     Plan  -bladder scans q6h   -Lasix 40 mg IV push given today given net positive   -BMP every 6 hrs   -consider john (currently on primafit) if pt retaining   -make sure pt having BMs  -pain control with dilaudid 0.5 q4h PRN.    PULM  #intubated  - intubated on 2/1  - GOC  - consider trach moving forward    GI  - NPO with tube feeds   - consider PEG moving forward    RENAL  - currently with normal renal function  -Add 1 dose 40 IV Lasix for fluid retention and overload     #hypernatremia  -maintain Na > 145 as above for mass effect but less than 150     ID  #SIRS  #ESBL Ecoli Bacteremia  #R arm cellulitis   #ESBL ecoli UTI   Systemic inflammatory response syndrome (SIRS).   Meeting SIRS 2/4 (HR>90, WBC>12k) likely iso of UTI.   Bcx with ESBL E. coli  -s/p ertapenem tx   -IVF as needed  -d/c vanc given unremarkable CT findings   -plan R arm CT angio with IV contrast    F: none  E: replete K<4, Mg<2  N: npo with tube feeds  VTE Prophylaxis: lovenox 40mg QD  GI: pantoprazole 40 IVP  C: Full Code  D: micu    Lines: Peripheral IV L 22g (2/4) IV L 18 g (2/4)l, NG tube

## 2025-02-08 NOTE — PROGRESS NOTE ADULT - ATTENDING COMMENTS
Patient with focal seizures on EEG with adjustments made to seizure medication after discussion with neurology. Will plan to improve sodium and send patient for repeat CT head as she has been off sedation for 48 to 72 hours. CPAP well and will give extra dose of lasix today.

## 2025-02-08 NOTE — PROGRESS NOTE ADULT - ASSESSMENT
75 yo female w PMH GBM (diagnosed in 2024, follows at [facility] under Dr. Hernandez, with reported attempted resection of tumor, on chemotherapy with last chemo 2 months ago) who presents with altered mentation. Epilepsy following for seizures with b/l foci, likely i/s/o tumor as well as considerable mass effect.  vEEG was notable for focal status, Propofol was started, Depakote was uptitrated and Vimpat added on 2/3 with improvement in EEG.  On 2/4 propofol was stopped and later in the evening Depakote was held (due to c/f interaction w/ carbapenem), with return of focal seizures and secondary generalization. Depakote was restarted. Keppra was added. vEEG at present with continued short focal seizures without secondary generalization.     Recommendations: 73 yo female w PMH GBM (diagnosed in 2024, follows at [facility] under Dr. Hernandez, with reported attempted resection of tumor, on chemotherapy with last chemo 2 months ago) who presents with altered mentation. Epilepsy following for seizures with b/l foci, likely i/s/o tumor as well as considerable mass effect.  vEEG was notable for focal status, Propofol was started, Depakote was uptitrated and Vimpat added on 2/3 with improvement in EEG.  On 2/4 propofol was stopped and later in the evening Depakote was held (due to c/f interaction w/ carbapenem), with return of focal seizures and secondary generalization. Depakote was restarted. Keppra was added. vEEG today is improved with continued short focal seizures (~2 per hour) without secondary generalization.     Recommendations:  - Increase Keppra 1500mg BID  - C/w Vimpat 200mg BID (monitor tele to ensure no heart block)  - VPA level noted, can hold off on additional dose today  - C/w Depakote 500mg q6hrs  - Obtain VPA level tomorrow AM  - scalp rest for vEEG; restart again tomorrow AM  - seizure precautions    Recommendations communicated to primary team. Discussed with Dr Hough.    75 yo female w PMH GBM (diagnosed in 2024, follows at [facility] under Dr. Hernandez, with reported attempted resection of tumor, on chemotherapy with last chemo 2 months ago) who presents with altered mentation. Epilepsy following for seizures with b/l foci, likely i/s/o tumor as well as considerable mass effect.  vEEG was notable for focal status, Propofol was started, Depakote was uptitrated and Vimpat added on 2/3 with improvement in EEG.  On 2/4 propofol was stopped and later in the evening Depakote was held (due to c/f interaction w/ carbapenem), with return of focal seizures and secondary generalization. Depakote was restarted. Keppra was added. vEEG today is improved with continued short focal seizures (~2 per hour) without secondary generalization.     Recommendations:  - Increase Keppra 1500mg BID  - C/w Vimpat 200mg BID (monitor tele to ensure no heart block)  - VPA level noted, can hold off on additional dose today  - C/w Depakote 500mg q6hrs  - Obtain VPA level tomorrow AM  - scalp rest for vEEG; restart again tomorrow AM  - seizure precautions    Recommendations communicated to primary team. Discussed with Dr Hough.

## 2025-02-08 NOTE — PROGRESS NOTE ADULT - SUBJECTIVE AND OBJECTIVE BOX
Neurology Progress Note    Interval History:  The patient was seen and examined at the bedside. Remains intubated off sedation, no purposeful movements.       PAST MEDICAL & SURGICAL HISTORY:  GBM (glioblastoma multiforme)    Seizure            Medications:  artificial  tears Solution 1 Drop(s) Both EYES every 12 hours  chlorhexidine 0.12% Liquid 15 milliLiter(s) Oral Mucosa every 12 hours  chlorhexidine 2% Cloths 1 Application(s) Topical <User Schedule>  dexAMETHasone  Injectable 4 milliGRAM(s) IV Push every 6 hours  dextrose 5%. 1000 milliLiter(s) IV Continuous <Continuous>  dextrose 5%. 1000 milliLiter(s) IV Continuous <Continuous>  dextrose 50% Injectable 25 Gram(s) IV Push once  dextrose 50% Injectable 12.5 Gram(s) IV Push once  dextrose 50% Injectable 25 Gram(s) IV Push once  dextrose Oral Gel 15 Gram(s) Oral once PRN  enoxaparin Injectable 40 milliGRAM(s) SubCutaneous every 24 hours  glucagon  Injectable 1 milliGRAM(s) IntraMuscular once  influenza  Vaccine (HIGH DOSE) 0.5 milliLiter(s) IntraMuscular once  insulin lispro (ADMELOG) corrective regimen sliding scale   SubCutaneous every 6 hours  lacosamide IVPB 200 milliGRAM(s) IV Intermittent every 12 hours  levETIRAcetam  IVPB 1000 milliGRAM(s) IV Intermittent every 12 hours  LORazepam   Injectable 2 milliGRAM(s) IV Push every 4 hours PRN  pantoprazole  Injectable 40 milliGRAM(s) IV Push every 24 hours  petrolatum Ophthalmic Ointment 1 Application(s) Both EYES every 12 hours  sodium phosphate 15 milliMole(s)/250 mL IVPB 15 milliMole(s) IV Intermittent once  valproate sodium   IVPB 500 milliGRAM(s) IV Intermittent every 6 hours      Vital Signs Last 24 Hrs  T(C): 36.9 (08 Feb 2025 05:04), Max: 37.1 (07 Feb 2025 09:33)  T(F): 98.4 (08 Feb 2025 05:04), Max: 98.8 (07 Feb 2025 09:33)  HR: 99 (08 Feb 2025 07:00) (76 - 100)  BP: 133/90 (08 Feb 2025 07:00) (110/78 - 162/102)  BP(mean): 109 (08 Feb 2025 07:00) (89 - 126)  RR: 22 (08 Feb 2025 07:00) (12 - 22)  SpO2: 96% (08 Feb 2025 07:00) (93% - 97%)    Parameters below as of 08 Feb 2025 07:00  Patient On (Oxygen Delivery Method): ventilator    O2 Concentration (%): 40    Neurological Exam:   Mental status: Intubated off sedation. Not following commands or exhibiting purposeful movements.   Cranial nerves: Eyes are primarily midline, slightly dysconjugate. Intermittently has R gaze deviation. Pupils are 3mm and reactive. OCR ?present to the R does not cross midline. Corneal intact. Gag intact. ? BTT on the R today (inconsistent)  Motor and sensory:  No spontaneous movement or withdrawal to painful stimuli   Coordination: unable to assess  Reflexes: Mute toes b/l DTRs 2+ on the L, 1+ on the R.  Gait: unable to assess    Labs:  CBC Full  -  ( 08 Feb 2025 05:30 )  WBC Count : 15.00 K/uL  RBC Count : 4.53 M/uL  Hemoglobin : 12.8 g/dL  Hematocrit : 41.1 %  Platelet Count - Automated : 236 K/uL  Mean Cell Volume : 90.7 fl  Mean Cell Hemoglobin : 28.3 pg  Mean Cell Hemoglobin Concentration : 31.1 g/dL  Auto Neutrophil # : 12.63 K/uL  Auto Lymphocyte # : 0.88 K/uL  Auto Monocyte # : 0.56 K/uL  Auto Eosinophil # : 0.00 K/uL  Auto Basophil # : 0.05 K/uL  Auto Neutrophil % : 84.2 %  Auto Lymphocyte % : 5.9 %  Auto Monocyte % : 3.7 %  Auto Eosinophil % : 0.0 %  Auto Basophil % : 0.3 %    02-08    152[H]  |  109[H]  |  26[H]  ----------------------------<  236[H]  4.0   |  31  |  0.32[L]    Ca    8.1[L]      08 Feb 2025 05:30  Phos  2.0     02-08  Mg     2.3     02-08    TPro  5.1[L]  /  Alb  3.0[L]  /  TBili  0.2  /  DBili  x   /  AST  26  /  ALT  49[H]  /  AlkPhos  111  02-08    LIVER FUNCTIONS - ( 08 Feb 2025 05:30 )  Alb: 3.0 g/dL / Pro: 5.1 g/dL / ALK PHOS: 111 U/L / ALT: 49 U/L / AST: 26 U/L / GGT: x             Urinalysis Basic - ( 08 Feb 2025 05:30 )    Color: x / Appearance: x / SG: x / pH: x  Gluc: 236 mg/dL / Ketone: x  / Bili: x / Urobili: x   Blood: x / Protein: x / Nitrite: x   Leuk Esterase: x / RBC: x / WBC x   Sq Epi: x / Non Sq Epi: x / Bacteria: x        Assessment:  This is a 74y Female w/ h/o     Plan: Neurology Progress Note    Interval History:  The patient was seen and examined at the bedside. Remains intubated off sedation, no purposeful movements.       PAST MEDICAL & SURGICAL HISTORY:  GBM (glioblastoma multiforme)  Seizure      Medications:  artificial  tears Solution 1 Drop(s) Both EYES every 12 hours  chlorhexidine 0.12% Liquid 15 milliLiter(s) Oral Mucosa every 12 hours  chlorhexidine 2% Cloths 1 Application(s) Topical <User Schedule>  dexAMETHasone  Injectable 4 milliGRAM(s) IV Push every 6 hours  dextrose 5%. 1000 milliLiter(s) IV Continuous <Continuous>  dextrose 5%. 1000 milliLiter(s) IV Continuous <Continuous>  dextrose 50% Injectable 25 Gram(s) IV Push once  dextrose 50% Injectable 12.5 Gram(s) IV Push once  dextrose 50% Injectable 25 Gram(s) IV Push once  dextrose Oral Gel 15 Gram(s) Oral once PRN  enoxaparin Injectable 40 milliGRAM(s) SubCutaneous every 24 hours  glucagon  Injectable 1 milliGRAM(s) IntraMuscular once  influenza  Vaccine (HIGH DOSE) 0.5 milliLiter(s) IntraMuscular once  insulin lispro (ADMELOG) corrective regimen sliding scale   SubCutaneous every 6 hours  lacosamide IVPB 200 milliGRAM(s) IV Intermittent every 12 hours  levETIRAcetam  IVPB 1000 milliGRAM(s) IV Intermittent every 12 hours  LORazepam   Injectable 2 milliGRAM(s) IV Push every 4 hours PRN  pantoprazole  Injectable 40 milliGRAM(s) IV Push every 24 hours  petrolatum Ophthalmic Ointment 1 Application(s) Both EYES every 12 hours  sodium phosphate 15 milliMole(s)/250 mL IVPB 15 milliMole(s) IV Intermittent once  valproate sodium   IVPB 500 milliGRAM(s) IV Intermittent every 6 hours      Vital Signs Last 24 Hrs  T(C): 36.9 (08 Feb 2025 05:04), Max: 37.1 (07 Feb 2025 09:33)  T(F): 98.4 (08 Feb 2025 05:04), Max: 98.8 (07 Feb 2025 09:33)  HR: 99 (08 Feb 2025 07:00) (76 - 100)  BP: 133/90 (08 Feb 2025 07:00) (110/78 - 162/102)  BP(mean): 109 (08 Feb 2025 07:00) (89 - 126)  RR: 22 (08 Feb 2025 07:00) (12 - 22)  SpO2: 96% (08 Feb 2025 07:00) (93% - 97%)    Parameters below as of 08 Feb 2025 07:00  Patient On (Oxygen Delivery Method): ventilator    O2 Concentration (%): 40    Neurological Exam:   Mental status: Intubated off sedation. Not following commands or exhibiting purposeful movements.   Cranial nerves: Eyes are primarily midline, slightly dysconjugate. Intermittently has R gaze deviation. Pupils are 3mm and reactive. OCR ?present to the R does not cross midline. Corneal intact. Gag intact. ? BTT on the R today (inconsistent)  Motor and sensory:  No spontaneous movement or withdrawal to painful stimuli   Coordination: unable to assess  Reflexes: Mute toes b/l DTRs 2+ on the L, 1+ on the R.  Gait: unable to assess    Labs:  CBC Full  -  ( 08 Feb 2025 05:30 )  WBC Count : 15.00 K/uL  RBC Count : 4.53 M/uL  Hemoglobin : 12.8 g/dL  Hematocrit : 41.1 %  Platelet Count - Automated : 236 K/uL  Mean Cell Volume : 90.7 fl  Mean Cell Hemoglobin : 28.3 pg  Mean Cell Hemoglobin Concentration : 31.1 g/dL  Auto Neutrophil # : 12.63 K/uL  Auto Lymphocyte # : 0.88 K/uL  Auto Monocyte # : 0.56 K/uL  Auto Eosinophil # : 0.00 K/uL  Auto Basophil # : 0.05 K/uL  Auto Neutrophil % : 84.2 %  Auto Lymphocyte % : 5.9 %  Auto Monocyte % : 3.7 %  Auto Eosinophil % : 0.0 %  Auto Basophil % : 0.3 %    02-08    152[H]  |  109[H]  |  26[H]  ----------------------------<  236[H]  4.0   |  31  |  0.32[L]    Ca    8.1[L]      08 Feb 2025 05:30  Phos  2.0     02-08  Mg     2.3     02-08    TPro  5.1[L]  /  Alb  3.0[L]  /  TBili  0.2  /  DBili  x   /  AST  26  /  ALT  49[H]  /  AlkPhos  111  02-08    LIVER FUNCTIONS - ( 08 Feb 2025 05:30 )  Alb: 3.0 g/dL / Pro: 5.1 g/dL / ALK PHOS: 111 U/L / ALT: 49 U/L / AST: 26 U/L / GGT: x             Urinalysis Basic - ( 08 Feb 2025 05:30 )    Color: x / Appearance: x / SG: x / pH: x  Gluc: 236 mg/dL / Ketone: x  / Bili: x / Urobili: x   Blood: x / Protein: x / Nitrite: x   Leuk Esterase: x / RBC: x / WBC x   Sq Epi: x / Non Sq Epi: x / Bacteria: x        Assessment:  This is a 74y Female w/ h/o     Plan:

## 2025-02-09 LAB
ALBUMIN SERPL ELPH-MCNC: 2.7 G/DL — LOW (ref 3.3–5)
ALBUMIN SERPL ELPH-MCNC: 2.8 G/DL — LOW (ref 3.3–5)
ALBUMIN SERPL ELPH-MCNC: 2.9 G/DL — LOW (ref 3.3–5)
ALP SERPL-CCNC: 106 U/L — SIGNIFICANT CHANGE UP (ref 40–120)
ALP SERPL-CCNC: 109 U/L — SIGNIFICANT CHANGE UP (ref 40–120)
ALP SERPL-CCNC: 110 U/L — SIGNIFICANT CHANGE UP (ref 40–120)
ALT FLD-CCNC: 51 U/L — HIGH (ref 10–45)
ALT FLD-CCNC: 58 U/L — HIGH (ref 10–45)
ALT FLD-CCNC: 59 U/L — HIGH (ref 10–45)
ANION GAP SERPL CALC-SCNC: 11 MMOL/L — SIGNIFICANT CHANGE UP (ref 5–17)
ANION GAP SERPL CALC-SCNC: 12 MMOL/L — SIGNIFICANT CHANGE UP (ref 5–17)
ANION GAP SERPL CALC-SCNC: 9 MMOL/L — SIGNIFICANT CHANGE UP (ref 5–17)
AST SERPL-CCNC: 27 U/L — SIGNIFICANT CHANGE UP (ref 10–40)
AST SERPL-CCNC: 28 U/L — SIGNIFICANT CHANGE UP (ref 10–40)
AST SERPL-CCNC: 31 U/L — SIGNIFICANT CHANGE UP (ref 10–40)
BASOPHILS # BLD AUTO: 0 K/UL — SIGNIFICANT CHANGE UP (ref 0–0.2)
BASOPHILS NFR BLD AUTO: 0 % — SIGNIFICANT CHANGE UP (ref 0–2)
BILIRUB SERPL-MCNC: 0.2 MG/DL — SIGNIFICANT CHANGE UP (ref 0.2–1.2)
BILIRUB SERPL-MCNC: 0.3 MG/DL — SIGNIFICANT CHANGE UP (ref 0.2–1.2)
BILIRUB SERPL-MCNC: 0.3 MG/DL — SIGNIFICANT CHANGE UP (ref 0.2–1.2)
BLD GP AB SCN SERPL QL: NEGATIVE — SIGNIFICANT CHANGE UP
BUN SERPL-MCNC: 24 MG/DL — HIGH (ref 7–23)
CALCIUM SERPL-MCNC: 7.9 MG/DL — LOW (ref 8.4–10.5)
CALCIUM SERPL-MCNC: 8 MG/DL — LOW (ref 8.4–10.5)
CALCIUM SERPL-MCNC: 8.1 MG/DL — LOW (ref 8.4–10.5)
CHLORIDE SERPL-SCNC: 104 MMOL/L — SIGNIFICANT CHANGE UP (ref 96–108)
CHLORIDE SERPL-SCNC: 104 MMOL/L — SIGNIFICANT CHANGE UP (ref 96–108)
CHLORIDE SERPL-SCNC: 106 MMOL/L — SIGNIFICANT CHANGE UP (ref 96–108)
CO2 SERPL-SCNC: 30 MMOL/L — SIGNIFICANT CHANGE UP (ref 22–31)
CO2 SERPL-SCNC: 30 MMOL/L — SIGNIFICANT CHANGE UP (ref 22–31)
CO2 SERPL-SCNC: 32 MMOL/L — HIGH (ref 22–31)
CREAT SERPL-MCNC: 0.25 MG/DL — LOW (ref 0.5–1.3)
CREAT SERPL-MCNC: 0.26 MG/DL — LOW (ref 0.5–1.3)
CREAT SERPL-MCNC: 0.3 MG/DL — LOW (ref 0.5–1.3)
EGFR: 111 ML/MIN/1.73M2 — SIGNIFICANT CHANGE UP
EGFR: 115 ML/MIN/1.73M2 — SIGNIFICANT CHANGE UP
EGFR: 116 ML/MIN/1.73M2 — SIGNIFICANT CHANGE UP
EOSINOPHIL # BLD AUTO: 0 K/UL — SIGNIFICANT CHANGE UP (ref 0–0.5)
EOSINOPHIL NFR BLD AUTO: 0 % — SIGNIFICANT CHANGE UP (ref 0–6)
GLUCOSE SERPL-MCNC: 121 MG/DL — HIGH (ref 70–99)
GLUCOSE SERPL-MCNC: 190 MG/DL — HIGH (ref 70–99)
GLUCOSE SERPL-MCNC: 252 MG/DL — HIGH (ref 70–99)
HCT VFR BLD CALC: 38.4 % — SIGNIFICANT CHANGE UP (ref 34.5–45)
HCT VFR BLD CALC: 39 % — SIGNIFICANT CHANGE UP (ref 34.5–45)
HGB BLD-MCNC: 12.1 G/DL — SIGNIFICANT CHANGE UP (ref 11.5–15.5)
HGB BLD-MCNC: 12.4 G/DL — SIGNIFICANT CHANGE UP (ref 11.5–15.5)
LYMPHOCYTES # BLD AUTO: 0.4 K/UL — LOW (ref 1–3.3)
LYMPHOCYTES # BLD AUTO: 2.6 % — LOW (ref 13–44)
MAGNESIUM SERPL-MCNC: 2.2 MG/DL — SIGNIFICANT CHANGE UP (ref 1.6–2.6)
MAGNESIUM SERPL-MCNC: 2.3 MG/DL — SIGNIFICANT CHANGE UP (ref 1.6–2.6)
MCHC RBC-ENTMCNC: 28.3 PG — SIGNIFICANT CHANGE UP (ref 27–34)
MCHC RBC-ENTMCNC: 28.6 PG — SIGNIFICANT CHANGE UP (ref 27–34)
MCHC RBC-ENTMCNC: 31 G/DL — LOW (ref 32–36)
MCHC RBC-ENTMCNC: 32.3 G/DL — SIGNIFICANT CHANGE UP (ref 32–36)
MCV RBC AUTO: 88.7 FL — SIGNIFICANT CHANGE UP (ref 80–100)
MCV RBC AUTO: 91.1 FL — SIGNIFICANT CHANGE UP (ref 80–100)
MONOCYTES # BLD AUTO: 0.53 K/UL — SIGNIFICANT CHANGE UP (ref 0–0.9)
MONOCYTES NFR BLD AUTO: 3.5 % — SIGNIFICANT CHANGE UP (ref 2–14)
NEUTROPHILS # BLD AUTO: 13.77 K/UL — HIGH (ref 1.8–7.4)
NEUTROPHILS NFR BLD AUTO: 88.7 % — HIGH (ref 43–77)
NRBC # BLD: 0 /100 WBCS — SIGNIFICANT CHANGE UP (ref 0–0)
NRBC BLD-RTO: 0 /100 WBCS — SIGNIFICANT CHANGE UP (ref 0–0)
PHOSPHATE SERPL-MCNC: 2.3 MG/DL — LOW (ref 2.5–4.5)
PHOSPHATE SERPL-MCNC: 2.7 MG/DL — SIGNIFICANT CHANGE UP (ref 2.5–4.5)
PLATELET # BLD AUTO: 239 K/UL — SIGNIFICANT CHANGE UP (ref 150–400)
PLATELET # BLD AUTO: 242 K/UL — SIGNIFICANT CHANGE UP (ref 150–400)
POTASSIUM SERPL-MCNC: 4.2 MMOL/L — SIGNIFICANT CHANGE UP (ref 3.5–5.3)
POTASSIUM SERPL-MCNC: 4.2 MMOL/L — SIGNIFICANT CHANGE UP (ref 3.5–5.3)
POTASSIUM SERPL-MCNC: 4.4 MMOL/L — SIGNIFICANT CHANGE UP (ref 3.5–5.3)
POTASSIUM SERPL-SCNC: 4.2 MMOL/L — SIGNIFICANT CHANGE UP (ref 3.5–5.3)
POTASSIUM SERPL-SCNC: 4.2 MMOL/L — SIGNIFICANT CHANGE UP (ref 3.5–5.3)
POTASSIUM SERPL-SCNC: 4.4 MMOL/L — SIGNIFICANT CHANGE UP (ref 3.5–5.3)
PROT SERPL-MCNC: 4.8 G/DL — LOW (ref 6–8.3)
PROT SERPL-MCNC: 4.8 G/DL — LOW (ref 6–8.3)
PROT SERPL-MCNC: 4.9 G/DL — LOW (ref 6–8.3)
RBC # BLD: 4.28 M/UL — SIGNIFICANT CHANGE UP (ref 3.8–5.2)
RBC # BLD: 4.33 M/UL — SIGNIFICANT CHANGE UP (ref 3.8–5.2)
RBC # FLD: 15.9 % — HIGH (ref 10.3–14.5)
RBC # FLD: 16.3 % — HIGH (ref 10.3–14.5)
RH IG SCN BLD-IMP: POSITIVE — SIGNIFICANT CHANGE UP
SODIUM SERPL-SCNC: 145 MMOL/L — SIGNIFICANT CHANGE UP (ref 135–145)
SODIUM SERPL-SCNC: 146 MMOL/L — HIGH (ref 135–145)
SODIUM SERPL-SCNC: 147 MMOL/L — HIGH (ref 135–145)
VALPROATE SERPL-MCNC: 42.2 UG/ML — LOW (ref 50–100)
WBC # BLD: 15.22 K/UL — HIGH (ref 3.8–10.5)
WBC # BLD: 16.54 K/UL — HIGH (ref 3.8–10.5)
WBC # FLD AUTO: 15.22 K/UL — HIGH (ref 3.8–10.5)
WBC # FLD AUTO: 16.54 K/UL — HIGH (ref 3.8–10.5)

## 2025-02-09 PROCEDURE — 99291 CRITICAL CARE FIRST HOUR: CPT

## 2025-02-09 PROCEDURE — 36600 WITHDRAWAL OF ARTERIAL BLOOD: CPT | Mod: 59

## 2025-02-09 PROCEDURE — 70450 CT HEAD/BRAIN W/O DYE: CPT | Mod: 26

## 2025-02-09 PROCEDURE — 36620 INSERTION CATHETER ARTERY: CPT

## 2025-02-09 PROCEDURE — 43752 NASAL/OROGASTRIC W/TUBE PLMT: CPT | Mod: 59

## 2025-02-09 PROCEDURE — 71045 X-RAY EXAM CHEST 1 VIEW: CPT | Mod: 26

## 2025-02-09 RX ORDER — SENNA 187 MG
10 TABLET ORAL AT BEDTIME
Refills: 0 | Status: DISCONTINUED | OUTPATIENT
Start: 2025-02-09 | End: 2025-02-25

## 2025-02-09 RX ORDER — SODIUM CHLORIDE 3 G/100ML
100 INJECTION, SOLUTION INTRAVENOUS ONCE
Refills: 0 | Status: COMPLETED | OUTPATIENT
Start: 2025-02-09 | End: 2025-02-09

## 2025-02-09 RX ORDER — POLYETHYLENE GLYCOL 3350 17 G/17G
17 POWDER, FOR SOLUTION ORAL
Refills: 0 | Status: DISCONTINUED | OUTPATIENT
Start: 2025-02-09 | End: 2025-02-18

## 2025-02-09 RX ORDER — SOD PHOS DI, MONO/K PHOS MONO 250 MG
1 TABLET ORAL ONCE
Refills: 0 | Status: COMPLETED | OUTPATIENT
Start: 2025-02-09 | End: 2025-02-09

## 2025-02-09 RX ORDER — SODIUM CHLORIDE 3 G/100ML
250 INJECTION, SOLUTION INTRAVENOUS ONCE
Refills: 0 | Status: COMPLETED | OUTPATIENT
Start: 2025-02-09 | End: 2025-02-09

## 2025-02-09 RX ORDER — LACTULOSE 10 G/15ML
20 SOLUTION ORAL EVERY 8 HOURS
Refills: 0 | Status: DISCONTINUED | OUTPATIENT
Start: 2025-02-09 | End: 2025-02-11

## 2025-02-09 RX ADMIN — Medication 1 DROP(S): at 10:17

## 2025-02-09 RX ADMIN — Medication 1 APPLICATION(S): at 05:39

## 2025-02-09 RX ADMIN — Medication 1 APPLICATION(S): at 10:16

## 2025-02-09 RX ADMIN — DEXAMETHASONE 4 MILLIGRAM(S): 0.5 TABLET ORAL at 18:54

## 2025-02-09 RX ADMIN — Medication 55 MILLIGRAM(S): at 22:48

## 2025-02-09 RX ADMIN — SODIUM CHLORIDE 750 MILLILITER(S): 3 INJECTION, SOLUTION INTRAVENOUS at 23:46

## 2025-02-09 RX ADMIN — LEVETIRACETAM 400 MILLIGRAM(S): 10 INJECTION, SOLUTION INTRAVENOUS at 10:14

## 2025-02-09 RX ADMIN — LACOSAMIDE 140 MILLIGRAM(S): 150 TABLET, FILM COATED ORAL at 10:13

## 2025-02-09 RX ADMIN — Medication 15 MILLILITER(S): at 22:00

## 2025-02-09 RX ADMIN — Medication 55 MILLIGRAM(S): at 02:19

## 2025-02-09 RX ADMIN — Medication 55 MILLIGRAM(S): at 16:01

## 2025-02-09 RX ADMIN — Medication 55 MILLIGRAM(S): at 10:13

## 2025-02-09 RX ADMIN — Medication 1 APPLICATION(S): at 21:56

## 2025-02-09 RX ADMIN — LACTULOSE 20 GRAM(S): 10 SOLUTION ORAL at 21:58

## 2025-02-09 RX ADMIN — Medication 1 PACKET(S): at 07:35

## 2025-02-09 RX ADMIN — POLYETHYLENE GLYCOL 3350 17 GRAM(S): 17 POWDER, FOR SOLUTION ORAL at 19:37

## 2025-02-09 RX ADMIN — DEXAMETHASONE 4 MILLIGRAM(S): 0.5 TABLET ORAL at 05:38

## 2025-02-09 RX ADMIN — INSULIN LISPRO 2: 100 INJECTION, SOLUTION INTRAVENOUS; SUBCUTANEOUS at 05:38

## 2025-02-09 RX ADMIN — Medication 40 MILLIGRAM(S): at 10:13

## 2025-02-09 RX ADMIN — DEXAMETHASONE 4 MILLIGRAM(S): 0.5 TABLET ORAL at 23:44

## 2025-02-09 RX ADMIN — DEXAMETHASONE 4 MILLIGRAM(S): 0.5 TABLET ORAL at 13:07

## 2025-02-09 RX ADMIN — LEVETIRACETAM 400 MILLIGRAM(S): 10 INJECTION, SOLUTION INTRAVENOUS at 21:54

## 2025-02-09 RX ADMIN — Medication 1 DROP(S): at 21:59

## 2025-02-09 RX ADMIN — SODIUM CHLORIDE 300 MILLILITER(S): 3 INJECTION, SOLUTION INTRAVENOUS at 11:45

## 2025-02-09 RX ADMIN — SODIUM CHLORIDE 300 MILLILITER(S): 3 INJECTION, SOLUTION INTRAVENOUS at 19:39

## 2025-02-09 RX ADMIN — LACOSAMIDE 140 MILLIGRAM(S): 150 TABLET, FILM COATED ORAL at 22:13

## 2025-02-09 RX ADMIN — Medication 10 MILLILITER(S): at 22:15

## 2025-02-09 RX ADMIN — Medication 15 MILLILITER(S): at 10:13

## 2025-02-09 RX ADMIN — ENOXAPARIN SODIUM 40 MILLIGRAM(S): 100 INJECTION SUBCUTANEOUS at 18:54

## 2025-02-09 NOTE — PROGRESS NOTE ADULT - ASSESSMENT
73 yo female w PMH GBM on hospice care (diagnosed in 2024, follows at [facility] under Dr. Hernandez, with reported attempted resection of tumor, on chemotherapy with last chemo 2 months ago), admitted for ams likely 2/2 disease progression After admission to [facility], Upon evaluation pt was obtunded with tremors to RUE, response to noxious stimuli but otherwise no response. Pupils were responsive with saccadic movement. She was slumped to her right sided with gurgling breath sounds.  At that point rapid was called and patient was inevitably intubated. Transferred to [facility]. Discussed with pts daughters post procedure poor prognosis and advised to have formal family meeting to clarify and document wishes.       NEURO  Intubated. Not sedated. Minimally responsive despite not being on sedation. 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 There is evidence of focal cortical hyperexcitability, more prominent in the right than the left frontotemporal regions. The background pattern shows a burst suppression ratio of 10:1, indicating severe diffuse or multifocal cerebral dysfunction, potentially exacerbated by IV sedatives. Additionally, there is severe generalized and multifocal slowing, further supporting significant underlying cerebral dysfunction.  Continued EEG findings of seizures on 2/6  Plan:   s/p valproic acid loading dose 1200 mg, vimpat 200 mg loading  - epilepsy consulted, appreciate recs      - Increase Keppra 1500mg BID      - C/w Vimpat 200mg BID (monitor tele to ensure no heart block)      - VPA level noted, can hold off on additional dose today      - C/w Depakote 500mg q6hrs      - Obtain VPA level tomorrow AM      - scalp rest for vEEG; restart again tomorrow AM      - seizure precautions  - f/u CT Head non-contrast    #GBM  last chemo ~2months ago, follows at [facility].  Per Neurosurgery consult on 1/31, no acute interventions.   -c/w decadron 4 IV q6h  -palliative care consult   -maintain Na > 145 to address cerebral edema 2/2 mass effect    CARDIAC  #Chronic atrial fibrillation.    Home med: metoprolol 12.5 BID   - c/w Lovenox ( per NSGY, no increased risk of hemorrhage     Plan  -bladder scans q6h   -Lasix 40 mg IV push given today given net positive   -BMP every 6 hrs   -consider john (currently on primafit) if pt retaining   -make sure pt having BMs  -pain control with dilaudid 0.5 q4h PRN.    PULM  #intubated  - intubated on 2/1  - GOC  - consider trach moving forward    GI  - NPO with tube feeds   - consider PEG moving forward    RENAL  - currently with normal renal function  -Add 1 dose 40 IV Lasix for fluid retention and overload     #hypernatremia  -maintain Na > 145 as above for mass effect but less than 150     ID  #SIRS  #ESBL Ecoli Bacteremia  #R arm cellulitis   #ESBL ecoli UTI   Systemic inflammatory response syndrome (SIRS).   Meeting SIRS 2/4 (HR>90, WBC>12k) likely iso of UTI.   Bcx with ESBL E. coli  -s/p ertapenem tx   -IVF as needed  -d/c vanc given unremarkable CT findings   -plan R arm CT angio with IV contrast    F: none  E: replete K<4, Mg<2  N: npo with tube feeds  VTE Prophylaxis: lovenox 40mg QD  GI: pantoprazole 40 IVP  C: Full Code  D: micu    Lines: Peripheral IV L 22g (2/4) IV L 18 g (2/4)l, NG tube      75 yo female w PMH GBM on hospice care (diagnosed in 2024, follows at [facility] under Dr. Hernandez, with reported attempted resection of tumor, on chemotherapy with last chemo 2 months ago), admitted for ams likely 2/2 disease progression After admission to [facility], Upon evaluation pt was obtunded with tremors to RUE, response to noxious stimuli but otherwise no response. Pupils were responsive with saccadic movement. She was slumped to her right sided with gurgling breath sounds.  At that point rapid was called and patient was inevitably intubated. Transferred to [facility]. Discussed with pts daughters post procedure poor prognosis and advised to have formal family meeting to clarify and document wishes.       NEURO  Intubated. Not sedated. Minimally responsive despite not being on sedation.     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 There is evidence of focal cortical hyperexcitability, more prominent in the right than the left frontotemporal regions. The background pattern shows a burst suppression ratio of 10:1, indicating severe diffuse or multifocal cerebral dysfunction, potentially exacerbated by IV sedatives. Additionally, there is severe generalized and multifocal slowing, further supporting significant underlying cerebral dysfunction.  Continued EEG findings of seizures on 2/6  Plan:   s/p valproic acid loading dose 1200 mg, vimpat 200 mg loading  - epilepsy consulted, appreciate recs      - Increase Keppra 1500mg BID      - C/w Vimpat 200mg BID (monitor tele to ensure no heart block)      - VPA level noted, can hold off on additional dose today      - C/w Depakote 500mg q6hrs      - Obtain VPA level tomorrow AM      - scalp rest for vEEG; restart again tomorrow AM      - seizure precautions  -CT head with worsening midline shift, neurosurgery consulted, recommending no acute interventions at this time (interventions would be futile).     #GBM  last chemo ~2months ago, follows at [facility].  Per Neurosurgery consult on 1/31, no acute interventions.   -c/w decadron 4 IV q6h  -palliative care consult   -maintain Na > 145 to address cerebral edema 2/2 mass effect    CARDIAC  #Chronic atrial fibrillation.    Home med: metoprolol 12.5 BID   - c/w Lovenox ( per NSGY, no increased risk of hemorrhage     Plan  -bladder scans q6h   -Lasix 40 mg IV push given today given net positive   -BMP every 6 hrs   -consider john (currently on primafit) if pt retaining   -make sure pt having BMs  -pain control with dilaudid 0.5 q4h PRN.    PULM  #intubated  - intubated on 2/1  - GOC  - consider trach moving forward    GI  - NPO with tube feeds   - consider PEG moving forward    #constipation   -standing miralax and senna for bowel reg, lactulose TID     RENAL  - currently with normal renal function  -stop D5     #hypernatremia  -maintain Na > 145 as above for mass effect but less than 150     Heme    Lovenox q24    ID  #SIRS  #ESBL Ecoli Bacteremia  #R arm cellulitis   #ESBL ecoli UTI   Systemic inflammatory response syndrome (SIRS).   Meeting SIRS 2/4 (HR>90, WBC>12k) likely iso of UTI.   Bcx with ESBL E. coli  -s/p ertapenem tx   -IVF as needed  -d/c vanc given unremarkable CT findings   -plan R arm CT angio with IV contrast    F: none  E: replete K<4, Mg<2  N: npo with tube feeds  VTE Prophylaxis: lovenox 40mg QD  GI: pantoprazole 40 IVP  C: Full Code  D: micu    Lines: Peripheral IV L 22g (2/4) IV L 18 g (2/4)l, NG tube

## 2025-02-09 NOTE — CHART NOTE - NSCHARTNOTEFT_GEN_A_CORE
Neurosurgery was contacted at this time after repeat CTH was obtained which demonstrated for slightly increased midline shift to the left compared to the last CT Head obtained. Referencing from the Neurosurgery Chart Note from 1/31/25, an extensive discussion was held with Dr. Honeycutt and the patient's family, where surgical intervention would likely shorten the patient's life due to the progression of the GBM. There are no recommendations for any surgical intervention.

## 2025-02-09 NOTE — PROGRESS NOTE ADULT - ATTENDING COMMENTS
Patient intubated and sedated with continued signs of seizure activity on EEG. Will adjust antiseizure medications as per discussion with epislepsy. Still not waking up off sedation so will repeat CT scan of the head to see if any new changes. Prognosis poor but patient able to CPAP well. Continue tube feeds.

## 2025-02-09 NOTE — PROGRESS NOTE ADULT - SUBJECTIVE AND OBJECTIVE BOX
Patient is a 74y old  Female who presents with a chief complaint of AMS (08 Feb 2025 08:16)      INTERVAL HPI/OVERNIGHT EVENTS:   No overnight events   Afebrile, hemodynamically stable     ICU Vital Signs Last 24 Hrs  T(C): 36.3 (09 Feb 2025 13:24), Max: 37.1 (08 Feb 2025 22:41)  T(F): 97.4 (09 Feb 2025 13:24), Max: 98.7 (08 Feb 2025 22:41)  HR: 90 (09 Feb 2025 15:00) (76 - 100)  BP: 126/86 (09 Feb 2025 14:20) (123/72 - 176/87)  BP(mean): 102 (09 Feb 2025 14:20) (93 - 121)  ABP: --  ABP(mean): --  RR: 15 (09 Feb 2025 15:00) (10 - 20)  SpO2: 97% (09 Feb 2025 15:00) (94% - 100%)    O2 Parameters below as of 09 Feb 2025 15:00  Patient On (Oxygen Delivery Method): ventilator    O2 Concentration (%): 40      I&O's Summary    08 Feb 2025 07:01  -  09 Feb 2025 07:00  --------------------------------------------------------  IN: 2410 mL / OUT: 1300 mL / NET: 1110 mL    09 Feb 2025 07:01  -  09 Feb 2025 16:10  --------------------------------------------------------  IN: 460 mL / OUT: 600 mL / NET: -140 mL      Mode: AC/ CMV (Assist Control/ Continuous Mandatory Ventilation)  RR (machine): 12  TV (machine): 350  FiO2: 40  PEEP: 5  ITime: 1  MAP: 7.2  PIP: 14      LABS:                        12.1   15.22 )-----------( 242      ( 09 Feb 2025 05:30 )             39.0     02-09    146[H]  |  104  |  24[H]  ----------------------------<  252[H]  4.2   |  30  |  0.30[L]    Ca    8.0[L]      09 Feb 2025 05:30  Phos  2.3     02-09  Mg     2.3     02-09    TPro  4.9[L]  /  Alb  2.7[L]  /  TBili  0.2  /  DBili  x   /  AST  27  /  ALT  51[H]  /  AlkPhos  110  02-09      Urinalysis Basic - ( 09 Feb 2025 05:30 )    Color: x / Appearance: x / SG: x / pH: x  Gluc: 252 mg/dL / Ketone: x  / Bili: x / Urobili: x   Blood: x / Protein: x / Nitrite: x   Leuk Esterase: x / RBC: x / WBC x   Sq Epi: x / Non Sq Epi: x / Bacteria: x      CAPILLARY BLOOD GLUCOSE      POCT Blood Glucose.: 118 mg/dL (09 Feb 2025 11:49)  POCT Blood Glucose.: 239 mg/dL (09 Feb 2025 05:13)  POCT Blood Glucose.: 185 mg/dL (08 Feb 2025 23:37)  POCT Blood Glucose.: 199 mg/dL (08 Feb 2025 17:48)        RADIOLOGY & ADDITIONAL TESTS:    Consultant(s) Notes Reviewed:  [x ] YES  [ ] NO    MEDICATIONS  (STANDING):  artificial  tears Solution 1 Drop(s) Both EYES every 12 hours  chlorhexidine 0.12% Liquid 15 milliLiter(s) Oral Mucosa every 12 hours  chlorhexidine 2% Cloths 1 Application(s) Topical <User Schedule>  dexAMETHasone  Injectable 4 milliGRAM(s) IV Push every 6 hours  dextrose 5%. 1000 milliLiter(s) (50 mL/Hr) IV Continuous <Continuous>  dextrose 5%. 1000 milliLiter(s) (100 mL/Hr) IV Continuous <Continuous>  dextrose 50% Injectable 25 Gram(s) IV Push once  dextrose 50% Injectable 12.5 Gram(s) IV Push once  dextrose 50% Injectable 25 Gram(s) IV Push once  enoxaparin Injectable 40 milliGRAM(s) SubCutaneous every 24 hours  glucagon  Injectable 1 milliGRAM(s) IntraMuscular once  influenza  Vaccine (HIGH DOSE) 0.5 milliLiter(s) IntraMuscular once  insulin lispro (ADMELOG) corrective regimen sliding scale   SubCutaneous every 6 hours  lacosamide IVPB 200 milliGRAM(s) IV Intermittent every 12 hours  lactulose Syrup 20 Gram(s) Oral every 8 hours  levETIRAcetam  IVPB 1500 milliGRAM(s) IV Intermittent every 12 hours  pantoprazole  Injectable 40 milliGRAM(s) IV Push every 24 hours  petrolatum Ophthalmic Ointment 1 Application(s) Both EYES every 12 hours  polyethylene glycol 3350 17 Gram(s) Oral two times a day  senna Syrup 10 milliLiter(s) Oral at bedtime  valproate sodium   IVPB 500 milliGRAM(s) IV Intermittent every 6 hours    MEDICATIONS  (PRN):  dextrose Oral Gel 15 Gram(s) Oral once PRN Blood Glucose LESS THAN 70 milliGRAM(s)/deciliter  LORazepam   Injectable 2 milliGRAM(s) IV Push every 4 hours PRN Seizures      PHYSICAL EXAM:  GENERAL:   HEAD:  Atraumatic, Normocephalic  EYES: EOMI, PERRLA, conjunctiva and sclera clear  NECK: Supple, No JVD, Normal thyroid, no enlarged nodes  NERVOUS SYSTEM:  Alert & Awake.   CHEST/LUNG: B/L good air entry; No rales, rhonchi, or wheezing  HEART: S1S2 normal, no S3, Regular rate and rhythm; No murmurs  ABDOMEN: Soft, Nontender, Nondistended; Bowel sounds present  EXTREMITIES:  2+ Peripheral Pulses, No clubbing, cyanosis, or edema  LYMPH: No lymphadenopathy noted  SKIN: No rashes or lesions    Care Discussed with Consultants/Other Providers [ x] YES  [ ] NO Patient is a 74y old  Female who presents with a chief complaint of AMS (08 Feb 2025 08:16)      INTERVAL HPI/OVERNIGHT EVENTS:   Patient with bladder scan of 400ccs on bladder scan, SC at midnight.     Subjective:   Patient seen at bedside, no sedation. Patient without signs of acute distress. Unchanged mental status since yesterday, minimally arousable, intact gag reflex.     ICU Vital Signs Last 24 Hrs  T(C): 36.3 (09 Feb 2025 13:24), Max: 37.1 (08 Feb 2025 22:41)  T(F): 97.4 (09 Feb 2025 13:24), Max: 98.7 (08 Feb 2025 22:41)  HR: 90 (09 Feb 2025 15:00) (76 - 100)  BP: 126/86 (09 Feb 2025 14:20) (123/72 - 176/87)  BP(mean): 102 (09 Feb 2025 14:20) (93 - 121)  ABP: --  ABP(mean): --  RR: 15 (09 Feb 2025 15:00) (10 - 20)  SpO2: 97% (09 Feb 2025 15:00) (94% - 100%)    O2 Parameters below as of 09 Feb 2025 15:00  Patient On (Oxygen Delivery Method): ventilator    O2 Concentration (%): 40      I&O's Summary    08 Feb 2025 07:01  -  09 Feb 2025 07:00  --------------------------------------------------------  IN: 2410 mL / OUT: 1300 mL / NET: 1110 mL    09 Feb 2025 07:01  -  09 Feb 2025 16:10  --------------------------------------------------------  IN: 460 mL / OUT: 600 mL / NET: -140 mL      Mode: AC/ CMV (Assist Control/ Continuous Mandatory Ventilation)  RR (machine): 12  TV (machine): 350  FiO2: 40  PEEP: 5  ITime: 1  MAP: 7.2  PIP: 14      LABS:                        12.1   15.22 )-----------( 242      ( 09 Feb 2025 05:30 )             39.0     02-09    146[H]  |  104  |  24[H]  ----------------------------<  252[H]  4.2   |  30  |  0.30[L]    Ca    8.0[L]      09 Feb 2025 05:30  Phos  2.3     02-09  Mg     2.3     02-09    TPro  4.9[L]  /  Alb  2.7[L]  /  TBili  0.2  /  DBili  x   /  AST  27  /  ALT  51[H]  /  AlkPhos  110  02-09      Urinalysis Basic - ( 09 Feb 2025 05:30 )    Color: x / Appearance: x / SG: x / pH: x  Gluc: 252 mg/dL / Ketone: x  / Bili: x / Urobili: x   Blood: x / Protein: x / Nitrite: x   Leuk Esterase: x / RBC: x / WBC x   Sq Epi: x / Non Sq Epi: x / Bacteria: x      CAPILLARY BLOOD GLUCOSE      POCT Blood Glucose.: 118 mg/dL (09 Feb 2025 11:49)  POCT Blood Glucose.: 239 mg/dL (09 Feb 2025 05:13)  POCT Blood Glucose.: 185 mg/dL (08 Feb 2025 23:37)  POCT Blood Glucose.: 199 mg/dL (08 Feb 2025 17:48)        RADIOLOGY & ADDITIONAL TESTS:    Consultant(s) Notes Reviewed:  [x ] YES  [ ] NO    MEDICATIONS  (STANDING):  artificial  tears Solution 1 Drop(s) Both EYES every 12 hours  chlorhexidine 0.12% Liquid 15 milliLiter(s) Oral Mucosa every 12 hours  chlorhexidine 2% Cloths 1 Application(s) Topical <User Schedule>  dexAMETHasone  Injectable 4 milliGRAM(s) IV Push every 6 hours  dextrose 5%. 1000 milliLiter(s) (50 mL/Hr) IV Continuous <Continuous>  dextrose 5%. 1000 milliLiter(s) (100 mL/Hr) IV Continuous <Continuous>  dextrose 50% Injectable 25 Gram(s) IV Push once  dextrose 50% Injectable 12.5 Gram(s) IV Push once  dextrose 50% Injectable 25 Gram(s) IV Push once  enoxaparin Injectable 40 milliGRAM(s) SubCutaneous every 24 hours  glucagon  Injectable 1 milliGRAM(s) IntraMuscular once  influenza  Vaccine (HIGH DOSE) 0.5 milliLiter(s) IntraMuscular once  insulin lispro (ADMELOG) corrective regimen sliding scale   SubCutaneous every 6 hours  lacosamide IVPB 200 milliGRAM(s) IV Intermittent every 12 hours  lactulose Syrup 20 Gram(s) Oral every 8 hours  levETIRAcetam  IVPB 1500 milliGRAM(s) IV Intermittent every 12 hours  pantoprazole  Injectable 40 milliGRAM(s) IV Push every 24 hours  petrolatum Ophthalmic Ointment 1 Application(s) Both EYES every 12 hours  polyethylene glycol 3350 17 Gram(s) Oral two times a day  senna Syrup 10 milliLiter(s) Oral at bedtime  valproate sodium   IVPB 500 milliGRAM(s) IV Intermittent every 6 hours    MEDICATIONS  (PRN):  dextrose Oral Gel 15 Gram(s) Oral once PRN Blood Glucose LESS THAN 70 milliGRAM(s)/deciliter  LORazepam   Injectable 2 milliGRAM(s) IV Push every 4 hours PRN Seizures      PHYSICAL EXAM:  General: intubated, not sedated.   HEENT: constricted pupils, minimally reactive light , anicteric sclera,  Cardiovascular: RRR, no m/r/g.   Respiratory: CTAB; no W/R/R  Gastrointestinal: soft, NT/ND; +BS x4  Extremities: cool extremities; 2+ peripheral pulses bilaterally; no LE edema, 4-5 second cap refill  Skin: R hand with less blistering and erythema, blanchable. wounds that are dry within the margins of the lines drawn   Neurologic: + Gag reflex, responds to pain stimuli, does not respond to name.     Care Discussed with Consultants/Other Providers [ x] YES  [ ] NO

## 2025-02-10 LAB
ALBUMIN SERPL ELPH-MCNC: 1.2 G/DL — LOW (ref 3.3–5)
ALBUMIN SERPL ELPH-MCNC: 2.7 G/DL — LOW (ref 3.3–5)
ALP SERPL-CCNC: 105 U/L — SIGNIFICANT CHANGE UP (ref 40–120)
ALP SERPL-CCNC: 36 U/L — LOW (ref 40–120)
ALT FLD-CCNC: 23 U/L — SIGNIFICANT CHANGE UP (ref 10–45)
ALT FLD-CCNC: 56 U/L — HIGH (ref 10–45)
ANION GAP SERPL CALC-SCNC: 11 MMOL/L — SIGNIFICANT CHANGE UP (ref 5–17)
ANION GAP SERPL CALC-SCNC: 15 MMOL/L — SIGNIFICANT CHANGE UP (ref 5–17)
ANION GAP SERPL CALC-SCNC: 6 MMOL/L — SIGNIFICANT CHANGE UP (ref 5–17)
AST SERPL-CCNC: 31 U/L — SIGNIFICANT CHANGE UP (ref 10–40)
AST SERPL-CCNC: SIGNIFICANT CHANGE UP (ref 10–40)
BASOPHILS # BLD AUTO: 0.03 K/UL — SIGNIFICANT CHANGE UP (ref 0–0.2)
BASOPHILS NFR BLD AUTO: 0.2 % — SIGNIFICANT CHANGE UP (ref 0–2)
BILIRUB SERPL-MCNC: 0.2 MG/DL — SIGNIFICANT CHANGE UP (ref 0.2–1.2)
BILIRUB SERPL-MCNC: 0.3 MG/DL — SIGNIFICANT CHANGE UP (ref 0.2–1.2)
BUN SERPL-MCNC: 17 MG/DL — SIGNIFICANT CHANGE UP (ref 7–23)
BUN SERPL-MCNC: 18 MG/DL — SIGNIFICANT CHANGE UP (ref 7–23)
BUN SERPL-MCNC: 24 MG/DL — HIGH (ref 7–23)
CALCIUM SERPL-MCNC: 6.5 MG/DL — CRITICAL LOW (ref 8.4–10.5)
CALCIUM SERPL-MCNC: 7.6 MG/DL — LOW (ref 8.4–10.5)
CALCIUM SERPL-MCNC: 7.6 MG/DL — LOW (ref 8.4–10.5)
CHLORIDE SERPL-SCNC: 107 MMOL/L — SIGNIFICANT CHANGE UP (ref 96–108)
CHLORIDE SERPL-SCNC: 110 MMOL/L — HIGH (ref 96–108)
CHLORIDE SERPL-SCNC: 116 MMOL/L — HIGH (ref 96–108)
CO2 SERPL-SCNC: 28 MMOL/L — SIGNIFICANT CHANGE UP (ref 22–31)
CO2 SERPL-SCNC: 28 MMOL/L — SIGNIFICANT CHANGE UP (ref 22–31)
CO2 SERPL-SCNC: 31 MMOL/L — SIGNIFICANT CHANGE UP (ref 22–31)
CREAT SERPL-MCNC: 0.22 MG/DL — LOW (ref 0.5–1.3)
CREAT SERPL-MCNC: 0.24 MG/DL — LOW (ref 0.5–1.3)
CREAT SERPL-MCNC: 0.27 MG/DL — LOW (ref 0.5–1.3)
EGFR: 114 ML/MIN/1.73M2 — SIGNIFICANT CHANGE UP
EGFR: 117 ML/MIN/1.73M2 — SIGNIFICANT CHANGE UP
EGFR: 120 ML/MIN/1.73M2 — SIGNIFICANT CHANGE UP
EOSINOPHIL # BLD AUTO: 0 K/UL — SIGNIFICANT CHANGE UP (ref 0–0.5)
EOSINOPHIL NFR BLD AUTO: 0 % — SIGNIFICANT CHANGE UP (ref 0–6)
GLUCOSE SERPL-MCNC: 182 MG/DL — HIGH (ref 70–99)
GLUCOSE SERPL-MCNC: 223 MG/DL — HIGH (ref 70–99)
GLUCOSE SERPL-MCNC: 277 MG/DL — HIGH (ref 70–99)
HCT VFR BLD CALC: 38.3 % — SIGNIFICANT CHANGE UP (ref 34.5–45)
HGB BLD-MCNC: 11.9 G/DL — SIGNIFICANT CHANGE UP (ref 11.5–15.5)
IMM GRANULOCYTES NFR BLD AUTO: 5.4 % — HIGH (ref 0–0.9)
LYMPHOCYTES # BLD AUTO: 0.68 K/UL — LOW (ref 1–3.3)
LYMPHOCYTES # BLD AUTO: 4.6 % — LOW (ref 13–44)
MAGNESIUM SERPL-MCNC: 2.2 MG/DL — SIGNIFICANT CHANGE UP (ref 1.6–2.6)
MCHC RBC-ENTMCNC: 27.5 PG — SIGNIFICANT CHANGE UP (ref 27–34)
MCHC RBC-ENTMCNC: 31.1 G/DL — LOW (ref 32–36)
MCV RBC AUTO: 88.7 FL — SIGNIFICANT CHANGE UP (ref 80–100)
MONOCYTES # BLD AUTO: 0.39 K/UL — SIGNIFICANT CHANGE UP (ref 0–0.9)
MONOCYTES NFR BLD AUTO: 2.6 % — SIGNIFICANT CHANGE UP (ref 2–14)
NEUTROPHILS # BLD AUTO: 12.96 K/UL — HIGH (ref 1.8–7.4)
NEUTROPHILS NFR BLD AUTO: 87.2 % — HIGH (ref 43–77)
NRBC # BLD: 0 /100 WBCS — SIGNIFICANT CHANGE UP (ref 0–0)
NRBC BLD-RTO: 0 /100 WBCS — SIGNIFICANT CHANGE UP (ref 0–0)
PHOSPHATE SERPL-MCNC: 2.4 MG/DL — LOW (ref 2.5–4.5)
PLATELET # BLD AUTO: 266 K/UL — SIGNIFICANT CHANGE UP (ref 150–400)
POTASSIUM SERPL-MCNC: 3.5 MMOL/L — SIGNIFICANT CHANGE UP (ref 3.5–5.3)
POTASSIUM SERPL-MCNC: 4 MMOL/L — SIGNIFICANT CHANGE UP (ref 3.5–5.3)
POTASSIUM SERPL-MCNC: 4.2 MMOL/L — SIGNIFICANT CHANGE UP (ref 3.5–5.3)
POTASSIUM SERPL-SCNC: 3.5 MMOL/L — SIGNIFICANT CHANGE UP (ref 3.5–5.3)
POTASSIUM SERPL-SCNC: 4 MMOL/L — SIGNIFICANT CHANGE UP (ref 3.5–5.3)
POTASSIUM SERPL-SCNC: 4.2 MMOL/L — SIGNIFICANT CHANGE UP (ref 3.5–5.3)
PROT SERPL-MCNC: 1.7 G/DL — LOW (ref 6–8.3)
PROT SERPL-MCNC: 4.6 G/DL — LOW (ref 6–8.3)
RBC # BLD: 4.32 M/UL — SIGNIFICANT CHANGE UP (ref 3.8–5.2)
RBC # FLD: 16.1 % — HIGH (ref 10.3–14.5)
SODIUM SERPL-SCNC: 149 MMOL/L — HIGH (ref 135–145)
SODIUM SERPL-SCNC: 150 MMOL/L — HIGH (ref 135–145)
SODIUM SERPL-SCNC: 153 MMOL/L — HIGH (ref 135–145)
VALPROATE SERPL-MCNC: 63.3 UG/ML — SIGNIFICANT CHANGE UP (ref 50–100)
WBC # BLD: 14.86 K/UL — HIGH (ref 3.8–10.5)
WBC # FLD AUTO: 14.86 K/UL — HIGH (ref 3.8–10.5)

## 2025-02-10 PROCEDURE — 99233 SBSQ HOSP IP/OBS HIGH 50: CPT | Mod: FS

## 2025-02-10 PROCEDURE — 99291 CRITICAL CARE FIRST HOUR: CPT

## 2025-02-10 PROCEDURE — 36620 INSERTION CATHETER ARTERY: CPT

## 2025-02-10 PROCEDURE — 95720 EEG PHY/QHP EA INCR W/VEEG: CPT

## 2025-02-10 PROCEDURE — 93010 ELECTROCARDIOGRAM REPORT: CPT

## 2025-02-10 RX ORDER — METOPROLOL SUCCINATE 50 MG/1
12.5 TABLET, EXTENDED RELEASE ORAL EVERY 12 HOURS
Refills: 0 | Status: DISCONTINUED | OUTPATIENT
Start: 2025-02-10 | End: 2025-02-10

## 2025-02-10 RX ORDER — SODIUM PHOSPHATE,DIBASIC DIHYD
30 POWDER (GRAM) MISCELLANEOUS ONCE
Refills: 0 | Status: COMPLETED | OUTPATIENT
Start: 2025-02-10 | End: 2025-02-10

## 2025-02-10 RX ORDER — METOPROLOL SUCCINATE 50 MG/1
5 TABLET, EXTENDED RELEASE ORAL ONCE
Refills: 0 | Status: COMPLETED | OUTPATIENT
Start: 2025-02-10 | End: 2025-02-10

## 2025-02-10 RX ORDER — INSULIN GLARGINE-YFGN 100 [IU]/ML
3 INJECTION, SOLUTION SUBCUTANEOUS AT BEDTIME
Refills: 0 | Status: DISCONTINUED | OUTPATIENT
Start: 2025-02-10 | End: 2025-02-11

## 2025-02-10 RX ORDER — FENTANYL CITRATE-0.9 % NACL/PF 100MCG/2ML
25 SYRINGE (ML) INTRAVENOUS ONCE
Refills: 0 | Status: DISCONTINUED | OUTPATIENT
Start: 2025-02-10 | End: 2025-02-10

## 2025-02-10 RX ORDER — METOPROLOL SUCCINATE 50 MG/1
25 TABLET, EXTENDED RELEASE ORAL EVERY 12 HOURS
Refills: 0 | Status: DISCONTINUED | OUTPATIENT
Start: 2025-02-10 | End: 2025-02-14

## 2025-02-10 RX ORDER — SODIUM CHLORIDE 3 G/100ML
100 INJECTION, SOLUTION INTRAVENOUS ONCE
Refills: 0 | Status: COMPLETED | OUTPATIENT
Start: 2025-02-10 | End: 2025-02-10

## 2025-02-10 RX ORDER — SODIUM CHLORIDE 3 G/100ML
250 INJECTION, SOLUTION INTRAVENOUS ONCE
Refills: 0 | Status: COMPLETED | OUTPATIENT
Start: 2025-02-10 | End: 2025-02-10

## 2025-02-10 RX ORDER — METOPROLOL SUCCINATE 50 MG/1
2.5 TABLET, EXTENDED RELEASE ORAL ONCE
Refills: 0 | Status: COMPLETED | OUTPATIENT
Start: 2025-02-10 | End: 2025-02-10

## 2025-02-10 RX ORDER — LEVETIRACETAM 10 MG/ML
1500 INJECTION, SOLUTION INTRAVENOUS EVERY 12 HOURS
Refills: 0 | Status: DISCONTINUED | OUTPATIENT
Start: 2025-02-10 | End: 2025-02-25

## 2025-02-10 RX ORDER — ACETAMINOPHEN 500 MG/5ML
1000 LIQUID (ML) ORAL ONCE
Refills: 0 | Status: COMPLETED | OUTPATIENT
Start: 2025-02-10 | End: 2025-02-10

## 2025-02-10 RX ORDER — LACOSAMIDE 150 MG/1
100 TABLET, FILM COATED ORAL EVERY 12 HOURS
Refills: 0 | Status: DISCONTINUED | OUTPATIENT
Start: 2025-02-10 | End: 2025-02-25

## 2025-02-10 RX ORDER — LACOSAMIDE 150 MG/1
100 TABLET, FILM COATED ORAL EVERY 12 HOURS
Refills: 0 | Status: DISCONTINUED | OUTPATIENT
Start: 2025-02-10 | End: 2025-02-10

## 2025-02-10 RX ADMIN — LEVETIRACETAM 1500 MILLIGRAM(S): 10 INJECTION, SOLUTION INTRAVENOUS at 21:26

## 2025-02-10 RX ADMIN — Medication 1 DROP(S): at 21:29

## 2025-02-10 RX ADMIN — METOPROLOL SUCCINATE 25 MILLIGRAM(S): 50 TABLET, EXTENDED RELEASE ORAL at 23:14

## 2025-02-10 RX ADMIN — INSULIN LISPRO 2: 100 INJECTION, SOLUTION INTRAVENOUS; SUBCUTANEOUS at 13:39

## 2025-02-10 RX ADMIN — Medication 15 MILLILITER(S): at 09:11

## 2025-02-10 RX ADMIN — LACTULOSE 20 GRAM(S): 10 SOLUTION ORAL at 14:14

## 2025-02-10 RX ADMIN — Medication 1 APPLICATION(S): at 09:33

## 2025-02-10 RX ADMIN — Medication 55 MILLIGRAM(S): at 09:10

## 2025-02-10 RX ADMIN — Medication 40 MILLIGRAM(S): at 09:11

## 2025-02-10 RX ADMIN — SODIUM CHLORIDE 600 MILLILITER(S): 3 INJECTION, SOLUTION INTRAVENOUS at 06:43

## 2025-02-10 RX ADMIN — ENOXAPARIN SODIUM 40 MILLIGRAM(S): 100 INJECTION SUBCUTANEOUS at 18:00

## 2025-02-10 RX ADMIN — INSULIN LISPRO 2: 100 INJECTION, SOLUTION INTRAVENOUS; SUBCUTANEOUS at 23:14

## 2025-02-10 RX ADMIN — METOPROLOL SUCCINATE 5 MILLIGRAM(S): 50 TABLET, EXTENDED RELEASE ORAL at 22:55

## 2025-02-10 RX ADMIN — Medication 15 MILLILITER(S): at 21:24

## 2025-02-10 RX ADMIN — DEXAMETHASONE 4 MILLIGRAM(S): 0.5 TABLET ORAL at 13:39

## 2025-02-10 RX ADMIN — Medication 55 MILLIGRAM(S): at 14:16

## 2025-02-10 RX ADMIN — LACTULOSE 20 GRAM(S): 10 SOLUTION ORAL at 21:26

## 2025-02-10 RX ADMIN — INSULIN LISPRO 2: 100 INJECTION, SOLUTION INTRAVENOUS; SUBCUTANEOUS at 18:22

## 2025-02-10 RX ADMIN — Medication 1 APPLICATION(S): at 06:15

## 2025-02-10 RX ADMIN — LEVETIRACETAM 400 MILLIGRAM(S): 10 INJECTION, SOLUTION INTRAVENOUS at 09:11

## 2025-02-10 RX ADMIN — LACOSAMIDE 100 MILLIGRAM(S): 150 TABLET, FILM COATED ORAL at 21:25

## 2025-02-10 RX ADMIN — Medication 1 DROP(S): at 09:31

## 2025-02-10 RX ADMIN — Medication 25 MICROGRAM(S): at 12:50

## 2025-02-10 RX ADMIN — DEXAMETHASONE 4 MILLIGRAM(S): 0.5 TABLET ORAL at 06:16

## 2025-02-10 RX ADMIN — DEXAMETHASONE 4 MILLIGRAM(S): 0.5 TABLET ORAL at 18:00

## 2025-02-10 RX ADMIN — INSULIN LISPRO 2: 100 INJECTION, SOLUTION INTRAVENOUS; SUBCUTANEOUS at 05:06

## 2025-02-10 RX ADMIN — METOPROLOL SUCCINATE 2.5 MILLIGRAM(S): 50 TABLET, EXTENDED RELEASE ORAL at 10:42

## 2025-02-10 RX ADMIN — Medication 400 MILLIGRAM(S): at 08:54

## 2025-02-10 RX ADMIN — POLYETHYLENE GLYCOL 3350 17 GRAM(S): 17 POWDER, FOR SOLUTION ORAL at 06:17

## 2025-02-10 RX ADMIN — Medication 1 APPLICATION(S): at 21:30

## 2025-02-10 RX ADMIN — METOPROLOL SUCCINATE 12.5 MILLIGRAM(S): 50 TABLET, EXTENDED RELEASE ORAL at 14:15

## 2025-02-10 RX ADMIN — Medication 1000 MILLIGRAM(S): at 09:15

## 2025-02-10 RX ADMIN — SODIUM CHLORIDE 750 MILLILITER(S): 3 INJECTION, SOLUTION INTRAVENOUS at 13:45

## 2025-02-10 RX ADMIN — LACOSAMIDE 140 MILLIGRAM(S): 150 TABLET, FILM COATED ORAL at 09:32

## 2025-02-10 RX ADMIN — Medication 85 MILLIMOLE(S): at 06:54

## 2025-02-10 RX ADMIN — DEXAMETHASONE 4 MILLIGRAM(S): 0.5 TABLET ORAL at 23:11

## 2025-02-10 RX ADMIN — Medication 55 MILLIGRAM(S): at 02:16

## 2025-02-10 RX ADMIN — LACTULOSE 20 GRAM(S): 10 SOLUTION ORAL at 06:15

## 2025-02-10 RX ADMIN — Medication 25 MICROGRAM(S): at 11:57

## 2025-02-10 RX ADMIN — Medication 500 MILLIGRAM(S): at 21:27

## 2025-02-10 RX ADMIN — Medication 10 MILLILITER(S): at 21:27

## 2025-02-10 RX ADMIN — INSULIN GLARGINE-YFGN 3 UNIT(S): 100 INJECTION, SOLUTION SUBCUTANEOUS at 23:15

## 2025-02-10 RX ADMIN — INSULIN LISPRO 1: 100 INJECTION, SOLUTION INTRAVENOUS; SUBCUTANEOUS at 00:01

## 2025-02-10 RX ADMIN — POLYETHYLENE GLYCOL 3350 17 GRAM(S): 17 POWDER, FOR SOLUTION ORAL at 18:00

## 2025-02-10 NOTE — PROGRESS NOTE ADULT - ASSESSMENT
73 yo female w PMH GBM on hospice care (diagnosed in 2024, follows at [facility] under Dr. Hernandez, with reported attempted resection of tumor, on chemotherapy with last chemo 2 months ago), admitted for ams likely 2/2 disease progression After admission to [facility], Upon evaluation pt was obtunded with tremors to RUE, response to noxious stimuli but otherwise no response. Pupils were responsive with saccadic movement. She was slumped to her right sided with gurgling breath sounds.  At that point rapid was called and patient was inevitably intubated. Transferred to [facility]. Discussed with pts daughters post procedure poor prognosis and advised to have formal family meeting to clarify and document wishes.       NEURO  Intubated. Not sedated. Minimally responsive despite not being on sedation. 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 There is evidence of focal cortical hyperexcitability, more prominent in the right than the left frontotemporal regions. The background pattern shows a burst suppression ratio of 10:1, indicating severe diffuse or multifocal cerebral dysfunction, potentially exacerbated by IV sedatives. Additionally, there is severe generalized and multifocal slowing, further supporting significant underlying cerebral dysfunction.  Continued EEG findings of seizures on 2/6  Plan:   s/p valproic acid loading dose 1200 mg, vimpat 200 mg loading  - epilepsy consulted, appreciate recs      - Increase Keppra 1500mg BID      - C/w Vimpat 200mg BID (monitor tele to ensure no heart block)      - VPA level noted, can hold off on additional dose today      - C/w Depakote 500mg q6hrs      - Obtain VPA level tomorrow AM      - scalp rest for vEEG; restart again tomorrow AM      - seizure precautions  - f/u CT Head non-contrast    #GBM  last chemo ~2months ago, follows at [facility].  Per Neurosurgery consult on 1/31, no acute interventions.   -c/w decadron 4 IV q6h  -palliative care consult   -maintain Na > 145 to address cerebral edema 2/2 mass effect    CARDIAC  #Chronic atrial fibrillation.    Home med: metoprolol 12.5 BID   - c/w Lovenox ( per NSGY, no increased risk of hemorrhage     Plan  -bladder scans q6h   -Lasix 40 mg IV push given today given net positive   -BMP every 6 hrs   -consider john (currently on primafit) if pt retaining   -make sure pt having BMs  -pain control with dilaudid 0.5 q4h PRN.    PULM  #intubated  - intubated on 2/1  - GOC  - consider trach moving forward    GI  - NPO with tube feeds   - consider PEG moving forward    RENAL  - currently with normal renal function  -Add 1 dose 40 IV Lasix for fluid retention and overload     #hypernatremia  -maintain Na > 145 as above for mass effect but less than 150     ID  #SIRS  #ESBL Ecoli Bacteremia  #R arm cellulitis   #ESBL ecoli UTI   Systemic inflammatory response syndrome (SIRS).   Meeting SIRS 2/4 (HR>90, WBC>12k) likely iso of UTI.   Bcx with ESBL E. coli  -s/p ertapenem tx   -IVF as needed  -d/c vanc given unremarkable CT findings   -plan R arm CT angio with IV contrast    F: none  E: replete K<4, Mg<2  N: npo with tube feeds  VTE Prophylaxis: lovenox 40mg QD  GI: pantoprazole 40 IVP  C: Full Code  D: micu    Lines: Peripheral IV L 22g (2/4) IV L 18 g (2/4)l, NG tube      75 yo female w PMH GBM on hospice care (diagnosed in 2024, follows at [facility] under Dr. Hernandez, with reported attempted resection of tumor, on chemotherapy with last chemo 2 months ago), admitted for ams likely 2/2 disease progression After admission to [facility], Upon evaluation pt was obtunded with tremors to RUE, response to noxious stimuli but otherwise no response. Pupils were responsive with saccadic movement. She was slumped to her right sided with gurgling breath sounds.  At that point rapid was called and patient was inevitably intubated. Transferred to [facility]. Discussed with pts daughters post procedure poor prognosis and advised to have formal family meeting to clarify and document wishes.       NEURO  Intubated. Not sedated. Minimally responsive despite not being on sedation. 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 There is evidence of focal cortical hyperexcitability, more prominent in the right than the left frontotemporal regions. The background pattern shows a burst suppression ratio of 10:1, indicating severe diffuse or multifocal cerebral dysfunction, potentially exacerbated by IV sedatives. Additionally, there is severe generalized and multifocal slowing, further supporting significant underlying cerebral dysfunction. CT head with worsening midline shift.  Continued EEG findings of seizures on 2/6  Plan:   s/p valproic acid loading dose 1200 mg, vimpat 200 mg loading  - epilepsy consulted, appreciate recs      -  Keppra 1500mg BID      - Vimpat 100mg BID (monitor tele to ensure no heart block)      - C/w Depakote 500mg q6hrs      - Obtain VPA in AM      - seizure precautions      #GBM  last chemo ~2months ago, follows at [facility].  Per Neurosurgery consult on 1/31, no acute interventions. Worsening midline shift on CT.   -c/w decadron 4 IV q6h  -palliative care consult   -maintain Na > 145 to address cerebral edema 2/2 mass effect    CARDIAC  #Chronic atrial fibrillation.   #afib with RVR    Home med: metoprolol 12.5 BID   - c/w Lovenox ( per NSGY, no increased risk of hemorrhage)  -start Lopressor 12.5 mg BID   -Fent 50 given for pain (could be cause of RVR)    Plan  -bladder scans q6h   -Lasix 40 mg IV push given today given net positive   -BMP every 6 hrs   -consider john (currently on primafit) if pt retaining   -make sure pt having BMs  -pain control with dilaudid 0.5 q4h PRN.    PULM  #intubated  - intubated on 2/1  - GOC  - consider trach moving forward    GI  - NPO with tube feeds   - consider PEG moving forward    RENAL  - currently with normal renal function  -Add 1 dose 40 IV Lasix for fluid retention and overload     #hypernatremia  -maintain Na > 145 as above for mass effect but less than 150     Endo  -maintain on iSS, continue to monitor glucose for further insulin regimen planning as glucose is currently greater than 180 and out of range for goal between 140-180.    ID  #SIRS  #ESBL Ecoli Bacteremia  #R arm cellulitis   #ESBL ecoli UTI   Systemic inflammatory response syndrome (SIRS).   Meeting SIRS 2/4 (HR>90, WBC>12k) likely iso of UTI.   Bcx with ESBL E. coli  -s/p ertapenem tx   -IVF as needed  -d/c vanc given unremarkable CT findings     F: none  E: replete K<4, Mg<2  N: npo with tube feeds  VTE Prophylaxis: lovenox 40mg QD  GI: pantoprazole 40 IVP  C: Full Code  D: micu    Lines: Peripheral IV L 22g (2/4) IV L 18 g (2/4), NG tube

## 2025-02-10 NOTE — PROGRESS NOTE ADULT - SUBJECTIVE AND OBJECTIVE BOX
EPILEPSY PROGRESS NOTE:  No events overnight. No complaints currently.    REVIEW OF SYSTEMS:  As above, otherwise negative for constitutional/HEENT/CV/pulm/GI//MSK/neuro/derm/endocrine/psych.    MEDICATIONS:   MEDICATIONS  (STANDING):  artificial  tears Solution 1 Drop(s) Both EYES every 12 hours  chlorhexidine 0.12% Liquid 15 milliLiter(s) Oral Mucosa every 12 hours  chlorhexidine 2% Cloths 1 Application(s) Topical <User Schedule>  dexAMETHasone  Injectable 4 milliGRAM(s) IV Push every 6 hours  dextrose 5%. 1000 milliLiter(s) (50 mL/Hr) IV Continuous <Continuous>  dextrose 5%. 1000 milliLiter(s) (100 mL/Hr) IV Continuous <Continuous>  dextrose 50% Injectable 25 Gram(s) IV Push once  dextrose 50% Injectable 12.5 Gram(s) IV Push once  dextrose 50% Injectable 25 Gram(s) IV Push once  enoxaparin Injectable 40 milliGRAM(s) SubCutaneous every 24 hours  glucagon  Injectable 1 milliGRAM(s) IntraMuscular once  influenza  Vaccine (HIGH DOSE) 0.5 milliLiter(s) IntraMuscular once  insulin lispro (ADMELOG) corrective regimen sliding scale   SubCutaneous every 6 hours  lacosamide IVPB 200 milliGRAM(s) IV Intermittent every 12 hours  lactulose Syrup 20 Gram(s) Oral every 8 hours  levETIRAcetam  IVPB 1500 milliGRAM(s) IV Intermittent every 12 hours  pantoprazole  Injectable 40 milliGRAM(s) IV Push every 24 hours  petrolatum Ophthalmic Ointment 1 Application(s) Both EYES every 12 hours  polyethylene glycol 3350 17 Gram(s) Oral two times a day  senna Syrup 10 milliLiter(s) Oral at bedtime  valproate sodium   IVPB 500 milliGRAM(s) IV Intermittent every 6 hours    MEDICATIONS  (PRN):  dextrose Oral Gel 15 Gram(s) Oral once PRN Blood Glucose LESS THAN 70 milliGRAM(s)/deciliter  LORazepam   Injectable 2 milliGRAM(s) IV Push every 4 hours PRN Seizures      VITAL SIGNS:  T(C): 37.2 (02-10-25 @ 08:51), Max: 37.2 (02-10-25 @ 08:51)  HR: 130 (02-10-25 @ 09:00) (76 - 130)  BP: 176/105 (02-10-25 @ 09:00) (114/75 - 176/105)  RR: 17 (02-10-25 @ 09:00) (10 - 25)  SpO2: 97% (02-10-25 @ 09:00) (95% - 100%)  Wt(kg): --    PHYSICAL EXAM:  Eyes open spontaneously. Conversational with appropriate sentences.  EOMI. Visual fields full. PERRL 3>2. Face symmetrical.  Full strength throughout.  Finger-nose-finger intact R/L.  Intact to light touch throughout.    LABS:  CBC Full  -  ( 10 Feb 2025 04:24 )  WBC Count : 14.86 K/uL  RBC Count : 4.32 M/uL  Hemoglobin : 11.9 g/dL  Hematocrit : 38.3 %  Platelet Count - Automated : 266 K/uL  Mean Cell Volume : 88.7 fl  Mean Cell Hemoglobin : 27.5 pg  Mean Cell Hemoglobin Concentration : 31.1 g/dL  Auto Neutrophil # : 12.96 K/uL  Auto Lymphocyte # : 0.68 K/uL  Auto Monocyte # : 0.39 K/uL  Auto Eosinophil # : 0.00 K/uL  Auto Basophil # : 0.03 K/uL  Auto Neutrophil % : 87.2 %  Auto Lymphocyte % : 4.6 %  Auto Monocyte % : 2.6 %  Auto Eosinophil % : 0.0 %  Auto Basophil % : 0.2 %    02-10    149[H]  |  110[H]  |  24[H]  ----------------------------<  277[H]  4.2   |  28  |  0.27[L]    Ca    7.6[L]      10 Feb 2025 04:24  Phos  2.4     02-10  Mg     2.2     02-10    TPro  4.6[L]  /  Alb  2.7[L]  /  TBili  0.2  /  DBili  x   /  AST  31  /  ALT  56[H]  /  AlkPhos  105  02-10    LIVER FUNCTIONS - ( 10 Feb 2025 04:24 )  Alb: 2.7 g/dL / Pro: 4.6 g/dL / ALK PHOS: 105 U/L / ALT: 56 U/L / AST: 31 U/L / GGT: x                 EEG: EPILEPSY PROGRESS NOTE:  Patient seen and examined at bedside, and remains unresponsive and off sedation for at least 3 days. There were 2 electrographic seizures noted on EEG overnight, but markedly improved from previous recordings.     REVIEW OF SYSTEMS:  Unobtainable 2/2 altered level of consciousness    MEDICATIONS:  artificial  tears Solution 1 Drop(s) Both EYES every 12 hours  chlorhexidine 0.12% Liquid 15 milliLiter(s) Oral Mucosa every 12 hours  chlorhexidine 2% Cloths 1 Application(s) Topical <User Schedule>  dexAMETHasone  Injectable 4 milliGRAM(s) IV Push every 6 hours  dextrose 5%. 1000 milliLiter(s) (50 mL/Hr) IV Continuous <Continuous>  dextrose 5%. 1000 milliLiter(s) (100 mL/Hr) IV Continuous <Continuous>  dextrose 50% Injectable 25 Gram(s) IV Push once  dextrose 50% Injectable 12.5 Gram(s) IV Push once  dextrose 50% Injectable 25 Gram(s) IV Push once  enoxaparin Injectable 40 milliGRAM(s) SubCutaneous every 24 hours  glucagon  Injectable 1 milliGRAM(s) IntraMuscular once  influenza  Vaccine (HIGH DOSE) 0.5 milliLiter(s) IntraMuscular once  insulin lispro (ADMELOG) corrective regimen sliding scale   SubCutaneous every 6 hours  lacosamide IVPB 200 milliGRAM(s) IV Intermittent every 12 hours  lactulose Syrup 20 Gram(s) Oral every 8 hours  levETIRAcetam  IVPB 1500 milliGRAM(s) IV Intermittent every 12 hours  pantoprazole  Injectable 40 milliGRAM(s) IV Push every 24 hours  petrolatum Ophthalmic Ointment 1 Application(s) Both EYES every 12 hours  polyethylene glycol 3350 17 Gram(s) Oral two times a day  senna Syrup 10 milliLiter(s) Oral at bedtime  valproate sodium   IVPB 500 milliGRAM(s) IV Intermittent every 6 hours    MEDICATIONS  (PRN):  dextrose Oral Gel 15 Gram(s) Oral once PRN Blood Glucose LESS THAN 70 milliGRAM(s)/deciliter  LORazepam   Injectable 2 milliGRAM(s) IV Push every 4 hours PRN Seizures    VITAL SIGNS:  T(C): 37.2 (02-10-25 @ 08:51), Max: 37.2 (02-10-25 @ 08:51)  HR: 130 (02-10-25 @ 09:00) (76 - 130)  BP: 176/105 (02-10-25 @ 09:00) (114/75 - 176/105)  RR: 17 (02-10-25 @ 09:00) (10 - 25)  SpO2: 97% (02-10-25 @ 09:00) (95% - 100%)  Wt(kg): --    PHYSICAL EXAM:  Eyes closed, does not open eyes to commands. Intermittently noted half-way eye opening with right gaze preference. Does not follow commands or track bedside activity with half-way opened eyes. +corneal, gag and cough reflexes. +blink to threat bilaterally. Noted tremors with intermittent deep noxious stimuli of the right middle toe (not constant). Otherwise, no motor or sensory response to noxious stimuli. 1+ reflexes noted in all limbs and toes mute.     LABS:  CBC Full  -  ( 10 Feb 2025 04:24 )  WBC Count : 14.86 K/uL  RBC Count : 4.32 M/uL  Hemoglobin : 11.9 g/dL  Hematocrit : 38.3 %  Platelet Count - Automated : 266 K/uL  Mean Cell Volume : 88.7 fl  Mean Cell Hemoglobin : 27.5 pg  Mean Cell Hemoglobin Concentration : 31.1 g/dL  Auto Neutrophil # : 12.96 K/uL  Auto Lymphocyte # : 0.68 K/uL  Auto Monocyte # : 0.39 K/uL  Auto Eosinophil # : 0.00 K/uL  Auto Basophil # : 0.03 K/uL  Auto Neutrophil % : 87.2 %  Auto Lymphocyte % : 4.6 %  Auto Monocyte % : 2.6 %  Auto Eosinophil % : 0.0 %  Auto Basophil % : 0.2 %    02-10    149[H]  |  110[H]  |  24[H]  ----------------------------<  277[H]  4.2   |  28  |  0.27[L]    Ca    7.6[L]      10 Feb 2025 04:24  Phos  2.4     02-10  Mg     2.2     02-10    TPro  4.6[L]  /  Alb  2.7[L]  /  TBili  0.2  /  DBili  x   /  AST  31  /  ALT  56[H]  /  AlkPhos  105  02-10    LIVER FUNCTIONS - ( 10 Feb 2025 04:24 )  Alb: 2.7 g/dL / Pro: 4.6 g/dL / ALK PHOS: 105 U/L / ALT: 56 U/L / AST: 31 U/L / GGT: x                  EPILEPSY PROGRESS NOTE:  Patient seen and examined at bedside, and remains unresponsive and off sedation for at least 3 days. There were 2 electrographic seizures noted on EEG overnight, but markedly improved from previous recordings. Of note, patient was noted to be tachycardic up to 140' and hypertensive 167/103.     REVIEW OF SYSTEMS:  Unobtainable 2/2 altered level of consciousness    MEDICATIONS:  artificial  tears Solution 1 Drop(s) Both EYES every 12 hours  chlorhexidine 0.12% Liquid 15 milliLiter(s) Oral Mucosa every 12 hours  chlorhexidine 2% Cloths 1 Application(s) Topical <User Schedule>  dexAMETHasone  Injectable 4 milliGRAM(s) IV Push every 6 hours  dextrose 5%. 1000 milliLiter(s) (50 mL/Hr) IV Continuous <Continuous>  dextrose 5%. 1000 milliLiter(s) (100 mL/Hr) IV Continuous <Continuous>  dextrose 50% Injectable 25 Gram(s) IV Push once  dextrose 50% Injectable 12.5 Gram(s) IV Push once  dextrose 50% Injectable 25 Gram(s) IV Push once  enoxaparin Injectable 40 milliGRAM(s) SubCutaneous every 24 hours  glucagon  Injectable 1 milliGRAM(s) IntraMuscular once  influenza  Vaccine (HIGH DOSE) 0.5 milliLiter(s) IntraMuscular once  insulin lispro (ADMELOG) corrective regimen sliding scale   SubCutaneous every 6 hours  lacosamide IVPB 200 milliGRAM(s) IV Intermittent every 12 hours  lactulose Syrup 20 Gram(s) Oral every 8 hours  levETIRAcetam  IVPB 1500 milliGRAM(s) IV Intermittent every 12 hours  pantoprazole  Injectable 40 milliGRAM(s) IV Push every 24 hours  petrolatum Ophthalmic Ointment 1 Application(s) Both EYES every 12 hours  polyethylene glycol 3350 17 Gram(s) Oral two times a day  senna Syrup 10 milliLiter(s) Oral at bedtime  valproate sodium   IVPB 500 milliGRAM(s) IV Intermittent every 6 hours    MEDICATIONS  (PRN):  dextrose Oral Gel 15 Gram(s) Oral once PRN Blood Glucose LESS THAN 70 milliGRAM(s)/deciliter  LORazepam   Injectable 2 milliGRAM(s) IV Push every 4 hours PRN Seizures    VITAL SIGNS:  T(C): 37.2 (02-10-25 @ 08:51), Max: 37.2 (02-10-25 @ 08:51)  HR: 130 (02-10-25 @ 09:00) (76 - 130)  BP: 176/105 (02-10-25 @ 09:00) (114/75 - 176/105)  RR: 17 (02-10-25 @ 09:00) (10 - 25)  SpO2: 97% (02-10-25 @ 09:00) (95% - 100%)  Wt(kg): --    PHYSICAL EXAM:  Eyes closed, does not open eyes to commands. Intermittently noted half-way eye opening with right gaze preference. Does not follow commands or track bedside activity with half-way opened eyes. +corneal, gag and cough reflexes. +blink to threat bilaterally. Noted tremors with intermittent deep noxious stimuli of the right middle toe (not constant). Otherwise, no motor or sensory response to noxious stimuli. 1+ reflexes noted in all limbs and toes mute.     LABS:  CBC Full  -  ( 10 Feb 2025 04:24 )  WBC Count : 14.86 K/uL  RBC Count : 4.32 M/uL  Hemoglobin : 11.9 g/dL  Hematocrit : 38.3 %  Platelet Count - Automated : 266 K/uL  Mean Cell Volume : 88.7 fl  Mean Cell Hemoglobin : 27.5 pg  Mean Cell Hemoglobin Concentration : 31.1 g/dL  Auto Neutrophil # : 12.96 K/uL  Auto Lymphocyte # : 0.68 K/uL  Auto Monocyte # : 0.39 K/uL  Auto Eosinophil # : 0.00 K/uL  Auto Basophil # : 0.03 K/uL  Auto Neutrophil % : 87.2 %  Auto Lymphocyte % : 4.6 %  Auto Monocyte % : 2.6 %  Auto Eosinophil % : 0.0 %  Auto Basophil % : 0.2 %    02-10    149[H]  |  110[H]  |  24[H]  ----------------------------<  277[H]  4.2   |  28  |  0.27[L]    Ca    7.6[L]      10 Feb 2025 04:24  Phos  2.4     02-10  Mg     2.2     02-10    TPro  4.6[L]  /  Alb  2.7[L]  /  TBili  0.2  /  DBili  x   /  AST  31  /  ALT  56[H]  /  AlkPhos  105  02-10    LIVER FUNCTIONS - ( 10 Feb 2025 04:24 )  Alb: 2.7 g/dL / Pro: 4.6 g/dL / ALK PHOS: 105 U/L / ALT: 56 U/L / AST: 31 U/L / GGT: x                  EPILEPSY PROGRESS NOTE:  Patient seen and examined at bedside, and remains unresponsive and off sedation for at least 3 days. There were 2 electrographic seizures noted on EEG overnight, without any definite clinical correlate, and markedly improved from previous recordings. Of note, patient was noted to be tachycardic up to 140' and hypertensive 167/103.   Not any improvement in clinical function.    REVIEW OF SYSTEMS:  Unobtainable 2/2 altered level of consciousness    MEDICATIONS:  artificial  tears Solution 1 Drop(s) Both EYES every 12 hours  chlorhexidine 0.12% Liquid 15 milliLiter(s) Oral Mucosa every 12 hours  chlorhexidine 2% Cloths 1 Application(s) Topical <User Schedule>  dexAMETHasone  Injectable 4 milliGRAM(s) IV Push every 6 hours  dextrose 5%. 1000 milliLiter(s) (50 mL/Hr) IV Continuous <Continuous>  dextrose 5%. 1000 milliLiter(s) (100 mL/Hr) IV Continuous <Continuous>  dextrose 50% Injectable 25 Gram(s) IV Push once  dextrose 50% Injectable 12.5 Gram(s) IV Push once  dextrose 50% Injectable 25 Gram(s) IV Push once  enoxaparin Injectable 40 milliGRAM(s) SubCutaneous every 24 hours  glucagon  Injectable 1 milliGRAM(s) IntraMuscular once  influenza  Vaccine (HIGH DOSE) 0.5 milliLiter(s) IntraMuscular once  insulin lispro (ADMELOG) corrective regimen sliding scale   SubCutaneous every 6 hours  lacosamide IVPB 200 milliGRAM(s) IV Intermittent every 12 hours  lactulose Syrup 20 Gram(s) Oral every 8 hours  levETIRAcetam  IVPB 1500 milliGRAM(s) IV Intermittent every 12 hours  pantoprazole  Injectable 40 milliGRAM(s) IV Push every 24 hours  petrolatum Ophthalmic Ointment 1 Application(s) Both EYES every 12 hours  polyethylene glycol 3350 17 Gram(s) Oral two times a day  senna Syrup 10 milliLiter(s) Oral at bedtime  valproate sodium   IVPB 500 milliGRAM(s) IV Intermittent every 6 hours    MEDICATIONS  (PRN):  dextrose Oral Gel 15 Gram(s) Oral once PRN Blood Glucose LESS THAN 70 milliGRAM(s)/deciliter  LORazepam   Injectable 2 milliGRAM(s) IV Push every 4 hours PRN Seizures    VITAL SIGNS:  T(C): 37.2 (02-10-25 @ 08:51), Max: 37.2 (02-10-25 @ 08:51)  HR: 130 (02-10-25 @ 09:00) (76 - 130)  BP: 176/105 (02-10-25 @ 09:00) (114/75 - 176/105)  RR: 17 (02-10-25 @ 09:00) (10 - 25)  SpO2: 97% (02-10-25 @ 09:00) (95% - 100%)  Wt(kg): --    PHYSICAL EXAM:  Eyes closed, does not open eyes to commands. Intermittently noted half-way eye opening with right gaze preference. Does not follow commands or track bedside activity with half-way opened eyes. +corneal, gag and cough reflexes. +blink to threat bilaterally. Noted tremors with intermittent deep noxious stimuli of the right middle toe (not constant). Otherwise, no motor or sensory response to noxious stimuli. 1+ reflexes noted in all limbs and toes mute.     LABS:  CBC Full  -  ( 10 Feb 2025 04:24 )  WBC Count : 14.86 K/uL  RBC Count : 4.32 M/uL  Hemoglobin : 11.9 g/dL  Hematocrit : 38.3 %  Platelet Count - Automated : 266 K/uL  Mean Cell Volume : 88.7 fl  Mean Cell Hemoglobin : 27.5 pg  Mean Cell Hemoglobin Concentration : 31.1 g/dL  Auto Neutrophil # : 12.96 K/uL  Auto Lymphocyte # : 0.68 K/uL  Auto Monocyte # : 0.39 K/uL  Auto Eosinophil # : 0.00 K/uL  Auto Basophil # : 0.03 K/uL  Auto Neutrophil % : 87.2 %  Auto Lymphocyte % : 4.6 %  Auto Monocyte % : 2.6 %  Auto Eosinophil % : 0.0 %  Auto Basophil % : 0.2 %    02-10    149[H]  |  110[H]  |  24[H]  ----------------------------<  277[H]  4.2   |  28  |  0.27[L]    Ca    7.6[L]      10 Feb 2025 04:24  Phos  2.4     02-10  Mg     2.2     02-10    TPro  4.6[L]  /  Alb  2.7[L]  /  TBili  0.2  /  DBili  x   /  AST  31  /  ALT  56[H]  /  AlkPhos  105  02-10    LIVER FUNCTIONS - ( 10 Feb 2025 04:24 )  Alb: 2.7 g/dL / Pro: 4.6 g/dL / ALK PHOS: 105 U/L / ALT: 56 U/L / AST: 31 U/L / GGT: x

## 2025-02-10 NOTE — EEG REPORT - NS EEG TEXT BOX
[facility] Department of Neurology  Inpatient Continuous video-Electroencephalogram      Patient Name:	ORVILLE SOLANO    :	1950  MRN:	[id number]    Study Start Date/Time:	2025, 6:09:19 PM  Study End Date/Time:    Referred by:  Dr Santiago    Brief Clinical History:  ORVILLE SOLANO is a 74 year old Female; with right GBM study performed to investigate for seizures or markers of epilepsy.   Technologist notes: -  Diagnosis Code:  R56.9 convulsions/seizure    Pertinent Medication:  Keppra 1500 mg bid, Depakote 500 mg q6h,Vimpat 200 mg bid    Acquisition Details:  Electroencephalography was acquired using a minimum of 21 channels on an [employer] Neu[employer] system v 9.3.1 with electrode placement according to the standard International 10-20 system following ACNS (American Clinical Neurophysiology Society) guidelines.  Anterior temporal T1 and T2 electrodes were utilized whenever possible.  The [employer] automated spike & seizure detections were all reviewed in detail, in addition to the entire raw EEG.    Findings:  Day 1:  2025, 6:09:19 PM to next morning at 07:00 AM   Background:  continuous, asymmetric background with predominantly theta/delta frequencies.slower over right  Generalized Slowing:  Intermittent frequent sporadic polymorphic delta  Symmetry/Focality: Continuous (90+%)   Right hemispheric  and independent left frontotemporal polymorphic delta/theta activity  Voltage:  Normal (20+ uV)  Organization:  Absent  Posterior Dominant Rhythm:  7-8 Hz rudimentary  Sleep:  Rudimentary but likely N2 transients present.  Variability:   Yes		Reactivity:  Yes    Spontaneous Activity:  Frequent ( >1/min < 1/10sec)   bilateral frontal predominant lateralized rhythmic delta activity with occasional superimposed with faster activity, worse on the left  Events:  1: right frontotemporal focal onset seizure:  Time:6:31 AM till 6:32 AM  Clinical:No overt clinical change  Electrographic:development of spikier rhythmic alpha follow by rhythmic monomorphic alpha wave  and became spike  wave discharges, 3Hz follow by slowing until offset    2-Right fronto-temporal focal onset  Time:6:58 AM till 6:58 AM  Clinical:No overt clinical changes  Electrographic: development of rhythmic theta wave and become spikier follow by slow wave until offset    Provocations:  •	Hyperventilation: was not performed.  •	Photic stimulation: was not performed.  Daily Summary:    1)	Finding indicating two  electrographic right fronto-temporal focal onset seizures at 6:30 AM with evolution  and 6:58 AM with minimal evolution on 2/10/25 for one minute   2)	Frequent ( >1/min < 1/10sec)   bilateral frontal predominant lateralized rhythmic delta activity with occasional superimposed with faster activity, worse on the left indicating high epileptic potential in this regions  3)	Moderate generalized slowing indicating similar degree of diffuse/multifocal cerebral dysfunction        Katelynn Lundy MD  CNP Fellow

## 2025-02-10 NOTE — PROGRESS NOTE ADULT - ATTENDING COMMENTS
CT scan showing continued swelling and midline shift. Will give further 3% hypertonic saline to increase sodium into the 150s. Case discussed with neurosurgery and keppra adjusted for some signs of intermittent seizure activity. Still not waking up will ask pallative care to discuss with family.

## 2025-02-10 NOTE — PROGRESS NOTE ADULT - ASSESSMENT
74-year-old-female w/ PMH of GBM (diagnosed in 2024, follows at [facility] under Dr. Hernandez, with reported attempted resection of tumor, on chemotherapy with last chemo 2 months ago) who was admitted altered mental status. 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 VEEG was notable for focal status epilepticus in the bilateral fronto-temporal region from 2/1/25 through 2/3/25 at 6pm following the initiation of vimpat and depakote regimen. On 2/4 propofol was stopped and later in the evening Depakote was held (due to c/f interaction w/ carbapenem), with return of focal seizures and secondary generalization. Given the re-emergence of seizures, depakote was restarted, and vimpat increased with improvement of EEG recordings and the number of seizures. There were 2 short electrographic seizures on 2/10/25 but again much improved from the past week, and it was decided to reduce an agent due to potential somnolence.     Recommendations:  - Continue vEEG monitoring   - Continue Keppra 1500mg BID  - Reduce Vimpat 200mg Q12hrs to 100mg Q12hrs  - Continue Depakote 500mg q6hrs (Therapeutic VPA level noted at 63.3 on 2/10/25)  - Maintain fall & seizure precautions

## 2025-02-10 NOTE — PROGRESS NOTE ADULT - SUBJECTIVE AND OBJECTIVE BOX
Patient is a 74y old  Female who presents with a chief complaint of AMS (09 Feb 2025 16:09)      INTERVAL HPI/OVERNIGHT EVENTS:   No overnight events   Afebrile, hemodynamically stable     ICU Vital Signs Last 24 Hrs  T(C): 36.7 (10 Feb 2025 06:04), Max: 37 (09 Feb 2025 09:19)  T(F): 98 (10 Feb 2025 06:04), Max: 98.6 (09 Feb 2025 09:19)  HR: 119 (10 Feb 2025 07:00) (76 - 119)  BP: 175/111 (10 Feb 2025 07:00) (114/75 - 175/111)  BP(mean): 130 (10 Feb 2025 07:00) (90 - 130)  ABP: --  ABP(mean): --  RR: 16 (10 Feb 2025 07:00) (10 - 25)  SpO2: 98% (10 Feb 2025 07:00) (95% - 100%)    O2 Parameters below as of 10 Feb 2025 07:00  Patient On (Oxygen Delivery Method): ventilator          I&O's Summary    09 Feb 2025 07:01  -  10 Feb 2025 07:00  --------------------------------------------------------  IN: 1500 mL / OUT: 900 mL / NET: 600 mL      Mode: AC/ CMV (Assist Control/ Continuous Mandatory Ventilation)  RR (machine): 12  TV (machine): 350  FiO2: 40  PEEP: 5  ITime: 1  MAP: 7.4  PIP: 14      LABS:                        11.9   14.86 )-----------( 266      ( 10 Feb 2025 04:24 )             38.3     02-10    149[H]  |  110[H]  |  24[H]  ----------------------------<  277[H]  4.2   |  28  |  0.27[L]    Ca    7.6[L]      10 Feb 2025 04:24  Phos  2.4     02-10  Mg     2.2     02-10    TPro  4.6[L]  /  Alb  2.7[L]  /  TBili  0.2  /  DBili  x   /  AST  31  /  ALT  56[H]  /  AlkPhos  105  02-10      Urinalysis Basic - ( 10 Feb 2025 04:24 )    Color: x / Appearance: x / SG: x / pH: x  Gluc: 277 mg/dL / Ketone: x  / Bili: x / Urobili: x   Blood: x / Protein: x / Nitrite: x   Leuk Esterase: x / RBC: x / WBC x   Sq Epi: x / Non Sq Epi: x / Bacteria: x      CAPILLARY BLOOD GLUCOSE      POCT Blood Glucose.: 207 mg/dL (10 Feb 2025 05:02)  POCT Blood Glucose.: 181 mg/dL (09 Feb 2025 23:55)  POCT Blood Glucose.: 98 mg/dL (09 Feb 2025 18:55)  POCT Blood Glucose.: 118 mg/dL (09 Feb 2025 11:49)        RADIOLOGY & ADDITIONAL TESTS:    Consultant(s) Notes Reviewed:  [x ] YES  [ ] NO    MEDICATIONS  (STANDING):  artificial  tears Solution 1 Drop(s) Both EYES every 12 hours  chlorhexidine 0.12% Liquid 15 milliLiter(s) Oral Mucosa every 12 hours  chlorhexidine 2% Cloths 1 Application(s) Topical <User Schedule>  dexAMETHasone  Injectable 4 milliGRAM(s) IV Push every 6 hours  dextrose 5%. 1000 milliLiter(s) (50 mL/Hr) IV Continuous <Continuous>  dextrose 5%. 1000 milliLiter(s) (100 mL/Hr) IV Continuous <Continuous>  dextrose 50% Injectable 25 Gram(s) IV Push once  dextrose 50% Injectable 12.5 Gram(s) IV Push once  dextrose 50% Injectable 25 Gram(s) IV Push once  enoxaparin Injectable 40 milliGRAM(s) SubCutaneous every 24 hours  glucagon  Injectable 1 milliGRAM(s) IntraMuscular once  influenza  Vaccine (HIGH DOSE) 0.5 milliLiter(s) IntraMuscular once  insulin lispro (ADMELOG) corrective regimen sliding scale   SubCutaneous every 6 hours  lacosamide IVPB 200 milliGRAM(s) IV Intermittent every 12 hours  lactulose Syrup 20 Gram(s) Oral every 8 hours  levETIRAcetam  IVPB 1500 milliGRAM(s) IV Intermittent every 12 hours  pantoprazole  Injectable 40 milliGRAM(s) IV Push every 24 hours  petrolatum Ophthalmic Ointment 1 Application(s) Both EYES every 12 hours  polyethylene glycol 3350 17 Gram(s) Oral two times a day  senna Syrup 10 milliLiter(s) Oral at bedtime  valproate sodium   IVPB 500 milliGRAM(s) IV Intermittent every 6 hours    MEDICATIONS  (PRN):  dextrose Oral Gel 15 Gram(s) Oral once PRN Blood Glucose LESS THAN 70 milliGRAM(s)/deciliter  LORazepam   Injectable 2 milliGRAM(s) IV Push every 4 hours PRN Seizures      PHYSICAL EXAM:  GENERAL:   HEAD:  Atraumatic, Normocephalic  EYES: EOMI, PERRLA, conjunctiva and sclera clear  NECK: Supple, No JVD, Normal thyroid, no enlarged nodes  NERVOUS SYSTEM:  Alert & Awake.   CHEST/LUNG: B/L good air entry; No rales, rhonchi, or wheezing  HEART: S1S2 normal, no S3, Regular rate and rhythm; No murmurs  ABDOMEN: Soft, Nontender, Nondistended; Bowel sounds present  EXTREMITIES:  2+ Peripheral Pulses, No clubbing, cyanosis, or edema  LYMPH: No lymphadenopathy noted  SKIN: No rashes or lesions    Care Discussed with Consultants/Other Providers [ x] YES  [ ] NO Patient is a 74y old  Female who presents with a chief complaint of AMS (09 Feb 2025 16:09)    INTERVAL HPI/OVERNIGHT EVENTS:   Given hypertonic saline bolus overnight x2 with improvement in sodium.     Subjective: Patient seen at bedside, still off sedation but continues to have decreasing mentation. Patient with + gag reflex but otherwise non responsive to name, some response to pain. Spoke to patient's son at bedside regarding GOC, pending further conversation with oldest son. Pt with increasing blood pressure and in AFIB w RVR this AM. Doing better with 2.5 mg IV Push Lopressor today.     ICU Vital Signs Last 24 Hrs  T(C): 36.7 (10 Feb 2025 06:04), Max: 37 (09 Feb 2025 09:19)  T(F): 98 (10 Feb 2025 06:04), Max: 98.6 (09 Feb 2025 09:19)  HR: 119 (10 Feb 2025 07:00) (76 - 119)  BP: 175/111 (10 Feb 2025 07:00) (114/75 - 175/111)  BP(mean): 130 (10 Feb 2025 07:00) (90 - 130)  ABP: --  ABP(mean): --  RR: 16 (10 Feb 2025 07:00) (10 - 25)  SpO2: 98% (10 Feb 2025 07:00) (95% - 100%)    O2 Parameters below as of 10 Feb 2025 07:00  Patient On (Oxygen Delivery Method): ventilator          I&O's Summary    09 Feb 2025 07:01  -  10 Feb 2025 07:00  --------------------------------------------------------  IN: 1500 mL / OUT: 900 mL / NET: 600 mL      Mode: AC/ CMV (Assist Control/ Continuous Mandatory Ventilation)  RR (machine): 12  TV (machine): 350  FiO2: 40  PEEP: 5  ITime: 1  MAP: 7.4  PIP: 14      LABS:                        11.9   14.86 )-----------( 266      ( 10 Feb 2025 04:24 )             38.3     02-10    149[H]  |  110[H]  |  24[H]  ----------------------------<  277[H]  4.2   |  28  |  0.27[L]    Ca    7.6[L]      10 Feb 2025 04:24  Phos  2.4     02-10  Mg     2.2     02-10    TPro  4.6[L]  /  Alb  2.7[L]  /  TBili  0.2  /  DBili  x   /  AST  31  /  ALT  56[H]  /  AlkPhos  105  02-10      Urinalysis Basic - ( 10 Feb 2025 04:24 )    Color: x / Appearance: x / SG: x / pH: x  Gluc: 277 mg/dL / Ketone: x  / Bili: x / Urobili: x   Blood: x / Protein: x / Nitrite: x   Leuk Esterase: x / RBC: x / WBC x   Sq Epi: x / Non Sq Epi: x / Bacteria: x      CAPILLARY BLOOD GLUCOSE      POCT Blood Glucose.: 207 mg/dL (10 Feb 2025 05:02)  POCT Blood Glucose.: 181 mg/dL (09 Feb 2025 23:55)  POCT Blood Glucose.: 98 mg/dL (09 Feb 2025 18:55)  POCT Blood Glucose.: 118 mg/dL (09 Feb 2025 11:49)        RADIOLOGY & ADDITIONAL TESTS:    Consultant(s) Notes Reviewed:  [x ] YES  [ ] NO    MEDICATIONS  (STANDING):  artificial  tears Solution 1 Drop(s) Both EYES every 12 hours  chlorhexidine 0.12% Liquid 15 milliLiter(s) Oral Mucosa every 12 hours  chlorhexidine 2% Cloths 1 Application(s) Topical <User Schedule>  dexAMETHasone  Injectable 4 milliGRAM(s) IV Push every 6 hours  dextrose 5%. 1000 milliLiter(s) (50 mL/Hr) IV Continuous <Continuous>  dextrose 5%. 1000 milliLiter(s) (100 mL/Hr) IV Continuous <Continuous>  dextrose 50% Injectable 25 Gram(s) IV Push once  dextrose 50% Injectable 12.5 Gram(s) IV Push once  dextrose 50% Injectable 25 Gram(s) IV Push once  enoxaparin Injectable 40 milliGRAM(s) SubCutaneous every 24 hours  glucagon  Injectable 1 milliGRAM(s) IntraMuscular once  influenza  Vaccine (HIGH DOSE) 0.5 milliLiter(s) IntraMuscular once  insulin lispro (ADMELOG) corrective regimen sliding scale   SubCutaneous every 6 hours  lacosamide IVPB 200 milliGRAM(s) IV Intermittent every 12 hours  lactulose Syrup 20 Gram(s) Oral every 8 hours  levETIRAcetam  IVPB 1500 milliGRAM(s) IV Intermittent every 12 hours  pantoprazole  Injectable 40 milliGRAM(s) IV Push every 24 hours  petrolatum Ophthalmic Ointment 1 Application(s) Both EYES every 12 hours  polyethylene glycol 3350 17 Gram(s) Oral two times a day  senna Syrup 10 milliLiter(s) Oral at bedtime  valproate sodium   IVPB 500 milliGRAM(s) IV Intermittent every 6 hours    MEDICATIONS  (PRN):  dextrose Oral Gel 15 Gram(s) Oral once PRN Blood Glucose LESS THAN 70 milliGRAM(s)/deciliter  LORazepam   Injectable 2 milliGRAM(s) IV Push every 4 hours PRN Seizures      PHYSICAL EXAM:  GENERAL:   HEAD:  Atraumatic, Normocephalic  EYES: EOMI, PERRLA, conjunctiva and sclera clear  NECK: Supple, No JVD, Normal thyroid, no enlarged nodes  NERVOUS SYSTEM:  Alert & Awake.   CHEST/LUNG: B/L good air entry; No rales, rhonchi, or wheezing  HEART: S1S2 normal, no S3, Regular rate and rhythm; No murmurs  ABDOMEN: Soft, Nontender, Nondistended; Bowel sounds present  EXTREMITIES:  2+ Peripheral Pulses, No clubbing, cyanosis, or edema  LYMPH: No lymphadenopathy noted  SKIN: No rashes or lesions    Care Discussed with Consultants/Other Providers [ x] YES  [ ] NO

## 2025-02-11 LAB
ALBUMIN SERPL ELPH-MCNC: 2.7 G/DL — LOW (ref 3.3–5)
ALP SERPL-CCNC: 109 U/L — SIGNIFICANT CHANGE UP (ref 40–120)
ALT FLD-CCNC: 75 U/L — HIGH (ref 10–45)
ANION GAP SERPL CALC-SCNC: 12 MMOL/L — SIGNIFICANT CHANGE UP (ref 5–17)
ANION GAP SERPL CALC-SCNC: 13 MMOL/L — SIGNIFICANT CHANGE UP (ref 5–17)
AST SERPL-CCNC: 41 U/L — HIGH (ref 10–40)
BASOPHILS # BLD AUTO: 0 K/UL — SIGNIFICANT CHANGE UP (ref 0–0.2)
BASOPHILS NFR BLD AUTO: 0 % — SIGNIFICANT CHANGE UP (ref 0–2)
BILIRUB SERPL-MCNC: 0.2 MG/DL — SIGNIFICANT CHANGE UP (ref 0.2–1.2)
BUN SERPL-MCNC: 19 MG/DL — SIGNIFICANT CHANGE UP (ref 7–23)
BUN SERPL-MCNC: 19 MG/DL — SIGNIFICANT CHANGE UP (ref 7–23)
CALCIUM SERPL-MCNC: 7.4 MG/DL — LOW (ref 8.4–10.5)
CALCIUM SERPL-MCNC: 7.7 MG/DL — LOW (ref 8.4–10.5)
CHLORIDE SERPL-SCNC: 103 MMOL/L — SIGNIFICANT CHANGE UP (ref 96–108)
CHLORIDE SERPL-SCNC: 110 MMOL/L — HIGH (ref 96–108)
CO2 SERPL-SCNC: 28 MMOL/L — SIGNIFICANT CHANGE UP (ref 22–31)
CO2 SERPL-SCNC: 30 MMOL/L — SIGNIFICANT CHANGE UP (ref 22–31)
CREAT SERPL-MCNC: 0.25 MG/DL — LOW (ref 0.5–1.3)
CREAT SERPL-MCNC: 0.28 MG/DL — LOW (ref 0.5–1.3)
EGFR: 113 ML/MIN/1.73M2 — SIGNIFICANT CHANGE UP
EGFR: 116 ML/MIN/1.73M2 — SIGNIFICANT CHANGE UP
EOSINOPHIL # BLD AUTO: 0 K/UL — SIGNIFICANT CHANGE UP (ref 0–0.5)
EOSINOPHIL NFR BLD AUTO: 0 % — SIGNIFICANT CHANGE UP (ref 0–6)
GLUCOSE SERPL-MCNC: 248 MG/DL — HIGH (ref 70–99)
GLUCOSE SERPL-MCNC: 299 MG/DL — HIGH (ref 70–99)
HCT VFR BLD CALC: 39.3 % — SIGNIFICANT CHANGE UP (ref 34.5–45)
HGB BLD-MCNC: 12.3 G/DL — SIGNIFICANT CHANGE UP (ref 11.5–15.5)
LYMPHOCYTES # BLD AUTO: 0.26 K/UL — LOW (ref 1–3.3)
LYMPHOCYTES # BLD AUTO: 1.8 % — LOW (ref 13–44)
MAGNESIUM SERPL-MCNC: 2.4 MG/DL — SIGNIFICANT CHANGE UP (ref 1.6–2.6)
MCHC RBC-ENTMCNC: 28.4 PG — SIGNIFICANT CHANGE UP (ref 27–34)
MCHC RBC-ENTMCNC: 31.3 G/DL — LOW (ref 32–36)
MCV RBC AUTO: 90.8 FL — SIGNIFICANT CHANGE UP (ref 80–100)
MONOCYTES # BLD AUTO: 0 K/UL — SIGNIFICANT CHANGE UP (ref 0–0.9)
MONOCYTES NFR BLD AUTO: 0 % — LOW (ref 2–14)
NEUTROPHILS # BLD AUTO: 13.93 K/UL — HIGH (ref 1.8–7.4)
NEUTROPHILS NFR BLD AUTO: 96.4 % — HIGH (ref 43–77)
PHOSPHATE SERPL-MCNC: 1.9 MG/DL — LOW (ref 2.5–4.5)
PLATELET # BLD AUTO: 280 K/UL — SIGNIFICANT CHANGE UP (ref 150–400)
POTASSIUM SERPL-MCNC: 3.8 MMOL/L — SIGNIFICANT CHANGE UP (ref 3.5–5.3)
POTASSIUM SERPL-MCNC: 4.1 MMOL/L — SIGNIFICANT CHANGE UP (ref 3.5–5.3)
POTASSIUM SERPL-SCNC: 3.8 MMOL/L — SIGNIFICANT CHANGE UP (ref 3.5–5.3)
POTASSIUM SERPL-SCNC: 4.1 MMOL/L — SIGNIFICANT CHANGE UP (ref 3.5–5.3)
PROT SERPL-MCNC: 4.6 G/DL — LOW (ref 6–8.3)
RBC # BLD: 4.33 M/UL — SIGNIFICANT CHANGE UP (ref 3.8–5.2)
RBC # FLD: 16.5 % — HIGH (ref 10.3–14.5)
SODIUM SERPL-SCNC: 145 MMOL/L — SIGNIFICANT CHANGE UP (ref 135–145)
SODIUM SERPL-SCNC: 151 MMOL/L — HIGH (ref 135–145)
VALPROATE FREE SERPL-MCNC: 39.4 UG/ML — HIGH (ref 6–22)
WBC # BLD: 14.45 K/UL — HIGH (ref 3.8–10.5)
WBC # FLD AUTO: 14.45 K/UL — HIGH (ref 3.8–10.5)

## 2025-02-11 PROCEDURE — 99291 CRITICAL CARE FIRST HOUR: CPT

## 2025-02-11 PROCEDURE — 99233 SBSQ HOSP IP/OBS HIGH 50: CPT

## 2025-02-11 PROCEDURE — 95720 EEG PHY/QHP EA INCR W/VEEG: CPT

## 2025-02-11 RX ORDER — INSULIN LISPRO 100 U/ML
INJECTION, SOLUTION INTRAVENOUS; SUBCUTANEOUS EVERY 6 HOURS
Refills: 0 | Status: DISCONTINUED | OUTPATIENT
Start: 2025-02-11 | End: 2025-02-17

## 2025-02-11 RX ORDER — SOD PHOS DI, MONO/K PHOS MONO 250 MG
1 TABLET ORAL ONCE
Refills: 0 | Status: COMPLETED | OUTPATIENT
Start: 2025-02-11 | End: 2025-02-11

## 2025-02-11 RX ORDER — INSULIN GLARGINE-YFGN 100 [IU]/ML
5 INJECTION, SOLUTION SUBCUTANEOUS AT BEDTIME
Refills: 0 | Status: DISCONTINUED | OUTPATIENT
Start: 2025-02-11 | End: 2025-02-14

## 2025-02-11 RX ORDER — SODIUM PHOSPHATE,DIBASIC DIHYD
30 POWDER (GRAM) MISCELLANEOUS ONCE
Refills: 0 | Status: COMPLETED | OUTPATIENT
Start: 2025-02-11 | End: 2025-02-11

## 2025-02-11 RX ORDER — LACTULOSE 10 G/15ML
30 SOLUTION ORAL EVERY 4 HOURS
Refills: 0 | Status: DISCONTINUED | OUTPATIENT
Start: 2025-02-11 | End: 2025-02-12

## 2025-02-11 RX ADMIN — DEXAMETHASONE 4 MILLIGRAM(S): 0.5 TABLET ORAL at 11:05

## 2025-02-11 RX ADMIN — Medication 15 MILLILITER(S): at 09:05

## 2025-02-11 RX ADMIN — POLYETHYLENE GLYCOL 3350 17 GRAM(S): 17 POWDER, FOR SOLUTION ORAL at 18:01

## 2025-02-11 RX ADMIN — DEXAMETHASONE 4 MILLIGRAM(S): 0.5 TABLET ORAL at 23:21

## 2025-02-11 RX ADMIN — INSULIN LISPRO 2: 100 INJECTION, SOLUTION INTRAVENOUS; SUBCUTANEOUS at 23:23

## 2025-02-11 RX ADMIN — LACTULOSE 30 GRAM(S): 10 SOLUTION ORAL at 14:00

## 2025-02-11 RX ADMIN — Medication 1 DROP(S): at 09:06

## 2025-02-11 RX ADMIN — Medication 1 APPLICATION(S): at 06:10

## 2025-02-11 RX ADMIN — INSULIN LISPRO 2: 100 INJECTION, SOLUTION INTRAVENOUS; SUBCUTANEOUS at 06:22

## 2025-02-11 RX ADMIN — Medication 1 PACKET(S): at 06:57

## 2025-02-11 RX ADMIN — Medication 1 APPLICATION(S): at 21:59

## 2025-02-11 RX ADMIN — Medication 1 APPLICATION(S): at 09:06

## 2025-02-11 RX ADMIN — LEVETIRACETAM 1500 MILLIGRAM(S): 10 INJECTION, SOLUTION INTRAVENOUS at 09:05

## 2025-02-11 RX ADMIN — LEVETIRACETAM 1500 MILLIGRAM(S): 10 INJECTION, SOLUTION INTRAVENOUS at 21:57

## 2025-02-11 RX ADMIN — LACTULOSE 30 GRAM(S): 10 SOLUTION ORAL at 21:54

## 2025-02-11 RX ADMIN — METOPROLOL SUCCINATE 25 MILLIGRAM(S): 50 TABLET, EXTENDED RELEASE ORAL at 21:58

## 2025-02-11 RX ADMIN — LACTULOSE 30 GRAM(S): 10 SOLUTION ORAL at 18:00

## 2025-02-11 RX ADMIN — Medication 500 MILLIGRAM(S): at 09:05

## 2025-02-11 RX ADMIN — LACOSAMIDE 100 MILLIGRAM(S): 150 TABLET, FILM COATED ORAL at 08:50

## 2025-02-11 RX ADMIN — Medication 1 DROP(S): at 22:00

## 2025-02-11 RX ADMIN — POLYETHYLENE GLYCOL 3350 17 GRAM(S): 17 POWDER, FOR SOLUTION ORAL at 06:11

## 2025-02-11 RX ADMIN — ENOXAPARIN SODIUM 40 MILLIGRAM(S): 100 INJECTION SUBCUTANEOUS at 18:00

## 2025-02-11 RX ADMIN — DEXAMETHASONE 4 MILLIGRAM(S): 0.5 TABLET ORAL at 06:11

## 2025-02-11 RX ADMIN — INSULIN LISPRO 2: 100 INJECTION, SOLUTION INTRAVENOUS; SUBCUTANEOUS at 11:30

## 2025-02-11 RX ADMIN — METOPROLOL SUCCINATE 25 MILLIGRAM(S): 50 TABLET, EXTENDED RELEASE ORAL at 09:05

## 2025-02-11 RX ADMIN — LACOSAMIDE 100 MILLIGRAM(S): 150 TABLET, FILM COATED ORAL at 22:08

## 2025-02-11 RX ADMIN — Medication 85 MILLIMOLE(S): at 07:13

## 2025-02-11 RX ADMIN — Medication 500 MILLIGRAM(S): at 03:07

## 2025-02-11 RX ADMIN — Medication 10 MILLILITER(S): at 21:55

## 2025-02-11 RX ADMIN — DEXAMETHASONE 4 MILLIGRAM(S): 0.5 TABLET ORAL at 18:01

## 2025-02-11 RX ADMIN — Medication 15 MILLILITER(S): at 21:53

## 2025-02-11 RX ADMIN — Medication 500 MILLIGRAM(S): at 15:00

## 2025-02-11 RX ADMIN — LACTULOSE 20 GRAM(S): 10 SOLUTION ORAL at 06:14

## 2025-02-11 RX ADMIN — INSULIN GLARGINE-YFGN 5 UNIT(S): 100 INJECTION, SOLUTION SUBCUTANEOUS at 21:54

## 2025-02-11 RX ADMIN — Medication 40 MILLIGRAM(S): at 09:05

## 2025-02-11 RX ADMIN — INSULIN LISPRO 2: 100 INJECTION, SOLUTION INTRAVENOUS; SUBCUTANEOUS at 17:59

## 2025-02-11 RX ADMIN — Medication 500 MILLIGRAM(S): at 22:08

## 2025-02-11 NOTE — PROGRESS NOTE ADULT - ASSESSMENT
74-year-old-female w/ PMH of GBM (diagnosed in 2024, follows at [facility] under Dr. Hernandez, with reported attempted resection of tumor, on chemotherapy with last chemo 2 months ago) who was admitted with altered mental status. 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 VEEG was notable for focal status epilepticus in the bilateral fronto-temporal region from 2/1/25 through 2/3/25 at 6pm that improved following the initiation of vimpat and depakote regimen. On 2/4 propofol was stopped and later in the evening Depakote was held (due to c/f interaction w/ carbapenem), with return of focal seizures and secondary generalization. Given the re-emergence of seizures, depakote was restarted, vimpat increased, and keppra started with improvement of EEG recordings and the number of seizures. There were 2 short electrographic seizures on 2/10/25 but again much improved from the past week, and it was decided to reduce an agent due to potential somnolence.     Recommendations:  - Continue vEEG monitoring   - Continue Keppra 1500mg BID  - Continue Vimpat 100mg Q12hrs  - Continue Depakote 500mg q6hrs (Therapeutic VPA level noted at 63.3 on 2/10/25)  - Maintain fall & seizure precautions 74-year-old-female w/ PMH of GBM (diagnosed in 2024, follows at [facility] under Dr. Hernandez, with reported attempted resection of tumor, on chemotherapy with last chemo 2 months ago) who was admitted with altered mental status. 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 VEEG was notable for focal status epilepticus in the bilateral fronto-temporal region from 2/1/25 through 2/3/25 at 6pm that improved following the initiation of vimpat and depakote regimen. On 2/4 propofol was stopped and later in the evening Depakote was held (due to c/f interaction w/ carbapenem), with return of focal seizures and secondary generalization. Given the re-emergence of seizures, depakote was restarted, vimpat increased, and keppra started with improvement of EEG recordings and the number of seizures. There were 2 short electrographic seizures on 2/10/25 as well as 5 reported on 2/11/25, but again much improved from the past week.     Recommendations:  - Continue vEEG monitoring   - Continue Keppra 1500mg BID  - Continue Vimpat 100mg Q12hrs  - Continue Depakote 500mg q6hrs (Therapeutic VPA level noted at 63.3 on 2/10/25)  - Maintain fall & seizure precautions

## 2025-02-11 NOTE — PROGRESS NOTE ADULT - SUBJECTIVE AND OBJECTIVE BOX
[facility] and Palliative Medicine  Naresh Meeks, Palliative Care Attending  Contact Info: Call [phone] (HEAL Line) or message on Microsoft Teams (Naresh Meeks)    SUBJECTIVE AND OBJECTIVE:  INTERVAL HPI/OVERNIGHT EVENTS: Interval events noted. Unable to participate in interview due to encephalopathy. See patient's PRN use for the past 24hrs noted below. Comprehensive symptom assessment and GOC exploration as noted below. Extensive time spent discussing plan of care with family.    ALLERGIES:  No Known Allergies    MEDICATIONS  (STANDING):  artificial  tears Solution 1 Drop(s) Both EYES every 12 hours  chlorhexidine 0.12% Liquid 15 milliLiter(s) Oral Mucosa every 12 hours  chlorhexidine 2% Cloths 1 Application(s) Topical <User Schedule>  dexAMETHasone  Injectable 4 milliGRAM(s) IV Push every 6 hours  dextrose 5%. 1000 milliLiter(s) (50 mL/Hr) IV Continuous <Continuous>  dextrose 5%. 1000 milliLiter(s) (100 mL/Hr) IV Continuous <Continuous>  dextrose 50% Injectable 25 Gram(s) IV Push once  dextrose 50% Injectable 12.5 Gram(s) IV Push once  dextrose 50% Injectable 25 Gram(s) IV Push once  enoxaparin Injectable 40 milliGRAM(s) SubCutaneous every 24 hours  glucagon  Injectable 1 milliGRAM(s) IntraMuscular once  influenza  Vaccine (HIGH DOSE) 0.5 milliLiter(s) IntraMuscular once  insulin glargine Injectable (LANTUS) 5 Unit(s) SubCutaneous at bedtime  insulin lispro (ADMELOG) corrective regimen sliding scale   SubCutaneous every 6 hours  lacosamide Solution 100 milliGRAM(s) Oral every 12 hours  lactulose Syrup 30 Gram(s) Oral every 4 hours  levETIRAcetam  Solution 1500 milliGRAM(s) Oral every 12 hours  metoprolol tartrate 25 milliGRAM(s) Oral every 12 hours  pantoprazole  Injectable 40 milliGRAM(s) IV Push every 24 hours  petrolatum Ophthalmic Ointment 1 Application(s) Both EYES every 12 hours  polyethylene glycol 3350 17 Gram(s) Oral two times a day  senna Syrup 10 milliLiter(s) Oral at bedtime  valproic  acid Syrup 500 milliGRAM(s) Oral every 6 hours    MEDICATIONS  (PRN):  dextrose Oral Gel 15 Gram(s) Oral once PRN Blood Glucose LESS THAN 70 milliGRAM(s)/deciliter  LORazepam   Injectable 2 milliGRAM(s) IV Push every 4 hours PRN Seizures      Analgesic Use (Scheduled and PRNs) for past 24 hours:    lacosamide Solution   100 milliGRAM(s) Oral (02-11-25 @ 08:50)   100 milliGRAM(s) Oral (02-10-25 @ 21:25)    levETIRAcetam  Solution   1500 milliGRAM(s) Oral (02-11-25 @ 09:05)   1500 milliGRAM(s) Oral (02-10-25 @ 21:26)    valproic  acid Syrup   500 milliGRAM(s) Oral (02-11-25 @ 09:05)   500 milliGRAM(s) Oral (02-11-25 @ 03:07)   500 milliGRAM(s) Oral (02-10-25 @ 21:27)      ITEMS UNCHECKED ARE NOT PRESENT  PRESENT SYMPTOMS/REVIEW OF SYSTEMS: []Unable to obtain due to poor mentation   Source if other than patient:  []Family   []Team         Vital Signs Last 24 Hrs  T(C): 36.7 (11 Feb 2025 13:26), Max: 37.4 (11 Feb 2025 06:00)  T(F): 98 (11 Feb 2025 13:26), Max: 99.3 (11 Feb 2025 06:00)  HR: 88 (11 Feb 2025 13:00) (73 - 133)  BP: 133/76 (11 Feb 2025 13:00) (99/56 - 144/78)  BP(mean): 98 (11 Feb 2025 13:00) (71 - 106)  RR: 18 (11 Feb 2025 13:00) (14 - 30)  SpO2: 96% (11 Feb 2025 13:00) (93% - 97%)    Parameters below as of 11 Feb 2025 13:00  Patient On (Oxygen Delivery Method): ventilator    O2 Concentration (%): 40    LABS: Personally reviewed and interpreted                        12.3   14.45 )-----------( 280      ( 11 Feb 2025 05:48 )             39.3   02-11    151[H]  |  110[H]  |  19  ----------------------------<  299[H]  4.1   |  28  |  0.25[L]    Ca    7.7[L]      11 Feb 2025 05:48  Phos  1.9     02-11  Mg     2.4     02-11    TPro  4.6[L]  /  Alb  2.7[L]  /  TBili  0.2  /  DBili  x   /  AST  41[H]  /  ALT  75[H]  /  AlkPhos  109  02-11      RADIOLOGY & ADDITIONAL STUDIES: Personally reviewed and interpreted  None new    DISCUSSION OF CASE: Family - to provide updates and emotional support; Primary Team/RN - to discuss plan of care [facility]s and Palliative Medicine  Naresh Meeks, Palliative Care Attending  Contact Info: Call [phone] (HEAL Line) or message on [employer] Teams (Naresh Meeks)    SUBJECTIVE AND OBJECTIVE:  INTERVAL HPI/OVERNIGHT EVENTS: Interval events noted. Unable to participate in interview due to encephalopathy. See patient's PRN use for the past 24hrs noted below. Comprehensive symptom assessment and GOC exploration as noted below. Extensive time spent discussing plan of care with family.    ALLERGIES:  No Known Allergies    MEDICATIONS  (STANDING):  artificial  tears Solution 1 Drop(s) Both EYES every 12 hours  chlorhexidine 0.12% Liquid 15 milliLiter(s) Oral Mucosa every 12 hours  chlorhexidine 2% Cloths 1 Application(s) Topical <User Schedule>  dexAMETHasone  Injectable 4 milliGRAM(s) IV Push every 6 hours  dextrose 5%. 1000 milliLiter(s) (50 mL/Hr) IV Continuous <Continuous>  dextrose 5%. 1000 milliLiter(s) (100 mL/Hr) IV Continuous <Continuous>  dextrose 50% Injectable 25 Gram(s) IV Push once  dextrose 50% Injectable 12.5 Gram(s) IV Push once  dextrose 50% Injectable 25 Gram(s) IV Push once  enoxaparin Injectable 40 milliGRAM(s) SubCutaneous every 24 hours  glucagon  Injectable 1 milliGRAM(s) IntraMuscular once  influenza  Vaccine (HIGH DOSE) 0.5 milliLiter(s) IntraMuscular once  insulin glargine Injectable (LANTUS) 5 Unit(s) SubCutaneous at bedtime  insulin lispro (ADMELOG) corrective regimen sliding scale   SubCutaneous every 6 hours  lacosamide Solution 100 milliGRAM(s) Oral every 12 hours  lactulose Syrup 30 Gram(s) Oral every 4 hours  levETIRAcetam  Solution 1500 milliGRAM(s) Oral every 12 hours  metoprolol tartrate 25 milliGRAM(s) Oral every 12 hours  pantoprazole  Injectable 40 milliGRAM(s) IV Push every 24 hours  petrolatum Ophthalmic Ointment 1 Application(s) Both EYES every 12 hours  polyethylene glycol 3350 17 Gram(s) Oral two times a day  senna Syrup 10 milliLiter(s) Oral at bedtime  valproic  acid Syrup 500 milliGRAM(s) Oral every 6 hours    MEDICATIONS  (PRN):  dextrose Oral Gel 15 Gram(s) Oral once PRN Blood Glucose LESS THAN 70 milliGRAM(s)/deciliter  LORazepam   Injectable 2 milliGRAM(s) IV Push every 4 hours PRN Seizures      Analgesic Use (Scheduled and PRNs) for past 24 hours:    lacosamide Solution   100 milliGRAM(s) Oral (02-11-25 @ 08:50)   100 milliGRAM(s) Oral (02-10-25 @ 21:25)    levETIRAcetam  Solution   1500 milliGRAM(s) Oral (02-11-25 @ 09:05)   1500 milliGRAM(s) Oral (02-10-25 @ 21:26)    valproic  acid Syrup   500 milliGRAM(s) Oral (02-11-25 @ 09:05)   500 milliGRAM(s) Oral (02-11-25 @ 03:07)   500 milliGRAM(s) Oral (02-10-25 @ 21:27)      ITEMS UNCHECKED ARE NOT PRESENT  PRESENT SYMPTOMS/REVIEW OF SYSTEMS: [x]Unable to obtain due to poor mentation   Source if other than patient:  []Family   []Team     Pain: [] yes [x] no - see PAINAD  QOL impact -  Location -  Aggravating factors -  Quality -  Radiation -  Timing -  Severity (0-10 scale) -  Minimal acceptable level (0-10 scale) -    Dyspnea:                           []Mild  []Moderate []Severe  Anxiety:                             []Mild []Moderate []Severe  Fatigue:                             []Mild []Moderate []Severe  Nausea:                             []Mild []Moderate []Severe  Loss of appetite:              []Mild []Moderate []Severe  Constipation:                    []Mild []Moderate []Severe    Other Symptoms:  [x]All other review of systems negative     GENERAL:  [] NAD []Alert [x]Lethargic  []Cachexia  [x]Unarousable  []Verbal  [x]Non-Verbal  BEHAVIORAL:   []Anxiety  [x]Delirium []Agitation []Cooperative []Oriented x  HEENT:  []Normal  [] Moist Mucous Membranes []Dry mouth   [x]ET Tube/Trach  []Oral lesions  PULMONARY:   []Clear []Tachypnea  []Audible excessive secretions  []Normal Work of Breathing []Labored Breathing  [x]Rhonchi []Crackles []Wheezing  CARDIOVASCULAR:    [x]Regular Rate [x]Regular Rhythm []Irregular []Tachy  []Mumtaz  GASTROINTESTINAL:  [x]Soft  [x]Distended   [x]+BS  [x]Non tender []Tender  []PEG [x]OGT/ NGT  Last BM:  GENITOURINARY:  []Normal [] Incontinent   []Oliguria/Anuria   [x][place]  MUSCULOSKELETAL:   []Normal Extremities  [x]Weakness  [x]Bed/Wheelchair bound []Edema  NEUROLOGIC:   []No focal deficits  []Cognitive impairment  [x]Dysphagia []Dysarthria []Paresis [x]Encephalopathic  SKIN:   [x]Normal   []Pressure ulcer(s)  []Rash    CRITICAL CARE:  [ ]Shock Present  [ ]Septic [ ]Cardiogenic [ ]Neurologic [ ]Hypovolemic  [ ] Vasopressors [ ]Inotropes   [x]Respiratory failure present [x]Mechanical Ventilation [ ]Non-invasive ventilatory support [ ]High-Flow  [x]Acute  [ ]Chronic [x]Hypoxic  [ ]Hypercarbic   [ ]Other organ failure    Vital Signs Last 24 Hrs  T(C): 36.7 (11 Feb 2025 13:26), Max: 37.4 (11 Feb 2025 06:00)  T(F): 98 (11 Feb 2025 13:26), Max: 99.3 (11 Feb 2025 06:00)  HR: 88 (11 Feb 2025 13:00) (73 - 133)  BP: 133/76 (11 Feb 2025 13:00) (99/56 - 144/78)  BP(mean): 98 (11 Feb 2025 13:00) (71 - 106)  RR: 18 (11 Feb 2025 13:00) (14 - 30)  SpO2: 96% (11 Feb 2025 13:00) (93% - 97%)    Parameters below as of 11 Feb 2025 13:00  Patient On (Oxygen Delivery Method): ventilator    O2 Concentration (%): 40    LABS: Personally reviewed and interpreted                        12.3   14.45 )-----------( 280      ( 11 Feb 2025 05:48 )             39.3   02-11    151[H]  |  110[H]  |  19  ----------------------------<  299[H]  4.1   |  28  |  0.25[L]    Ca    7.7[L]      11 Feb 2025 05:48  Phos  1.9     02-11  Mg     2.4     02-11    TPro  4.6[L]  /  Alb  2.7[L]  /  TBili  0.2  /  DBili  x   /  AST  41[H]  /  ALT  75[H]  /  AlkPhos  109  02-11      RADIOLOGY & ADDITIONAL STUDIES: Personally reviewed and interpreted  None new    DISCUSSION OF CASE: Family - to provide updates and emotional support; Primary Team/RN - to discuss plan of care

## 2025-02-11 NOTE — PROGRESS NOTE ADULT - SUBJECTIVE AND OBJECTIVE BOX
Patient is a 74y old  Female who presents with a chief complaint of AMS (11 Feb 2025 15:41)      INTERVAL HPI/OVERNIGHT EVENTS:   Given 5 mg IVP Lopressor, PO Lopressor incrased to 35 mg, Na at goal overnight.     Subjective: Patient seen at bedside, no acute new findings on exam. Patient noted to still not be awakening despite not being on sedation. Per family, they will more likely pursue tracheostomy but would like to talk to their health liaison first.       ICU Vital Signs Last 24 Hrs  T(C): 37.1 (11 Feb 2025 17:07), Max: 37.4 (11 Feb 2025 06:00)  T(F): 98.8 (11 Feb 2025 17:07), Max: 99.3 (11 Feb 2025 06:00)  HR: 99 (11 Feb 2025 19:00) (73 - 133)  BP: 130/77 (11 Feb 2025 19:00) (99/56 - 144/78)  BP(mean): 99 (11 Feb 2025 19:00) (71 - 100)  ABP: --  ABP(mean): --  RR: 20 (11 Feb 2025 19:00) (14 - 30)  SpO2: 95% (11 Feb 2025 19:00) (93% - 97%)    O2 Parameters below as of 11 Feb 2025 20:00  Patient On (Oxygen Delivery Method): ventilator    O2 Concentration (%): 40      I&O's Summary    10 Feb 2025 07:01  -  11 Feb 2025 07:00  --------------------------------------------------------  IN: 1926.3 mL / OUT: 760 mL / NET: 1166.3 mL    11 Feb 2025 07:01  -  11 Feb 2025 20:07  --------------------------------------------------------  IN: 1256.5 mL / OUT: 0 mL / NET: 1256.5 mL      Mode: AC/ CMV (Assist Control/ Continuous Mandatory Ventilation)  RR (machine): 12  TV (machine): 350  FiO2: 40  PEEP: 5  ITime: 1  MAP: 8  PIP: 14      LABS:                        12.3   14.45 )-----------( 280      ( 11 Feb 2025 05:48 )             39.3     02-11    145  |  103  |  19  ----------------------------<  248[H]  3.8   |  30  |  0.28[L]    Ca    7.4[L]      11 Feb 2025 16:12  Phos  1.9     02-11  Mg     2.4     02-11    TPro  4.6[L]  /  Alb  2.7[L]  /  TBili  0.2  /  DBili  x   /  AST  41[H]  /  ALT  75[H]  /  AlkPhos  109  02-11      Urinalysis Basic - ( 11 Feb 2025 16:12 )    Color: x / Appearance: x / SG: x / pH: x  Gluc: 248 mg/dL / Ketone: x  / Bili: x / Urobili: x   Blood: x / Protein: x / Nitrite: x   Leuk Esterase: x / RBC: x / WBC x   Sq Epi: x / Non Sq Epi: x / Bacteria: x      CAPILLARY BLOOD GLUCOSE      POCT Blood Glucose.: 160 mg/dL (11 Feb 2025 17:35)  POCT Blood Glucose.: 165 mg/dL (11 Feb 2025 11:11)  POCT Blood Glucose.: 235 mg/dL (11 Feb 2025 06:19)  POCT Blood Glucose.: 203 mg/dL (10 Feb 2025 23:03)        RADIOLOGY & ADDITIONAL TESTS:    Consultant(s) Notes Reviewed:  [x ] YES  [ ] NO    MEDICATIONS  (STANDING):  artificial  tears Solution 1 Drop(s) Both EYES every 12 hours  chlorhexidine 0.12% Liquid 15 milliLiter(s) Oral Mucosa every 12 hours  chlorhexidine 2% Cloths 1 Application(s) Topical <User Schedule>  dexAMETHasone  Injectable 4 milliGRAM(s) IV Push every 6 hours  dextrose 5%. 1000 milliLiter(s) (50 mL/Hr) IV Continuous <Continuous>  dextrose 5%. 1000 milliLiter(s) (100 mL/Hr) IV Continuous <Continuous>  dextrose 50% Injectable 25 Gram(s) IV Push once  dextrose 50% Injectable 12.5 Gram(s) IV Push once  dextrose 50% Injectable 25 Gram(s) IV Push once  enoxaparin Injectable 40 milliGRAM(s) SubCutaneous every 24 hours  glucagon  Injectable 1 milliGRAM(s) IntraMuscular once  influenza  Vaccine (HIGH DOSE) 0.5 milliLiter(s) IntraMuscular once  insulin glargine Injectable (LANTUS) 5 Unit(s) SubCutaneous at bedtime  insulin lispro (ADMELOG) corrective regimen sliding scale   SubCutaneous every 6 hours  lacosamide Solution 100 milliGRAM(s) Oral every 12 hours  lactulose Syrup 30 Gram(s) Oral every 4 hours  levETIRAcetam  Solution 1500 milliGRAM(s) Oral every 12 hours  metoprolol tartrate 25 milliGRAM(s) Oral every 12 hours  pantoprazole  Injectable 40 milliGRAM(s) IV Push every 24 hours  petrolatum Ophthalmic Ointment 1 Application(s) Both EYES every 12 hours  polyethylene glycol 3350 17 Gram(s) Oral two times a day  senna Syrup 10 milliLiter(s) Oral at bedtime  valproic  acid Syrup 500 milliGRAM(s) Oral every 6 hours    MEDICATIONS  (PRN):  dextrose Oral Gel 15 Gram(s) Oral once PRN Blood Glucose LESS THAN 70 milliGRAM(s)/deciliter  LORazepam   Injectable 2 milliGRAM(s) IV Push every 4 hours PRN Seizures      PHYSICAL EXAM:  GENERAL: Intubated.   HEAD:  Atraumatic, Normocephalic  EYES: EOMI, PERRLA, conjunctiva and sclera clear  NECK: Supple, No JVD, Normal thyroid, no enlarged nodes  NERVOUS SYSTEM:  Alert & Awake.   CHEST/LUNG: B/L good air entry; No rales, rhonchi, or wheezing  HEART: S1S2 normal, no S3, Regular rate and rhythm; No murmurs  ABDOMEN: Soft, Nontender, Nondistended; Bowel sounds present  EXTREMITIES:  2+ Peripheral Pulses, No clubbing, cyanosis, or edema  LYMPH: No lymphadenopathy noted  SKIN: No rashes or lesions      Care Discussed with Consultants/Other Providers [ x] YES  [ ] NO

## 2025-02-11 NOTE — PROGRESS NOTE ADULT - SUBJECTIVE AND OBJECTIVE BOX
EPILEPSY PROGRESS NOTE:  No events overnight. No complaints currently.    REVIEW OF SYSTEMS:  As above, otherwise negative for constitutional/HEENT/CV/pulm/GI//MSK/neuro/derm/endocrine/psych.    MEDICATIONS:  artificial  tears Solution 1 Drop(s) Both EYES every 12 hours  chlorhexidine 0.12% Liquid 15 milliLiter(s) Oral Mucosa every 12 hours  chlorhexidine 2% Cloths 1 Application(s) Topical <User Schedule>  dexAMETHasone  Injectable 4 milliGRAM(s) IV Push every 6 hours  dextrose 5%. 1000 milliLiter(s) (50 mL/Hr) IV Continuous <Continuous>  dextrose 5%. 1000 milliLiter(s) (100 mL/Hr) IV Continuous <Continuous>  dextrose 50% Injectable 25 Gram(s) IV Push once  dextrose 50% Injectable 12.5 Gram(s) IV Push once  dextrose 50% Injectable 25 Gram(s) IV Push once  enoxaparin Injectable 40 milliGRAM(s) SubCutaneous every 24 hours  glucagon  Injectable 1 milliGRAM(s) IntraMuscular once  influenza  Vaccine (HIGH DOSE) 0.5 milliLiter(s) IntraMuscular once  insulin glargine Injectable (LANTUS) 3 Unit(s) SubCutaneous at bedtime  insulin lispro (ADMELOG) corrective regimen sliding scale   SubCutaneous three times a day before meals  lacosamide Solution 100 milliGRAM(s) Oral every 12 hours  lactulose Syrup 20 Gram(s) Oral every 8 hours  levETIRAcetam  Solution 1500 milliGRAM(s) Oral every 12 hours  metoprolol tartrate 25 milliGRAM(s) Oral every 12 hours  pantoprazole  Injectable 40 milliGRAM(s) IV Push every 24 hours  petrolatum Ophthalmic Ointment 1 Application(s) Both EYES every 12 hours  polyethylene glycol 3350 17 Gram(s) Oral two times a day  senna Syrup 10 milliLiter(s) Oral at bedtime  valproic  acid Syrup 500 milliGRAM(s) Oral every 6 hours    MEDICATIONS  (PRN):  dextrose Oral Gel 15 Gram(s) Oral once PRN Blood Glucose LESS THAN 70 milliGRAM(s)/deciliter  LORazepam   Injectable 2 milliGRAM(s) IV Push every 4 hours PRN Seizures      VITAL SIGNS:  T(C): 37.3 (02-11-25 @ 09:16), Max: 37.4 (02-11-25 @ 06:00)  HR: 133 (02-11-25 @ 09:40) (79 - 133)  BP: 122/69 (02-11-25 @ 08:00) (103/57 - 153/100)  RR: 21 (02-11-25 @ 08:00) (14 - 22)  SpO2: 96% (02-11-25 @ 09:40) (93% - 98%)  Wt(kg): --    PHYSICAL EXAM:  Eyes open spontaneously. Conversational with appropriate sentences.  EOMI. Visual fields full. PERRL 3>2. Face symmetrical.  Full strength throughout.  Finger-nose-finger intact R/L.  Intact to light touch throughout.    LABS:  CBC Full  -  ( 11 Feb 2025 05:48 )  WBC Count : 14.45 K/uL  RBC Count : 4.33 M/uL  Hemoglobin : 12.3 g/dL  Hematocrit : 39.3 %  Platelet Count - Automated : 280 K/uL  Mean Cell Volume : 90.8 fl  Mean Cell Hemoglobin : 28.4 pg  Mean Cell Hemoglobin Concentration : 31.3 g/dL  Auto Neutrophil # : x  Auto Lymphocyte # : x  Auto Monocyte # : x  Auto Eosinophil # : x  Auto Basophil # : x  Auto Neutrophil % : x  Auto Lymphocyte % : x  Auto Monocyte % : x  Auto Eosinophil % : x  Auto Basophil % : x    02-11    151[H]  |  110[H]  |  19  ----------------------------<  299[H]  4.1   |  28  |  0.25[L]    Ca    7.7[L]      11 Feb 2025 05:48  Phos  1.9     02-11  Mg     2.4     02-11    TPro  4.6[L]  /  Alb  2.7[L]  /  TBili  0.2  /  DBili  x   /  AST  41[H]  /  ALT  75[H]  /  AlkPhos  109  02-11    LIVER FUNCTIONS - ( 11 Feb 2025 05:48 )  Alb: 2.7 g/dL / Pro: 4.6 g/dL / ALK PHOS: 109 U/L / ALT: 75 U/L / AST: 41 U/L / GGT: x                 EEG: EPILEPSY PROGRESS NOTE:  Patient seen and examined at bedside, and there were no report of clinical seizures. Son at bedside, and concerns addressed. HR and BP improved from yesterday. Remain unresponsive despite no sedatives for a few days.     REVIEW OF SYSTEMS:  Unobtainable 2/2 altered level of consciousness.     MEDICATIONS:  artificial  tears Solution 1 Drop(s) Both EYES every 12 hours  chlorhexidine 0.12% Liquid 15 milliLiter(s) Oral Mucosa every 12 hours  chlorhexidine 2% Cloths 1 Application(s) Topical <User Schedule>  dexAMETHasone  Injectable 4 milliGRAM(s) IV Push every 6 hours  dextrose 5%. 1000 milliLiter(s) (50 mL/Hr) IV Continuous <Continuous>  dextrose 5%. 1000 milliLiter(s) (100 mL/Hr) IV Continuous <Continuous>  dextrose 50% Injectable 25 Gram(s) IV Push once  dextrose 50% Injectable 12.5 Gram(s) IV Push once  dextrose 50% Injectable 25 Gram(s) IV Push once  enoxaparin Injectable 40 milliGRAM(s) SubCutaneous every 24 hours  glucagon  Injectable 1 milliGRAM(s) IntraMuscular once  influenza  Vaccine (HIGH DOSE) 0.5 milliLiter(s) IntraMuscular once  insulin glargine Injectable (LANTUS) 3 Unit(s) SubCutaneous at bedtime  insulin lispro (ADMELOG) corrective regimen sliding scale   SubCutaneous three times a day before meals  lacosamide Solution 100 milliGRAM(s) Oral every 12 hours  lactulose Syrup 20 Gram(s) Oral every 8 hours  levETIRAcetam  Solution 1500 milliGRAM(s) Oral every 12 hours  metoprolol tartrate 25 milliGRAM(s) Oral every 12 hours  pantoprazole  Injectable 40 milliGRAM(s) IV Push every 24 hours  petrolatum Ophthalmic Ointment 1 Application(s) Both EYES every 12 hours  polyethylene glycol 3350 17 Gram(s) Oral two times a day  senna Syrup 10 milliLiter(s) Oral at bedtime  valproic  acid Syrup 500 milliGRAM(s) Oral every 6 hours    MEDICATIONS  (PRN):  dextrose Oral Gel 15 Gram(s) Oral once PRN Blood Glucose LESS THAN 70 milliGRAM(s)/deciliter  LORazepam   Injectable 2 milliGRAM(s) IV Push every 4 hours PRN Seizures    VITAL SIGNS:  T(C): 37.3 (02-11-25 @ 09:16), Max: 37.4 (02-11-25 @ 06:00)  HR: 133 (02-11-25 @ 09:40) (79 - 133)  BP: 122/69 (02-11-25 @ 08:00) (103/57 - 153/100)  RR: 21 (02-11-25 @ 08:00) (14 - 22)  SpO2: 96% (02-11-25 @ 09:40) (93% - 98%)  Wt(kg): --    PHYSICAL EXAM:  Eyes closed, but Intermittently noted half-way eye opening with right gaze preference. Does not follow commands or track bedside activity with half-way opened eyes. +corneal, gag and cough reflexes. +blink to threat bilaterally. Noted tremors with intermittent deep noxious stimuli of the right middle toe (not constant). L foot noted with triple flexion today. Otherwise, no motor or sensory response to noxious stimuli. 1+ reflexes noted in all limbs and toes mute.     LABS:  CBC Full  -  ( 11 Feb 2025 05:48 )  WBC Count : 14.45 K/uL  RBC Count : 4.33 M/uL  Hemoglobin : 12.3 g/dL  Hematocrit : 39.3 %  Platelet Count - Automated : 280 K/uL  Mean Cell Volume : 90.8 fl  Mean Cell Hemoglobin : 28.4 pg  Mean Cell Hemoglobin Concentration : 31.3 g/dL  Auto Neutrophil # : x  Auto Lymphocyte # : x  Auto Monocyte # : x  Auto Eosinophil # : x  Auto Basophil # : x  Auto Neutrophil % : x  Auto Lymphocyte % : x  Auto Monocyte % : x  Auto Eosinophil % : x  Auto Basophil % : x    02-11    151[H]  |  110[H]  |  19  ----------------------------<  299[H]  4.1   |  28  |  0.25[L]    Ca    7.7[L]      11 Feb 2025 05:48  Phos  1.9     02-11  Mg     2.4     02-11    TPro  4.6[L]  /  Alb  2.7[L]  /  TBili  0.2  /  DBili  x   /  AST  41[H]  /  ALT  75[H]  /  AlkPhos  109  02-11    LIVER FUNCTIONS - ( 11 Feb 2025 05:48 )  Alb: 2.7 g/dL / Pro: 4.6 g/dL / ALK PHOS: 109 U/L / ALT: 75 U/L / AST: 41 U/L / GGT: x

## 2025-02-11 NOTE — EEG REPORT - NS EEG TEXT BOX
[facility] Department of Neurology  Inpatient Epilepsy Monitoring Unit video-Electroencephalography Report    Acquisition Details:  Electroencephalography was acquired using a minimum of 21 channels on an [employer]Xt Neurology system v 9.3.1 with electrode placement according to the standard International 10-20 system following ACNS (American Clinical Neurophysiology Society) guidelines for Long-Term Video EEG monitoring.  Anterior temporal T1 and T2 electrodes were utilized whenever possible.   The [employer] automated spike & seizure detections were all reviewed in detail, in addition to extensive portions of raw EEG.  Specially-trained nurses were available for seizure-related events.  Continuous live-time video monitoring of the patients for seizure-related and safety events was performed by specially-trained technicians.        Day 2:  2/10/2025, 7 AM to next morning at 07:00 AM   Meds:Depakote 500 mg Q6, keppra 1500 mg bid, vimpat 100 mg bid  Background:  continuous, asymmetric background with predominantly theta/delta frequencies.slower over right  Generalized Slowing:  Intermittent frequent sporadic polymorphic delta  Symmetry/Focality: Continuous (90+%)   Right hemispheric  and independent left frontotemporal polymorphic delta/theta activity  Voltage:  Normal (20+ uV)  Organization:  Absent  Posterior Dominant Rhythm:  7-8 Hz rudimentary  Sleep:  Rudimentary but likely N2 transients present.  Variability:   Yes		Reactivity:  Yes    Spontaneous Activity:  Frequent ( >1/min < 1/10sec)   independent bilateral frontal predominant lateralized rhythmic delta activity ,occasional superimposed with faster activity, worse on the left  Events:  1-Right fronto-temporal focal onset  Time: 14:36,15:29,20:36,21:49,00:49 AM,each for 2 minutes  Clinical:No overt clinical changes  Electrographic: development of rhythmic theta wave and become spikier follow by slow wave until offset    Provocations:  •	Hyperventilation: was not performed.  •	Photic stimulation: was not performed.    Daily Summary:    1)	Finding indicating 5  electrographic right fronto-temporal focal onset seizures at 14:36,15:29,20:36,21:49,00:49 AM each lasting 2 minutes  2)	Frequent ( >1/min < 1/10sec)   bilateral frontal predominant lateralized rhythmic delta activity with occasional superimposed with faster activity, worse on the left indicating high epileptic potential in this regions  3)	Moderate generalized slowing indicating similar degree of diffuse/multifocal cerebral dysfunction        Katelynn Lundy MD  CNP Fellow

## 2025-02-11 NOTE — PROGRESS NOTE ADULT - ASSESSMENT
73 yo female w PMH GBM on hospice care (diagnosed in 2024, follows at [facility] under Dr. Hernandez, with reported attempted resection of tumor, on chemotherapy with last chemo 2 months ago), admitted for ams likely 2/2 disease progression After admission to [facility], Upon evaluation pt was obtunded with tremors to RUE, response to noxious stimuli but otherwise no response. Pupils were responsive with saccadic movement. She was slumped to her right sided with gurgling breath sounds.  At that point rapid was called and patient was inevitably intubated. Transferred to [facility]. Discussed with pts daughters post procedure poor prognosis and advised to have formal family meeting to clarify and document wishes.       NEURO  Intubated. Not sedated. Minimally responsive despite not being on sedation.     Continues to have seizures, concern for airway protection as GBM progresses, tentative plan for tracheostomy pending further conversations with family.     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 There is evidence of focal cortical hyperexcitability, more prominent in the right than the left frontotemporal regions. The background pattern shows a burst suppression ratio of 10:1, indicating severe diffuse or multifocal cerebral dysfunction, potentially exacerbated by IV sedatives. Additionally, there is severe generalized and multifocal slowing, further supporting significant underlying cerebral dysfunction. CT head with worsening midline shift.  Continued EEG findings of seizures.  Plan:   s/p valproic acid loading dose 1200 mg, vimpat 200 mg loading  - epilepsy consulted, appreciate recs      -  Keppra 1500mg BID      - Vimpat 100mg BID (monitor tele to ensure no heart block)      - C/w Depakote 500mg q6hrs      - Obtain VPA in AM      - seizure precautions      #GBM  last chemo ~2months ago, follows at [facility].  Per Neurosurgery consult on 1/31, no acute interventions. Worsening midline shift on CT.   -c/w decadron 4 IV q6h  -palliative care consult   -maintain Na > 145 to address cerebral edema 2/2 mass effect    CARDIAC  #Chronic atrial fibrillation.   #afib with RVR    Home med: metoprolol 12.5 BID   - c/w Lovenox ( per NSGY, no increased risk of hemorrhage)  -start Lopressor 12.5 mg BID   -Fent 50 given for pain (could be cause of RVR)    Plan  -bladder scans q6h   -Lasix 40 mg IV push given today given net positive   -BMP every 6 hrs   -consider john (currently on primafit) if pt retaining   -make sure pt having BMs  -pain control with dilaudid 0.5 q4h PRN.    PULM  #intubated  - intubated on 2/1  - GOC  - consider trach moving forward    GI  - NPO with tube feeds   - consider PEG moving forward  -Miralax and Senna standing, Lactulose 30q4     #Constipation  Patient without bowel movement for three     RENAL  - currently with normal renal function  -Add 1 dose 40 IV Lasix for fluid retention and overload     #hypernatremia  -maintain Na > 145 as above for mass effect but less than 150     Endo  -maintain on iSS, continue to monitor glucose for further insulin regimen planning as glucose is currently greater than 180 and out of range for goal between 140-180.    Diet changed to Glucerna due to elevated glucose, CTM.     ID  #SIRS  #ESBL Ecoli Bacteremia  #R arm cellulitis   #ESBL ecoli UTI   Systemic inflammatory response syndrome (SIRS).   Meeting SIRS 2/4 (HR>90, WBC>12k) likely iso of UTI.   Bcx with ESBL E. coli  -s/p ertapenem tx   -IVF as needed  -d/c vanc given unremarkable CT findings     F: none  E: replete K<4, Mg<2  N: npo with tube feeds  VTE Prophylaxis: lovenox 40mg QD  GI: pantoprazole 40 IVP  C: Full Code  D: micu    Lines: Peripheral IV L 22g (2/4) IV L 18 g (2/4), NG tube      75 yo female w PMH GBM on hospice care (diagnosed in 2024, follows at [facility] under Dr. Hernandez, with reported attempted resection of tumor, on chemotherapy with last chemo 2 months ago), admitted for ams likely 2/2 disease progression After admission to [facility], Upon evaluation pt was obtunded with tremors to RUE, response to noxious stimuli but otherwise no response. Pupils were responsive with saccadic movement. She was slumped to her right sided with gurgling breath sounds.  At that point rapid was called and patient was inevitably intubated. Transferred to [facility]. Discussed with pts daughters post procedure poor prognosis and advised to have formal family meeting to clarify and document wishes.       NEURO  Intubated. Not sedated. Minimally responsive despite not being on sedation.     Continues to have seizures, concern for airway protection as GBM progresses, tentative plan for tracheostomy pending further conversations with family.     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 There is evidence of focal cortical hyperexcitability, more prominent in the right than the left frontotemporal regions. The background pattern shows a burst suppression ratio of 10:1, indicating severe diffuse or multifocal cerebral dysfunction, potentially exacerbated by IV sedatives. Additionally, there is severe generalized and multifocal slowing, further supporting significant underlying cerebral dysfunction. CT head with worsening midline shift.  Continued EEG findings of seizures.  Plan:   s/p valproic acid loading dose 1200 mg, vimpat 200 mg loading  - epilepsy consulted, appreciate recs      -  Keppra 1500mg BID      - Vimpat 100mg BID (monitor tele to ensure no heart block)      - C/w Depakote 500mg q6hrs      - Obtain VPA in AM      - seizure precautions      #GBM  last chemo ~2months ago, follows at [facility].  Per Neurosurgery consult on 1/31, no acute interventions. Worsening midline shift on CT.   -c/w decadron 4 IV q6h  -palliative care consult       CARDIAC  #Chronic atrial fibrillation.   #afib with RVR    Home med: metoprolol 12.5 BID   - c/w Lovenox ( per NSGY, no increased risk of hemorrhage)  -start Lopressor 12.5 mg BID   -Fent 50 given for pain (could be cause of RVR)    Plan  -bladder scans q6h   -Lasix 40 mg IV push given today given net positive   -BMP every 6 hrs   -consider john (currently on primafit) if pt retaining   -make sure pt having BMs  -pain control with dilaudid 0.5 q4h PRN.    PULM  #intubated  - intubated on 2/1  - GOC  - consider trach moving forward    GI  - NPO with tube feeds   - consider PEG moving forward  -Miralax and Senna standing, Lactulose 30q4     #Constipation  Patient without bowel movement for three     RENAL  - currently with normal renal function  -Add 1 dose 40 IV Lasix for fluid retention and overload     #hypernatremia  -maintain Na > 145 as above for mass effect but less than 150     Endo  -maintain on iSS, continue to monitor glucose for further insulin regimen planning as glucose is currently greater than 180 and out of range for goal between 140-180.    Diet changed to Glucerna due to elevated glucose, CTM.     ID  #SIRS  #ESBL Ecoli Bacteremia  #R arm cellulitis   #ESBL ecoli UTI   Systemic inflammatory response syndrome (SIRS).   Meeting SIRS 2/4 (HR>90, WBC>12k) likely iso of UTI.   Bcx with ESBL E. coli  -s/p ertapenem tx   -IVF as needed  -d/c vanc given unremarkable CT findings     F: none  E: replete K<4, Mg<2  N: npo with tube feeds  VTE Prophylaxis: lovenox 40mg QD  GI: pantoprazole 40 IVP  C: Full Code  D: micu    Lines: Peripheral IV L 22g (2/4) IV L 18 g (2/4), NG tube

## 2025-02-11 NOTE — CHART NOTE - NSCHARTNOTEFT_GEN_A_CORE
Admitting Diagnosis:   Patient is a 74y old  Female who presents with a chief complaint of AMS (11 Feb 2025 10:15)      PAST MEDICAL & SURGICAL HISTORY:  GBM (glioblastoma multiforme)  Seizure      Current Nutrition Order:   Diet, NPO with Tube Feed:   Tube Feeding Modality: Nasogastric  Glucerna 1.5 Jose Elias (GLUCERNA1.5)  Total Volume for 24 Hours (mL): 972  Total Number of Cans: 5  Continuous  Until Goal Tube Feed Rate (mL per Hour): 54  Tube Feed Duration (in Hours): 18  Tube Feed Start Time: 11:00  Tube Feed Stop Time: 05:00 (02-11-25 @ 10:51)      PO Intake: N/A    GI Issues: Per flowsheets, last BM 2/5 (x12, fecal incontinence); bowel regimen ordered. All quadrants +bowel sounds 2/5. Abdomen distended 2/10.    Pain: No non-verbal indicators.    Skin Integrity:  Clifton score 8   3+ generalized edema  right arm skin tear      02-10-25 @ 07:01  -  02-11-25 @ 07:00  --------------------------------------------------------  IN: 1926.3 mL / OUT: 760 mL / NET: 1166.3 mL    02-11-25 @ 07:01  -  02-11-25 @ 11:57  --------------------------------------------------------  IN: 387.2 mL / OUT: 0 mL / NET: 387.2 mL        Labs:   02-11    151[H]  |  110[H]  |  19  ----------------------------<  299[H]  4.1   |  28  |  0.25[L]    Ca    7.7[L]      11 Feb 2025 05:48  Phos  1.9     02-11  Mg     2.4     02-11    TPro  4.6[L]  /  Alb  2.7[L]  /  TBili  0.2  /  DBili  x   /  AST  41[H]  /  ALT  75[H]  /  AlkPhos  109  02-11    CAPILLARY BLOOD GLUCOSE      POCT Blood Glucose.: 165 mg/dL (11 Feb 2025 11:11)  POCT Blood Glucose.: 235 mg/dL (11 Feb 2025 06:19)  POCT Blood Glucose.: 203 mg/dL (10 Feb 2025 23:03)  POCT Blood Glucose.: 223 mg/dL (10 Feb 2025 18:06)  POCT Blood Glucose.: 206 mg/dL (10 Feb 2025 13:34)      Medications:  MEDICATIONS  (STANDING):  artificial  tears Solution 1 Drop(s) Both EYES every 12 hours  chlorhexidine 0.12% Liquid 15 milliLiter(s) Oral Mucosa every 12 hours  chlorhexidine 2% Cloths 1 Application(s) Topical <User Schedule>  dexAMETHasone  Injectable 4 milliGRAM(s) IV Push every 6 hours  dextrose 5%. 1000 milliLiter(s) (50 mL/Hr) IV Continuous <Continuous>  dextrose 5%. 1000 milliLiter(s) (100 mL/Hr) IV Continuous <Continuous>  dextrose 50% Injectable 25 Gram(s) IV Push once  dextrose 50% Injectable 12.5 Gram(s) IV Push once  dextrose 50% Injectable 25 Gram(s) IV Push once  enoxaparin Injectable 40 milliGRAM(s) SubCutaneous every 24 hours  glucagon  Injectable 1 milliGRAM(s) IntraMuscular once  influenza  Vaccine (HIGH DOSE) 0.5 milliLiter(s) IntraMuscular once  insulin glargine Injectable (LANTUS) 5 Unit(s) SubCutaneous at bedtime  insulin lispro (ADMELOG) corrective regimen sliding scale   SubCutaneous every 6 hours  lacosamide Solution 100 milliGRAM(s) Oral every 12 hours  lactulose Syrup 30 Gram(s) Oral every 4 hours  levETIRAcetam  Solution 1500 milliGRAM(s) Oral every 12 hours  metoprolol tartrate 25 milliGRAM(s) Oral every 12 hours  pantoprazole  Injectable 40 milliGRAM(s) IV Push every 24 hours  petrolatum Ophthalmic Ointment 1 Application(s) Both EYES every 12 hours  polyethylene glycol 3350 17 Gram(s) Oral two times a day  senna Syrup 10 milliLiter(s) Oral at bedtime  valproic  acid Syrup 500 milliGRAM(s) Oral every 6 hours    MEDICATIONS  (PRN):  dextrose Oral Gel 15 Gram(s) Oral once PRN Blood Glucose LESS THAN 70 milliGRAM(s)/deciliter  LORazepam   Injectable 2 milliGRAM(s) IV Push every 4 hours PRN Seizures      Height for BMI (CENTIMETERS)	157.5 Centimeter(s)  Weight for BMI (lbs)	115.1 lb  Weight for BMI (kg)	52.2 kg  Body Mass Index	21     Weight Change: No new wt since admit. Recommend weigh pt weekly at minimum.     Estimated energy needs:   Weight used for calculations	IBW   Estimated Energy Needs Weight (lbs)	110.2 lb  Estimated Energy Needs Weight (kg)	50 kg  Estimated Energy Needs From (jose elias/kg)	25   Estimated Energy Needs To (jose elias/kg)	30   Estimated Energy Needs Calculated From (jose elias/kg)	1250   Estimated Energy Needs Calculated To (jose elias/kg)	1500     Estimated Protein Needs Weight (lbs)	110.2 lb  Estimated Protein Needs Weight (kg)	50 kg  Estimated Protein Needs From (g/kg)	1.4   Estimated Protein Needs To (g/kg)	1.6   Estimated Protein Needs Calculated From (g/kg)	70   Estimated Protein Needs Calculated To (g/kg)	80     Estimated Fluid Needs Weight (lbs)	110.2 lb  Estimated Fluid Needs Weight (kg)	50 kg  Estimated Fluid Needs From (ml/kg)	25   Estimated Fluid Needs To (ml/kg)	30   Estimated Fluid Needs Calculated From (ml/kg)	1250   Estimated Fluid Needs Calculated To (ml/kg)	1500     Other Calculations	Needs determined using ideal body weight 50 kg (104%IBW) adjusted for intubation and critical condition.     Subjective:   73 yo female w PMH GBM on hospice care (diagnosed in 2024, follows at [facility] under Dr. Hernandez, with reported attempted resection of tumor, on chemotherapy with last chemo 2 months ago), admitted for ams likely secondary to disease progression. 2/3: Hypertonic saline. 2/4: vEEG, no more seizures. 2/5: Added free water flushes. 2/8: Phos repleted. 2/9: NGT confirmed. 2/10: Repleted phos. 2/11: Increased Lantus to 5, lactulose 30q4.     Pt care discussed in interdisciplinary care team rounds. Pt remains intubated with vent set to VC-AC, MAP 71, no pressors, and no propofol at time of assessment. Pt no longer sedated but A&Ox0 per team. Pt continues on enteral nutrition support via nasogastric tube; formula switched from Jevity 1.5 to Glucerna 1.5 in setting of hyperglycemia; order pended to team by RD this AM. See recs below. Plan to continue concentrated formula to maintain permissively elevated serum sodium levels (today 151). Labs and medications reviewed. Notable labs: POCT blood glucose 165-235 x 24 hours, Na 151 <H>, Cl 110 <H>, Cr 0.25 <L>, corrected Ca ((4-2.7)x0.8)+7.7=8.74 <WNL>, AST 41 <H>, ALT 75 <H>, Phos 1.9 <L>. Ordered for: insulin regimen (insulin sliding scale, 5 units lantus at bedtime), lactulose, miralax, senna, protonix. Per flowsheets, last BM 2/5 (x12, fecal incontinence); bowel regimen ordered and lactulose dose increased today. All quadrants +bowel sounds 2/5. Abdomen distended 2/10. No other reports GI distress or further nutritional concerns at this time. RDN to remain available, see recommendations below.     Previous Nutrition Diagnosis: Inadequate Oral Intake related to intubation as evidenced by NPO.     Active [   ]  Resolved [ x ]    If resolved, new PES: Increased nutrient needs related to increased physiological demands as evidenced by Glioblastoma and recent chemotherapy.    Goal: Pt will meet 75% or more of protein/energy needs via most appropriate route for nutrition.     Recommendations:  -Continue enteral nutrition support via nasogastric tube    *Continue Glucerna 1.5 with goal rate of 54 ml/hr from 3054-6482 to provide 972 ml tube feed, 1458 calories, 81 gProt., and 738 ml free water. This is 22.7 nonprotein calories and 1.62 gProt. per kg ideal body weight 50 kg.   -Monitor pressor and propofol demands    *Goal MAP >65 and lactate <2.0 for generally safe provision of nutrition   -Align nutrition with goals of care at all times  -Weekly wts  -Monitor chemistry, GI function, and skin integrity     **Orders communicated and pended to MICU team**       Risk Level: High [ x ] Moderate [   ] Low [   ] Admitting Diagnosis:   Patient is a 74y old  Female who presents with a chief complaint of AMS (11 Feb 2025 10:15)      PAST MEDICAL & SURGICAL HISTORY:  GBM (glioblastoma multiforme)  Seizure      Current Nutrition Order:   Diet, NPO with Tube Feed:   Tube Feeding Modality: Nasogastric  Glucerna 1.5 Jose Elias (GLUCERNA1.5)  Total Volume for 24 Hours (mL): 972  Total Number of Cans: 5  Continuous  Until Goal Tube Feed Rate (mL per Hour): 54  Tube Feed Duration (in Hours): 18  Tube Feed Start Time: 11:00  Tube Feed Stop Time: 05:00 (02-11-25 @ 10:51)      PO Intake: N/A    GI Issues: Per flowsheets, last BM 2/5 (x12, fecal incontinence); bowel regimen ordered. All quadrants +bowel sounds 2/5. Abdomen distended 2/10.    Pain: No non-verbal indicators.    Skin Integrity:  Clifton score 8   3+ generalized edema  right arm skin tear      02-10-25 @ 07:01  -  02-11-25 @ 07:00  --------------------------------------------------------  IN: 1926.3 mL / OUT: 760 mL / NET: 1166.3 mL    02-11-25 @ 07:01  -  02-11-25 @ 11:57  --------------------------------------------------------  IN: 387.2 mL / OUT: 0 mL / NET: 387.2 mL        Labs:   02-11    151[H]  |  110[H]  |  19  ----------------------------<  299[H]  4.1   |  28  |  0.25[L]    Ca    7.7[L]      11 Feb 2025 05:48  Phos  1.9     02-11  Mg     2.4     02-11    TPro  4.6[L]  /  Alb  2.7[L]  /  TBili  0.2  /  DBili  x   /  AST  41[H]  /  ALT  75[H]  /  AlkPhos  109  02-11    CAPILLARY BLOOD GLUCOSE      POCT Blood Glucose.: 165 mg/dL (11 Feb 2025 11:11)  POCT Blood Glucose.: 235 mg/dL (11 Feb 2025 06:19)  POCT Blood Glucose.: 203 mg/dL (10 Feb 2025 23:03)  POCT Blood Glucose.: 223 mg/dL (10 Feb 2025 18:06)  POCT Blood Glucose.: 206 mg/dL (10 Feb 2025 13:34)      Medications:  MEDICATIONS  (STANDING):  artificial  tears Solution 1 Drop(s) Both EYES every 12 hours  chlorhexidine 0.12% Liquid 15 milliLiter(s) Oral Mucosa every 12 hours  chlorhexidine 2% Cloths 1 Application(s) Topical <User Schedule>  dexAMETHasone  Injectable 4 milliGRAM(s) IV Push every 6 hours  dextrose 5%. 1000 milliLiter(s) (50 mL/Hr) IV Continuous <Continuous>  dextrose 5%. 1000 milliLiter(s) (100 mL/Hr) IV Continuous <Continuous>  dextrose 50% Injectable 25 Gram(s) IV Push once  dextrose 50% Injectable 12.5 Gram(s) IV Push once  dextrose 50% Injectable 25 Gram(s) IV Push once  enoxaparin Injectable 40 milliGRAM(s) SubCutaneous every 24 hours  glucagon  Injectable 1 milliGRAM(s) IntraMuscular once  influenza  Vaccine (HIGH DOSE) 0.5 milliLiter(s) IntraMuscular once  insulin glargine Injectable (LANTUS) 5 Unit(s) SubCutaneous at bedtime  insulin lispro (ADMELOG) corrective regimen sliding scale   SubCutaneous every 6 hours  lacosamide Solution 100 milliGRAM(s) Oral every 12 hours  lactulose Syrup 30 Gram(s) Oral every 4 hours  levETIRAcetam  Solution 1500 milliGRAM(s) Oral every 12 hours  metoprolol tartrate 25 milliGRAM(s) Oral every 12 hours  pantoprazole  Injectable 40 milliGRAM(s) IV Push every 24 hours  petrolatum Ophthalmic Ointment 1 Application(s) Both EYES every 12 hours  polyethylene glycol 3350 17 Gram(s) Oral two times a day  senna Syrup 10 milliLiter(s) Oral at bedtime  valproic  acid Syrup 500 milliGRAM(s) Oral every 6 hours    MEDICATIONS  (PRN):  dextrose Oral Gel 15 Gram(s) Oral once PRN Blood Glucose LESS THAN 70 milliGRAM(s)/deciliter  LORazepam   Injectable 2 milliGRAM(s) IV Push every 4 hours PRN Seizures      Height for BMI (CENTIMETERS)	157.5 Centimeter(s)  Weight for BMI (lbs)	115.1 lb  Weight for BMI (kg)	52.2 kg  Body Mass Index	21     Weight Change: No new wt since admit. Recommend weigh pt weekly at minimum.     Estimated energy needs:   Weight used for calculations	IBW   Estimated Energy Needs Weight (lbs)	110.2 lb  Estimated Energy Needs Weight (kg)	50 kg  Estimated Energy Needs From (jose elias/kg)	25   Estimated Energy Needs To (jose elias/kg)	30   Estimated Energy Needs Calculated From (jose elias/kg)	1250   Estimated Energy Needs Calculated To (jose elias/kg)	1500     Estimated Protein Needs Weight (lbs)	110.2 lb  Estimated Protein Needs Weight (kg)	50 kg  Estimated Protein Needs From (g/kg)	1.4   Estimated Protein Needs To (g/kg)	1.6   Estimated Protein Needs Calculated From (g/kg)	70   Estimated Protein Needs Calculated To (g/kg)	80     Estimated Fluid Needs Weight (lbs)	110.2 lb  Estimated Fluid Needs Weight (kg)	50 kg  Estimated Fluid Needs From (ml/kg)	25   Estimated Fluid Needs To (ml/kg)	30   Estimated Fluid Needs Calculated From (ml/kg)	1250   Estimated Fluid Needs Calculated To (ml/kg)	1500     Other Calculations	Needs determined using ideal body weight 50 kg (104%IBW) adjusted for intubation and critical condition.     Subjective:   75 yo female w PMH GBM on hospice care (diagnosed in 2024, follows at [facility] under Dr. Hernandez, with reported attempted resection of tumor, on chemotherapy with last chemo 2 months ago), admitted for ams likely secondary to disease progression. 2/3: Hypertonic saline. 2/4: vEEG, no more seizures. 2/5: Added free water flushes. 2/8: Phos repleted. 2/9: NGT confirmed. 2/10: Repleted phos. 2/11: Increased Lantus to 5, lactulose 30q4.     Pt care discussed in interdisciplinary care team rounds. Pt remains intubated with vent set to VC-AC, MAP 71, no pressors, and no propofol at time of assessment. Pt no longer sedated. Pt continues on enteral nutrition support via nasogastric tube; formula switched from Jevity 1.5 to Glucerna 1.5 in setting of hyperglycemia; order pended to team by RD this AM. See recs below. Plan to continue concentrated formula to maintain permissively elevated serum sodium levels (today 151). Labs and medications reviewed. Notable labs: POCT blood glucose 165-235 x 24 hours, Na 151 <H>, Cl 110 <H>, Cr 0.25 <L>, corrected Ca ((4-2.7)x0.8)+7.7=8.74 <WNL>, AST 41 <H>, ALT 75 <H>, Phos 1.9 <L>. Ordered for: insulin regimen (insulin sliding scale, 5 units lantus at bedtime), lactulose, miralax, senna, protonix. Per flowsheets, last BM 2/5 (x12, fecal incontinence); bowel regimen ordered and lactulose dose increased today. All quadrants +bowel sounds 2/5. Abdomen distended 2/10. No other reports GI distress or further nutritional concerns at this time. RDN to remain available, see recommendations below.     Previous Nutrition Diagnosis: Inadequate Oral Intake related to intubation as evidenced by NPO.     Active [   ]  Resolved [ x ]    If resolved, new PES: Increased nutrient needs related to increased physiological demands as evidenced by Glioblastoma and recent chemotherapy.    Goal: Pt will meet 75% or more of protein/energy needs via most appropriate route for nutrition.     Recommendations:  -Continue enteral nutrition support via nasogastric tube    *Recommend Glucerna 1.5 with goal rate of 54 ml/hr from 4575-5308 to provide 972 ml tube feed, 1458 calories, 81 gProt., and 738 ml free water. This is 22.7 nonprotein calories and 1.62 gProt. per kg ideal body weight 50 kg.   -Monitor pressor and propofol demands    *Goal MAP >65 and lactate <2.0 for generally safe provision of nutrition   -Align nutrition with goals of care at all times  -Weekly wts  -Monitor chemistry, GI function, and skin integrity     **Orders communicated and pended to MICU team**       Risk Level: High [ x ] Moderate [   ] Low [   ] Admitting Diagnosis:   Patient is a 74y old  Female who presents with a chief complaint of AMS (11 Feb 2025 10:15)      PAST MEDICAL & SURGICAL HISTORY:  GBM (glioblastoma multiforme)  Seizure      Current Nutrition Order:   Diet, NPO with Tube Feed:   Tube Feeding Modality: Nasogastric  Glucerna 1.5 Jose Elias (GLUCERNA1.5)  Total Volume for 24 Hours (mL): 972  Total Number of Cans: 5  Continuous  Until Goal Tube Feed Rate (mL per Hour): 54  Tube Feed Duration (in Hours): 18  Tube Feed Start Time: 11:00  Tube Feed Stop Time: 05:00 (02-11-25 @ 10:51)      PO Intake: N/A    GI Issues: Per flowsheets, last BM 2/5 (x12, fecal incontinence); bowel regimen ordered. All quadrants +bowel sounds 2/5. Abdomen distended 2/10.    Pain: No non-verbal indicators.    Skin Integrity:  Clifton score 8   3+ generalized edema  right arm skin tear      02-10-25 @ 07:01  -  02-11-25 @ 07:00  --------------------------------------------------------  IN: 1926.3 mL / OUT: 760 mL / NET: 1166.3 mL    02-11-25 @ 07:01  -  02-11-25 @ 11:57  --------------------------------------------------------  IN: 387.2 mL / OUT: 0 mL / NET: 387.2 mL        Labs:   02-11    151[H]  |  110[H]  |  19  ----------------------------<  299[H]  4.1   |  28  |  0.25[L]    Ca    7.7[L]      11 Feb 2025 05:48  Phos  1.9     02-11  Mg     2.4     02-11    TPro  4.6[L]  /  Alb  2.7[L]  /  TBili  0.2  /  DBili  x   /  AST  41[H]  /  ALT  75[H]  /  AlkPhos  109  02-11    CAPILLARY BLOOD GLUCOSE      POCT Blood Glucose.: 165 mg/dL (11 Feb 2025 11:11)  POCT Blood Glucose.: 235 mg/dL (11 Feb 2025 06:19)  POCT Blood Glucose.: 203 mg/dL (10 Feb 2025 23:03)  POCT Blood Glucose.: 223 mg/dL (10 Feb 2025 18:06)  POCT Blood Glucose.: 206 mg/dL (10 Feb 2025 13:34)      Medications:  MEDICATIONS  (STANDING):  artificial  tears Solution 1 Drop(s) Both EYES every 12 hours  chlorhexidine 0.12% Liquid 15 milliLiter(s) Oral Mucosa every 12 hours  chlorhexidine 2% Cloths 1 Application(s) Topical <User Schedule>  dexAMETHasone  Injectable 4 milliGRAM(s) IV Push every 6 hours  dextrose 5%. 1000 milliLiter(s) (50 mL/Hr) IV Continuous <Continuous>  dextrose 5%. 1000 milliLiter(s) (100 mL/Hr) IV Continuous <Continuous>  dextrose 50% Injectable 25 Gram(s) IV Push once  dextrose 50% Injectable 12.5 Gram(s) IV Push once  dextrose 50% Injectable 25 Gram(s) IV Push once  enoxaparin Injectable 40 milliGRAM(s) SubCutaneous every 24 hours  glucagon  Injectable 1 milliGRAM(s) IntraMuscular once  influenza  Vaccine (HIGH DOSE) 0.5 milliLiter(s) IntraMuscular once  insulin glargine Injectable (LANTUS) 5 Unit(s) SubCutaneous at bedtime  insulin lispro (ADMELOG) corrective regimen sliding scale   SubCutaneous every 6 hours  lacosamide Solution 100 milliGRAM(s) Oral every 12 hours  lactulose Syrup 30 Gram(s) Oral every 4 hours  levETIRAcetam  Solution 1500 milliGRAM(s) Oral every 12 hours  metoprolol tartrate 25 milliGRAM(s) Oral every 12 hours  pantoprazole  Injectable 40 milliGRAM(s) IV Push every 24 hours  petrolatum Ophthalmic Ointment 1 Application(s) Both EYES every 12 hours  polyethylene glycol 3350 17 Gram(s) Oral two times a day  senna Syrup 10 milliLiter(s) Oral at bedtime  valproic  acid Syrup 500 milliGRAM(s) Oral every 6 hours    MEDICATIONS  (PRN):  dextrose Oral Gel 15 Gram(s) Oral once PRN Blood Glucose LESS THAN 70 milliGRAM(s)/deciliter  LORazepam   Injectable 2 milliGRAM(s) IV Push every 4 hours PRN Seizures      Height for BMI (CENTIMETERS)	157.5 Centimeter(s)  Weight for BMI (lbs)	115.1 lb  Weight for BMI (kg)	52.2 kg  Body Mass Index	21     Weight Change: No new wt since admit. Recommend weigh pt weekly at minimum.     Estimated energy needs:   Weight used for calculations	IBW   Estimated Energy Needs Weight (lbs)	110.2 lb  Estimated Energy Needs Weight (kg)	50 kg  Estimated Energy Needs From (jose elias/kg)	25   Estimated Energy Needs To (jose elias/kg)	30   Estimated Energy Needs Calculated From (jose elias/kg)	1250   Estimated Energy Needs Calculated To (jose elias/kg)	1500     Estimated Protein Needs Weight (lbs)	110.2 lb  Estimated Protein Needs Weight (kg)	50 kg  Estimated Protein Needs From (g/kg)	1.4   Estimated Protein Needs To (g/kg)	1.6   Estimated Protein Needs Calculated From (g/kg)	70   Estimated Protein Needs Calculated To (g/kg)	80     Estimated Fluid Needs Weight (lbs)	110.2 lb  Estimated Fluid Needs Weight (kg)	50 kg  Estimated Fluid Needs From (ml/kg)	25   Estimated Fluid Needs To (ml/kg)	30   Estimated Fluid Needs Calculated From (ml/kg)	1250   Estimated Fluid Needs Calculated To (ml/kg)	1500     Other Calculations	Needs determined using ideal body weight 50 kg (104%IBW) adjusted for intubation and critical condition.     Subjective:   73 yo female w PMH GBM on hospice care (diagnosed in 2024, follows at [facility] under Dr. Hernandez, with reported attempted resection of tumor, on chemotherapy with last chemo 2 months ago), admitted for ams likely secondary to disease progression. 2/3: Hypertonic saline. 2/4: vEEG, no more seizures. 2/5: Added free water flushes. 2/8: Phos repleted. 2/9: NGT confirmed. 2/10: Repleted phos. 2/11: Increased Lantus to 5, lactulose to 30q4.     Pt care discussed in interdisciplinary care team rounds. Pt remains intubated with vent set to VC-AC, MAP 71, no pressors, and no propofol at time of assessment. Pt no longer sedated. Pt continues on enteral nutrition support via nasogastric tube; formula switched from Jevity 1.5 to Glucerna 1.5 in setting of hyperglycemia; order pended to team by RD this AM. See recs below. Plan to continue concentrated formula to maintain permissively elevated serum sodium levels (today 151). Labs and medications reviewed. Notable labs: POCT blood glucose 165-235 x 24 hours, Na 151 <H>, Cl 110 <H>, Cr 0.25 <L>, corrected Ca ((4-2.7)x0.8)+7.7=8.74 <WNL>, AST 41 <H>, ALT 75 <H>, Phos 1.9 <L>. Ordered for: insulin regimen (insulin sliding scale, 5 units lantus at bedtime), lactulose, miralax, senna, protonix. Per flowsheets, last BM 2/5 (x12, fecal incontinence); bowel regimen ordered and lactulose dose increased today. All quadrants +bowel sounds 2/5. Abdomen distended 2/10. No other reports GI distress or further nutritional concerns at this time. RDN to remain available, see recommendations below.     Previous Nutrition Diagnosis: Inadequate Oral Intake related to intubation as evidenced by NPO.     Active [   ]  Resolved [ x ]    If resolved, new PES: Increased nutrient needs related to increased physiological demands as evidenced by Glioblastoma and recent chemotherapy.    Goal: Pt will meet 75% or more of protein/energy needs via most appropriate route for nutrition.     Recommendations:  -Continue enteral nutrition support via nasogastric tube    *Recommend Glucerna 1.5 with goal rate of 54 ml/hr from 3154-7863 to provide 972 ml tube feed, 1458 calories, 81 gProt., and 738 ml free water. This is 22.7 nonprotein calories and 1.62 gProt. per kg ideal body weight 50 kg.   -Monitor pressor and propofol demands    *Goal MAP >65 and lactate <2.0 for generally safe provision of nutrition   -Align nutrition with goals of care at all times  -Weekly wts  -Monitor chemistry, GI function, and skin integrity     **Orders communicated and pended to MICU team**     Risk Level: High [ x ] Moderate [   ] Low [   ]

## 2025-02-12 LAB
ALBUMIN SERPL ELPH-MCNC: 3.1 G/DL — LOW (ref 3.3–5)
ALBUMIN SERPL ELPH-MCNC: 3.1 G/DL — LOW (ref 3.3–5)
ALP SERPL-CCNC: 170 U/L — HIGH (ref 40–120)
ALP SERPL-CCNC: 177 U/L — HIGH (ref 40–120)
ALT FLD-CCNC: 151 U/L — HIGH (ref 10–45)
ALT FLD-CCNC: 166 U/L — HIGH (ref 10–45)
ANION GAP SERPL CALC-SCNC: 12 MMOL/L — SIGNIFICANT CHANGE UP (ref 5–17)
ANION GAP SERPL CALC-SCNC: 15 MMOL/L — SIGNIFICANT CHANGE UP (ref 5–17)
APPEARANCE UR: CLEAR — SIGNIFICANT CHANGE UP
AST SERPL-CCNC: 77 U/L — HIGH (ref 10–40)
AST SERPL-CCNC: 80 U/L — HIGH (ref 10–40)
BASOPHILS # BLD AUTO: 0 K/UL — SIGNIFICANT CHANGE UP (ref 0–0.2)
BASOPHILS # BLD AUTO: 0.13 K/UL — SIGNIFICANT CHANGE UP (ref 0–0.2)
BASOPHILS NFR BLD AUTO: 0 % — SIGNIFICANT CHANGE UP (ref 0–2)
BASOPHILS NFR BLD AUTO: 0.6 % — SIGNIFICANT CHANGE UP (ref 0–2)
BILIRUB SERPL-MCNC: 0.3 MG/DL — SIGNIFICANT CHANGE UP (ref 0.2–1.2)
BILIRUB SERPL-MCNC: 0.3 MG/DL — SIGNIFICANT CHANGE UP (ref 0.2–1.2)
BILIRUB UR-MCNC: NEGATIVE — SIGNIFICANT CHANGE UP
BLD GP AB SCN SERPL QL: NEGATIVE — SIGNIFICANT CHANGE UP
BUN SERPL-MCNC: 27 MG/DL — HIGH (ref 7–23)
BUN SERPL-MCNC: 33 MG/DL — HIGH (ref 7–23)
CALCIUM SERPL-MCNC: 8.1 MG/DL — LOW (ref 8.4–10.5)
CALCIUM SERPL-MCNC: 8.5 MG/DL — SIGNIFICANT CHANGE UP (ref 8.4–10.5)
CHLORIDE SERPL-SCNC: 102 MMOL/L — SIGNIFICANT CHANGE UP (ref 96–108)
CHLORIDE SERPL-SCNC: 107 MMOL/L — SIGNIFICANT CHANGE UP (ref 96–108)
CO2 SERPL-SCNC: 28 MMOL/L — SIGNIFICANT CHANGE UP (ref 22–31)
CO2 SERPL-SCNC: 30 MMOL/L — SIGNIFICANT CHANGE UP (ref 22–31)
COLOR SPEC: SIGNIFICANT CHANGE UP
CREAT SERPL-MCNC: 0.33 MG/DL — LOW (ref 0.5–1.3)
CREAT SERPL-MCNC: 0.37 MG/DL — LOW (ref 0.5–1.3)
DIFF PNL FLD: NEGATIVE — SIGNIFICANT CHANGE UP
EGFR: 106 ML/MIN/1.73M2 — SIGNIFICANT CHANGE UP
EGFR: 109 ML/MIN/1.73M2 — SIGNIFICANT CHANGE UP
EOSINOPHIL # BLD AUTO: 0 K/UL — SIGNIFICANT CHANGE UP (ref 0–0.5)
EOSINOPHIL # BLD AUTO: 0 K/UL — SIGNIFICANT CHANGE UP (ref 0–0.5)
EOSINOPHIL NFR BLD AUTO: 0 % — SIGNIFICANT CHANGE UP (ref 0–6)
EOSINOPHIL NFR BLD AUTO: 0 % — SIGNIFICANT CHANGE UP (ref 0–6)
GLUCOSE SERPL-MCNC: 116 MG/DL — HIGH (ref 70–99)
GLUCOSE SERPL-MCNC: 248 MG/DL — HIGH (ref 70–99)
GLUCOSE UR QL: NEGATIVE MG/DL — SIGNIFICANT CHANGE UP
HCT VFR BLD CALC: 47.7 % — HIGH (ref 34.5–45)
HCT VFR BLD CALC: 49.7 % — HIGH (ref 34.5–45)
HGB BLD-MCNC: 14.6 G/DL — SIGNIFICANT CHANGE UP (ref 11.5–15.5)
HGB BLD-MCNC: 15.2 G/DL — SIGNIFICANT CHANGE UP (ref 11.5–15.5)
IMM GRANULOCYTES NFR BLD AUTO: 5.1 % — HIGH (ref 0–0.9)
KETONES UR-MCNC: ABNORMAL MG/DL
LEUKOCYTE ESTERASE UR-ACNC: NEGATIVE — SIGNIFICANT CHANGE UP
LYMPHOCYTES # BLD AUTO: 0.65 K/UL — LOW (ref 1–3.3)
LYMPHOCYTES # BLD AUTO: 0.75 K/UL — LOW (ref 1–3.3)
LYMPHOCYTES # BLD AUTO: 2.6 % — LOW (ref 13–44)
LYMPHOCYTES # BLD AUTO: 3.2 % — LOW (ref 13–44)
MAGNESIUM SERPL-MCNC: 2.8 MG/DL — HIGH (ref 1.6–2.6)
MCHC RBC-ENTMCNC: 27 PG — SIGNIFICANT CHANGE UP (ref 27–34)
MCHC RBC-ENTMCNC: 27.1 PG — SIGNIFICANT CHANGE UP (ref 27–34)
MCHC RBC-ENTMCNC: 30.6 G/DL — LOW (ref 32–36)
MCHC RBC-ENTMCNC: 30.6 G/DL — LOW (ref 32–36)
MCV RBC AUTO: 88.4 FL — SIGNIFICANT CHANGE UP (ref 80–100)
MCV RBC AUTO: 88.5 FL — SIGNIFICANT CHANGE UP (ref 80–100)
MONOCYTES # BLD AUTO: 0.42 K/UL — SIGNIFICANT CHANGE UP (ref 0–0.9)
MONOCYTES # BLD AUTO: 0.63 K/UL — SIGNIFICANT CHANGE UP (ref 0–0.9)
MONOCYTES NFR BLD AUTO: 1.7 % — LOW (ref 2–14)
MONOCYTES NFR BLD AUTO: 2.7 % — SIGNIFICANT CHANGE UP (ref 2–14)
NEUTROPHILS # BLD AUTO: 20.66 K/UL — HIGH (ref 1.8–7.4)
NEUTROPHILS # BLD AUTO: 23.36 K/UL — HIGH (ref 1.8–7.4)
NEUTROPHILS NFR BLD AUTO: 88.4 % — HIGH (ref 43–77)
NEUTROPHILS NFR BLD AUTO: 93 % — HIGH (ref 43–77)
NITRITE UR-MCNC: NEGATIVE — SIGNIFICANT CHANGE UP
NRBC BLD AUTO-RTO: 0 /100 WBCS — SIGNIFICANT CHANGE UP (ref 0–0)
PH UR: 5.5 — SIGNIFICANT CHANGE UP (ref 5–8)
PHOSPHATE SERPL-MCNC: 3.5 MG/DL — SIGNIFICANT CHANGE UP (ref 2.5–4.5)
PLATELET # BLD AUTO: 339 K/UL — SIGNIFICANT CHANGE UP (ref 150–400)
PLATELET # BLD AUTO: 373 K/UL — SIGNIFICANT CHANGE UP (ref 150–400)
POTASSIUM SERPL-MCNC: 3.6 MMOL/L — SIGNIFICANT CHANGE UP (ref 3.5–5.3)
POTASSIUM SERPL-MCNC: 4.5 MMOL/L — SIGNIFICANT CHANGE UP (ref 3.5–5.3)
POTASSIUM SERPL-SCNC: 3.6 MMOL/L — SIGNIFICANT CHANGE UP (ref 3.5–5.3)
POTASSIUM SERPL-SCNC: 4.5 MMOL/L — SIGNIFICANT CHANGE UP (ref 3.5–5.3)
PROT SERPL-MCNC: 5.4 G/DL — LOW (ref 6–8.3)
PROT SERPL-MCNC: 5.6 G/DL — LOW (ref 6–8.3)
PROT UR-MCNC: 30 MG/DL
RBC # BLD: 5.39 M/UL — HIGH (ref 3.8–5.2)
RBC # BLD: 5.62 M/UL — HIGH (ref 3.8–5.2)
RBC # FLD: 16.6 % — HIGH (ref 10.3–14.5)
RBC # FLD: 17.3 % — HIGH (ref 10.3–14.5)
RH IG SCN BLD-IMP: POSITIVE — SIGNIFICANT CHANGE UP
SODIUM SERPL-SCNC: 145 MMOL/L — SIGNIFICANT CHANGE UP (ref 135–145)
SODIUM SERPL-SCNC: 149 MMOL/L — HIGH (ref 135–145)
SP GR SPEC: >1.03 — HIGH (ref 1–1.03)
UROBILINOGEN FLD QL: 0.2 MG/DL — SIGNIFICANT CHANGE UP (ref 0.2–1)
VALPROATE FREE SERPL-MCNC: 50.9 UG/ML — HIGH (ref 6–22)
VALPROATE SERPL-MCNC: 66.1 UG/ML — SIGNIFICANT CHANGE UP (ref 50–100)
WBC # BLD: 23.37 K/UL — HIGH (ref 3.8–10.5)
WBC # BLD: 24.88 K/UL — HIGH (ref 3.8–10.5)
WBC # FLD AUTO: 23.37 K/UL — HIGH (ref 3.8–10.5)
WBC # FLD AUTO: 24.88 K/UL — HIGH (ref 3.8–10.5)

## 2025-02-12 PROCEDURE — 99291 CRITICAL CARE FIRST HOUR: CPT

## 2025-02-12 PROCEDURE — 95720 EEG PHY/QHP EA INCR W/VEEG: CPT

## 2025-02-12 PROCEDURE — 99233 SBSQ HOSP IP/OBS HIGH 50: CPT

## 2025-02-12 PROCEDURE — 71045 X-RAY EXAM CHEST 1 VIEW: CPT | Mod: 26

## 2025-02-12 PROCEDURE — 74018 RADEX ABDOMEN 1 VIEW: CPT | Mod: 26

## 2025-02-12 RX ORDER — ACETAMINOPHEN 500 MG/5ML
1000 LIQUID (ML) ORAL ONCE
Refills: 0 | Status: COMPLETED | OUTPATIENT
Start: 2025-02-12 | End: 2025-02-12

## 2025-02-12 RX ORDER — ERTAPENEM SODIUM 1 G/1
1000 INJECTION, POWDER, LYOPHILIZED, FOR SOLUTION INTRAMUSCULAR; INTRAVENOUS ONCE
Refills: 0 | Status: COMPLETED | OUTPATIENT
Start: 2025-02-12 | End: 2025-02-12

## 2025-02-12 RX ORDER — ACETAMINOPHEN 500 MG/5ML
520 LIQUID (ML) ORAL EVERY 6 HOURS
Refills: 0 | Status: DISCONTINUED | OUTPATIENT
Start: 2025-02-12 | End: 2025-02-25

## 2025-02-12 RX ORDER — LACTULOSE 10 G/15ML
30 SOLUTION ORAL EVERY 12 HOURS
Refills: 0 | Status: DISCONTINUED | OUTPATIENT
Start: 2025-02-12 | End: 2025-02-12

## 2025-02-12 RX ADMIN — LACOSAMIDE 100 MILLIGRAM(S): 150 TABLET, FILM COATED ORAL at 22:06

## 2025-02-12 RX ADMIN — Medication 1 APPLICATION(S): at 06:43

## 2025-02-12 RX ADMIN — METOPROLOL SUCCINATE 25 MILLIGRAM(S): 50 TABLET, EXTENDED RELEASE ORAL at 08:51

## 2025-02-12 RX ADMIN — INSULIN LISPRO 2: 100 INJECTION, SOLUTION INTRAVENOUS; SUBCUTANEOUS at 19:02

## 2025-02-12 RX ADMIN — Medication 520 MILLIGRAM(S): at 10:03

## 2025-02-12 RX ADMIN — POLYETHYLENE GLYCOL 3350 17 GRAM(S): 17 POWDER, FOR SOLUTION ORAL at 18:46

## 2025-02-12 RX ADMIN — ERTAPENEM SODIUM 100 MILLIGRAM(S): 1 INJECTION, POWDER, LYOPHILIZED, FOR SOLUTION INTRAMUSCULAR; INTRAVENOUS at 18:46

## 2025-02-12 RX ADMIN — DEXAMETHASONE 4 MILLIGRAM(S): 0.5 TABLET ORAL at 06:45

## 2025-02-12 RX ADMIN — INSULIN LISPRO 2: 100 INJECTION, SOLUTION INTRAVENOUS; SUBCUTANEOUS at 06:47

## 2025-02-12 RX ADMIN — Medication 1 DROP(S): at 22:08

## 2025-02-12 RX ADMIN — ENOXAPARIN SODIUM 40 MILLIGRAM(S): 100 INJECTION SUBCUTANEOUS at 18:46

## 2025-02-12 RX ADMIN — Medication 400 MILLIGRAM(S): at 13:54

## 2025-02-12 RX ADMIN — INSULIN LISPRO 4: 100 INJECTION, SOLUTION INTRAVENOUS; SUBCUTANEOUS at 11:55

## 2025-02-12 RX ADMIN — METOPROLOL SUCCINATE 25 MILLIGRAM(S): 50 TABLET, EXTENDED RELEASE ORAL at 22:04

## 2025-02-12 RX ADMIN — LACTULOSE 30 GRAM(S): 10 SOLUTION ORAL at 06:45

## 2025-02-12 RX ADMIN — DEXAMETHASONE 4 MILLIGRAM(S): 0.5 TABLET ORAL at 18:46

## 2025-02-12 RX ADMIN — LACOSAMIDE 100 MILLIGRAM(S): 150 TABLET, FILM COATED ORAL at 08:51

## 2025-02-12 RX ADMIN — Medication 15 MILLILITER(S): at 08:51

## 2025-02-12 RX ADMIN — Medication 1000 MILLIGRAM(S): at 14:00

## 2025-02-12 RX ADMIN — Medication 40 MILLIEQUIVALENT(S): at 09:19

## 2025-02-12 RX ADMIN — INSULIN GLARGINE-YFGN 5 UNIT(S): 100 INJECTION, SOLUTION SUBCUTANEOUS at 22:06

## 2025-02-12 RX ADMIN — Medication 15 MILLILITER(S): at 22:04

## 2025-02-12 RX ADMIN — DEXAMETHASONE 4 MILLIGRAM(S): 0.5 TABLET ORAL at 13:26

## 2025-02-12 RX ADMIN — LACTULOSE 30 GRAM(S): 10 SOLUTION ORAL at 02:53

## 2025-02-12 RX ADMIN — Medication 1 DROP(S): at 09:13

## 2025-02-12 RX ADMIN — LEVETIRACETAM 1500 MILLIGRAM(S): 10 INJECTION, SOLUTION INTRAVENOUS at 22:07

## 2025-02-12 RX ADMIN — Medication 500 MILLIGRAM(S): at 08:51

## 2025-02-12 RX ADMIN — Medication 1 APPLICATION(S): at 10:53

## 2025-02-12 RX ADMIN — Medication 1 APPLICATION(S): at 22:08

## 2025-02-12 RX ADMIN — Medication 500 MILLIGRAM(S): at 15:53

## 2025-02-12 RX ADMIN — Medication 10 MILLILITER(S): at 22:08

## 2025-02-12 RX ADMIN — Medication 500 MILLIGRAM(S): at 02:52

## 2025-02-12 RX ADMIN — Medication 500 MILLIGRAM(S): at 22:06

## 2025-02-12 RX ADMIN — POLYETHYLENE GLYCOL 3350 17 GRAM(S): 17 POWDER, FOR SOLUTION ORAL at 06:45

## 2025-02-12 RX ADMIN — Medication 520 MILLIGRAM(S): at 09:14

## 2025-02-12 RX ADMIN — Medication 40 MILLIGRAM(S): at 08:51

## 2025-02-12 RX ADMIN — LEVETIRACETAM 1500 MILLIGRAM(S): 10 INJECTION, SOLUTION INTRAVENOUS at 08:52

## 2025-02-12 NOTE — PROGRESS NOTE ADULT - NS ATTEND AMEND GEN_ALL_CORE FT
Minimal highly restricted electrographic seizures without clinical correlate occurred during the afternoon and overnight.  Did not seem related to decrease in lacosamide dosing as of yet.    Pt with a noted tripled flexion response which is new.    Plan:  1) continue Lacosamide at 100mg bid  2) continue vEEG overnight.
Pt without change in clinical status.  EEG with very focal seizures without any spread.  Also theta/alpha organization noted.    Spoke to pt's eldest son who brought on their medical liaison and explained the situation to them, particularly noting the midline shift/transfalcine herniation and potential the lack of awakening could be related to this and/or thalamic or brainstem involvement.      Plan:  1) discussed the current situation and neurosurgery's stance.  Family will consider options.  2) no change in seizure meds.
Pt off sedation for days, but not any improvement in responsiveness.  Grandson reported she was diagnosed about a year ago.  Was still communicating in short sentences and yes/no answering up to a month ago, but decline since seizures began about a month ago.      Spoke to Dr. Honeycutt who had gone over the case, including details about her prior work-up at [facility] and Dr. Hernandez, and her inability to tolerate chemotherapy.  While this is a primarily right frontal and temporal tumor, the regrowth and multifocal nature portends a poor prognosis if someone also would not tolerate chemotherapy.  He did not feel surgery would be helpful for improving quality of life or extension of life.    Impression:  1) goal for seizure-team is to control seizures but limit medications to allow for patient to make some type of communication to her family during palliative care discussions. - Decrease one of the meds that is not known to help, lacosamide to half dose 100mg bid starting tonight.

## 2025-02-12 NOTE — PROGRESS NOTE ADULT - ASSESSMENT
73 yo female w PMH GBM on hospice care (diagnosed in 2024, follows at [facility] under Dr. Hernandez, with reported attempted resection of tumor, on chemotherapy with last chemo 2 months ago), admitted for ams likely 2/2 disease progression After admission to [facility], Upon evaluation pt was obtunded with tremors to RUE, response to noxious stimuli but otherwise no response. Pupils were responsive with saccadic movement. She was slumped to her right sided with gurgling breath sounds.  At that point rapid was called and patient was inevitably intubated. Transferred to [facility]. Discussed with pts daughters post procedure poor prognosis and advised to have formal family meeting to clarify and document wishes.       NEURO  Intubated. Not sedated. Minimally responsive despite not being on sedation.     Continues to have seizures, concern for airway protection as GBM progresses, tentative plan for tracheostomy pending further conversations with family.     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 There is evidence of focal cortical hyperexcitability, more prominent in the right than the left frontotemporal regions. The background pattern shows a burst suppression ratio of 10:1, indicating severe diffuse or multifocal cerebral dysfunction, potentially exacerbated by IV sedatives. Additionally, there is severe generalized and multifocal slowing, further supporting significant underlying cerebral dysfunction. CT head with worsening midline shift.  Continued EEG findings of seizures.  Plan:   s/p valproic acid loading dose 1200 mg, vimpat 200 mg loading  - epilepsy consulted, appreciate recs      -  Keppra 1500mg BID      - Vimpat 100mg BID (monitor tele to ensure no heart block)      - C/w Depakote 500mg q6hrs      - Obtain VPA in AM      - seizure precautions      #GBM  last chemo ~2months ago, follows at [facility].  Per Neurosurgery consult on 1/31, no acute interventions. Worsening midline shift on CT.   -c/w decadron 4 IV q6h  -palliative care consult       CARDIAC  #Chronic atrial fibrillation.   #afib with RVR   Plan  -bladder scans q6h   -Lasix 40 mg IV push given today given net positive   -BMP every 6 hrs   -consider john (currently on primafit) if pt retaining   -make sure pt having BMs  -pain control with dilaudid 0.5 q4h PRN.   Home med: metoprolol 12.5 BID   - c/w Lovenox ( per NSGY, no increased risk of hemorrhage)  -start Lopressor 12.5 mg BID     PULM  #intubated  - intubated on 2/1  - GOC  - consider trach moving forward    GI  - NPO with tube feeds   - consider PEG moving forward  -Miralax and Senna standing, Lactulose 30q4     #Constipation  Patient without bowel movement for three days, started on Lactulose, s/p 5 bowel movements.   -D/c lactulose and decrease bowel regimen. Break for today, reassess tomorrow.     RENAL  - currently with normal renal function  -Add 1 dose 40 IV Lasix for fluid retention and overload     #hypernatremia  -maintain Na > 145 as above for mass effect but less than 150     Endo  -maintain on iSS, continue to monitor glucose for further insulin regimen planning as glucose is currently greater than 180 and out of range for goal between 140-180.    Diet changed to Glucerna due to elevated glucose, CTM.     ID  #SIRS  #ESBL Ecoli Bacteremia  #R arm cellulitis   #ESBL ecoli UTI   Systemic inflammatory response syndrome (SIRS).   Meeting SIRS 2/4 (HR>90, WBC>12k) likely iso of UTI.   Bcx with ESBL E. coli  -s/p ertapenem tx   -IVF as needed  -d/c vanc given unremarkable CT findings     F: none  E: replete K<4, Mg<2  N: npo with tube feeds  VTE Prophylaxis: lovenox 40mg QD  GI: pantoprazole 40 IVP  C: Full Code  D: micu    Lines: Peripheral IV L 22g (2/4) IV L 18 g (2/4), NG tube

## 2025-02-12 NOTE — PROGRESS NOTE ADULT - ASSESSMENT
74-year-old-female w/ PMH of GBM (diagnosed in 2024, follows at [facility] under Dr. Hernandez, with reported attempted resection of tumor, on chemotherapy with last chemo 2 months ago) who was admitted with altered mental status on 1/31/25. 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 VEEG was notable for focal status epilepticus in the bilateral fronto-temporal region from 2/1/25 through 2/3/25 at 6pm that improved following the initiation of vimpat and depakote regimen. On 2/4 propofol was stopped and later in the evening Depakote was held (due to c/f interaction w/ carbapenem), with return of focal seizures and secondary generalization. Given the re-emergence of seizures, depakote was restarted, vimpat increased, and keppra started with improvement of EEG recordings and the number of seizures. There were 2 short electrographic seizures on 2/10/25, 7 reported on 2/11/25, and #? on 2/12/25 but again much improved from the past week.     Recommendations:  - Continue vEEG monitoring   - Continue Keppra 1500mg BID  - Continue Vimpat 100mg Q12hrs  - Continue Depakote 500mg q6hrs (Therapeutic VPA level noted at 63.3 on 2/10/25)  - Maintain fall & seizure precautions

## 2025-02-12 NOTE — PROGRESS NOTE ADULT - SUBJECTIVE AND OBJECTIVE BOX
Patient is a 74y old  Female who presents with a chief complaint of AMS (12 Feb 2025 14:17)      INTERVAL HPI/OVERNIGHT EVENTS:   O/n made a large BM.     Subjective: Patient seen at bedside, intubated and minimally responsive despite no sedation. Patient with five bowel movements. Patient with vomiting episode this morning. Lactulose stopped, feeds held, blood cultures UA, and CXR ordered. Fever during rounds, Ertapenem started pending bcx.     ICU Vital Signs Last 24 Hrs  T(C): 37.6 (12 Feb 2025 16:00), Max: 38.3 (12 Feb 2025 09:45)  T(F): 99.7 (12 Feb 2025 16:00), Max: 101 (12 Feb 2025 13:03)  HR: 107 (12 Feb 2025 16:00) (93 - 128)  BP: 122/81 (12 Feb 2025 16:00) (97/66 - 167/96)  BP(mean): 98 (12 Feb 2025 16:00) (77 - 122)  ABP: --  ABP(mean): --  RR: 19 (12 Feb 2025 16:00) (13 - 25)  SpO2: 99% (12 Feb 2025 16:00) (95% - 99%)    O2 Parameters below as of 12 Feb 2025 16:00  Patient On (Oxygen Delivery Method): ventilator    O2 Concentration (%): 40      I&O's Summary    11 Feb 2025 07:01  -  12 Feb 2025 07:00  --------------------------------------------------------  IN: 1688.5 mL / OUT: 350 mL / NET: 1338.5 mL    12 Feb 2025 07:01  -  12 Feb 2025 17:07  --------------------------------------------------------  IN: 300 mL / OUT: 500 mL / NET: -200 mL      Mode: AC/ CMV (Assist Control/ Continuous Mandatory Ventilation)  RR (machine): 12  TV (machine): 350  FiO2: 40  PEEP: 5  ITime: 1  MAP: 9.4  PIP: 18      LABS:                        14.6   23.37 )-----------( 339      ( 12 Feb 2025 16:29 )             47.7     02-12    x   |  x   |  x   ----------------------------<  116[H]  x    |  30  |  x     Ca    8.5      12 Feb 2025 06:09  Phos  2.9     02-12  Mg     2.8     02-12    TPro  5.6[L]  /  Alb  3.1[L]  /  TBili  0.3  /  DBili  x   /  AST  80[H]  /  ALT  151[H]  /  AlkPhos  170[H]  02-12      Urinalysis Basic - ( 12 Feb 2025 16:37 )    Color: Dark Yellow / Appearance: Clear / SG: >1.030 / pH: x  Gluc: x / Ketone: Trace mg/dL  / Bili: Negative / Urobili: 0.2 mg/dL   Blood: x / Protein: 30 mg/dL / Nitrite: Negative   Leuk Esterase: Negative / RBC: 2 /HPF / WBC 4 /HPF   Sq Epi: x / Non Sq Epi: 4 /HPF / Bacteria: Many /HPF      CAPILLARY BLOOD GLUCOSE      POCT Blood Glucose.: 239 mg/dL (12 Feb 2025 11:28)  POCT Blood Glucose.: 197 mg/dL (12 Feb 2025 06:20)  POCT Blood Glucose.: 193 mg/dL (11 Feb 2025 23:17)  POCT Blood Glucose.: 160 mg/dL (11 Feb 2025 17:35)        RADIOLOGY & ADDITIONAL TESTS:    Consultant(s) Notes Reviewed:  [x ] YES  [ ] NO    MEDICATIONS  (STANDING):  artificial  tears Solution 1 Drop(s) Both EYES every 12 hours  chlorhexidine 0.12% Liquid 15 milliLiter(s) Oral Mucosa every 12 hours  chlorhexidine 2% Cloths 1 Application(s) Topical <User Schedule>  dexAMETHasone  Injectable 4 milliGRAM(s) IV Push every 6 hours  dextrose 5%. 1000 milliLiter(s) (50 mL/Hr) IV Continuous <Continuous>  dextrose 5%. 1000 milliLiter(s) (100 mL/Hr) IV Continuous <Continuous>  dextrose 50% Injectable 25 Gram(s) IV Push once  dextrose 50% Injectable 12.5 Gram(s) IV Push once  dextrose 50% Injectable 25 Gram(s) IV Push once  enoxaparin Injectable 40 milliGRAM(s) SubCutaneous every 24 hours  ertapenem  IVPB 1 milliGRAM(s) IV Intermittent once  glucagon  Injectable 1 milliGRAM(s) IntraMuscular once  influenza  Vaccine (HIGH DOSE) 0.5 milliLiter(s) IntraMuscular once  insulin glargine Injectable (LANTUS) 5 Unit(s) SubCutaneous at bedtime  insulin lispro (ADMELOG) corrective regimen sliding scale   SubCutaneous every 6 hours  lacosamide Solution 100 milliGRAM(s) Oral every 12 hours  lactulose Syrup 30 Gram(s) Oral every 12 hours  levETIRAcetam  Solution 1500 milliGRAM(s) Oral every 12 hours  metoprolol tartrate 25 milliGRAM(s) Oral every 12 hours  pantoprazole  Injectable 40 milliGRAM(s) IV Push every 24 hours  petrolatum Ophthalmic Ointment 1 Application(s) Both EYES every 12 hours  polyethylene glycol 3350 17 Gram(s) Oral two times a day  senna Syrup 10 milliLiter(s) Oral at bedtime  valproic  acid Syrup 500 milliGRAM(s) Oral every 6 hours    MEDICATIONS  (PRN):  acetaminophen   Oral Liquid .. 520 milliGRAM(s) Oral every 6 hours PRN Temp greater or equal to 38C (100.4F)  dextrose Oral Gel 15 Gram(s) Oral once PRN Blood Glucose LESS THAN 70 milliGRAM(s)/deciliter  LORazepam   Injectable 2 milliGRAM(s) IV Push every 4 hours PRN Seizures      PHYSICAL EXAM:  GENERAL: Intubated.   HEAD:  Atraumatic, Normocephalic  EYES: EOMI, PERRLA, conjunctiva and sclera clear  NECK: Supple, No JVD, Normal thyroid, no enlarged nodes  NERVOUS SYSTEM:  A&Ox0.   CHEST/LUNG: B/L good air entry; No rales, rhonchi, or wheezing  HEART: S1S2 normal, no S3, Regular rate and rhythm; No murmurs  ABDOMEN: Distended, diminished bowel sounds.   EXTREMITIES:  2+ Peripheral Pulses, No clubbing, cyanosis, or edema  LYMPH: No lymphadenopathy noted  SKIN: No rashes or lesions      Care Discussed with Consultants/Other Providers [ x] YES  [ ] NO

## 2025-02-12 NOTE — EEG REPORT - NS EEG TEXT BOX
[facility] Department of Neurology  Inpatient Epilepsy Monitoring Unit video-Electroencephalography Report    Acquisition Details:  Electroencephalography was acquired using a minimum of 21 channels on an [employer] Neu[employer] system v 9.3.1 with electrode placement according to the standard International 10-20 system following ACNS (American Clinical Neurophysiology Society) guidelines for Long-Term Video EEG monitoring.  Anterior temporal T1 and T2 electrodes were utilized whenever possible.   The [employer] automated spike & seizure detections were all reviewed in detail, in addition to extensive portions of raw EEG.  Specially-trained nurses were available for seizure-related events.  Continuous live-time video monitoring of the patients for seizure-related and safety events was performed by specially-trained technicians.      Day 3:  2/11/2025, 7 AM to next morning at 07:00 AM   Meds:Depakote 500 mg Q6, keppra 1500 mg bid  Background:  continuous, asymmetric background with predominantly theta/delta frequencies.slower over right  Generalized Slowing:  Intermittent frequent sporadic polymorphic delta  Symmetry/Focality: Continuous (90+%)   Right hemispheric  and independent left frontotemporal polymorphic delta/theta activity  Voltage:  Normal (20+ uV)  Organization:  Absent  Posterior Dominant Rhythm:  7-8 Hz rudimentary  Sleep:  Rudimentary but likely N2 transients present.  Variability:   Yes		Reactivity:  Yes    Spontaneous Activity:  Frequent ( >1/min < 1/10sec)   independent bilateral frontal predominant lateralized rhythmic delta activity ,occasional superimposed with faster activity, worse on the left  2-occasional rhythmic theta activity over right fronto-temporal    Events:    1-Right fronto-temporal focal onset  Time: 14:17,15:32,18:54,3:21 AM for about 2 min each  Clinical:No overt clinical changes  Electrographic: development of rhythmic theta wave and become spikier follow by slow wave until offset    Provocations:  •	Hyperventilation: was not performed.  •	Photic stimulation: was not performed.  Daily Summary:    1-Finding indicating 4  electrographic right fronto-temporal focal onset seizures  2-Frequent ( >1/min < 1/10sec)   bilateral frontal predominant lateralized rhythmic delta activity with occasional superimposed with faster activity, worse on the left indicating high epileptic potential in this regions  3-Moderate generalized slowing indicating similar degree of diffuse/multifocal cerebral dysfunction      Katelynn Lundy MD  CNP Fellow

## 2025-02-12 NOTE — PROGRESS NOTE ADULT - SUBJECTIVE AND OBJECTIVE BOX
EPILEPSY PROGRESS NOTE:  Patient seen and examined at bedside, as per son there were no clinical events seen. Patient noted to be febrile 100.6F, tachycardic up to 130's and hypertensive. Remain unresponsive and off sedation for approximately a week.     REVIEW OF SYSTEMS:  Unobtainable 2/2 altered level of consciousness     MEDICATIONS:  artificial  tears Solution 1 Drop(s) Both EYES every 12 hours  chlorhexidine 0.12% Liquid 15 milliLiter(s) Oral Mucosa every 12 hours  chlorhexidine 2% Cloths 1 Application(s) Topical <User Schedule>  dexAMETHasone  Injectable 4 milliGRAM(s) IV Push every 6 hours  dextrose 5%. 1000 milliLiter(s) (50 mL/Hr) IV Continuous <Continuous>  dextrose 5%. 1000 milliLiter(s) (100 mL/Hr) IV Continuous <Continuous>  dextrose 50% Injectable 25 Gram(s) IV Push once  dextrose 50% Injectable 12.5 Gram(s) IV Push once  dextrose 50% Injectable 25 Gram(s) IV Push once  enoxaparin Injectable 40 milliGRAM(s) SubCutaneous every 24 hours  glucagon  Injectable 1 milliGRAM(s) IntraMuscular once  influenza  Vaccine (HIGH DOSE) 0.5 milliLiter(s) IntraMuscular once  insulin glargine Injectable (LANTUS) 5 Unit(s) SubCutaneous at bedtime  insulin lispro (ADMELOG) corrective regimen sliding scale   SubCutaneous every 6 hours  lacosamide Solution 100 milliGRAM(s) Oral every 12 hours  lactulose Syrup 30 Gram(s) Oral every 12 hours  levETIRAcetam  Solution 1500 milliGRAM(s) Oral every 12 hours  metoprolol tartrate 25 milliGRAM(s) Oral every 12 hours  pantoprazole  Injectable 40 milliGRAM(s) IV Push every 24 hours  petrolatum Ophthalmic Ointment 1 Application(s) Both EYES every 12 hours  polyethylene glycol 3350 17 Gram(s) Oral two times a day  senna Syrup 10 milliLiter(s) Oral at bedtime  valproic  acid Syrup 500 milliGRAM(s) Oral every 6 hours    MEDICATIONS  (PRN):  acetaminophen   Oral Liquid .. 520 milliGRAM(s) Oral every 6 hours PRN Temp greater or equal to 38C (100.4F)  dextrose Oral Gel 15 Gram(s) Oral once PRN Blood Glucose LESS THAN 70 milliGRAM(s)/deciliter  LORazepam   Injectable 2 milliGRAM(s) IV Push every 4 hours PRN Seizures    VITAL SIGNS:  T(C): 38 (02-12-25 @ 06:00), Max: 38 (02-12-25 @ 06:00)  HR: 128 (02-12-25 @ 08:00) (73 - 128)  BP: 159/96 (02-12-25 @ 08:00) (99/56 - 167/96)  RR: 19 (02-12-25 @ 08:00) (15 - 30)  SpO2: 98% (02-12-25 @ 08:00) (95% - 98%)  Wt(kg): --    PHYSICAL EXAM:  Eyes closed, but Intermittently noted half-way eye opening with right gaze preference. Does not follow commands or track bedside activity with half-way opened eyes. +corneal, gag and cough reflexes. +blink to threat bilaterally. Noted tremors with intermittent deep noxious stimuli of the right middle toe (not constant) on previous days but today B/L LE with intermittent triple flexion. No motor or sensory response to noxious stimuli of the B/L UEs. 1+ reflexes noted in all limbs and toes mute.     LABS:  CBC Full  -  ( 12 Feb 2025 06:09 )  WBC Count : 24.88 K/uL  RBC Count : 5.62 M/uL  Hemoglobin : 15.2 g/dL  Hematocrit : 49.7 %  Platelet Count - Automated : 373 K/uL  Mean Cell Volume : 88.4 fl  Mean Cell Hemoglobin : 27.0 pg  Mean Cell Hemoglobin Concentration : 30.6 g/dL  Auto Neutrophil # : x  Auto Lymphocyte # : x  Auto Monocyte # : x  Auto Eosinophil # : x  Auto Basophil # : x  Auto Neutrophil % : x  Auto Lymphocyte % : x  Auto Monocyte % : x  Auto Eosinophil % : x  Auto Basophil % : x    02-12    145  |  102  |  33[H]  ----------------------------<  248[H]  3.6   |  28  |  0.37[L]    Ca    8.5      12 Feb 2025 06:09  Phos  3.5     02-12  Mg     2.8     02-12    TPro  5.6[L]  /  Alb  3.1[L]  /  TBili  0.3  /  DBili  x   /  AST  80[H]  /  ALT  151[H]  /  AlkPhos  170[H]  02-12    LIVER FUNCTIONS - ( 12 Feb 2025 06:09 )  Alb: 3.1 g/dL / Pro: 5.6 g/dL / ALK PHOS: 170 U/L / ALT: 151 U/L / AST: 80 U/L / GGT: x             EEG Daily Summary (2/11/25):    1)	Finding indicating 5  electrographic right fronto-temporal focal onset seizures at 14:36,15:29,20:36,21:49,00:49 AM each lasting 2 minutes  2)	Frequent ( >1/min < 1/10sec)   bilateral frontal predominant lateralized rhythmic delta activity with occasional superimposed with faster activity, worse on the left indicating high epileptic potential in this regions  3)	Moderate generalized slowing indicating similar degree of diffuse/multifocal cerebral dysfunction    Katelynn Lundy MD  CNP Fellow      Electronic Signatures:  Ralph Aquino)  (Signature Pending)  	Co-Signer: EEG REPORT  Katelynn Lundy)  (Signed 11-Feb-2025 14:23)  	Authored: EEG REPORT

## 2025-02-13 LAB
ALBUMIN SERPL ELPH-MCNC: 2.8 G/DL — LOW (ref 3.3–5)
ALP SERPL-CCNC: 156 U/L — HIGH (ref 40–120)
ALT FLD-CCNC: SIGNIFICANT CHANGE UP (ref 10–45)
ANION GAP SERPL CALC-SCNC: 10 MMOL/L — SIGNIFICANT CHANGE UP (ref 5–17)
ANION GAP SERPL CALC-SCNC: 9 MMOL/L — SIGNIFICANT CHANGE UP (ref 5–17)
AST SERPL-CCNC: SIGNIFICANT CHANGE UP (ref 10–40)
BASOPHILS # BLD AUTO: 0.08 K/UL — SIGNIFICANT CHANGE UP (ref 0–0.2)
BASOPHILS NFR BLD AUTO: 0.4 % — SIGNIFICANT CHANGE UP (ref 0–2)
BILIRUB SERPL-MCNC: 0.4 MG/DL — SIGNIFICANT CHANGE UP (ref 0.2–1.2)
BUN SERPL-MCNC: 22 MG/DL — SIGNIFICANT CHANGE UP (ref 7–23)
BUN SERPL-MCNC: 22 MG/DL — SIGNIFICANT CHANGE UP (ref 7–23)
CALCIUM SERPL-MCNC: 8.3 MG/DL — LOW (ref 8.4–10.5)
CALCIUM SERPL-MCNC: 8.6 MG/DL — SIGNIFICANT CHANGE UP (ref 8.4–10.5)
CHLORIDE SERPL-SCNC: 104 MMOL/L — SIGNIFICANT CHANGE UP (ref 96–108)
CHLORIDE SERPL-SCNC: 104 MMOL/L — SIGNIFICANT CHANGE UP (ref 96–108)
CO2 SERPL-SCNC: 30 MMOL/L — SIGNIFICANT CHANGE UP (ref 22–31)
CO2 SERPL-SCNC: 33 MMOL/L — HIGH (ref 22–31)
CREAT SERPL-MCNC: 0.3 MG/DL — LOW (ref 0.5–1.3)
CREAT SERPL-MCNC: 0.34 MG/DL — LOW (ref 0.5–1.3)
EGFR: 108 ML/MIN/1.73M2 — SIGNIFICANT CHANGE UP
EGFR: 111 ML/MIN/1.73M2 — SIGNIFICANT CHANGE UP
EOSINOPHIL # BLD AUTO: 0 K/UL — SIGNIFICANT CHANGE UP (ref 0–0.5)
EOSINOPHIL NFR BLD AUTO: 0 % — SIGNIFICANT CHANGE UP (ref 0–6)
GLUCOSE SERPL-MCNC: 126 MG/DL — HIGH (ref 70–99)
GLUCOSE SERPL-MCNC: 174 MG/DL — HIGH (ref 70–99)
HCT VFR BLD CALC: 44.3 % — SIGNIFICANT CHANGE UP (ref 34.5–45)
HGB BLD-MCNC: 14.1 G/DL — SIGNIFICANT CHANGE UP (ref 11.5–15.5)
IMM GRANULOCYTES NFR BLD AUTO: 4.1 % — HIGH (ref 0–0.9)
LYMPHOCYTES # BLD AUTO: 0.81 K/UL — LOW (ref 1–3.3)
LYMPHOCYTES # BLD AUTO: 3.7 % — LOW (ref 13–44)
MAGNESIUM SERPL-MCNC: 2.6 MG/DL — SIGNIFICANT CHANGE UP (ref 1.6–2.6)
MCHC RBC-ENTMCNC: 28.7 PG — SIGNIFICANT CHANGE UP (ref 27–34)
MCHC RBC-ENTMCNC: 31.8 G/DL — LOW (ref 32–36)
MCV RBC AUTO: 90 FL — SIGNIFICANT CHANGE UP (ref 80–100)
MONOCYTES # BLD AUTO: 0.56 K/UL — SIGNIFICANT CHANGE UP (ref 0–0.9)
MONOCYTES NFR BLD AUTO: 2.6 % — SIGNIFICANT CHANGE UP (ref 2–14)
NEUTROPHILS # BLD AUTO: 19.45 K/UL — HIGH (ref 1.8–7.4)
NEUTROPHILS NFR BLD AUTO: 89.2 % — HIGH (ref 43–77)
NRBC BLD AUTO-RTO: 0 /100 WBCS — SIGNIFICANT CHANGE UP (ref 0–0)
PHOSPHATE SERPL-MCNC: 3.3 MG/DL — SIGNIFICANT CHANGE UP (ref 2.5–4.5)
PLATELET # BLD AUTO: 290 K/UL — SIGNIFICANT CHANGE UP (ref 150–400)
POTASSIUM SERPL-MCNC: 4.9 MMOL/L — SIGNIFICANT CHANGE UP (ref 3.5–5.3)
POTASSIUM SERPL-MCNC: SIGNIFICANT CHANGE UP (ref 3.5–5.3)
POTASSIUM SERPL-SCNC: 4.9 MMOL/L — SIGNIFICANT CHANGE UP (ref 3.5–5.3)
POTASSIUM SERPL-SCNC: SIGNIFICANT CHANGE UP (ref 3.5–5.3)
PROT SERPL-MCNC: 5.4 G/DL — LOW (ref 6–8.3)
RBC # BLD: 4.92 M/UL — SIGNIFICANT CHANGE UP (ref 3.8–5.2)
RBC # FLD: 17.5 % — HIGH (ref 10.3–14.5)
SODIUM SERPL-SCNC: 144 MMOL/L — SIGNIFICANT CHANGE UP (ref 135–145)
SODIUM SERPL-SCNC: 146 MMOL/L — HIGH (ref 135–145)
VALPROATE SERPL-MCNC: 55.9 UG/ML — SIGNIFICANT CHANGE UP (ref 50–100)
WBC # BLD: 21.8 K/UL — HIGH (ref 3.8–10.5)
WBC # FLD AUTO: 21.8 K/UL — HIGH (ref 3.8–10.5)

## 2025-02-13 PROCEDURE — 99291 CRITICAL CARE FIRST HOUR: CPT

## 2025-02-13 PROCEDURE — 95718 EEG PHYS/QHP 2-12 HR W/VEEG: CPT

## 2025-02-13 RX ORDER — VANCOMYCIN HCL IN 5 % DEXTROSE 1.5G/250ML
750 PLASTIC BAG, INJECTION (ML) INTRAVENOUS EVERY 12 HOURS
Refills: 0 | Status: DISCONTINUED | OUTPATIENT
Start: 2025-02-13 | End: 2025-02-16

## 2025-02-13 RX ORDER — ERTAPENEM SODIUM 1 G/1
1000 INJECTION, POWDER, LYOPHILIZED, FOR SOLUTION INTRAMUSCULAR; INTRAVENOUS EVERY 24 HOURS
Refills: 0 | Status: DISCONTINUED | OUTPATIENT
Start: 2025-02-13 | End: 2025-02-13

## 2025-02-13 RX ADMIN — Medication 1 APPLICATION(S): at 06:35

## 2025-02-13 RX ADMIN — METOPROLOL SUCCINATE 25 MILLIGRAM(S): 50 TABLET, EXTENDED RELEASE ORAL at 10:01

## 2025-02-13 RX ADMIN — Medication 1 APPLICATION(S): at 22:07

## 2025-02-13 RX ADMIN — Medication 1 DROP(S): at 22:20

## 2025-02-13 RX ADMIN — INSULIN GLARGINE-YFGN 5 UNIT(S): 100 INJECTION, SOLUTION SUBCUTANEOUS at 23:23

## 2025-02-13 RX ADMIN — LEVETIRACETAM 1500 MILLIGRAM(S): 10 INJECTION, SOLUTION INTRAVENOUS at 22:07

## 2025-02-13 RX ADMIN — LACOSAMIDE 100 MILLIGRAM(S): 150 TABLET, FILM COATED ORAL at 22:07

## 2025-02-13 RX ADMIN — INSULIN LISPRO 2: 100 INJECTION, SOLUTION INTRAVENOUS; SUBCUTANEOUS at 11:04

## 2025-02-13 RX ADMIN — Medication 500 MILLIGRAM(S): at 22:08

## 2025-02-13 RX ADMIN — Medication 520 MILLIGRAM(S): at 18:16

## 2025-02-13 RX ADMIN — LACOSAMIDE 100 MILLIGRAM(S): 150 TABLET, FILM COATED ORAL at 08:36

## 2025-02-13 RX ADMIN — Medication 520 MILLIGRAM(S): at 12:30

## 2025-02-13 RX ADMIN — INSULIN LISPRO 2: 100 INJECTION, SOLUTION INTRAVENOUS; SUBCUTANEOUS at 00:02

## 2025-02-13 RX ADMIN — Medication 1 DROP(S): at 09:17

## 2025-02-13 RX ADMIN — Medication 400 MILLIGRAM(S): at 00:00

## 2025-02-13 RX ADMIN — Medication 15 MILLILITER(S): at 09:14

## 2025-02-13 RX ADMIN — DEXAMETHASONE 4 MILLIGRAM(S): 0.5 TABLET ORAL at 17:01

## 2025-02-13 RX ADMIN — Medication 1 APPLICATION(S): at 09:23

## 2025-02-13 RX ADMIN — DEXAMETHASONE 4 MILLIGRAM(S): 0.5 TABLET ORAL at 06:32

## 2025-02-13 RX ADMIN — Medication 500 MILLIGRAM(S): at 08:36

## 2025-02-13 RX ADMIN — LEVETIRACETAM 1500 MILLIGRAM(S): 10 INJECTION, SOLUTION INTRAVENOUS at 08:37

## 2025-02-13 RX ADMIN — Medication 40 MILLIGRAM(S): at 09:17

## 2025-02-13 RX ADMIN — METOPROLOL SUCCINATE 25 MILLIGRAM(S): 50 TABLET, EXTENDED RELEASE ORAL at 22:08

## 2025-02-13 RX ADMIN — POLYETHYLENE GLYCOL 3350 17 GRAM(S): 17 POWDER, FOR SOLUTION ORAL at 17:01

## 2025-02-13 RX ADMIN — INSULIN LISPRO 4: 100 INJECTION, SOLUTION INTRAVENOUS; SUBCUTANEOUS at 17:01

## 2025-02-13 RX ADMIN — INSULIN LISPRO 8: 100 INJECTION, SOLUTION INTRAVENOUS; SUBCUTANEOUS at 23:23

## 2025-02-13 RX ADMIN — DEXAMETHASONE 4 MILLIGRAM(S): 0.5 TABLET ORAL at 00:01

## 2025-02-13 RX ADMIN — POLYETHYLENE GLYCOL 3350 17 GRAM(S): 17 POWDER, FOR SOLUTION ORAL at 06:32

## 2025-02-13 RX ADMIN — Medication 10 MILLILITER(S): at 22:07

## 2025-02-13 RX ADMIN — DEXAMETHASONE 4 MILLIGRAM(S): 0.5 TABLET ORAL at 11:03

## 2025-02-13 RX ADMIN — ENOXAPARIN SODIUM 40 MILLIGRAM(S): 100 INJECTION SUBCUTANEOUS at 17:01

## 2025-02-13 RX ADMIN — Medication 15 MILLILITER(S): at 22:08

## 2025-02-13 RX ADMIN — Medication 500 MILLIGRAM(S): at 03:50

## 2025-02-13 RX ADMIN — Medication 520 MILLIGRAM(S): at 11:11

## 2025-02-13 RX ADMIN — Medication 500 MILLIGRAM(S): at 14:27

## 2025-02-13 RX ADMIN — Medication 250 MILLIGRAM(S): at 13:44

## 2025-02-13 RX ADMIN — DEXAMETHASONE 4 MILLIGRAM(S): 0.5 TABLET ORAL at 23:23

## 2025-02-13 NOTE — CHART NOTE - NSCHARTNOTEFT_GEN_A_CORE
Infectious Diseases Anti-infective Approval Note    Medication:  Ertapenem  Dose:  1 gram  Route:  IV   Frequency:  daily  Duration**:  7 days   Purpose:  (check one)         Empiric: pending cultures  X       Empiric:  no culture data       Final duration:      Dose may be adjusted as needed for alterations in renal function.    *THIS IS NOT AN INFECTIOUS DISEASES CONSULTATION*    **Indicates duration of approval, not necessarily duration of treatment.

## 2025-02-13 NOTE — PROGRESS NOTE ADULT - SUBJECTIVE AND OBJECTIVE BOX
Patient is a 74y old  Female who presents with a chief complaint of AMS (13 Feb 2025 14:28)      INTERVAL HPI/OVERNIGHT EVENTS:   No overnight events   Afebrile, hemodynamically stable     Subjective: Patient seen at bedside, appears to be doing the same as yesterday. Day 13 of intubation. Family still not sure if they will undergo tracheostomy.     ICU Vital Signs Last 24 Hrs  T(C): 38 (13 Feb 2025 18:00), Max: 38 (13 Feb 2025 11:00)  T(F): 100.4 (13 Feb 2025 18:00), Max: 100.4 (13 Feb 2025 11:00)  HR: 111 (13 Feb 2025 19:00) (88 - 111)  BP: 93/62 (13 Feb 2025 19:00) (92/65 - 135/66)  BP(mean): 73 (13 Feb 2025 19:00) (69 - 95)  ABP: --  ABP(mean): --  RR: 15 (13 Feb 2025 19:00) (12 - 23)  SpO2: 98% (13 Feb 2025 19:00) (96% - 100%)    O2 Parameters below as of 13 Feb 2025 19:00  Patient On (Oxygen Delivery Method): ventilator    O2 Concentration (%): 40      I&O's Summary    12 Feb 2025 07:01  -  13 Feb 2025 07:00  --------------------------------------------------------  IN: 600 mL / OUT: 1000 mL / NET: -400 mL    13 Feb 2025 07:01  -  13 Feb 2025 19:02  --------------------------------------------------------  IN: 1016 mL / OUT: 850 mL / NET: 166 mL      Mode: AC/ CMV (Assist Control/ Continuous Mandatory Ventilation)  RR (machine): 12  TV (machine): 350  FiO2: 40  PEEP: 5  ITime: 1  MAP: 7  PIP: 14      LABS:                        14.1   21.80 )-----------( 290      ( 13 Feb 2025 05:30 )             44.3     02-13    144  |  104  |  22  ----------------------------<  174[H]  4.9   |  30  |  0.30[L]    Ca    8.3[L]      13 Feb 2025 10:26  Phos  3.3     02-13  Mg     2.6     02-13    TPro  5.4[L]  /  Alb  2.8[L]  /  TBili  0.4  /  DBili  x   /  AST  See Note  /  ALT  See Note  /  AlkPhos  156[H]  02-13      Urinalysis Basic - ( 13 Feb 2025 10:26 )    Color: x / Appearance: x / SG: x / pH: x  Gluc: 174 mg/dL / Ketone: x  / Bili: x / Urobili: x   Blood: x / Protein: x / Nitrite: x   Leuk Esterase: x / RBC: x / WBC x   Sq Epi: x / Non Sq Epi: x / Bacteria: x      CAPILLARY BLOOD GLUCOSE      POCT Blood Glucose.: 237 mg/dL (13 Feb 2025 16:56)  POCT Blood Glucose.: 165 mg/dL (13 Feb 2025 11:00)  POCT Blood Glucose.: 116 mg/dL (13 Feb 2025 05:26)  POCT Blood Glucose.: 157 mg/dL (12 Feb 2025 23:57)        RADIOLOGY & ADDITIONAL TESTS:    Consultant(s) Notes Reviewed:  [x ] YES  [ ] NO    MEDICATIONS  (STANDING):  artificial  tears Solution 1 Drop(s) Both EYES every 12 hours  chlorhexidine 0.12% Liquid 15 milliLiter(s) Oral Mucosa every 12 hours  chlorhexidine 2% Cloths 1 Application(s) Topical <User Schedule>  dexAMETHasone  Injectable 4 milliGRAM(s) IV Push every 6 hours  dextrose 5%. 1000 milliLiter(s) (50 mL/Hr) IV Continuous <Continuous>  dextrose 5%. 1000 milliLiter(s) (100 mL/Hr) IV Continuous <Continuous>  dextrose 50% Injectable 25 Gram(s) IV Push once  dextrose 50% Injectable 12.5 Gram(s) IV Push once  dextrose 50% Injectable 25 Gram(s) IV Push once  enoxaparin Injectable 40 milliGRAM(s) SubCutaneous every 24 hours  glucagon  Injectable 1 milliGRAM(s) IntraMuscular once  influenza  Vaccine (HIGH DOSE) 0.5 milliLiter(s) IntraMuscular once  insulin glargine Injectable (LANTUS) 5 Unit(s) SubCutaneous at bedtime  insulin lispro (ADMELOG) corrective regimen sliding scale   SubCutaneous every 6 hours  lacosamide Solution 100 milliGRAM(s) Oral every 12 hours  levETIRAcetam  Solution 1500 milliGRAM(s) Oral every 12 hours  levoFLOXacin IVPB 750 milliGRAM(s) IV Intermittent every 24 hours  metoprolol tartrate 25 milliGRAM(s) Oral every 12 hours  pantoprazole  Injectable 40 milliGRAM(s) IV Push every 24 hours  petrolatum Ophthalmic Ointment 1 Application(s) Both EYES every 12 hours  polyethylene glycol 3350 17 Gram(s) Oral two times a day  senna Syrup 10 milliLiter(s) Oral at bedtime  valproic  acid Syrup 500 milliGRAM(s) Oral every 6 hours  vancomycin  IVPB 750 milliGRAM(s) IV Intermittent every 12 hours    MEDICATIONS  (PRN):  acetaminophen   Oral Liquid .. 520 milliGRAM(s) Oral every 6 hours PRN Temp greater or equal to 38C (100.4F)  dextrose Oral Gel 15 Gram(s) Oral once PRN Blood Glucose LESS THAN 70 milliGRAM(s)/deciliter      PHYSICAL EXAM:  GENERAL: Intubated.   HEAD:  Atraumatic, Normocephalic  EYES: PERRLA, conjunctiva and sclera clear, saccadic eye movements   NECK: Supple, No JVD, Normal thyroid, no enlarged nodes  NERVOUS SYSTEM:  A&Ox0.   CHEST/LUNG: B/L good air entry; No rales, rhonchi, or wheezing  HEART: S1S2 normal, no S3, Regular rate and rhythm; No murmurs  ABDOMEN: Distended, soft, non tender, bowel sounds present   EXTREMITIES:  2+ Peripheral Pulses, No clubbing, cyanosis, or edema  LYMPH: No lymphadenopathy noted  SKIN: No rashes or lesions  Care Discussed with Consultants/Other Providers [ x] YES  [ ] NO

## 2025-02-13 NOTE — PROGRESS NOTE ADULT - ASSESSMENT
73 yo female w PMH GBM on hospice care (diagnosed in 2024, follows at [facility] under Dr. Hernandez, with reported attempted resection of tumor, on chemotherapy with last chemo 2 months ago), admitted for ams likely 2/2 disease progression After admission to [facility], Upon evaluation pt was obtunded with tremors to RUE, response to noxious stimuli but otherwise no response. Pupils were responsive with saccadic movement. She was slumped to her right sided with gurgling breath sounds.  At that point rapid was called and patient was inevitably intubated. Transferred to [facility]. Discussed with pts daughters post procedure poor prognosis and advised to have formal family meeting to clarify and document wishes.       NEURO  Intubated. Not sedated. Minimally responsive despite not being on sedation.     Continues to have seizures, concern for airway protection as GBM progresses, tentative plan for tracheostomy pending further conversations with family.     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 There is evidence of focal cortical hyperexcitability, more prominent in the right than the left frontotemporal regions. The background pattern shows a burst suppression ratio of 10:1, indicating severe diffuse or multifocal cerebral dysfunction, potentially exacerbated by IV sedatives. Additionally, there is severe generalized and multifocal slowing, further supporting significant underlying cerebral dysfunction. CT head with worsening midline shift.  Continued EEG findings of seizures.  Plan:   s/p valproic acid loading dose 1200 mg, vimpat 200 mg loading  - epilepsy consulted, appreciate recs      -  Keppra 1500mg BID      - Vimpat 100mg BID (monitor tele to ensure no heart block)      - C/w Depakote 500mg q6hrs      - Obtain VPA in AM      - seizure precautions      #GBM  last chemo ~2months ago, follows at [facility].  Per Neurosurgery consult on 1/31, no acute interventions. Worsening midline shift on CT.   -c/w decadron 4 IV q6h  -palliative care consult       CARDIAC  #Chronic atrial fibrillation.   #afib with RVR   Plan  -bladder scans q6h   -Lasix 40 mg IV push given today given net positive   -BMP every 6 hrs   -consider john (currently on primafit) if pt retaining   -make sure pt having BMs  -pain control with dilaudid 0.5 q4h PRN.   Home med: metoprolol 12.5 BID   - c/w Lovenox ( per NSGY, no increased risk of hemorrhage)  -start Lopressor 12.5 mg BID     PULM  #intubated  - intubated on 2/1  - GOC  - consider trach moving forward    GI  - NPO with tube feeds   - consider PEG moving forward  -Miralax and Senna standing, Lactulose 30q4     #Constipation  Patient without bowel movement for three days, started on Lactulose, s/p 5 bowel movements.   -D/c lactulose and decrease bowel regimen. Break for today, reassess tomorrow.     RENAL  - currently with normal renal function  -Add 1 dose 40 IV Lasix for fluid retention and overload     #hypernatremia  -maintain Na > 145 as above for mass effect but less than 150     Endo  -maintain on iSS, continue to monitor glucose for further insulin regimen planning as glucose is currently greater than 180 and out of range for goal between 140-180.    Diet changed to Glucerna due to elevated glucose, CTM.     ID  #SIRS  #ESBL Ecoli Bacteremia  #R arm cellulitis   #ESBL ecoli UTI   Systemic inflammatory response syndrome (SIRS).   Meeting SIRS 2/4 (HR>90, WBC>12k) likely iso of UTI.   Bcx with ESBL E. coli, s/p ertapenem treatment. Patient continuing to be tachycardic, tenuous course between fevering and hypothermia.   -Start Levofloxacin (started 2/13)   -Start Vancomycin (2/13)  -IVF as needed      F: none  E: replete K<4, Mg<2  N: npo with tube feeds  VTE Prophylaxis: lovenox 40mg QD  GI: pantoprazole 40 IVP  C: Full Code  D: micu    Lines: Peripheral IV L 22g (2/4) IV L 18 g (2/4), NG tube

## 2025-02-14 LAB
ALBUMIN SERPL ELPH-MCNC: 2.8 G/DL — LOW (ref 3.3–5)
ALP SERPL-CCNC: 141 U/L — HIGH (ref 40–120)
ALT FLD-CCNC: 79 U/L — HIGH (ref 10–45)
ANION GAP SERPL CALC-SCNC: 12 MMOL/L — SIGNIFICANT CHANGE UP (ref 5–17)
ANION GAP SERPL CALC-SCNC: 13 MMOL/L — SIGNIFICANT CHANGE UP (ref 5–17)
APPEARANCE UR: CLEAR — SIGNIFICANT CHANGE UP
AST SERPL-CCNC: SIGNIFICANT CHANGE UP (ref 10–40)
BASOPHILS # BLD AUTO: 0 K/UL — SIGNIFICANT CHANGE UP (ref 0–0.2)
BASOPHILS NFR BLD AUTO: 0 % — SIGNIFICANT CHANGE UP (ref 0–2)
BILIRUB SERPL-MCNC: 0.2 MG/DL — SIGNIFICANT CHANGE UP (ref 0.2–1.2)
BILIRUB UR-MCNC: NEGATIVE — SIGNIFICANT CHANGE UP
BUN SERPL-MCNC: 19 MG/DL — SIGNIFICANT CHANGE UP (ref 7–23)
BUN SERPL-MCNC: 24 MG/DL — HIGH (ref 7–23)
CALCIUM SERPL-MCNC: 8.5 MG/DL — SIGNIFICANT CHANGE UP (ref 8.4–10.5)
CALCIUM SERPL-MCNC: 8.6 MG/DL — SIGNIFICANT CHANGE UP (ref 8.4–10.5)
CHLORIDE SERPL-SCNC: 117 MMOL/L — HIGH (ref 96–108)
CHLORIDE SERPL-SCNC: 127 MMOL/L — HIGH (ref 96–108)
CO2 SERPL-SCNC: 29 MMOL/L — SIGNIFICANT CHANGE UP (ref 22–31)
CO2 SERPL-SCNC: 32 MMOL/L — HIGH (ref 22–31)
COLOR SPEC: YELLOW — SIGNIFICANT CHANGE UP
CREAT SERPL-MCNC: 0.29 MG/DL — LOW (ref 0.5–1.3)
CREAT SERPL-MCNC: 0.36 MG/DL — LOW (ref 0.5–1.3)
DIFF PNL FLD: NEGATIVE — SIGNIFICANT CHANGE UP
EGFR: 106 ML/MIN/1.73M2 — SIGNIFICANT CHANGE UP
EGFR: 112 ML/MIN/1.73M2 — SIGNIFICANT CHANGE UP
EOSINOPHIL # BLD AUTO: 0 K/UL — SIGNIFICANT CHANGE UP (ref 0–0.5)
EOSINOPHIL NFR BLD AUTO: 0 % — SIGNIFICANT CHANGE UP (ref 0–6)
GLUCOSE SERPL-MCNC: 319 MG/DL — HIGH (ref 70–99)
GLUCOSE SERPL-MCNC: 391 MG/DL — HIGH (ref 70–99)
GLUCOSE UR QL: 500 MG/DL
HCT VFR BLD CALC: 40 % — SIGNIFICANT CHANGE UP (ref 34.5–45)
HGB BLD-MCNC: 12.2 G/DL — SIGNIFICANT CHANGE UP (ref 11.5–15.5)
KETONES UR-MCNC: NEGATIVE MG/DL — SIGNIFICANT CHANGE UP
LEUKOCYTE ESTERASE UR-ACNC: NEGATIVE — SIGNIFICANT CHANGE UP
LYMPHOCYTES # BLD AUTO: 0.56 K/UL — LOW (ref 1–3.3)
LYMPHOCYTES # BLD AUTO: 2.6 % — LOW (ref 13–44)
MAGNESIUM SERPL-MCNC: 3 MG/DL — HIGH (ref 1.6–2.6)
MCHC RBC-ENTMCNC: 28.2 PG — SIGNIFICANT CHANGE UP (ref 27–34)
MCHC RBC-ENTMCNC: 30.5 G/DL — LOW (ref 32–36)
MCV RBC AUTO: 92.6 FL — SIGNIFICANT CHANGE UP (ref 80–100)
MONOCYTES # BLD AUTO: 0.19 K/UL — SIGNIFICANT CHANGE UP (ref 0–0.9)
MONOCYTES NFR BLD AUTO: 0.9 % — LOW (ref 2–14)
NEUTROPHILS # BLD AUTO: 20.63 K/UL — HIGH (ref 1.8–7.4)
NEUTROPHILS NFR BLD AUTO: 95.6 % — HIGH (ref 43–77)
NITRITE UR-MCNC: NEGATIVE — SIGNIFICANT CHANGE UP
NRBC BLD AUTO-RTO: SIGNIFICANT CHANGE UP /100 WBCS (ref 0–0)
PH UR: 6.5 — SIGNIFICANT CHANGE UP (ref 5–8)
PHOSPHATE SERPL-MCNC: 2.4 MG/DL — LOW (ref 2.5–4.5)
PLATELET # BLD AUTO: 217 K/UL — SIGNIFICANT CHANGE UP (ref 150–400)
POTASSIUM SERPL-MCNC: 4.6 MMOL/L — SIGNIFICANT CHANGE UP (ref 3.5–5.3)
POTASSIUM SERPL-MCNC: SIGNIFICANT CHANGE UP MMOL/L (ref 3.5–5.3)
POTASSIUM SERPL-SCNC: 4.6 MMOL/L — SIGNIFICANT CHANGE UP (ref 3.5–5.3)
POTASSIUM SERPL-SCNC: SIGNIFICANT CHANGE UP MMOL/L (ref 3.5–5.3)
PROT SERPL-MCNC: 5.1 G/DL — LOW (ref 6–8.3)
PROT UR-MCNC: NEGATIVE MG/DL — SIGNIFICANT CHANGE UP
RBC # BLD: 4.32 M/UL — SIGNIFICANT CHANGE UP (ref 3.8–5.2)
RBC # FLD: 17.5 % — HIGH (ref 10.3–14.5)
SODIUM SERPL-SCNC: 162 MMOL/L — CRITICAL HIGH (ref 135–145)
SODIUM SERPL-SCNC: 168 MMOL/L — CRITICAL HIGH (ref 135–145)
SP GR SPEC: 1.01 — SIGNIFICANT CHANGE UP (ref 1–1.03)
UROBILINOGEN FLD QL: 0.2 MG/DL — SIGNIFICANT CHANGE UP (ref 0.2–1)
VALPROATE FREE SERPL-MCNC: 43.9 UG/ML — HIGH (ref 6–22)
VALPROATE SERPL-MCNC: 36.9 UG/ML — LOW (ref 50–100)
WBC # BLD: 21.58 K/UL — HIGH (ref 3.8–10.5)
WBC # FLD AUTO: 21.58 K/UL — HIGH (ref 3.8–10.5)

## 2025-02-14 PROCEDURE — 99291 CRITICAL CARE FIRST HOUR: CPT

## 2025-02-14 PROCEDURE — 70450 CT HEAD/BRAIN W/O DYE: CPT | Mod: 26

## 2025-02-14 RX ORDER — INSULIN GLARGINE-YFGN 100 [IU]/ML
8 INJECTION, SOLUTION SUBCUTANEOUS AT BEDTIME
Refills: 0 | Status: DISCONTINUED | OUTPATIENT
Start: 2025-02-14 | End: 2025-02-16

## 2025-02-14 RX ORDER — NOREPINEPHRINE BITARTRATE 8 MG
0.05 SOLUTION INTRAVENOUS
Qty: 8 | Refills: 0 | Status: DISCONTINUED | OUTPATIENT
Start: 2025-02-14 | End: 2025-02-18

## 2025-02-14 RX ORDER — SODIUM CHLORIDE 9 G/1000ML
500 INJECTION, SOLUTION INTRAVENOUS
Refills: 0 | Status: DISCONTINUED | OUTPATIENT
Start: 2025-02-14 | End: 2025-02-14

## 2025-02-14 RX ORDER — ACETAMINOPHEN 500 MG/5ML
1000 LIQUID (ML) ORAL ONCE
Refills: 0 | Status: COMPLETED | OUTPATIENT
Start: 2025-02-14 | End: 2025-02-13

## 2025-02-14 RX ORDER — SODIUM CHLORIDE 9 G/1000ML
500 INJECTION, SOLUTION INTRAVENOUS
Refills: 0 | Status: COMPLETED | OUTPATIENT
Start: 2025-02-14 | End: 2025-02-14

## 2025-02-14 RX ADMIN — POLYETHYLENE GLYCOL 3350 17 GRAM(S): 17 POWDER, FOR SOLUTION ORAL at 06:22

## 2025-02-14 RX ADMIN — Medication 250 MILLIGRAM(S): at 14:57

## 2025-02-14 RX ADMIN — Medication 1 APPLICATION(S): at 06:23

## 2025-02-14 RX ADMIN — LACOSAMIDE 100 MILLIGRAM(S): 150 TABLET, FILM COATED ORAL at 09:54

## 2025-02-14 RX ADMIN — LACOSAMIDE 100 MILLIGRAM(S): 150 TABLET, FILM COATED ORAL at 21:28

## 2025-02-14 RX ADMIN — Medication 500 MILLIGRAM(S): at 16:14

## 2025-02-14 RX ADMIN — Medication 1 APPLICATION(S): at 21:29

## 2025-02-14 RX ADMIN — INSULIN LISPRO 8: 100 INJECTION, SOLUTION INTRAVENOUS; SUBCUTANEOUS at 22:58

## 2025-02-14 RX ADMIN — INSULIN GLARGINE-YFGN 8 UNIT(S): 100 INJECTION, SOLUTION SUBCUTANEOUS at 22:59

## 2025-02-14 RX ADMIN — Medication 500 MILLIGRAM(S): at 21:27

## 2025-02-14 RX ADMIN — INSULIN LISPRO 6: 100 INJECTION, SOLUTION INTRAVENOUS; SUBCUTANEOUS at 18:43

## 2025-02-14 RX ADMIN — Medication 500 MILLIGRAM(S): at 02:56

## 2025-02-14 RX ADMIN — Medication 1 DROP(S): at 21:28

## 2025-02-14 RX ADMIN — ENOXAPARIN SODIUM 40 MILLIGRAM(S): 100 INJECTION SUBCUTANEOUS at 18:15

## 2025-02-14 RX ADMIN — INSULIN LISPRO 12: 100 INJECTION, SOLUTION INTRAVENOUS; SUBCUTANEOUS at 06:30

## 2025-02-14 RX ADMIN — Medication 1 APPLICATION(S): at 09:55

## 2025-02-14 RX ADMIN — SODIUM CHLORIDE 500 MILLILITER(S): 9 INJECTION, SOLUTION INTRAVENOUS at 10:33

## 2025-02-14 RX ADMIN — Medication 10 MILLILITER(S): at 21:28

## 2025-02-14 RX ADMIN — DEXAMETHASONE 4 MILLIGRAM(S): 0.5 TABLET ORAL at 18:15

## 2025-02-14 RX ADMIN — DEXAMETHASONE 4 MILLIGRAM(S): 0.5 TABLET ORAL at 22:58

## 2025-02-14 RX ADMIN — METOPROLOL SUCCINATE 25 MILLIGRAM(S): 50 TABLET, EXTENDED RELEASE ORAL at 09:54

## 2025-02-14 RX ADMIN — NOREPINEPHRINE BITARTRATE 4.89 MICROGRAM(S)/KG/MIN: 8 SOLUTION at 06:22

## 2025-02-14 RX ADMIN — Medication 1000 MILLILITER(S): at 03:17

## 2025-02-14 RX ADMIN — DEXAMETHASONE 4 MILLIGRAM(S): 0.5 TABLET ORAL at 06:23

## 2025-02-14 RX ADMIN — NOREPINEPHRINE BITARTRATE 4.89 MICROGRAM(S)/KG/MIN: 8 SOLUTION at 12:50

## 2025-02-14 RX ADMIN — INSULIN LISPRO 6: 100 INJECTION, SOLUTION INTRAVENOUS; SUBCUTANEOUS at 11:32

## 2025-02-14 RX ADMIN — Medication 500 MILLIGRAM(S): at 09:54

## 2025-02-14 RX ADMIN — Medication 250 MILLIGRAM(S): at 02:57

## 2025-02-14 RX ADMIN — Medication 1 DROP(S): at 09:54

## 2025-02-14 RX ADMIN — Medication 15 MILLILITER(S): at 21:27

## 2025-02-14 RX ADMIN — LEVETIRACETAM 1500 MILLIGRAM(S): 10 INJECTION, SOLUTION INTRAVENOUS at 21:28

## 2025-02-14 RX ADMIN — POLYETHYLENE GLYCOL 3350 17 GRAM(S): 17 POWDER, FOR SOLUTION ORAL at 18:15

## 2025-02-14 RX ADMIN — LEVETIRACETAM 1500 MILLIGRAM(S): 10 INJECTION, SOLUTION INTRAVENOUS at 09:54

## 2025-02-14 RX ADMIN — DEXAMETHASONE 4 MILLIGRAM(S): 0.5 TABLET ORAL at 12:50

## 2025-02-14 RX ADMIN — Medication 40 MILLIGRAM(S): at 09:54

## 2025-02-14 RX ADMIN — Medication 15 MILLILITER(S): at 09:53

## 2025-02-14 NOTE — CONSULT NOTE ADULT - SUBJECTIVE AND OBJECTIVE BOX
CLINICAL SUMMARY:        						:      Prognosis Estimate (survival in days, wks, mos, yrs):	terminal palliative performance 10% ; functional quadriplegia and worsening herniation 								    Patient Decision-Making Capacity:  [  ] Has capacity    [ X ] Lacks capacity because	clinical condition 				     Patient Aware of:  Diagnosis:  [  ] Yes   [  ] No  [ X ] Unknown    Prognosis:  [  ] Yes   [  ] No  [ X ] Unknown           Name of medical decision-maker should patient lack capacity:                     Relationship:   				       Role:  [  ] Health Care Agent     [  ] Legal Surrogate   Contact #(s):                (c)                            (h)				       Other ‘stakeholders’:  											      Other Decision-Maker (i.e., HCA or Surrogate) Aware of:  Diagnosis: [X  ]Yes   [  ]No      Prognosis:  [ X ] Yes    [  ] No     Evidence of Patient’s Preference of Life-Sustaining Treatment (Written or Oral):	no limitations; Pentec[employer]al objection [place]			               	     Resuscitation status:   DNR:  [  ]Yes   [ X ]No      DNI:  [  ]Yes   [ X ]No        Discussion: Case discussed and chart reviewed.   BIOETHICS ANALYSIS:												  	      CONCLUSION: 													     OR  														  RECOMMENDATION:    CASE DISCUSSED WITH MEDICAL ETHICIST	  MORE THAN 50% OF THE TIME OF THIS CONSULTATION WAS SPENT IN COORDINATION OF CARE OF PATIENT										  											                                  CLINICAL SUMMARY:  This is a 74-year-old-female w/ PMH of GBM (diagnosed in 2024, follows at [facility] under Dr. Hernandez, with reported attempted resection of tumor, on chemotherapy with last chemo 2 months ago) ; s/p right temporal craniotomy who was admitted with altered mental status on  1/31/25. Initial CT of head demonstrating large infiltrating mass in right cerebral hemisphere. Epilepsy following for seizures that is likely due to disease  progression (GBM tumor), and mass effect. VEEG was notable for focal status epilepticus in the bilateral fronto-temporal region from 2/1/25 through 2/3/25  at 6pm that improved following the initiation of vimpat and depakote regimen. Palliative and Neurosurgery on consult, not a candidate for surgical intervention and Goc ongoing with the family. On 2/4 propofol was stopped and later in the evening Depakote was held (due to c/f interaction w/ carbapenem), with return of focal seizures and secondary generalization. Given the re-emergence of seizures, depakote was restarted, vimpat increased, and keppra started with improvement of EEG recordings and the number of seizures. On 2/9 CT Scan of head showing moderate midline shift to left increase 1.2-1.4 cm. EEG 2/12 EEG demonstrating moderate diffuse multifocal cerebral dysfunction.Today, primary team concerned for neurological deterioration. CT Scan of the head reveals worsening worsening subfalcine and uncal herniation with new descending transtentorial herniation., Neurologic exam does not show any brainstem activity without sedation and patient persists on antiepileptics Depakote, Vimpat and Keppra. Family reporting patient’s [facility] with Quaker objections to death by neurologic criteria.    At present sundown and Sabbath approaching on Friday 2/14/2025    Prognosis Estimate (survival in days, wks, mos, yrs):	terminal palliative performance 10% ; functional quadriplegia and worsening herniation 								    Patient Decision-Making Capacity:  [  ] Has capacity    [ X ] Lacks capacity because	clinical condition 				     Patient Aware of:  Diagnosis:  [  ] Yes   [  ] No  [ X ] Unknown    Prognosis:  [  ] Yes   [  ] No  [ X ] Unknown           Name of medical decision-maker should patient lack capacity:    Severiano Mutzen                 Relationship:   Son				       Role:  [  ] Health Care Agent     [ X ] Legal Surrogate   Contact #(s):                (c) [phone]                         (h)				       Other ‘stakeholders’:  											      Other Decision-Maker (i.e., HCA or Surrogate) Aware of:  Diagnosis: [X  ]Yes   [  ]No      Prognosis:  [ X ] Yes    [  ] No     Evidence of Patient’s Preference of Life-Sustaining Treatment (Written or Oral):	no limitations; Quaker objection [employer]			               	     Resuscitation status:   DNR:  [  ]Yes   [ X ]No      DNI:  [  ]Yes   [ X ]No        Discussion: Case discussed and chart reviewed.     BIOETHICS ANALYSIS: At present the ethical conflict involves autonomy and social justice related to the right for Quaker objection to death by neurologic criteria in a patient who has expressed her Quaker desires during treatment for GBM an is not an organ donor.    Brain stem death is not recognised by Mosque [employer] law as death of the individual. [facility] Policy 700.00 allows for families to hae Quaker accomodation and   not be pressured into consenting to withdrawal of care. [employer] authorities may be consulted for moral guidance. Given the worsening herniation and hypotension, Asystole usually occurs within a few days of death by neurologic criteria even if limited supportive care is continued	.    As such physicians are not obligated to provide treatments that have no expectation of benefit and are contrary to acceptable medical standards. Therefore, comfort measures including limiting escalation of vasoactive pressors is ethically permissible. Ventilation and nutrition may be continued.. Upon the event of asystole, Ms Valentino may be assessed and clinicians have no obligation to administer vasoactive agents or perform cardiopulmonary compressions in patients who display signs and symptoms consistent with death by neurologic criteria 							  	  														  RECOMMENDATION: As Claudia is upon us, reasonable Quaker accomodation are ethically reasonable in light of family/patient's request. The [facility] Department of Health Regulation 10 [place]CRR Section 400.16 requires that hospitals have an accommodation policy for family members who   have a Quaker objection to the diagnosis of death by neurologic criteria.  At [facility], mechanical ventilation and artificial nutrition and   hydration will be continued until cardiac death occurs or organ donation. . In certain situations, an infusion bag of a vasoactive blood pressure agent at a fixed   rate or other medication may be allowed to run to completion without renewal in an effort to provide Quaker accommodation.    It is ethically reasonable and permissible to continue comfort measures with mechanical ventilation and no escalation of vasoactive pressors and provide bereavement for her family as imminent worsening herniation is already occurring. As this is Day 14th of hospitalization After the sabbath on Monday 2/17/25; should the patient survive goals of care can be revisited to explain the continuation of mechanical ventilation and artificial nutrition in the face of death by neurologic criteria and perform subsequent testing.    	  MORE THAN 50% OF THE TIME OF THIS CONSULTATION WAS SPENT IN COORDINATION OF CARE OF PATIENT										  									                             References:  1. [facility] Policy 700.00  2. Latricia KEE, Fabiana TUTTLE, Baudilio MEDINA. Brain stem death: managing care when accepted medical guidelines and Quaker beliefs are in conflict. Consideration and compromise are possible. BMJ. 2000 May 6;320(4593):1266-7. doi: 10.1136/bmj.320.7244.1266. PMID: 02249029; PMCID: [id number].

## 2025-02-14 NOTE — CONSULT NOTE ADULT - PATIENT LOCATION
[facility] Fts[facility] Text: The defect edges were debeveled with a #15 scalpel blade.  Given the location of the defect, shape of the defect and the proximity to free margins a full thickness skin graft was deemed most appropriate.  Using a sterile surgical marker, the primary defect shape was transferred to the donor site. The area thus outlined was incised deep to adipose tissue with a #15 scalpel blade.  The harvested graft was then trimmed of adipose tissue until only dermis and epidermis was left.  The skin margins of the secondary defect were undermined to an appropriate distance in all directions utilizing iris scissors.  The secondary defect was closed with interrupted buried subcutaneous sutures.  The skin edges were then re-apposed with running  sutures.  The skin graft was then placed in the primary defect and oriented appropriately.

## 2025-02-14 NOTE — CHART NOTE - NSCHARTNOTEFT_GEN_A_CORE
Admitting Diagnosis:   Patient is a 74y old  Female who presents with a chief complaint of AMS (13 Feb 2025 14:28)      PAST MEDICAL & SURGICAL HISTORY:  GBM (glioblastoma multiforme)  Seizure      Current Nutrition Order:    Diet, NPO with Tube Feed:   Tube Feeding Modality: Nasogastric  Glucerna 1.5 Jose Elias (GLUCERNA1.5)  Total Volume for 24 Hours (mL): 972  Total Number of Cans: 5  Continuous  Starting Tube Feed Rate {mL per Hour}: 10  Increase Tube Feed Rate by (mL): 10     Every 4 hours  Until Goal Tube Feed Rate (mL per Hour): 54  Tube Feed Duration (in Hours): 18  Tube Feed Start Time: 11:00  Tube Feed Stop Time: 05:00 (02-13-25 @ 05:51) [Active]      PO Intake: N/A    GI Issues: Per flowsheets, last BM 2/13 x3; abdomen soft/nontender, rounded; +bowel sounds 2/13. Bowel regimen ordered; consider holding if bowel movements continue increasing.    Pain: No non-verbal indicators of pain noted in flowsheets.    Skin Integrity:  Clifton score 8  2+ generalized, 4+ right hand edema noted in flowsheets  No pressure injuries noted  Skin tear to right arm      02-13-25 @ 07:01  -  02-14-25 @ 07:00  --------------------------------------------------------  IN: 3329.3 mL / OUT: 3200 mL / NET: 129.3 mL    02-14-25 @ 07:01  -  02-14-25 @ 10:42  --------------------------------------------------------  IN: 46.3 mL / OUT: 0 mL / NET: 46.3 mL        Labs:   02-14    165[HH]  |  119[H]  |  23  ----------------------------<  364[H]  4.3   |  33[H]  |  0.33[L]    Ca    8.4      14 Feb 2025 07:30  Phos  2.4     02-14  Mg     3.0     02-14    TPro  5.2[L]  /  Alb  2.7[L]  /  TBili  0.2  /  DBili  x   /  AST  26  /  ALT  78[H]  /  AlkPhos  141[H]  02-14    CAPILLARY BLOOD GLUCOSE      POCT Blood Glucose.: 277 mg/dL (14 Feb 2025 09:49)  POCT Blood Glucose.: 412 mg/dL (14 Feb 2025 06:02)  POCT Blood Glucose.: 331 mg/dL (13 Feb 2025 23:21)  POCT Blood Glucose.: 237 mg/dL (13 Feb 2025 16:56)  POCT Blood Glucose.: 165 mg/dL (13 Feb 2025 11:00)      Medications:  MEDICATIONS  (STANDING):  artificial  tears Solution 1 Drop(s) Both EYES every 12 hours  chlorhexidine 0.12% Liquid 15 milliLiter(s) Oral Mucosa every 12 hours  chlorhexidine 2% Cloths 1 Application(s) Topical <User Schedule>  dexAMETHasone  Injectable 4 milliGRAM(s) IV Push every 6 hours  dextrose 5%. 1000 milliLiter(s) (50 mL/Hr) IV Continuous <Continuous>  dextrose 5%. 1000 milliLiter(s) (100 mL/Hr) IV Continuous <Continuous>  dextrose 50% Injectable 25 Gram(s) IV Push once  dextrose 50% Injectable 12.5 Gram(s) IV Push once  dextrose 50% Injectable 25 Gram(s) IV Push once  enoxaparin Injectable 40 milliGRAM(s) SubCutaneous every 24 hours  glucagon  Injectable 1 milliGRAM(s) IntraMuscular once  influenza  Vaccine (HIGH DOSE) 0.5 milliLiter(s) IntraMuscular once  insulin glargine Injectable (LANTUS) 8 Unit(s) SubCutaneous at bedtime  insulin lispro (ADMELOG) corrective regimen sliding scale   SubCutaneous every 6 hours  lacosamide Solution 100 milliGRAM(s) Oral every 12 hours  levETIRAcetam  Solution 1500 milliGRAM(s) Oral every 12 hours  levoFLOXacin IVPB 750 milliGRAM(s) IV Intermittent every 24 hours  metoprolol tartrate 25 milliGRAM(s) Oral every 12 hours  norepinephrine Infusion 0.05 MICROgram(s)/kG/Min (4.89 mL/Hr) IV Continuous <Continuous>  pantoprazole  Injectable 40 milliGRAM(s) IV Push every 24 hours  petrolatum Ophthalmic Ointment 1 Application(s) Both EYES every 12 hours  polyethylene glycol 3350 17 Gram(s) Oral two times a day  senna Syrup 10 milliLiter(s) Oral at bedtime  valproic  acid Syrup 500 milliGRAM(s) Oral every 6 hours  vancomycin  IVPB 750 milliGRAM(s) IV Intermittent every 12 hours    MEDICATIONS  (PRN):  acetaminophen   Oral Liquid .. 520 milliGRAM(s) Oral every 6 hours PRN Temp greater or equal to 38C (100.4F)  dextrose Oral Gel 15 Gram(s) Oral once PRN Blood Glucose LESS THAN 70 milliGRAM(s)/deciliter      Height for BMI (CENTIMETERS)	157.5 Centimeter(s)  Weight for BMI (lbs)	115.1 lb  Weight for BMI (kg)	52.2 kg  Body Mass Index	21     Weight Change: No new weights since admit. Recommend weigh pt weekly at minimum.     Estimated energy needs:   Weight used for calculations	IBW   Estimated Energy Needs Weight (lbs)	110.2 lb  Estimated Energy Needs Weight (kg)	50 kg  Estimated Energy Needs From (jose elias/kg)	25   Estimated Energy Needs To (jose elias/kg)	30   Estimated Energy Needs Calculated From (jose elias/kg)	1250   Estimated Energy Needs Calculated To (jose elias/kg)	1500     Estimated Protein Needs Weight (lbs)	110.2 lb  Estimated Protein Needs Weight (kg)	50 kg  Estimated Protein Needs From (g/kg)	1.4   Estimated Protein Needs To (g/kg)	1.6   Estimated Protein Needs Calculated From (g/kg)	70   Estimated Protein Needs Calculated To (g/kg)	80     Estimated Fluid Needs Weight (lbs)	110.2 lb  Estimated Fluid Needs Weight (kg)	50 kg  Estimated Fluid Needs From (ml/kg)	25   Estimated Fluid Needs To (ml/kg)	30   Estimated Fluid Needs Calculated From (ml/kg)	1250   Estimated Fluid Needs Calculated To (ml/kg)	1500     Other Calculations	Needs determined using ideal body weight 50 kg (104%IBW) adjusted for intubation and critical condition.     Subjective:   75 yo female w PMH GBM on hospice care (diagnosed in 2024, follows at [facility] under Dr. Hernandez, with reported attempted resection of tumor, on chemotherapy with last chemo 2 months ago), admitted for ams likely secondary to disease progression. 2/3: Hypertonic saline. 2/4: vEEG, no more seizures. 2/5: Added free water flushes. 2/8: Phos repleted. 2/9: NGT confirmed. 2/10: Repleted phos. 2/11: Increased Lantus to 5, lactulose to 30q4. Changed to Glucerna. 2/12: Abdominal xray ordered- very diffuse gaseous distention. Large BM. Vomited. Feeds held. 2/13: Restarted feeds. Hypotensive (MAP in 50s). 2/14: Increased lantus to 8 units. Per MICU resident progress noted, pt minimally responsive despite not being on sedation.     Pt seen for follow up and pt care discussed in interdisciplinary care team rounds. Pt remains intubated with vent set to VC-AC, no longer sedated, MAP 68, on norepinephrine gtt, and no propofol at time of assessment. Pt continues on enteral nutrition support via nasogastric tube; Glucerna 1.5 at goal rate 54 ml/hr. Plan to continue concentrated formula to maintain permissively elevated serum sodium levels (today 165). Labs and medications reviewed. Notable labs: POCT blood glucose 165-412 x 24 hours, Na 165 <H>, Cl 119 <H>, CO2 33 <H>, Cr 0.33 <L>, alkaline phosphatase 141 <H>, ALT 78 <H>, Mg 3.0 <H>, Phos 2.4 <L>. Ordered for: IV antibiotics, insulin regimen (insulin sliding scale, 8 units lantus at bedtime), pain regimen, miralax, senna, protonix. Per flowsheets, last BM 2/13 x3; abdomen soft/nontender, rounded; +bowel sounds 2/13. Bowel regimen ordered; consider holding if bowel movements continue increasing/diarrhea. No other reports GI distress or further nutritional concerns at this time. RDN to remain available, see recommendations below.     Previous Nutrition Diagnosis: Increased nutrient needs related to increased physiological demands as evidenced by Glioblastoma and recent chemotherapy.    Active [ x ]  Resolved [   ]    Goal:  Pt will meet 75% or more of protein/energy needs via most appropriate route for nutrition.     Recommendations:  -Continue enteral nutrition support via nasogastric tube    *Recommend Glucerna 1.5 with goal rate of 54 ml/hr from 6131-7375 to provide 972 ml tube feed, 1458 calories, 81 gProt., and 738 ml free water. This is 22.7 nonprotein calories and 1.62 gProt. per kg ideal body weight 50 kg.   -Monitor pressor and propofol demands    *Goal MAP >65 and lactate <2.0 for generally safe provision of nutrition   -Align nutrition with goals of care at all times  -Weekly wts  -Monitor chemistry, GI function, and skin integrity   *Consider holding bowel regimen if bowel movements continue increasing/diarrhea    Risk Level: High [ x ] Moderate [   ] Low [   ] Admitting Diagnosis:   Patient is a 74y old  Female who presents with a chief complaint of AMS (13 Feb 2025 14:28)      PAST MEDICAL & SURGICAL HISTORY:  GBM (glioblastoma multiforme)  Seizure      Current Nutrition Order:    Diet, NPO with Tube Feed:   Tube Feeding Modality: Nasogastric  Glucerna 1.5 Jose Elias (GLUCERNA1.5)  Total Volume for 24 Hours (mL): 972  Total Number of Cans: 5  Continuous  Starting Tube Feed Rate {mL per Hour}: 10  Increase Tube Feed Rate by (mL): 10     Every 4 hours  Until Goal Tube Feed Rate (mL per Hour): 54  Tube Feed Duration (in Hours): 18  Tube Feed Start Time: 11:00  Tube Feed Stop Time: 05:00 (02-13-25 @ 05:51) [Active]      PO Intake: N/A    GI Issues: Per flowsheets, last BM 2/13 x3; abdomen soft/nontender, rounded; +bowel sounds 2/13. Bowel regimen ordered; consider holding if bowel movements continue increasing.    Pain: No non-verbal indicators of pain noted in flowsheets.    Skin Integrity:  Clifton score 8  2+ generalized, 4+ right hand edema noted in flowsheets  No pressure injuries noted  Skin tear to right arm      02-13-25 @ 07:01  -  02-14-25 @ 07:00  --------------------------------------------------------  IN: 3329.3 mL / OUT: 3200 mL / NET: 129.3 mL    02-14-25 @ 07:01  -  02-14-25 @ 10:42  --------------------------------------------------------  IN: 46.3 mL / OUT: 0 mL / NET: 46.3 mL        Labs:   02-14    165[HH]  |  119[H]  |  23  ----------------------------<  364[H]  4.3   |  33[H]  |  0.33[L]    Ca    8.4      14 Feb 2025 07:30  Phos  2.4     02-14  Mg     3.0     02-14    TPro  5.2[L]  /  Alb  2.7[L]  /  TBili  0.2  /  DBili  x   /  AST  26  /  ALT  78[H]  /  AlkPhos  141[H]  02-14    CAPILLARY BLOOD GLUCOSE      POCT Blood Glucose.: 277 mg/dL (14 Feb 2025 09:49)  POCT Blood Glucose.: 412 mg/dL (14 Feb 2025 06:02)  POCT Blood Glucose.: 331 mg/dL (13 Feb 2025 23:21)  POCT Blood Glucose.: 237 mg/dL (13 Feb 2025 16:56)  POCT Blood Glucose.: 165 mg/dL (13 Feb 2025 11:00)      Medications:  MEDICATIONS  (STANDING):  artificial  tears Solution 1 Drop(s) Both EYES every 12 hours  chlorhexidine 0.12% Liquid 15 milliLiter(s) Oral Mucosa every 12 hours  chlorhexidine 2% Cloths 1 Application(s) Topical <User Schedule>  dexAMETHasone  Injectable 4 milliGRAM(s) IV Push every 6 hours  dextrose 5%. 1000 milliLiter(s) (50 mL/Hr) IV Continuous <Continuous>  dextrose 5%. 1000 milliLiter(s) (100 mL/Hr) IV Continuous <Continuous>  dextrose 50% Injectable 25 Gram(s) IV Push once  dextrose 50% Injectable 12.5 Gram(s) IV Push once  dextrose 50% Injectable 25 Gram(s) IV Push once  enoxaparin Injectable 40 milliGRAM(s) SubCutaneous every 24 hours  glucagon  Injectable 1 milliGRAM(s) IntraMuscular once  influenza  Vaccine (HIGH DOSE) 0.5 milliLiter(s) IntraMuscular once  insulin glargine Injectable (LANTUS) 8 Unit(s) SubCutaneous at bedtime  insulin lispro (ADMELOG) corrective regimen sliding scale   SubCutaneous every 6 hours  lacosamide Solution 100 milliGRAM(s) Oral every 12 hours  levETIRAcetam  Solution 1500 milliGRAM(s) Oral every 12 hours  levoFLOXacin IVPB 750 milliGRAM(s) IV Intermittent every 24 hours  metoprolol tartrate 25 milliGRAM(s) Oral every 12 hours  norepinephrine Infusion 0.05 MICROgram(s)/kG/Min (4.89 mL/Hr) IV Continuous <Continuous>  pantoprazole  Injectable 40 milliGRAM(s) IV Push every 24 hours  petrolatum Ophthalmic Ointment 1 Application(s) Both EYES every 12 hours  polyethylene glycol 3350 17 Gram(s) Oral two times a day  senna Syrup 10 milliLiter(s) Oral at bedtime  valproic  acid Syrup 500 milliGRAM(s) Oral every 6 hours  vancomycin  IVPB 750 milliGRAM(s) IV Intermittent every 12 hours    MEDICATIONS  (PRN):  acetaminophen   Oral Liquid .. 520 milliGRAM(s) Oral every 6 hours PRN Temp greater or equal to 38C (100.4F)  dextrose Oral Gel 15 Gram(s) Oral once PRN Blood Glucose LESS THAN 70 milliGRAM(s)/deciliter      Height for BMI (CENTIMETERS)	157.5 Centimeter(s)  Weight for BMI (lbs)	115.1 lb  Weight for BMI (kg)	52.2 kg  Body Mass Index	21     Weight Change: No new weights since admit. Recommend weigh pt weekly at minimum.     Estimated energy needs:   Weight used for calculations	IBW   Estimated Energy Needs Weight (lbs)	110.2 lb  Estimated Energy Needs Weight (kg)	50 kg  Estimated Energy Needs From (jose elias/kg)	25   Estimated Energy Needs To (jose elias/kg)	30   Estimated Energy Needs Calculated From (jose elias/kg)	1250   Estimated Energy Needs Calculated To (jose elias/kg)	1500     Estimated Protein Needs Weight (lbs)	110.2 lb  Estimated Protein Needs Weight (kg)	50 kg  Estimated Protein Needs From (g/kg)	1.4   Estimated Protein Needs To (g/kg)	1.6   Estimated Protein Needs Calculated From (g/kg)	70   Estimated Protein Needs Calculated To (g/kg)	80     Estimated Fluid Needs Weight (lbs)	110.2 lb  Estimated Fluid Needs Weight (kg)	50 kg  Estimated Fluid Needs From (ml/kg)	25   Estimated Fluid Needs To (ml/kg)	30   Estimated Fluid Needs Calculated From (ml/kg)	1250   Estimated Fluid Needs Calculated To (ml/kg)	1500     Other Calculations	Needs determined using ideal body weight 50 kg (104%IBW) adjusted for intubation and critical condition.     Subjective:   75 yo female w PMH GBM on hospice care (diagnosed in 2024, follows at [facility] under Dr. Hernandez, with reported attempted resection of tumor, on chemotherapy with last chemo 2 months ago), admitted for ams likely secondary to disease progression. 2/3: Hypertonic saline. 2/4: vEEG, no more seizures. 2/5: Added free water flushes. 2/8: Phos repleted. 2/9: NGT confirmed. 2/10: Repleted phos. 2/11: Increased Lantus to 5, lactulose to 30q4. Changed to Glucerna. 2/12: Abdominal xray ordered- very diffuse gaseous distention. Large BM. Vomited. Feeds held. 2/13: Restarted feeds. Hypotensive (MAP in 50s). 2/14: Increased lantus to 8 units. Per MICU resident progress noted, pt minimally responsive despite not being on sedation.     Pt seen for follow up and pt care discussed in interdisciplinary care team rounds. Pt remains intubated with vent set to VC-AC, no longer sedated, MAP 68, on norepinephrine gtt, and no propofol at time of assessment. Pt continues on enteral nutrition support via nasogastric tube; Glucerna 1.5 at goal rate 54 ml/hr. Plan to continue concentrated formula to maintain permissively elevated serum sodium levels (today 165). Labs and medications reviewed. Notable labs: POCT blood glucose 165-412 x 24 hours (lantus increased to 8 units today), Na 165 <H>, Cl 119 <H>, CO2 33 <H>, Cr 0.33 <L>, alkaline phosphatase 141 <H>, ALT 78 <H>, Mg 3.0 <H>, Phos 2.4 <L>. Ordered for: IV antibiotics, insulin regimen (insulin sliding scale, 8 units lantus at bedtime), pain regimen, miralax, senna, protonix. Per flowsheets, last BM 2/13 x3; abdomen soft/nontender, rounded; +bowel sounds 2/13. Bowel regimen ordered; consider holding if bowel movements continue increasing/diarrhea. No other reports GI distress or further nutritional concerns at this time. RDN to remain available, see recommendations below.     Previous Nutrition Diagnosis: Increased nutrient needs related to increased physiological demands as evidenced by Glioblastoma and recent chemotherapy.    Active [ x ]  Resolved [   ]    Goal:  Pt will meet 75% or more of protein/energy needs via most appropriate route for nutrition.     Recommendations:  -Continue enteral nutrition support via nasogastric tube    *Recommend Glucerna 1.5 with goal rate of 54 ml/hr from [id number] to provide 972 ml tube feed, 1458 calories, 81 gProt., and 738 ml free water. This is 22.7 nonprotein calories and 1.62 gProt. per kg ideal body weight 50 kg.   -Monitor pressor and propofol demands    *Goal MAP >65 and lactate <2.0 for generally safe provision of nutrition   -Align nutrition with goals of care at all times  -Weekly wts  -Monitor chemistry, GI function, and skin integrity   *Consider holding bowel regimen if bowel movements continue increasing/diarrhea    Risk Level: High [ x ] Moderate [   ] Low [   ]

## 2025-02-14 NOTE — PROGRESS NOTE ADULT - ASSESSMENT
75 yo female w PMH GBM on hospice care (diagnosed in 2024, follows at [facility] under Dr. Hernandez, with reported attempted resection of tumor, on chemotherapy with last chemo 2 months ago), admitted for ams likely 2/2 disease progression After admission to [facility], Upon evaluation pt was obtunded with tremors to RUE, response to noxious stimuli but otherwise no response. Pupils were responsive with saccadic movement. She was slumped to her right sided with gurgling breath sounds.  At that point rapid was called and patient was inevitably intubated. Transferred to [facility]. Discussed with pts daughters post procedure poor prognosis and advised to have formal family meeting to clarify and document wishes.       NEURO  Intubated. Not sedated. Minimally responsive despite not being on sedation.     Continues to have seizures, concern for airway protection as GBM progresses, tentative plan for tracheostomy pending further conversations with family.     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 There is evidence of focal cortical hyperexcitability, more prominent in the right than the left frontotemporal regions. The background pattern shows a burst suppression ratio of 10:1, indicating severe diffuse or multifocal cerebral dysfunction, potentially exacerbated by IV sedatives. Additionally, there is severe generalized and multifocal slowing, further supporting significant underlying cerebral dysfunction. CT head with worsening midline shift.  Continued EEG findings of seizures.  Plan:   s/p valproic acid loading dose 1200 mg, vimpat 200 mg loading  - epilepsy consulted, appreciate recs      -  Keppra 1500mg BID      - Vimpat 100mg BID (monitor tele to ensure no heart block)      - C/w Depakote 500mg q6hrs      - Obtain VPA in AM      - seizure precautions      #GBM  last chemo ~2months ago, follows at [facility].  Per Neurosurgery consult on 1/31, no acute interventions. Worsening midline shift on CT. On CT today, concern for worsening neurological status. Patient got CT head that shows complete effacement of the cerebral and cerebellar sulci consistent with diffuse edema with effacement of the basal cisterns, worsening subfalcine and uncal herniation with new descending transtentorial herniation.  -c/w decadron 4 IV q6h  -epilepsy aware  -palliative care consult       CARDIAC  #Chronic atrial fibrillation.   #afib with RVR   Plan  -bladder scans q6h   -Lasix 40 mg IV push given today given net positive   -BMP every 6 hrs   -consider john (currently on primafit) if pt retaining   -make sure pt having BMs  -pain control with dilaudid 0.5 q4h PRN.   Home med: metoprolol 12.5 BID   - c/w Lovenox ( per NSGY, no increased risk of hemorrhage)  -start Lopressor 12.5 mg BID     PULM  #intubated  - intubated on 2/1  - GOC  - consider trach moving forward    GI  - NPO with tube feeds   - consider PEG moving forward  -Miralax and Senna standing, Lactulose 30q4     #Constipation  Patient without bowel movement for three days, started on Lactulose, s/p 5 bowel movements.   -D/c lactulose and decrease bowel regimen. Break for today, reassess tomorrow.     RENAL  - currently with normal renal function  -Add 1 dose 40 IV Lasix for fluid retention and overload     #hypernatremia  Sodium in the high 160's. Likely 2/2 diabetes insipidus iso advancing brain cancer that is leading to advanced brain swelling.   Plan  -Consult pallative   - the brain swelling will worsen with worsening sodium as time progresses. CLIVE indicated at this time.     Endo  -maintain on iSS, continue to monitor glucose for further insulin regimen planning as glucose is currently greater than 180 and out of range for goal between 140-180.    Diet changed to Glucerna due to elevated glucose, CTM.     ID  #SIRS  #ESBL Ecoli Bacteremia  #R arm cellulitis   #ESBL ecoli UTI   Systemic inflammatory response syndrome (SIRS).   Meeting SIRS 2/4 (HR>90, WBC>12k) likely iso of UTI.   Bcx with ESBL E. coli, s/p ertapenem treatment. Patient continuing to be tachycardic, tenuous course between fevering and hypothermia.   -c/w Levofloxacin (started 2/13)   -c/w Vancomycin (2/13)  -IVF as needed      F: none  E: replete K<4, Mg<2  N: npo with tube feeds  VTE Prophylaxis: lovenox 40mg QD  GI: pantoprazole 40 IVP  C: Full Code  D: micu    Lines: Peripheral IV L 22g (2/4) IV L 18 g (2/4), NG tube      73 yo female w PMH GBM on hospice care (diagnosed in 2024, follows at [facility] under Dr. Hernandez, with reported attempted resection of tumor, on chemotherapy with last chemo 2 months ago), admitted for ams likely 2/2 disease progression After admission to [facility], Upon evaluation pt was obtunded with tremors to RUE, response to noxious stimuli but otherwise no response. Pupils were responsive with saccadic movement. She was slumped to her right sided with gurgling breath sounds.  At that point rapid was called and patient was inevitably intubated. Transferred to [facility]. Discussed with pts daughters post procedure poor prognosis and advised to have formal family meeting to clarify and document wishes.       NEURO  Intubated. Not sedated. Minimally responsive despite not being on sedation.     Continues to have seizures, concern for airway protection as GBM progresses, tentative plan for tracheostomy pending further conversations with family.     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 There is evidence of focal cortical hyperexcitability, more prominent in the right than the left frontotemporal regions. The background pattern shows a burst suppression ratio of 10:1, indicating severe diffuse or multifocal cerebral dysfunction, potentially exacerbated by IV sedatives. Additionally, there is severe generalized and multifocal slowing, further supporting significant underlying cerebral dysfunction. CT head with worsening midline shift.  Continued EEG findings of seizures.  Plan:   s/p valproic acid loading dose 1200 mg, vimpat 200 mg loading  - epilepsy consulted, appreciate recs      -  Keppra 1500mg BID      - Vimpat 100mg BID (monitor tele to ensure no heart block)      - C/w Depakote 500mg q6hrs      - Obtain VPA in AM      - seizure precautions      #GBM  last chemo ~2months ago, follows at [facility].  Per Neurosurgery consult on 1/31, no acute interventions. Worsening midline shift on CT. On CT today, concern for worsening neurological status. Patient got CT head that shows complete effacement of the cerebral and cerebellar sulci consistent with diffuse edema with effacement of the basal cisterns, worsening subfalcine and uncal herniation with new descending transtentorial herniation.  -c/w decadron 4 IV q6h  -epilepsy aware  -palliative care consult       CARDIAC  #Chronic atrial fibrillation.   #afib with RVR   Plan  -bladder scans q6h   -Lasix 40 mg IV push given today given net positive   -BMP every 6 hrs   -consider john (currently on primafit) if pt retaining   -make sure pt having BMs  -pain control with dilaudid 0.5 q4h PRN.   Home med: metoprolol 12.5 BID   - c/w Lovenox ( per NSGY, no increased risk of hemorrhage)  -start Lopressor 12.5 mg BID     PULM  #intubated  - intubated on 2/1  - GOC  - consider trach moving forward    GI  - NPO with tube feeds   - consider PEG moving forward  -Miralax and Senna standing, Lactulose 30q4     #Constipation  Patient without bowel movement for three days, started on Lactulose, s/p 5 bowel movements.   -D/c lactulose and decrease bowel regimen. Break for today, reassess tomorrow.     RENAL  - currently with normal renal function  -Add 1 dose 40 IV Lasix for fluid retention and overload     #hypernatremia  Sodium in the high 160's. Likely 2/2 diabetes insipidus iso advancing brain cancer that is leading to advanced brain swelling.   Plan  -Consult pallative   - the brain swelling will worsen with worsening sodium as time progresses. CLIVE indicated at this time.     Currently with John.     Endo  -maintain on iSS, continue to monitor glucose for further insulin regimen planning as glucose is currently greater than 180 and out of range for goal between 140-180.    Diet changed to Glucerna due to elevated glucose, CTM.     ID  #SIRS  #ESBL Ecoli Bacteremia  #R arm cellulitis   #ESBL ecoli UTI   Systemic inflammatory response syndrome (SIRS).   Meeting SIRS 2/4 (HR>90, WBC>12k) likely iso of UTI.   Bcx with ESBL E. coli, s/p ertapenem treatment. Patient continuing to be tachycardic, tenuous course between fevering and hypothermia.   -c/w Levofloxacin (started 2/13)   -c/w Vancomycin (2/13)        F: none  E: replete K<4, Mg<2  N: npo with tube feeds  VTE Prophylaxis: lovenox 40mg QD  GI: pantoprazole 40 IVP  C: Full Code  D: micu    Lines: Peripheral IV L 22g (2/4) IV L 18 g (2/4), NG tube, John (2/14)

## 2025-02-14 NOTE — PROGRESS NOTE ADULT - SUBJECTIVE AND OBJECTIVE BOX
Patient is a 74y old  Female who presents with a chief complaint of AMS (2025 13:35)      INTERVAL HPI/OVERNIGHT EVENTS:   Hypotensive MAPs in 50s, given L NS and started on Levo, T 101.5, ofirmev given. Straight cath 1050.     Subjective: Patient seen at bedside, appears to have worsening mental status and CT head ordered given patient without any eye movement, no corneal reflex, and no gag reflex.     ICU Vital Signs Last 24 Hrs  T(C): 37 (2025 13:38), Max: 38.6 (2025 21:22)  T(F): 98.6 (2025 13:38), Max: 101.5 (2025 21:22)  HR: 109 (2025 12:52) (94 - 130)  BP: 143/62 (2025 12:25) (61/31 - 143/62)  BP(mean): 96 (2025 12:25) (39 - 98)  ABP: --  ABP(mean): --  RR: 12 (2025 12:52) (12 - 16)  SpO2: 99% (2025 12:52) (95% - 100%)    O2 Parameters below as of 2025 12:52  Patient On (Oxygen Delivery Method): ventilator    O2 Concentration (%): 40      I&O's Summary    2025 07:01  -  2025 07:00  --------------------------------------------------------  IN: 3329.3 mL / OUT: 3200 mL / NET: 129.3 mL    2025 07:01  -  2025 15:09  --------------------------------------------------------  IN: 46.3 mL / OUT: 0 mL / NET: 46.3 mL      Mode: AC/ CMV (Assist Control/ Continuous Mandatory Ventilation)  RR (machine): 12  TV (machine): 350  FiO2: 40  PEEP: 5  ITime: 1  MAP: 6.8  PIP: 14      LABS:                        12.2   21.58 )-----------( 217      ( 2025 06:15 )             40.0     02-14    168[HH]  |  127[H]  |  19  ----------------------------<  319[H]  SEE NOTE   |  29  |  0.29[L]    Ca    8.5      2025 09:12  Phos  2.4     -  Mg     3.0         TPro  5.2[L]  /  Alb  2.7[L]  /  TBili  0.2  /  DBili  x   /  AST  26  /  ALT  78[H]  /  AlkPhos  141[H]        Urinalysis Basic - ( 2025 12:37 )    Color: Yellow / Appearance: Clear / S.008 / pH: x  Gluc: x / Ketone: Negative mg/dL  / Bili: Negative / Urobili: 0.2 mg/dL   Blood: x / Protein: Negative mg/dL / Nitrite: Negative   Leuk Esterase: Negative / RBC: x / WBC x   Sq Epi: x / Non Sq Epi: x / Bacteria: x      CAPILLARY BLOOD GLUCOSE      POCT Blood Glucose.: 286 mg/dL (2025 11:17)  POCT Blood Glucose.: 277 mg/dL (2025 09:49)  POCT Blood Glucose.: 412 mg/dL (2025 06:02)  POCT Blood Glucose.: 331 mg/dL (2025 23:21)  POCT Blood Glucose.: 237 mg/dL (2025 16:56)        RADIOLOGY & ADDITIONAL TESTS:    Consultant(s) Notes Reviewed:  [x ] YES  [ ] NO    MEDICATIONS  (STANDING):  artificial  tears Solution 1 Drop(s) Both EYES every 12 hours  chlorhexidine 0.12% Liquid 15 milliLiter(s) Oral Mucosa every 12 hours  chlorhexidine 2% Cloths 1 Application(s) Topical <User Schedule>  dexAMETHasone  Injectable 4 milliGRAM(s) IV Push every 6 hours  dextrose 5%. 1000 milliLiter(s) (50 mL/Hr) IV Continuous <Continuous>  dextrose 5%. 1000 milliLiter(s) (100 mL/Hr) IV Continuous <Continuous>  dextrose 50% Injectable 25 Gram(s) IV Push once  dextrose 50% Injectable 12.5 Gram(s) IV Push once  dextrose 50% Injectable 25 Gram(s) IV Push once  enoxaparin Injectable 40 milliGRAM(s) SubCutaneous every 24 hours  glucagon  Injectable 1 milliGRAM(s) IntraMuscular once  influenza  Vaccine (HIGH DOSE) 0.5 milliLiter(s) IntraMuscular once  insulin glargine Injectable (LANTUS) 8 Unit(s) SubCutaneous at bedtime  insulin lispro (ADMELOG) corrective regimen sliding scale   SubCutaneous every 6 hours  lacosamide Solution 100 milliGRAM(s) Oral every 12 hours  levETIRAcetam  Solution 1500 milliGRAM(s) Oral every 12 hours  levoFLOXacin IVPB 750 milliGRAM(s) IV Intermittent every 24 hours  metoprolol tartrate 25 milliGRAM(s) Oral every 12 hours  norepinephrine Infusion 0.05 MICROgram(s)/kG/Min (4.89 mL/Hr) IV Continuous <Continuous>  pantoprazole  Injectable 40 milliGRAM(s) IV Push every 24 hours  petrolatum Ophthalmic Ointment 1 Application(s) Both EYES every 12 hours  polyethylene glycol 3350 17 Gram(s) Oral two times a day  senna Syrup 10 milliLiter(s) Oral at bedtime  valproic  acid Syrup 500 milliGRAM(s) Oral every 6 hours  vancomycin  IVPB 750 milliGRAM(s) IV Intermittent every 12 hours    MEDICATIONS  (PRN):  acetaminophen   Oral Liquid .. 520 milliGRAM(s) Oral every 6 hours PRN Temp greater or equal to 38C (100.4F)  dextrose Oral Gel 15 Gram(s) Oral once PRN Blood Glucose LESS THAN 70 milliGRAM(s)/deciliter      PHYSICAL EXAM:  GENERAL:   HEAD:  Atraumatic, Normocephalic  EYES: EOMI, PERRLA, conjunctiva and sclera clear  NECK: Supple, No JVD, Normal thyroid, no enlarged nodes  NERVOUS SYSTEM:  Alert & Awake.   CHEST/LUNG: B/L good air entry; No rales, rhonchi, or wheezing  HEART: S1S2 normal, no S3, Regular rate and rhythm; No murmurs  ABDOMEN: Soft, Nontender, Nondistended; Bowel sounds present  EXTREMITIES:  2+ Peripheral Pulses, No clubbing, cyanosis, or edema  LYMPH: No lymphadenopathy noted  SKIN: No rashes or lesions    Care Discussed with Consultants/Other Providers [ x] YES  [ ] NO Patient is a 74y old  Female who presents with a chief complaint of AMS (2025 13:35)      INTERVAL HPI/OVERNIGHT EVENTS:   Hypotensive MAPs in 50s, given L NS and started on Levo, T 101.5, ofirmev given. Straight cath 1050.     Subjective: Patient seen at bedside, appears to have worsening mental status and CT head ordered given patient without any eye movement, no corneal reflex, and no gag reflex.     ICU Vital Signs Last 24 Hrs  T(C): 37 (2025 13:38), Max: 38.6 (2025 21:22)  T(F): 98.6 (2025 13:38), Max: 101.5 (2025 21:22)  HR: 109 (2025 12:52) (94 - 130)  BP: 143/62 (2025 12:25) (61/31 - 143/62)  BP(mean): 96 (2025 12:25) (39 - 98)  ABP: --  ABP(mean): --  RR: 12 (2025 12:52) (12 - 16)  SpO2: 99% (2025 12:52) (95% - 100%)    O2 Parameters below as of 2025 12:52  Patient On (Oxygen Delivery Method): ventilator    O2 Concentration (%): 40      I&O's Summary    2025 07:01  -  2025 07:00  --------------------------------------------------------  IN: 3329.3 mL / OUT: 3200 mL / NET: 129.3 mL    2025 07:01  -  2025 15:09  --------------------------------------------------------  IN: 46.3 mL / OUT: 0 mL / NET: 46.3 mL      Mode: AC/ CMV (Assist Control/ Continuous Mandatory Ventilation)  RR (machine): 12  TV (machine): 350  FiO2: 40  PEEP: 5  ITime: 1  MAP: 6.8  PIP: 14      LABS:                        12.2   21.58 )-----------( 217      ( 2025 06:15 )             40.0     02-14    168[HH]  |  127[H]  |  19  ----------------------------<  319[H]  SEE NOTE   |  29  |  0.29[L]    Ca    8.5      2025 09:12  Phos  2.4     -  Mg     3.0         TPro  5.2[L]  /  Alb  2.7[L]  /  TBili  0.2  /  DBili  x   /  AST  26  /  ALT  78[H]  /  AlkPhos  141[H]        Urinalysis Basic - ( 2025 12:37 )    Color: Yellow / Appearance: Clear / S.008 / pH: x  Gluc: x / Ketone: Negative mg/dL  / Bili: Negative / Urobili: 0.2 mg/dL   Blood: x / Protein: Negative mg/dL / Nitrite: Negative   Leuk Esterase: Negative / RBC: x / WBC x   Sq Epi: x / Non Sq Epi: x / Bacteria: x      CAPILLARY BLOOD GLUCOSE      POCT Blood Glucose.: 286 mg/dL (2025 11:17)  POCT Blood Glucose.: 277 mg/dL (2025 09:49)  POCT Blood Glucose.: 412 mg/dL (2025 06:02)  POCT Blood Glucose.: 331 mg/dL (2025 23:21)  POCT Blood Glucose.: 237 mg/dL (2025 16:56)        RADIOLOGY & ADDITIONAL TESTS:    Consultant(s) Notes Reviewed:  [x ] YES  [ ] NO    MEDICATIONS  (STANDING):  artificial  tears Solution 1 Drop(s) Both EYES every 12 hours  chlorhexidine 0.12% Liquid 15 milliLiter(s) Oral Mucosa every 12 hours  chlorhexidine 2% Cloths 1 Application(s) Topical <User Schedule>  dexAMETHasone  Injectable 4 milliGRAM(s) IV Push every 6 hours  dextrose 5%. 1000 milliLiter(s) (50 mL/Hr) IV Continuous <Continuous>  dextrose 5%. 1000 milliLiter(s) (100 mL/Hr) IV Continuous <Continuous>  dextrose 50% Injectable 25 Gram(s) IV Push once  dextrose 50% Injectable 12.5 Gram(s) IV Push once  dextrose 50% Injectable 25 Gram(s) IV Push once  enoxaparin Injectable 40 milliGRAM(s) SubCutaneous every 24 hours  glucagon  Injectable 1 milliGRAM(s) IntraMuscular once  influenza  Vaccine (HIGH DOSE) 0.5 milliLiter(s) IntraMuscular once  insulin glargine Injectable (LANTUS) 8 Unit(s) SubCutaneous at bedtime  insulin lispro (ADMELOG) corrective regimen sliding scale   SubCutaneous every 6 hours  lacosamide Solution 100 milliGRAM(s) Oral every 12 hours  levETIRAcetam  Solution 1500 milliGRAM(s) Oral every 12 hours  levoFLOXacin IVPB 750 milliGRAM(s) IV Intermittent every 24 hours  metoprolol tartrate 25 milliGRAM(s) Oral every 12 hours  norepinephrine Infusion 0.05 MICROgram(s)/kG/Min (4.89 mL/Hr) IV Continuous <Continuous>  pantoprazole  Injectable 40 milliGRAM(s) IV Push every 24 hours  petrolatum Ophthalmic Ointment 1 Application(s) Both EYES every 12 hours  polyethylene glycol 3350 17 Gram(s) Oral two times a day  senna Syrup 10 milliLiter(s) Oral at bedtime  valproic  acid Syrup 500 milliGRAM(s) Oral every 6 hours  vancomycin  IVPB 750 milliGRAM(s) IV Intermittent every 12 hours    MEDICATIONS  (PRN):  acetaminophen   Oral Liquid .. 520 milliGRAM(s) Oral every 6 hours PRN Temp greater or equal to 38C (100.4F)  dextrose Oral Gel 15 Gram(s) Oral once PRN Blood Glucose LESS THAN 70 milliGRAM(s)/deciliter      PHYSICAL EXAM:  GENERAL: Intubated. Not sedated.   EYES: Pupils fixed. No saccadic movement.  NECK: Trachea midline  NERVOUS SYSTEM:  A&Ox0.  CHEST/LUNG: B/L good air entry; No rales, rhonchi, or wheezing  HEART: S1S2 normal, no S3, Regular rate and rhythm; No murmurs  ABDOMEN: Soft, Nontender, Nondistended; Bowel sounds present  LYMPH: No lymphadenopathy noted  SKIN: No rashes    Care Discussed with Consultants/Other Providers [ x] YES  [ ] NO

## 2025-02-14 NOTE — PROGRESS NOTE ADULT - ASSESSMENT
74-year-old-female w/ PMH of GBM (diagnosed in 2024, follows at [facility] under Dr. Hernandez, with reported attempted resection of tumor, on chemotherapy with last chemo 2 months ago) who was admitted with altered mental status on 1/31/25. 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 VEEG was notable for focal status epilepticus in the bilateral fronto-temporal region from 2/1/25 through 2/3/25 at 6pm that improved following the initiation of vimpat and depakote regimen. On 2/4 propofol was stopped and later in the evening Depakote was held (due to c/f interaction w/ carbapenem), with return of focal seizures and secondary generalization. Given the re-emergence of seizures, depakote was restarted, vimpat increased, and keppra started with improvement of EEG recordings and the number of seizures. There were 2 short electrographic seizures on 2/10/25, 7 reported on 2/11/25, and #? on 2/12/25 but again much improved from the past week.     Recommendations:  - Continue vEEG monitoring   - Continue Keppra 1500mg BID  - Continue Vimpat 100mg Q12hrs  - Continue Depakote 500mg q6hrs (Therapeutic VPA level noted at 63.3 on 2/10/25)  - Maintain fall & seizure precautions 74-year-old-female w/ PMH of GBM (diagnosed in 2024, follows at [facility] under Dr. Hernandez, with reported attempted resection of tumor, on chemotherapy with last chemo 2 months ago) who was admitted with altered mental status on 1/31/25. 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 VEEG was notable for focal status epilepticus in the bilateral fronto-temporal region from 2/1/25 through 2/3/25 at 6pm that improved following the initiation of vimpat and depakote regimen. On 2/4 propofol was stopped and later in the evening Depakote was held (due to c/f interaction w/ carbapenem), with return of focal seizures and secondary generalization. Given the re-emergence of seizures, depakote was restarted, vimpat increased, and keppra started with improvement of EEG recordings and the number of seizures. There were 2 short electrographic seizures on 2/10/25, 7 reported on 2/11/25, and #? on 2/12/25 but again much improved from the past week. Today, primary team concerned for neurological deterioration. Yesterday, scalp break for EEG. Neuroexam does not show any brainstem activity without sedation. Patient on pressures and Na 165 todat. CT head that shows complete effacement of the cerebral and cerebellar sulci consistent with diffuse edema with effacement of the basal cisterns, worsening subfalcine and uncal herniation with new descending transtentorial herniation.    Recommendations:  - Apnea test evaluation should be conducted when vitals and lab parameters normalize  - Discussed with family new neurological findings with Dr. Aquino  - Can continue Keppra 1500mg BID  - Can continue Vimpat 100mg Q12hrs  - Can continue Depakote 500mg q6hrs  - Management by primary team    Case discussed with epilepsy attending Dr. Aqiuno 74-year-old-female w/ PMH of GBM (diagnosed in 2024, follows at [facility] under Dr. Hernandez, with reported attempted resection of tumor, on chemotherapy with last chemo 2 months ago) who was admitted with altered mental status on 1/31/25. 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 VEEG was notable for focal status epilepticus in the bilateral fronto-temporal region from 2/1/25 through 2/3/25 at 6pm that improved following the initiation of vimpat and depakote regimen. On 2/4 propofol was stopped and later in the evening Depakote was held (due to c/f interaction w/ carbapenem), with return of focal seizures and secondary generalization. Given the re-emergence of seizures, depakote was restarted, vimpat increased, and keppra started with improvement of EEG recordings and the number of seizures. There were 2 short electrographic seizures on 2/10/25, 7 reported on 2/11/25, and #? on 2/12/25 but again much improved from the past week. Today, primary team concerned for neurological deterioration. Yesterday, scalp break for EEG.  Neuroexam does not show any brainstem activity without sedation. Patient on pressures and Na 165 todat. CT head that shows complete effacement of the cerebral and cerebellar sulci consistent with diffuse edema with effacement of the basal cisterns, worsening subfalcine and uncal herniation with new descending transtentorial herniation.      Recommendations:  - Family and medical liaison requests no tests with respect to 'brain death', specifically do not perform apnea test, calorics, brain SPECT/perfusion, in keeping with their beliefs.  - Discussed with family new neurological findings with Dr. Aquino  - Can continue Keppra 1500mg BID  - Can continue Vimpat 100mg Q12hrs  - Can continue Depakote 500mg q6hrs  - Management by primary team    Case discussed with epilepsy attending Dr. Aquino

## 2025-02-14 NOTE — CONSULT NOTE ADULT - CONSULT REASON
AMS
To assist  in the ethical dilemma of a 75 year old with worsening neurologic  status
seizure
Encephalopathy,  Progressive GBM

## 2025-02-14 NOTE — PROGRESS NOTE ADULT - SUBJECTIVE AND OBJECTIVE BOX
Inteval history:    Concern for worsening neurological status. Patient got CT head that shows complete effacement of the cerebral and cerebellar sulci consistent with diffuse edema with effacement of the basal cisterns, worsening subfalcine and uncal herniation with new descending transtentorial herniation.    ROS  As above, otherwise negative for constitutional/HEENT/CV/pulm/GI//MSK/neuro/derm/endocrine/psych.     MEDICATIONS  (STANDING):  artificial  tears Solution 1 Drop(s) Both EYES every 12 hours  chlorhexidine 0.12% Liquid 15 milliLiter(s) Oral Mucosa every 12 hours  chlorhexidine 2% Cloths 1 Application(s) Topical <User Schedule>  dexAMETHasone  Injectable 4 milliGRAM(s) IV Push every 6 hours  dextrose 5%. 1000 milliLiter(s) (50 mL/Hr) IV Continuous <Continuous>  dextrose 5%. 1000 milliLiter(s) (100 mL/Hr) IV Continuous <Continuous>  dextrose 50% Injectable 25 Gram(s) IV Push once  dextrose 50% Injectable 12.5 Gram(s) IV Push once  dextrose 50% Injectable 25 Gram(s) IV Push once  enoxaparin Injectable 40 milliGRAM(s) SubCutaneous every 24 hours  glucagon  Injectable 1 milliGRAM(s) IntraMuscular once  influenza  Vaccine (HIGH DOSE) 0.5 milliLiter(s) IntraMuscular once  insulin glargine Injectable (LANTUS) 8 Unit(s) SubCutaneous at bedtime  insulin lispro (ADMELOG) corrective regimen sliding scale   SubCutaneous every 6 hours  lacosamide Solution 100 milliGRAM(s) Oral every 12 hours  levETIRAcetam  Solution 1500 milliGRAM(s) Oral every 12 hours  levoFLOXacin IVPB 750 milliGRAM(s) IV Intermittent every 24 hours  metoprolol tartrate 25 milliGRAM(s) Oral every 12 hours  norepinephrine Infusion 0.05 MICROgram(s)/kG/Min (4.89 mL/Hr) IV Continuous <Continuous>  pantoprazole  Injectable 40 milliGRAM(s) IV Push every 24 hours  petrolatum Ophthalmic Ointment 1 Application(s) Both EYES every 12 hours  polyethylene glycol 3350 17 Gram(s) Oral two times a day  senna Syrup 10 milliLiter(s) Oral at bedtime  valproic  acid Syrup 500 milliGRAM(s) Oral every 6 hours  vancomycin  IVPB 750 milliGRAM(s) IV Intermittent every 12 hours    MEDICATIONS  (PRN):  acetaminophen   Oral Liquid .. 520 milliGRAM(s) Oral every 6 hours PRN Temp greater or equal to 38C (100.4F)  dextrose Oral Gel 15 Gram(s) Oral once PRN Blood Glucose LESS THAN 70 milliGRAM(s)/deciliter      T(C): 37.4 (02-14-25 @ 10:03), Max: 38.6 (02-13-25 @ 21:22)  HR: 112 (02-14-25 @ 12:25) (88 - 130)  BP: 143/62 (02-14-25 @ 12:25) (61/31 - 143/62)  RR: 12 (02-14-25 @ 12:25) (12 - 16)  SpO2: 99% (02-14-25 @ 12:25) (95% - 100%)    General:  Constitutional:  Sitting comfortably in NAD.  Ears, Nose, Throat: no abnormalities, mucus membranes moist  Neck: supple, no lymphadenopathy  Extremities: no edema, clubbing or cyanosis  Skin: no rash or neurocutaneous signs     Cognitive:  Orientation, language, memory and knowledge screens intact.    Cranial Nerves:  II: KSENIA. III/IV/VI: EOM Full.  Absent nystagmus  V1V2V3: Symmetric, VII: Face appears symmetric VIII: Normal to screening, IX/X: Palate Elevates Symmetrical  XI: Trapezius Symmetric  XII: Tongue midline  Motor:  Power: no pronator drift, power 5/5 distal U/E  Tone: normal x 4 limbs  No tremor  Coordination/Gait:  Finger-nose intact, normal finger taps and rapid-alternating movements,   Narrow based gait, tandem forward   hops well on both feet  Reflexes:  DTR: 2+ symmetric all 4 limbs, no clonus      Investigations:  CBC Full  -  ( 14 Feb 2025 06:15 )  WBC Count : 21.58 K/uL  RBC Count : 4.32 M/uL  Hemoglobin : 12.2 g/dL  Hematocrit : 40.0 %  Platelet Count - Automated : 217 K/uL  Mean Cell Volume : 92.6 fl  Mean Cell Hemoglobin : 28.2 pg  Mean Cell Hemoglobin Concentration : 30.5 g/dL  Auto Neutrophil # : x  Auto Lymphocyte # : x  Auto Monocyte # : x  Auto Eosinophil # : x  Auto Basophil # : x  Auto Neutrophil % : x  Auto Lymphocyte % : x  Auto Monocyte % : x  Auto Eosinophil % : x  Auto Basophil % : x    02-14    165[HH]  |  119[H]  |  23  ----------------------------<  364[H]  4.3   |  33[H]  |  0.33[L]    Ca    8.4      14 Feb 2025 07:30  Phos  2.4     02-14  Mg     3.0     02-14    TPro  5.2[L]  /  Alb  2.7[L]  /  TBili  0.2  /  DBili  x   /  AST  26  /  ALT  78[H]  /  AlkPhos  141[H]  02-14    LIVER FUNCTIONS - ( 14 Feb 2025 07:30 )  Alb: 2.7 g/dL / Pro: 5.2 g/dL / ALK PHOS: 141 U/L / ALT: 78 U/L / AST: 26 U/L / GGT: x                 EEG: Inteval history:    Concern for worsening neurological status. Patient got CT head that shows complete effacement of the cerebral and cerebellar sulci consistent with diffuse edema with effacement of the basal cisterns, worsening subfalcine and uncal herniation with new descending transtentorial herniation.    ROS  As above, otherwise negative for constitutional/HEENT/CV/pulm/GI//MSK/neuro/derm/endocrine/psych.     MEDICATIONS  (STANDING):  artificial  tears Solution 1 Drop(s) Both EYES every 12 hours  chlorhexidine 0.12% Liquid 15 milliLiter(s) Oral Mucosa every 12 hours  chlorhexidine 2% Cloths 1 Application(s) Topical <User Schedule>  dexAMETHasone  Injectable 4 milliGRAM(s) IV Push every 6 hours  dextrose 5%. 1000 milliLiter(s) (50 mL/Hr) IV Continuous <Continuous>  dextrose 5%. 1000 milliLiter(s) (100 mL/Hr) IV Continuous <Continuous>  dextrose 50% Injectable 25 Gram(s) IV Push once  dextrose 50% Injectable 12.5 Gram(s) IV Push once  dextrose 50% Injectable 25 Gram(s) IV Push once  enoxaparin Injectable 40 milliGRAM(s) SubCutaneous every 24 hours  glucagon  Injectable 1 milliGRAM(s) IntraMuscular once  influenza  Vaccine (HIGH DOSE) 0.5 milliLiter(s) IntraMuscular once  insulin glargine Injectable (LANTUS) 8 Unit(s) SubCutaneous at bedtime  insulin lispro (ADMELOG) corrective regimen sliding scale   SubCutaneous every 6 hours  lacosamide Solution 100 milliGRAM(s) Oral every 12 hours  levETIRAcetam  Solution 1500 milliGRAM(s) Oral every 12 hours  levoFLOXacin IVPB 750 milliGRAM(s) IV Intermittent every 24 hours  metoprolol tartrate 25 milliGRAM(s) Oral every 12 hours  norepinephrine Infusion 0.05 MICROgram(s)/kG/Min (4.89 mL/Hr) IV Continuous <Continuous>  pantoprazole  Injectable 40 milliGRAM(s) IV Push every 24 hours  petrolatum Ophthalmic Ointment 1 Application(s) Both EYES every 12 hours  polyethylene glycol 3350 17 Gram(s) Oral two times a day  senna Syrup 10 milliLiter(s) Oral at bedtime  valproic  acid Syrup 500 milliGRAM(s) Oral every 6 hours  vancomycin  IVPB 750 milliGRAM(s) IV Intermittent every 12 hours    MEDICATIONS  (PRN):  acetaminophen   Oral Liquid .. 520 milliGRAM(s) Oral every 6 hours PRN Temp greater or equal to 38C (100.4F)  dextrose Oral Gel 15 Gram(s) Oral once PRN Blood Glucose LESS THAN 70 milliGRAM(s)/deciliter      T(C): 37.4 (02-14-25 @ 10:03), Max: 38.6 (02-13-25 @ 21:22)  HR: 112 (02-14-25 @ 12:25) (88 - 130)  BP: 143/62 (02-14-25 @ 12:25) (61/31 - 143/62)  RR: 12 (02-14-25 @ 12:25) (12 - 16)  SpO2: 99% (02-14-25 @ 12:25) (95% - 100%)    General:  Constitutional:  Sitting comfortably in NAD.  Ears, Nose, Throat: no abnormalities, mucus membranes moist  Neck: supple, no lymphadenopathy  Extremities: no edema, clubbing or cyanosis  Skin: no rash or neurocutaneous signs     PHYSICAL EXAM:  General: connected    Neurology:  - Mental status: GCSt 3t/15 ([id number]). No motor response or grimacing to central and acral pain  - Brainstem reflexes: No corneal reflexes. No pupillary reflexes. No oculocephalic No TTB reflex. No cough reflex  - Respiratory pattern: No overbreathing ventilator  - Tone: Decreased  - Motor-sensory: No motor response or grimacing to central and acral pain  - DTR: 3/4 in all extremities, no Babinski, no Reece   - No neck rigidity    Investigations:  CBC Full  -  ( 14 Feb 2025 06:15 )  WBC Count : 21.58 K/uL  RBC Count : 4.32 M/uL  Hemoglobin : 12.2 g/dL  Hematocrit : 40.0 %  Platelet Count - Automated : 217 K/uL  Mean Cell Volume : 92.6 fl  Mean Cell Hemoglobin : 28.2 pg  Mean Cell Hemoglobin Concentration : 30.5 g/dL  Auto Neutrophil # : x  Auto Lymphocyte # : x  Auto Monocyte # : x  Auto Eosinophil # : x  Auto Basophil # : x  Auto Neutrophil % : x  Auto Lymphocyte % : x  Auto Monocyte % : x  Auto Eosinophil % : x  Auto Basophil % : x    02-14    165[HH]  |  119[H]  |  23  ----------------------------<  364[H]  4.3   |  33[H]  |  0.33[L]    Ca    8.4      14 Feb 2025 07:30  Phos  2.4     02-14  Mg     3.0     02-14    TPro  5.2[L]  /  Alb  2.7[L]  /  TBili  0.2  /  DBili  x   /  AST  26  /  ALT  78[H]  /  AlkPhos  141[H]  02-14    LIVER FUNCTIONS - ( 14 Feb 2025 07:30 )  Alb: 2.7 g/dL / Pro: 5.2 g/dL / ALK PHOS: 141 U/L / ALT: 78 U/L / AST: 26 U/L / GGT: x        Interval history:    Concern for worsening neurological status.  CT head today that shows complete effacement of the cerebral and cerebellar sulci consistent with diffuse edema with effacement of the basal cisterns, worsening subfalcine and uncal herniation with new descending transtentorial herniation.    ROS  As above, otherwise negative for constitutional/HEENT/CV/pulm/GI//MSK/neuro/derm/endocrine/psych.     MEDICATIONS  (STANDING):  artificial  tears Solution 1 Drop(s) Both EYES every 12 hours  chlorhexidine 0.12% Liquid 15 milliLiter(s) Oral Mucosa every 12 hours  chlorhexidine 2% Cloths 1 Application(s) Topical <User Schedule>  dexAMETHasone  Injectable 4 milliGRAM(s) IV Push every 6 hours  dextrose 5%. 1000 milliLiter(s) (50 mL/Hr) IV Continuous <Continuous>  dextrose 5%. 1000 milliLiter(s) (100 mL/Hr) IV Continuous <Continuous>  dextrose 50% Injectable 25 Gram(s) IV Push once  dextrose 50% Injectable 12.5 Gram(s) IV Push once  dextrose 50% Injectable 25 Gram(s) IV Push once  enoxaparin Injectable 40 milliGRAM(s) SubCutaneous every 24 hours  glucagon  Injectable 1 milliGRAM(s) IntraMuscular once  influenza  Vaccine (HIGH DOSE) 0.5 milliLiter(s) IntraMuscular once  insulin glargine Injectable (LANTUS) 8 Unit(s) SubCutaneous at bedtime  insulin lispro (ADMELOG) corrective regimen sliding scale   SubCutaneous every 6 hours  lacosamide Solution 100 milliGRAM(s) Oral every 12 hours  levETIRAcetam  Solution 1500 milliGRAM(s) Oral every 12 hours  levoFLOXacin IVPB 750 milliGRAM(s) IV Intermittent every 24 hours  metoprolol tartrate 25 milliGRAM(s) Oral every 12 hours  norepinephrine Infusion 0.05 MICROgram(s)/kG/Min (4.89 mL/Hr) IV Continuous <Continuous>  pantoprazole  Injectable 40 milliGRAM(s) IV Push every 24 hours  petrolatum Ophthalmic Ointment 1 Application(s) Both EYES every 12 hours  polyethylene glycol 3350 17 Gram(s) Oral two times a day  senna Syrup 10 milliLiter(s) Oral at bedtime  valproic  acid Syrup 500 milliGRAM(s) Oral every 6 hours  vancomycin  IVPB 750 milliGRAM(s) IV Intermittent every 12 hours    MEDICATIONS  (PRN):  acetaminophen   Oral Liquid .. 520 milliGRAM(s) Oral every 6 hours PRN Temp greater or equal to 38C (100.4F)  dextrose Oral Gel 15 Gram(s) Oral once PRN Blood Glucose LESS THAN 70 milliGRAM(s)/deciliter      T(C): 37.4 (02-14-25 @ 10:03), Max: 38.6 (02-13-25 @ 21:22)  HR: 112 (02-14-25 @ 12:25) (88 - 130)  BP: 143/62 (02-14-25 @ 12:25) (61/31 - 143/62)  RR: 12 (02-14-25 @ 12:25) (12 - 16)  SpO2: 99% (02-14-25 @ 12:25) (95% - 100%)    General:  Constitutional:  Sitting comfortably in NAD.  Ears, Nose, Throat: no abnormalities, mucus membranes moist  Neck: supple, no lymphadenopathy  Extremities: no edema, clubbing or cyanosis  Skin: no rash or neurocutaneous signs     PHYSICAL EXAM:  General: connected    Neurology:  - Mental status: GCSt 3t/15 ([id number]). No motor response or grimacing to central and acral pain  - Brainstem reflexes: No corneal reflexes. No pupillary reflexes. No oculocephalic No TTB reflex. No cough reflex  - Respiratory pattern: No overbreathing ventilator  - Tone: Decreased  - Motor-sensory: No motor response or grimacing to central and acral pain  - DTR: 3/4 in all extremities, no Babinski, no Reece   - No neck rigidity    Investigations:  CBC Full  -  ( 14 Feb 2025 06:15 )  WBC Count : 21.58 K/uL  RBC Count : 4.32 M/uL  Hemoglobin : 12.2 g/dL  Hematocrit : 40.0 %  Platelet Count - Automated : 217 K/uL  Mean Cell Volume : 92.6 fl  Mean Cell Hemoglobin : 28.2 pg  Mean Cell Hemoglobin Concentration : 30.5 g/dL  Auto Neutrophil # : x  Auto Lymphocyte # : x  Auto Monocyte # : x  Auto Eosinophil # : x  Auto Basophil # : x  Auto Neutrophil % : x  Auto Lymphocyte % : x  Auto Monocyte % : x  Auto Eosinophil % : x  Auto Basophil % : x    02-14    165[HH]  |  119[H]  |  23  ----------------------------<  364[H]  4.3   |  33[H]  |  0.33[L]    Ca    8.4      14 Feb 2025 07:30  Phos  2.4     02-14  Mg     3.0     02-14    TPro  5.2[L]  /  Alb  2.7[L]  /  TBili  0.2  /  DBili  x   /  AST  26  /  ALT  78[H]  /  AlkPhos  141[H]  02-14    LIVER FUNCTIONS - ( 14 Feb 2025 07:30 )  Alb: 2.7 g/dL / Pro: 5.2 g/dL / ALK PHOS: 141 U/L / ALT: 78 U/L / AST: 26 U/L / GGT: x

## 2025-02-14 NOTE — PROGRESS NOTE ADULT - ATTENDING COMMENTS
Discussed with her eldest son over the telephone, along with their medical liaison the CT findings and concurrent clinical exam findings that show absence of brain stem reflexes.  Discussed that the respirator is what is currently keeping her other organs alive.  They explained that their belief system is that she is alive as long as the heart is still beating, and they wish to avoid any further tests of brain function.  They understood that there is no chance for brain recovery.  Understanding all of these issues, they are compelled to still request everything to try to keep her alive, and at the same time, a tracheostomy is something they would not wish for her, due to the prognosis.    It is a complicated situation, but I interpret it to mean that they know the prognosis and that her death is very near, but they cannot withdraw care.    I passed on the details of the conversation with the MICU team Discussed with her eldest son over the telephone, along with their medical liaison the CT findings and concurrent clinical exam findings that show absence of brain stem reflexes.  Discussed that the respirator is what is currently keeping her other organs alive.  They explained that their belief system is that she is alive as long as the heart is still beating, and they wish to avoid any further tests of brain function.  They understood that there is no chance for brain recovery.  Understanding all of these issues, they are compelled to still request everything to try to keep her alive, and at the same time, a tracheostomy is something they would not wish for her, due to the prognosis.    It is a complicated situation, but I interpret it to mean that they know the prognosis and that her death is very near, but they cannot withdraw care.    I passed on the details of the conversation with the MICU team, who I mentioned they need to discuss details of possible transfers given the combination of factors.

## 2025-02-15 LAB
ALBUMIN SERPL ELPH-MCNC: 2.9 G/DL — LOW (ref 3.3–5)
ALP SERPL-CCNC: 132 U/L — HIGH (ref 40–120)
ALT FLD-CCNC: 59 U/L — HIGH (ref 10–45)
ANION GAP SERPL CALC-SCNC: SIGNIFICANT CHANGE UP MMOL/L (ref 5–17)
AST SERPL-CCNC: 32 U/L — SIGNIFICANT CHANGE UP (ref 10–40)
BASOPHILS # BLD AUTO: 0 K/UL — SIGNIFICANT CHANGE UP (ref 0–0.2)
BASOPHILS NFR BLD AUTO: 0 % — SIGNIFICANT CHANGE UP (ref 0–2)
BILIRUB SERPL-MCNC: 0.2 MG/DL — SIGNIFICANT CHANGE UP (ref 0.2–1.2)
BUN SERPL-MCNC: 17 MG/DL — SIGNIFICANT CHANGE UP (ref 7–23)
CALCIUM SERPL-MCNC: 8.9 MG/DL — SIGNIFICANT CHANGE UP (ref 8.4–10.5)
CHLORIDE SERPL-SCNC: 138 MMOL/L — HIGH (ref 96–108)
CO2 SERPL-SCNC: 38 MMOL/L — HIGH (ref 22–31)
CREAT SERPL-MCNC: 0.33 MG/DL — LOW (ref 0.5–1.3)
EGFR: 109 ML/MIN/1.73M2 — SIGNIFICANT CHANGE UP
EOSINOPHIL # BLD AUTO: 0 K/UL — SIGNIFICANT CHANGE UP (ref 0–0.5)
EOSINOPHIL NFR BLD AUTO: 0 % — SIGNIFICANT CHANGE UP (ref 0–6)
GLUCOSE SERPL-MCNC: 391 MG/DL — HIGH (ref 70–99)
HCT VFR BLD CALC: 41.3 % — SIGNIFICANT CHANGE UP (ref 34.5–45)
HGB BLD-MCNC: 11.8 G/DL — SIGNIFICANT CHANGE UP (ref 11.5–15.5)
LYMPHOCYTES # BLD AUTO: 0.4 K/UL — LOW (ref 1–3.3)
LYMPHOCYTES # BLD AUTO: 1.8 % — LOW (ref 13–44)
MAGNESIUM SERPL-MCNC: 3.1 MG/DL — HIGH (ref 1.6–2.6)
MCHC RBC-ENTMCNC: 27.3 PG — SIGNIFICANT CHANGE UP (ref 27–34)
MCHC RBC-ENTMCNC: 28.6 G/DL — LOW (ref 32–36)
MCV RBC AUTO: 95.6 FL — SIGNIFICANT CHANGE UP (ref 80–100)
MONOCYTES # BLD AUTO: 0 K/UL — SIGNIFICANT CHANGE UP (ref 0–0.9)
MONOCYTES NFR BLD AUTO: 0 % — LOW (ref 2–14)
NEUTROPHILS # BLD AUTO: 21.69 K/UL — HIGH (ref 1.8–7.4)
NEUTROPHILS NFR BLD AUTO: 98.2 % — HIGH (ref 43–77)
PHOSPHATE SERPL-MCNC: 2.6 MG/DL — SIGNIFICANT CHANGE UP (ref 2.5–4.5)
PLATELET # BLD AUTO: 245 K/UL — SIGNIFICANT CHANGE UP (ref 150–400)
POTASSIUM SERPL-MCNC: 3.9 MMOL/L — SIGNIFICANT CHANGE UP (ref 3.5–5.3)
POTASSIUM SERPL-SCNC: 3.9 MMOL/L — SIGNIFICANT CHANGE UP (ref 3.5–5.3)
PROT SERPL-MCNC: 5.4 G/DL — LOW (ref 6–8.3)
RBC # BLD: 4.32 M/UL — SIGNIFICANT CHANGE UP (ref 3.8–5.2)
RBC # FLD: 17.9 % — HIGH (ref 10.3–14.5)
SODIUM SERPL-SCNC: >180 MMOL/L — CRITICAL HIGH (ref 135–145)
VANCOMYCIN TROUGH SERPL-MCNC: 15.2 UG/ML — SIGNIFICANT CHANGE UP (ref 10–20)
WBC # BLD: 22.09 K/UL — HIGH (ref 3.8–10.5)
WBC # FLD AUTO: 22.09 K/UL — HIGH (ref 3.8–10.5)

## 2025-02-15 PROCEDURE — 99291 CRITICAL CARE FIRST HOUR: CPT

## 2025-02-15 RX ADMIN — DEXAMETHASONE 4 MILLIGRAM(S): 0.5 TABLET ORAL at 23:23

## 2025-02-15 RX ADMIN — INSULIN LISPRO 8: 100 INJECTION, SOLUTION INTRAVENOUS; SUBCUTANEOUS at 05:40

## 2025-02-15 RX ADMIN — LEVETIRACETAM 1500 MILLIGRAM(S): 10 INJECTION, SOLUTION INTRAVENOUS at 09:35

## 2025-02-15 RX ADMIN — Medication 1 DROP(S): at 10:07

## 2025-02-15 RX ADMIN — DEXAMETHASONE 4 MILLIGRAM(S): 0.5 TABLET ORAL at 05:40

## 2025-02-15 RX ADMIN — Medication 40 MILLIGRAM(S): at 09:35

## 2025-02-15 RX ADMIN — LACOSAMIDE 100 MILLIGRAM(S): 150 TABLET, FILM COATED ORAL at 22:01

## 2025-02-15 RX ADMIN — INSULIN LISPRO 12: 100 INJECTION, SOLUTION INTRAVENOUS; SUBCUTANEOUS at 23:23

## 2025-02-15 RX ADMIN — Medication 1 APPLICATION(S): at 09:38

## 2025-02-15 RX ADMIN — DEXAMETHASONE 4 MILLIGRAM(S): 0.5 TABLET ORAL at 12:09

## 2025-02-15 RX ADMIN — Medication 250 MILLIGRAM(S): at 02:51

## 2025-02-15 RX ADMIN — Medication 15 MILLILITER(S): at 22:02

## 2025-02-15 RX ADMIN — Medication 500 MILLIGRAM(S): at 15:32

## 2025-02-15 RX ADMIN — Medication 500 MILLIGRAM(S): at 02:52

## 2025-02-15 RX ADMIN — LEVETIRACETAM 1500 MILLIGRAM(S): 10 INJECTION, SOLUTION INTRAVENOUS at 22:01

## 2025-02-15 RX ADMIN — Medication 250 MILLIGRAM(S): at 14:56

## 2025-02-15 RX ADMIN — Medication 1 APPLICATION(S): at 22:01

## 2025-02-15 RX ADMIN — Medication 1 DROP(S): at 22:01

## 2025-02-15 RX ADMIN — Medication 1 APPLICATION(S): at 05:54

## 2025-02-15 RX ADMIN — NOREPINEPHRINE BITARTRATE 4.89 MICROGRAM(S)/KG/MIN: 8 SOLUTION at 22:35

## 2025-02-15 RX ADMIN — POLYETHYLENE GLYCOL 3350 17 GRAM(S): 17 POWDER, FOR SOLUTION ORAL at 17:17

## 2025-02-15 RX ADMIN — INSULIN LISPRO 8: 100 INJECTION, SOLUTION INTRAVENOUS; SUBCUTANEOUS at 12:09

## 2025-02-15 RX ADMIN — DEXAMETHASONE 4 MILLIGRAM(S): 0.5 TABLET ORAL at 18:35

## 2025-02-15 RX ADMIN — INSULIN GLARGINE-YFGN 8 UNIT(S): 100 INJECTION, SOLUTION SUBCUTANEOUS at 22:32

## 2025-02-15 RX ADMIN — POLYETHYLENE GLYCOL 3350 17 GRAM(S): 17 POWDER, FOR SOLUTION ORAL at 05:40

## 2025-02-15 RX ADMIN — Medication 500 MILLIGRAM(S): at 09:35

## 2025-02-15 RX ADMIN — Medication 15 MILLILITER(S): at 09:35

## 2025-02-15 RX ADMIN — Medication 500 MILLIGRAM(S): at 22:01

## 2025-02-15 RX ADMIN — NOREPINEPHRINE BITARTRATE 4.89 MICROGRAM(S)/KG/MIN: 8 SOLUTION at 05:42

## 2025-02-15 RX ADMIN — LACOSAMIDE 100 MILLIGRAM(S): 150 TABLET, FILM COATED ORAL at 09:36

## 2025-02-15 RX ADMIN — ENOXAPARIN SODIUM 40 MILLIGRAM(S): 100 INJECTION SUBCUTANEOUS at 17:17

## 2025-02-15 RX ADMIN — INSULIN LISPRO 8: 100 INJECTION, SOLUTION INTRAVENOUS; SUBCUTANEOUS at 17:27

## 2025-02-15 NOTE — PROGRESS NOTE ADULT - ASSESSMENT
75 yo female w PMH GBM on hospice care (diagnosed in 2024, follows at [facility] under Dr. Hernandez, with reported attempted resection of tumor, on chemotherapy with last chemo 2 months ago), admitted for ams likely 2/2 disease progression After admission to [facility], Upon evaluation pt was obtunded with tremors to RUE, response to noxious stimuli but otherwise no response. Pupils were responsive with saccadic movement. She was slumped to her right sided with gurgling breath sounds.  At that point rapid was called and patient was inevitably intubated. Transferred to [facility]. Discussed with pts daughters post procedure poor prognosis and advised to have formal family meeting to clarify and document wishes.       NEURO  Intubated. Not sedated. Minimally responsive despite not being on sedation.     Continues to have seizures, concern for airway protection as GBM progresses.    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 There is evidence of focal cortical hyperexcitability, more prominent in the right than the left frontotemporal regions. The background pattern shows a burst suppression ratio of 10:1, indicating severe diffuse or multifocal cerebral dysfunction, potentially exacerbated by IV sedatives. Additionally, there is severe generalized and multifocal slowing, further supporting significant underlying cerebral dysfunction. CT head with worsening midline shift. Patient got CT head that shows complete effacement of the cerebral and cerebellar sulci consistent with diffuse edema with effacement of the basal cisterns, worsening subfalcine and uncal herniation with new descending transtentorial herniation.    Continued EEG findings of seizures.  Plan:   s/p valproic acid loading dose 1200 mg, vimpat 200 mg loading  - epilepsy consulted, appreciate recs      -  Keppra 1500mg BID      - Vimpat 100mg BID (monitor tele to ensure no heart block)      - C/w Depakote 500mg q6hrs      - Obtain VPA in AM      - seizure precautions      #GBM  last chemo ~2months ago, follows at [facility].  Per Neurosurgery consult on 1/31, no acute interventions. Worsening midline shift on CT. On CT today, concern for worsening neurological status. Patient got CT head that shows complete effacement of the cerebral and cerebellar sulci consistent with diffuse edema with effacement of the basal cisterns, worsening subfalcine and uncal herniation with new descending transtentorial herniation.  -c/w decadron 4 IV q6h  -epilepsy aware  -palliative care consult       CARDIAC  #Chronic atrial fibrillation.   #afib with RVR   Plan  -bladder scans q6h   -Lasix 40 mg IV push given today given net positive   -BMP every 6 hrs   -consider john (currently on primafit) if pt retaining   -make sure pt having BMs  -pain control with dilaudid 0.5 q4h PRN.   Home med: metoprolol 12.5 BID   - c/w Lovenox ( per NSGY, no increased risk of hemorrhage)  -start Lopressor 12.5 mg BID     PULM  #intubated  - intubated on 2/1  - GOC  - consider trach moving forward    GI  - NPO with tube feeds   - consider PEG moving forward  -Miralax and Senna standing, Lactulose 30q4     #Constipation  Patient without bowel movement for three days, started on Lactulose, s/p 5 bowel movements.   -D/c lactulose and decrease bowel regimen. Break for today, reassess tomorrow.     RENAL  - currently with normal renal function  -Add 1 dose 40 IV Lasix for fluid retention and overload     #hypernatremia  Sodium in the high 160's. Likely 2/2 diabetes insipidus iso advancing brain cancer that is leading to advanced brain swelling.   Plan  -Consult pallative   - the brain swelling will worsen with worsening sodium as time progresses. CLIVE indicated at this time.     Currently with John.     Endo  -maintain on iSS, continue to monitor glucose for further insulin regimen planning as glucose is currently greater than 180 and out of range for goal between 140-180.    Diet changed to Glucerna due to elevated glucose, CTM.     ID  #SIRS  #ESBL Ecoli Bacteremia  #R arm cellulitis   #ESBL ecoli UTI   Systemic inflammatory response syndrome (SIRS).   Meeting SIRS 2/4 (HR>90, WBC>12k) likely iso of UTI.   Bcx with ESBL E. coli, s/p ertapenem treatment. Patient continuing to be tachycardic, tenuous course between fevering and hypothermia.   -c/w Levofloxacin (started 2/13)   -c/w Vancomycin (2/13)        F: none  E: replete K<4, Mg<2  N: npo with tube feeds  VTE Prophylaxis: lovenox 40mg QD  GI: pantoprazole 40 IVP  C: Full Code  D: micu    Lines: Peripheral IV L 22g (2/4) IV L 18 g (2/4), NG tube, John (2/14)

## 2025-02-15 NOTE — PROGRESS NOTE ADULT - SUBJECTIVE AND OBJECTIVE BOX
Patient is a 74y old  Female who presents with a chief complaint of AMS (15 Feb 2025 08:28)      INTERVAL HPI/OVERNIGHT EVENTS:   No overnight events   Afebrile, hemodynamically stable     Subjective: Patient seen at bedside, unchanged exam from yesterday. No response, no pupillary response, no gag reflex.    ICU Vital Signs Last 24 Hrs  T(C): 36.4 (15 Feb 2025 09:38), Max: 37.1 (14 Feb 2025 21:55)  T(F): 97.5 (15 Feb 2025 09:38), Max: 98.8 (14 Feb 2025 21:55)  HR: 118 (15 Feb 2025 10:00) (100 - 123)  BP: 97/55 (15 Feb 2025 10:00) (83/53 - 143/62)  BP(mean): 70 (15 Feb 2025 10:00) (63 - 96)  ABP: --  ABP(mean): --  RR: 12 (15 Feb 2025 10:00) (12 - 20)  SpO2: 98% (15 Feb 2025 10:00) (97% - 100%)    O2 Parameters below as of 15 Feb 2025 10:00  Patient On (Oxygen Delivery Method): ventilator    O2 Concentration (%): 40      I&O's Summary    14 Feb 2025 07:01  -  15 Feb 2025 07:00  --------------------------------------------------------  IN: 1840 mL / OUT: 3730 mL / NET: -1890 mL    15 Feb 2025 07:01  -  15 Feb 2025 10:36  --------------------------------------------------------  IN: 38.1 mL / OUT: 550 mL / NET: -511.9 mL      Mode: AC/ CMV (Assist Control/ Continuous Mandatory Ventilation)  RR (machine): 12  TV (machine): 350  FiO2: 40  PEEP: 5  ITime: 1  MAP: 6.8  PIP: 14      LABS:                        11.8   22.09 )-----------( 245      ( 15 Feb 2025 06:32 )             41.3     02-15    >180[HH]  |  138[H]  |  17  ----------------------------<  391[H]  3.9   |  38[H]  |  0.33[L]    Ca    8.9      15 Feb 2025 06:32  Phos  2.6     02-15  Mg     3.1     02-15    TPro  5.4[L]  /  Alb  2.9[L]  /  TBili  0.2  /  DBili  x   /  AST  32  /  ALT  59[H]  /  AlkPhos  132[H]  02-15      Urinalysis Basic - ( 15 Feb 2025 06:32 )    Color: x / Appearance: x / SG: x / pH: x  Gluc: 391 mg/dL / Ketone: x  / Bili: x / Urobili: x   Blood: x / Protein: x / Nitrite: x   Leuk Esterase: x / RBC: x / WBC x   Sq Epi: x / Non Sq Epi: x / Bacteria: x      CAPILLARY BLOOD GLUCOSE      POCT Blood Glucose.: 350 mg/dL (15 Feb 2025 05:31)  POCT Blood Glucose.: 334 mg/dL (14 Feb 2025 22:56)  POCT Blood Glucose.: 296 mg/dL (14 Feb 2025 18:25)  POCT Blood Glucose.: 286 mg/dL (14 Feb 2025 11:17)        RADIOLOGY & ADDITIONAL TESTS:    Consultant(s) Notes Reviewed:  [x ] YES  [ ] NO    MEDICATIONS  (STANDING):  artificial  tears Solution 1 Drop(s) Both EYES every 12 hours  chlorhexidine 0.12% Liquid 15 milliLiter(s) Oral Mucosa every 12 hours  chlorhexidine 2% Cloths 1 Application(s) Topical <User Schedule>  dexAMETHasone  Injectable 4 milliGRAM(s) IV Push every 6 hours  dextrose 5%. 1000 milliLiter(s) (50 mL/Hr) IV Continuous <Continuous>  dextrose 5%. 1000 milliLiter(s) (100 mL/Hr) IV Continuous <Continuous>  dextrose 50% Injectable 25 Gram(s) IV Push once  dextrose 50% Injectable 12.5 Gram(s) IV Push once  dextrose 50% Injectable 25 Gram(s) IV Push once  enoxaparin Injectable 40 milliGRAM(s) SubCutaneous every 24 hours  glucagon  Injectable 1 milliGRAM(s) IntraMuscular once  influenza  Vaccine (HIGH DOSE) 0.5 milliLiter(s) IntraMuscular once  insulin glargine Injectable (LANTUS) 8 Unit(s) SubCutaneous at bedtime  insulin lispro (ADMELOG) corrective regimen sliding scale   SubCutaneous every 6 hours  lacosamide Solution 100 milliGRAM(s) Oral every 12 hours  levETIRAcetam  Solution 1500 milliGRAM(s) Oral every 12 hours  levoFLOXacin IVPB 750 milliGRAM(s) IV Intermittent every 24 hours  norepinephrine Infusion 0.05 MICROgram(s)/kG/Min (4.89 mL/Hr) IV Continuous <Continuous>  pantoprazole  Injectable 40 milliGRAM(s) IV Push every 24 hours  petrolatum Ophthalmic Ointment 1 Application(s) Both EYES every 12 hours  polyethylene glycol 3350 17 Gram(s) Oral two times a day  senna Syrup 10 milliLiter(s) Oral at bedtime  valproic  acid Syrup 500 milliGRAM(s) Oral every 6 hours  vancomycin  IVPB 750 milliGRAM(s) IV Intermittent every 12 hours    MEDICATIONS  (PRN):  acetaminophen   Oral Liquid .. 520 milliGRAM(s) Oral every 6 hours PRN Temp greater or equal to 38C (100.4F)  dextrose Oral Gel 15 Gram(s) Oral once PRN Blood Glucose LESS THAN 70 milliGRAM(s)/deciliter      PHYSICAL EXAM:  GENERAL: Intubated. Not sedated.   EYES: Pupils fixed. No saccadic movement.  NECK: Trachea midline  NERVOUS SYSTEM:  A&Ox0.  CHEST/LUNG: B/L good air entry; No rales, rhonchi, or wheezing  HEART: S1S2 normal, no S3, Regular rate and rhythm; No murmurs  ABDOMEN: Soft, Nontender, Nondistended; Bowel sounds present  LYMPH: No lymphadenopathy noted  SKIN: No rashes or lesions    Care Discussed with Consultants/Other Providers [ x] YES  [ ] NO

## 2025-02-16 LAB
ALBUMIN SERPL ELPH-MCNC: 2.6 G/DL — LOW (ref 3.3–5)
ALP SERPL-CCNC: 167 U/L — HIGH (ref 40–120)
ALT FLD-CCNC: 91 U/L — HIGH (ref 10–45)
ANION GAP SERPL CALC-SCNC: SIGNIFICANT CHANGE UP MMOL/L (ref 5–17)
AST SERPL-CCNC: 63 U/L — HIGH (ref 10–40)
BASOPHILS # BLD AUTO: 0 K/UL — SIGNIFICANT CHANGE UP (ref 0–0.2)
BASOPHILS NFR BLD AUTO: 0 % — SIGNIFICANT CHANGE UP (ref 0–2)
BILIRUB SERPL-MCNC: <0.2 MG/DL — SIGNIFICANT CHANGE UP (ref 0.2–1.2)
BUN SERPL-MCNC: 29 MG/DL — HIGH (ref 7–23)
CALCIUM SERPL-MCNC: 8.8 MG/DL — SIGNIFICANT CHANGE UP (ref 8.4–10.5)
CHLORIDE SERPL-SCNC: 139 MMOL/L — HIGH (ref 96–108)
CO2 SERPL-SCNC: 36 MMOL/L — HIGH (ref 22–31)
CREAT SERPL-MCNC: 0.39 MG/DL — LOW (ref 0.5–1.3)
EGFR: 104 ML/MIN/1.73M2 — SIGNIFICANT CHANGE UP
EOSINOPHIL # BLD AUTO: 0 K/UL — SIGNIFICANT CHANGE UP (ref 0–0.5)
EOSINOPHIL NFR BLD AUTO: 0 % — SIGNIFICANT CHANGE UP (ref 0–6)
GLUCOSE SERPL-MCNC: 527 MG/DL — CRITICAL HIGH (ref 70–99)
HCT VFR BLD CALC: 39.6 % — SIGNIFICANT CHANGE UP (ref 34.5–45)
HGB BLD-MCNC: 10.9 G/DL — LOW (ref 11.5–15.5)
LYMPHOCYTES # BLD AUTO: 0.21 K/UL — LOW (ref 1–3.3)
LYMPHOCYTES # BLD AUTO: 0.9 % — LOW (ref 13–44)
MAGNESIUM SERPL-MCNC: 3.4 MG/DL — HIGH (ref 1.6–2.6)
MCHC RBC-ENTMCNC: 27.5 G/DL — LOW (ref 32–36)
MCHC RBC-ENTMCNC: 28.4 PG — SIGNIFICANT CHANGE UP (ref 27–34)
MCV RBC AUTO: 103.1 FL — HIGH (ref 80–100)
MONOCYTES # BLD AUTO: 1.07 K/UL — HIGH (ref 0–0.9)
MONOCYTES NFR BLD AUTO: 4.6 % — SIGNIFICANT CHANGE UP (ref 2–14)
NEUTROPHILS # BLD AUTO: 21.58 K/UL — HIGH (ref 1.8–7.4)
NEUTROPHILS NFR BLD AUTO: 90 % — HIGH (ref 43–77)
PHOSPHATE SERPL-MCNC: 2.4 MG/DL — LOW (ref 2.5–4.5)
PLATELET # BLD AUTO: 207 K/UL — SIGNIFICANT CHANGE UP (ref 150–400)
POTASSIUM SERPL-MCNC: 4.3 MMOL/L — SIGNIFICANT CHANGE UP (ref 3.5–5.3)
POTASSIUM SERPL-SCNC: 4.3 MMOL/L — SIGNIFICANT CHANGE UP (ref 3.5–5.3)
PROT SERPL-MCNC: 5.2 G/DL — LOW (ref 6–8.3)
RBC # BLD: 3.84 M/UL — SIGNIFICANT CHANGE UP (ref 3.8–5.2)
RBC # FLD: 18.7 % — HIGH (ref 10.3–14.5)
SODIUM SERPL-SCNC: >180 MMOL/L — CRITICAL HIGH (ref 135–145)
WBC # BLD: 23.28 K/UL — HIGH (ref 3.8–10.5)
WBC # FLD AUTO: 23.28 K/UL — HIGH (ref 3.8–10.5)

## 2025-02-16 PROCEDURE — 99291 CRITICAL CARE FIRST HOUR: CPT

## 2025-02-16 RX ORDER — DESMOPRESSIN ACETATE 4 UG/ML
0.25 INJECTION INTRAVENOUS ONCE
Refills: 0 | Status: COMPLETED | OUTPATIENT
Start: 2025-02-16 | End: 2025-02-16

## 2025-02-16 RX ORDER — VANCOMYCIN HCL IN 5 % DEXTROSE 1.5G/250ML
750 PLASTIC BAG, INJECTION (ML) INTRAVENOUS EVERY 12 HOURS
Refills: 0 | Status: DISCONTINUED | OUTPATIENT
Start: 2025-02-16 | End: 2025-02-18

## 2025-02-16 RX ORDER — INSULIN GLARGINE-YFGN 100 [IU]/ML
16 INJECTION, SOLUTION SUBCUTANEOUS AT BEDTIME
Refills: 0 | Status: DISCONTINUED | OUTPATIENT
Start: 2025-02-16 | End: 2025-02-17

## 2025-02-16 RX ADMIN — DEXAMETHASONE 4 MILLIGRAM(S): 0.5 TABLET ORAL at 12:03

## 2025-02-16 RX ADMIN — Medication 250 MILLIGRAM(S): at 14:39

## 2025-02-16 RX ADMIN — Medication 3 UNIT(S): at 23:25

## 2025-02-16 RX ADMIN — LACOSAMIDE 100 MILLIGRAM(S): 150 TABLET, FILM COATED ORAL at 23:28

## 2025-02-16 RX ADMIN — LEVETIRACETAM 1500 MILLIGRAM(S): 10 INJECTION, SOLUTION INTRAVENOUS at 23:27

## 2025-02-16 RX ADMIN — INSULIN LISPRO 10: 100 INJECTION, SOLUTION INTRAVENOUS; SUBCUTANEOUS at 23:28

## 2025-02-16 RX ADMIN — DEXAMETHASONE 4 MILLIGRAM(S): 0.5 TABLET ORAL at 23:26

## 2025-02-16 RX ADMIN — INSULIN LISPRO 8: 100 INJECTION, SOLUTION INTRAVENOUS; SUBCUTANEOUS at 12:19

## 2025-02-16 RX ADMIN — Medication 500 MILLIGRAM(S): at 23:52

## 2025-02-16 RX ADMIN — Medication 1 APPLICATION(S): at 07:14

## 2025-02-16 RX ADMIN — Medication 3 UNIT(S): at 17:11

## 2025-02-16 RX ADMIN — ENOXAPARIN SODIUM 40 MILLIGRAM(S): 100 INJECTION SUBCUTANEOUS at 17:11

## 2025-02-16 RX ADMIN — Medication 3 UNIT(S): at 12:15

## 2025-02-16 RX ADMIN — DESMOPRESSIN ACETATE 0.25 MICROGRAM(S): 4 INJECTION INTRAVENOUS at 12:39

## 2025-02-16 RX ADMIN — INSULIN GLARGINE-YFGN 16 UNIT(S): 100 INJECTION, SOLUTION SUBCUTANEOUS at 23:27

## 2025-02-16 RX ADMIN — LACOSAMIDE 100 MILLIGRAM(S): 150 TABLET, FILM COATED ORAL at 09:39

## 2025-02-16 RX ADMIN — LEVETIRACETAM 1500 MILLIGRAM(S): 10 INJECTION, SOLUTION INTRAVENOUS at 09:39

## 2025-02-16 RX ADMIN — INSULIN LISPRO 10: 100 INJECTION, SOLUTION INTRAVENOUS; SUBCUTANEOUS at 17:09

## 2025-02-16 RX ADMIN — Medication 15 MILLILITER(S): at 23:51

## 2025-02-16 RX ADMIN — Medication 250 MILLIGRAM(S): at 03:25

## 2025-02-16 RX ADMIN — Medication 1 DROP(S): at 09:39

## 2025-02-16 RX ADMIN — Medication 1 APPLICATION(S): at 23:28

## 2025-02-16 RX ADMIN — DEXAMETHASONE 4 MILLIGRAM(S): 0.5 TABLET ORAL at 06:42

## 2025-02-16 RX ADMIN — Medication 500 MILLIGRAM(S): at 09:39

## 2025-02-16 RX ADMIN — Medication 500 MILLIGRAM(S): at 03:20

## 2025-02-16 RX ADMIN — INSULIN LISPRO 10: 100 INJECTION, SOLUTION INTRAVENOUS; SUBCUTANEOUS at 06:42

## 2025-02-16 RX ADMIN — Medication 500 MILLIGRAM(S): at 14:39

## 2025-02-16 RX ADMIN — Medication 40 MILLIGRAM(S): at 09:39

## 2025-02-16 RX ADMIN — DEXAMETHASONE 4 MILLIGRAM(S): 0.5 TABLET ORAL at 17:10

## 2025-02-16 RX ADMIN — Medication 1 DROP(S): at 23:29

## 2025-02-16 RX ADMIN — Medication 1 APPLICATION(S): at 09:40

## 2025-02-16 RX ADMIN — Medication 15 MILLILITER(S): at 09:39

## 2025-02-16 NOTE — PROGRESS NOTE ADULT - ASSESSMENT
75 yo female w PMH GBM on hospice care (diagnosed in 2024, follows at [facility] under Dr. Hernandez, with reported attempted resection of tumor, on chemotherapy with last chemo 2 months ago), admitted for ams likely 2/2 disease progression After admission to [facility], Upon evaluation pt was obtunded with tremors to RUE, response to noxious stimuli but otherwise no response. Pupils were responsive with saccadic movement. She was slumped to her right sided with gurgling breath sounds.  At that point rapid was called and patient was inevitably intubated. Transferred to [facility]. Discussed with pts daughters post procedure poor prognosis and advised to have formal family meeting to clarify and document wishes.       NEURO  Intubated. Not sedated. Minimally responsive despite not being on sedation.     Continues to have seizures, concern for airway protection as GBM progresses.    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 There is evidence of focal cortical hyperexcitability, more prominent in the right than the left frontotemporal regions. The background pattern shows a burst suppression ratio of 10:1, indicating severe diffuse or multifocal cerebral dysfunction, potentially exacerbated by IV sedatives. Additionally, there is severe generalized and multifocal slowing, further supporting significant underlying cerebral dysfunction. CT head with worsening midline shift. Patient got CT head that shows complete effacement of the cerebral and cerebellar sulci consistent with diffuse edema with effacement of the basal cisterns, worsening subfalcine and uncal herniation with new descending transtentorial herniation.    Continued EEG findings of seizures.  Plan:   s/p valproic acid loading dose 1200 mg, vimpat 200 mg loading  - epilepsy consulted, appreciate recs      -  Keppra 1500mg BID      - Vimpat 100mg BID (monitor tele to ensure no heart block)      - C/w Depakote 500mg q6hrs      - Obtain VPA in AM      - seizure precautions      #GBM  last chemo ~2months ago, follows at [facility].  Per Neurosurgery consult on 1/31, no acute interventions. Worsening midline shift on CT. On CT today, concern for worsening neurological status. Patient got CT head that shows complete effacement of the cerebral and cerebellar sulci consistent with diffuse edema with effacement of the basal cisterns, worsening subfalcine and uncal herniation with new descending transtentorial herniation.  -c/w decadron 4 IV q6h  -epilepsy aware  -palliative care consult       CARDIAC  #Chronic atrial fibrillation.   #afib with RVR   Plan  -bladder scans q6h   -Lasix 40 mg IV push given today given net positive   -BMP every 6 hrs   -consider john (currently on primafit) if pt retaining   -make sure pt having BMs  -pain control with dilaudid 0.5 q4h PRN.   Home med: metoprolol 12.5 BID   - c/w Lovenox ( per NSGY, no increased risk of hemorrhage)  -start Lopressor 12.5 mg BID     PULM  #intubated  - intubated on 2/1  - GOC  - consider trach moving forward    GI  - NPO with tube feeds   - consider PEG moving forward  -Miralax and Senna standing, Lactulose 30q4     #Constipation  Patient without bowel movement for three days, started on Lactulose, s/p 5 bowel movements.   -D/c lactulose and decrease bowel regimen. Break for today, reassess tomorrow.     RENAL  - currently with normal renal function  -Add 1 dose 40 IV Lasix for fluid retention and overload     #hypernatremia  Sodium in the high 160's. Likely 2/2 diabetes insipidus iso advancing brain cancer that is leading to advanced brain swelling.   Plan  -Consult pallative   - the brain swelling will worsen with worsening sodium as time progresses. CLIVE indicated at this time.     Currently with John.     Endo  -maintain on iSS, continue to monitor glucose for further insulin regimen planning as glucose is currently greater than 180 and out of range for goal between 140-180.    Diet changed to Glucerna due to elevated glucose, CTM.     ID  #SIRS  #ESBL Ecoli Bacteremia  #R arm cellulitis   #ESBL ecoli UTI   Systemic inflammatory response syndrome (SIRS).   Meeting SIRS 2/4 (HR>90, WBC>12k) likely iso of UTI.   Bcx with ESBL E. coli, s/p ertapenem treatment. Patient continuing to be tachycardic, tenuous course between fevering and hypothermia.   -c/w Levofloxacin (started 2/13)   -c/w Vancomycin (2/13)        F: none  E: replete K<4, Mg<2  N: npo with tube feeds  VTE Prophylaxis: lovenox 40mg QD  GI: pantoprazole 40 IVP  C: Full Code  D: micu    Lines: Peripheral IV L 22g (2/4) IV L 18 g (2/4), NG tube, John (2/14)     73 yo female w PMH GBM on hospice care (diagnosed in 2024, follows at [facility] under Dr. Hernandez, with reported attempted resection of tumor, on chemotherapy with last chemo 2 months ago), admitted for ams likely 2/2 disease progression After admission to [facility], Upon evaluation pt was obtunded with tremors to RUE, response to noxious stimuli but otherwise no response. Pupils were responsive with saccadic movement. She was slumped to her right sided with gurgling breath sounds.  At that point rapid was called and patient was inevitably intubated. Transferred to [facility]. Discussed with pts daughters post procedure poor prognosis and advised to have formal family meeting to clarify and document wishes.       NEURO  Intubated. Not sedated. Minimally responsive despite not being on sedation.     Continues to have seizures, concern for airway protection as GBM progresses.    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 There is evidence of focal cortical hyperexcitability, more prominent in the right than the left frontotemporal regions. The background pattern shows a burst suppression ratio of 10:1, indicating severe diffuse or multifocal cerebral dysfunction, potentially exacerbated by IV sedatives. Additionally, there is severe generalized and multifocal slowing, further supporting significant underlying cerebral dysfunction. CT head with worsening midline shift. Patient got CT head that shows complete effacement of the cerebral and cerebellar sulci consistent with diffuse edema with effacement of the basal cisterns, worsening subfalcine and uncal herniation with new descending transtentorial herniation.    Continued EEG findings of seizures.  Plan:   s/p valproic acid loading dose 1200 mg, vimpat 200 mg loading  - epilepsy consulted, appreciate recs      -  Keppra 1500mg BID      - Vimpat 100mg BID (monitor tele to ensure no heart block)      - C/w Depakote 500mg q6hrs      - Obtain VPA in AM      - seizure precautions      #GBM  last chemo ~2months ago, follows at [facility].  Per Neurosurgery consult on 1/31, no acute interventions. Worsening midline shift on CT. On CT today, concern for worsening neurological status. Patient got CT head that shows complete effacement of the cerebral and cerebellar sulci consistent with diffuse edema with effacement of the basal cisterns, worsening subfalcine and uncal herniation with new descending transtentorial herniation.  -c/w decadron 4 IV q6h  -epilepsy aware  -palliative care consult       CARDIAC  #Chronic atrial fibrillation.   #afib with RVR   Plan  -bladder scans q6h   -Lasix 40 mg IV push given today given net positive   -BMP every 6 hrs   -consider john (currently on primafit) if pt retaining   -make sure pt having BMs  -pain control with dilaudid 0.5 q4h PRN.   Home med: metoprolol 12.5 BID   - c/w Lovenox ( per NSGY, no increased risk of hemorrhage)  -start Lopressor 12.5 mg BID     PULM  #intubated  - intubated on 2/1  - GOC  - consider trach moving forward    GI  - NPO with tube feeds   - consider PEG moving forward  -Miralax and Senna standing, Lactulose 30q4     #Constipation  Patient without bowel movement for three days, started on Lactulose, s/p 5 bowel movements.   -D/c lactulose and decrease bowel regimen. Break for today, reassess tomorrow.     RENAL  - currently with normal renal function  -Add 1 dose 40 IV Lasix for fluid retention and overload     #hypernatremia  Sodium in the high 160's. Likely 2/2 diabetes insipidus iso advancing brain cancer that is leading to advanced brain swelling.   Plan  - Consult palliative   - the brain swelling will worsen with worsening sodium as time progresses. CLIVE indicated at this time.   - s/p 0.25mcg DDAVP 2/16    Currently with John.     Endo  -maintain on iSS, continue to monitor glucose for further insulin regimen planning as glucose is currently greater than 180 and out of range for goal between 140-180.  - lantus 16 units QHS  - regular insulin 3units Q6H   Diet changed to Glucerna due to elevated glucose, CTM.     ID  #SIRS  #ESBL Ecoli Bacteremia  #R arm cellulitis   #ESBL ecoli UTI   Systemic inflammatory response syndrome (SIRS).   Meeting SIRS 2/4 (HR>90, WBC>12k) likely iso of UTI.   Bcx with ESBL E. coli, s/p ertapenem treatment. Patient continuing to be tachycardic, tenuous course between fevering and hypothermia.   -c/w Levofloxacin (2/13-)   -c/w Vancomycin (2/13-)        F: none  E: replete K<4, Mg<2  N: npo with tube feeds  VTE Prophylaxis: lovenox 40mg QD  GI: pantoprazole 40 IVP  C: Full Code  D: micu    Lines: Peripheral IV L 22g (2/4) IV L 18 g (2/4), NG tube, John (2/14)

## 2025-02-16 NOTE — PROGRESS NOTE ADULT - SUBJECTIVE AND OBJECTIVE BOX
SUBJECTIVE:  ORVILLE SOLANO is doing well this morning. Today is hospital day 16d.     24 Hour Events:   - No acute overnight events.    ALLERGIES:  No Known Allergies    MEDICATIONS:  STANDING MEDICATIONS  artificial  tears Solution 1 Drop(s) Both EYES every 12 hours  chlorhexidine 0.12% Liquid 15 milliLiter(s) Oral Mucosa every 12 hours  chlorhexidine 2% Cloths 1 Application(s) Topical <User Schedule>  dexAMETHasone  Injectable 4 milliGRAM(s) IV Push every 6 hours  dextrose 5%. 1000 milliLiter(s) IV Continuous <Continuous>  dextrose 5%. 1000 milliLiter(s) IV Continuous <Continuous>  dextrose 50% Injectable 25 Gram(s) IV Push once  dextrose 50% Injectable 12.5 Gram(s) IV Push once  dextrose 50% Injectable 25 Gram(s) IV Push once  enoxaparin Injectable 40 milliGRAM(s) SubCutaneous every 24 hours  glucagon  Injectable 1 milliGRAM(s) IntraMuscular once  influenza  Vaccine (HIGH DOSE) 0.5 milliLiter(s) IntraMuscular once  insulin glargine Injectable (LANTUS) 8 Unit(s) SubCutaneous at bedtime  insulin lispro (ADMELOG) corrective regimen sliding scale   SubCutaneous every 6 hours  lacosamide Solution 100 milliGRAM(s) Oral every 12 hours  levETIRAcetam  Solution 1500 milliGRAM(s) Oral every 12 hours  levoFLOXacin IVPB 750 milliGRAM(s) IV Intermittent every 24 hours  norepinephrine Infusion 0.05 MICROgram(s)/kG/Min IV Continuous <Continuous>  pantoprazole  Injectable 40 milliGRAM(s) IV Push every 24 hours  petrolatum Ophthalmic Ointment 1 Application(s) Both EYES every 12 hours  polyethylene glycol 3350 17 Gram(s) Oral two times a day  senna Syrup 10 milliLiter(s) Oral at bedtime  valproic  acid Syrup 500 milliGRAM(s) Oral every 6 hours  vancomycin  IVPB 750 milliGRAM(s) IV Intermittent every 12 hours    PRN MEDICATIONS  acetaminophen   Oral Liquid .. 520 milliGRAM(s) Oral every 6 hours PRN  dextrose Oral Gel 15 Gram(s) Oral once PRN    VITALS:   ICU Vital Signs Last 24 Hrs  T(C): 37.3 (16 Feb 2025 01:06), Max: 37.3 (16 Feb 2025 01:06)  T(F): 99.1 (16 Feb 2025 01:06), Max: 99.1 (16 Feb 2025 01:06)  HR: 129 (16 Feb 2025 05:00) (104 - 131)  BP: 102/55 (16 Feb 2025 05:00) (84/50 - 116/59)  BP(mean): 75 (16 Feb 2025 05:00) (62 - 82)  ABP: --  ABP(mean): --  RR: 14 (16 Feb 2025 05:00) (12 - 26)  SpO2: 96% (16 Feb 2025 05:00) (95% - 98%)    O2 Parameters below as of 16 Feb 2025 05:00  Patient On (Oxygen Delivery Method): ventilator    O2 Concentration (%): 40    PHYSICAL EXAM  GENERAL: Intubated. Not sedated.   EYES: Pupils fixed. No saccadic movement.  NECK: Trachea midline  NERVOUS SYSTEM:  A&Ox0.  CHEST/LUNG: B/L good air entry; No rales, rhonchi, or wheezing  HEART: S1S2 normal, no S3, Regular rate and rhythm; No murmurs  ABDOMEN: Soft, Nontender, Nondistended; Bowel sounds present  LYMPH: No lymphadenopathy noted  SKIN: No rashes    LABS:                        10.9   23.28 )-----------( 207      ( 16 Feb 2025 04:34 )             39.6     02-16    >180[HH]  |  139[H]  |  29[H]  ----------------------------<  527[HH]  4.3   |  36[H]  |  0.39[L]    Ca    8.8      16 Feb 2025 04:34  Phos  2.4     02-16  Mg     3.4     02-16    TPro  5.2[L]  /  Alb  2.6[L]  /  TBili  <0.2  /  DBili  x   /  AST  63[H]  /  ALT  91[H]  /  AlkPhos  167[H]  02-16      Urinalysis Basic - ( 16 Feb 2025 04:34 )    Color: x / Appearance: x / SG: x / pH: x  Gluc: 527 mg/dL / Ketone: x  / Bili: x / Urobili: x   Blood: x / Protein: x / Nitrite: x   Leuk Esterase: x / RBC: x / WBC x   Sq Epi: x / Non Sq Epi: x / Bacteria: x    MICRO:    Urinalysis with Rflx Culture (collected 14 Feb 2025 12:37)      CARDS:  no new    RADIOLOGY:  no new    Lines:  Wade Catheter:   Indwelling Urethral Catheter:     Connect To:  Straight Drainage/Gravity    Indication:  Urine Output Monitoring in Critically Ill (02-14-25 @ 14:05) (not performed) [Active] SUBJECTIVE:  ORVILLE SOLANO is intubated. Today is hospital day 16d.     24 Hour Events:   - No acute overnight events.    ALLERGIES:  No Known Allergies    MEDICATIONS:  STANDING MEDICATIONS  artificial  tears Solution 1 Drop(s) Both EYES every 12 hours  chlorhexidine 0.12% Liquid 15 milliLiter(s) Oral Mucosa every 12 hours  chlorhexidine 2% Cloths 1 Application(s) Topical <User Schedule>  dexAMETHasone  Injectable 4 milliGRAM(s) IV Push every 6 hours  dextrose 5%. 1000 milliLiter(s) IV Continuous <Continuous>  dextrose 5%. 1000 milliLiter(s) IV Continuous <Continuous>  dextrose 50% Injectable 25 Gram(s) IV Push once  dextrose 50% Injectable 12.5 Gram(s) IV Push once  dextrose 50% Injectable 25 Gram(s) IV Push once  enoxaparin Injectable 40 milliGRAM(s) SubCutaneous every 24 hours  glucagon  Injectable 1 milliGRAM(s) IntraMuscular once  influenza  Vaccine (HIGH DOSE) 0.5 milliLiter(s) IntraMuscular once  insulin glargine Injectable (LANTUS) 8 Unit(s) SubCutaneous at bedtime  insulin lispro (ADMELOG) corrective regimen sliding scale   SubCutaneous every 6 hours  lacosamide Solution 100 milliGRAM(s) Oral every 12 hours  levETIRAcetam  Solution 1500 milliGRAM(s) Oral every 12 hours  levoFLOXacin IVPB 750 milliGRAM(s) IV Intermittent every 24 hours  norepinephrine Infusion 0.05 MICROgram(s)/kG/Min IV Continuous <Continuous>  pantoprazole  Injectable 40 milliGRAM(s) IV Push every 24 hours  petrolatum Ophthalmic Ointment 1 Application(s) Both EYES every 12 hours  polyethylene glycol 3350 17 Gram(s) Oral two times a day  senna Syrup 10 milliLiter(s) Oral at bedtime  valproic  acid Syrup 500 milliGRAM(s) Oral every 6 hours  vancomycin  IVPB 750 milliGRAM(s) IV Intermittent every 12 hours    PRN MEDICATIONS  acetaminophen   Oral Liquid .. 520 milliGRAM(s) Oral every 6 hours PRN  dextrose Oral Gel 15 Gram(s) Oral once PRN    VITALS:   ICU Vital Signs Last 24 Hrs  T(C): 37.3 (16 Feb 2025 01:06), Max: 37.3 (16 Feb 2025 01:06)  T(F): 99.1 (16 Feb 2025 01:06), Max: 99.1 (16 Feb 2025 01:06)  HR: 129 (16 Feb 2025 05:00) (104 - 131)  BP: 102/55 (16 Feb 2025 05:00) (84/50 - 116/59)  BP(mean): 75 (16 Feb 2025 05:00) (62 - 82)  ABP: --  ABP(mean): --  RR: 14 (16 Feb 2025 05:00) (12 - 26)  SpO2: 96% (16 Feb 2025 05:00) (95% - 98%)    O2 Parameters below as of 16 Feb 2025 05:00  Patient On (Oxygen Delivery Method): ventilator    O2 Concentration (%): 40    PHYSICAL EXAM  GENERAL: Intubated. Not sedated.   EYES: Pupils fixed. No saccadic movement. Oculocephalic reflex not intact  NECK: Trachea midline  NERVOUS SYSTEM:  A&Ox0.  CHEST/LUNG: B/L good air entry; No rales, rhonchi, or wheezing  HEART: S1S2 normal, no S3, Regular rate and rhythm; No murmurs  ABDOMEN: Soft, Nontender, Nondistended; Bowel sounds present  LYMPH: No lymphadenopathy noted  SKIN: No rashes    LABS:                        10.9   23.28 )-----------( 207      ( 16 Feb 2025 04:34 )             39.6     02-16    >180[HH]  |  139[H]  |  29[H]  ----------------------------<  527[HH]  4.3   |  36[H]  |  0.39[L]    Ca    8.8      16 Feb 2025 04:34  Phos  2.4     02-16  Mg     3.4     02-16    TPro  5.2[L]  /  Alb  2.6[L]  /  TBili  <0.2  /  DBili  x   /  AST  63[H]  /  ALT  91[H]  /  AlkPhos  167[H]  02-16      Urinalysis Basic - ( 16 Feb 2025 04:34 )    Color: x / Appearance: x / SG: x / pH: x  Gluc: 527 mg/dL / Ketone: x  / Bili: x / Urobili: x   Blood: x / Protein: x / Nitrite: x   Leuk Esterase: x / RBC: x / WBC x   Sq Epi: x / Non Sq Epi: x / Bacteria: x    MICRO:    Urinalysis with Rflx Culture (collected 14 Feb 2025 12:37)      CARDS:  no new    RADIOLOGY:  no new    Lines:  Wade Catheter:   Indwelling Urethral Catheter:     Connect To:  Straight Drainage/Gravity    Indication:  Urine Output Monitoring in Critically Ill (02-14-25 @ 14:05) (not performed) [Active]

## 2025-02-17 LAB
ALBUMIN SERPL ELPH-MCNC: 2.6 G/DL — LOW (ref 3.3–5)
ALP SERPL-CCNC: 243 U/L — HIGH (ref 40–120)
ALT FLD-CCNC: 128 U/L — HIGH (ref 10–45)
ANION GAP SERPL CALC-SCNC: >13 MMOL/L — SIGNIFICANT CHANGE UP (ref 5–17)
ANION GAP SERPL CALC-SCNC: SIGNIFICANT CHANGE UP MMOL/L (ref 5–17)
AST SERPL-CCNC: 83 U/L — HIGH (ref 10–40)
BASOPHILS # BLD AUTO: 0 K/UL — SIGNIFICANT CHANGE UP (ref 0–0.2)
BASOPHILS # BLD AUTO: 0 K/UL — SIGNIFICANT CHANGE UP (ref 0–0.2)
BASOPHILS NFR BLD AUTO: 0 % — SIGNIFICANT CHANGE UP (ref 0–2)
BASOPHILS NFR BLD AUTO: 0 % — SIGNIFICANT CHANGE UP (ref 0–2)
BILIRUB SERPL-MCNC: 0.2 MG/DL — SIGNIFICANT CHANGE UP (ref 0.2–1.2)
BUN SERPL-MCNC: 33 MG/DL — HIGH (ref 7–23)
BUN SERPL-MCNC: 46 MG/DL — HIGH (ref 7–23)
CALCIUM SERPL-MCNC: 8.9 MG/DL — SIGNIFICANT CHANGE UP (ref 8.4–10.5)
CALCIUM SERPL-MCNC: 9 MG/DL — SIGNIFICANT CHANGE UP (ref 8.4–10.5)
CHLORIDE SERPL-SCNC: 138 MMOL/L — HIGH (ref 96–108)
CHLORIDE SERPL-SCNC: 138 MMOL/L — HIGH (ref 96–108)
CO2 SERPL-SCNC: 30 MMOL/L — SIGNIFICANT CHANGE UP (ref 22–31)
CO2 SERPL-SCNC: 36 MMOL/L — HIGH (ref 22–31)
CREAT SERPL-MCNC: 0.41 MG/DL — LOW (ref 0.5–1.3)
CREAT SERPL-MCNC: 0.62 MG/DL — SIGNIFICANT CHANGE UP (ref 0.5–1.3)
CULTURE RESULTS: SIGNIFICANT CHANGE UP
EGFR: 103 ML/MIN/1.73M2 — SIGNIFICANT CHANGE UP
EGFR: 93 ML/MIN/1.73M2 — SIGNIFICANT CHANGE UP
EOSINOPHIL # BLD AUTO: 0 K/UL — SIGNIFICANT CHANGE UP (ref 0–0.5)
EOSINOPHIL # BLD AUTO: 0 K/UL — SIGNIFICANT CHANGE UP (ref 0–0.5)
EOSINOPHIL NFR BLD AUTO: 0 % — SIGNIFICANT CHANGE UP (ref 0–6)
EOSINOPHIL NFR BLD AUTO: 0 % — SIGNIFICANT CHANGE UP (ref 0–6)
GLUCOSE SERPL-MCNC: 366 MG/DL — HIGH (ref 70–99)
GLUCOSE SERPL-MCNC: 489 MG/DL — CRITICAL HIGH (ref 70–99)
HCT VFR BLD CALC: 37 % — SIGNIFICANT CHANGE UP (ref 34.5–45)
HCT VFR BLD CALC: 38.9 % — SIGNIFICANT CHANGE UP (ref 34.5–45)
HGB BLD-MCNC: 10.2 G/DL — LOW (ref 11.5–15.5)
HGB BLD-MCNC: 10.8 G/DL — LOW (ref 11.5–15.5)
LYMPHOCYTES # BLD AUTO: 1.07 K/UL — SIGNIFICANT CHANGE UP (ref 1–3.3)
LYMPHOCYTES # BLD AUTO: 1.22 K/UL — SIGNIFICANT CHANGE UP (ref 1–3.3)
LYMPHOCYTES # BLD AUTO: 4.6 % — LOW (ref 13–44)
LYMPHOCYTES # BLD AUTO: 5.4 % — LOW (ref 13–44)
MAGNESIUM SERPL-MCNC: 3.4 MG/DL — HIGH (ref 1.6–2.6)
MAGNESIUM SERPL-MCNC: 3.8 MG/DL — HIGH (ref 1.6–2.6)
MCHC RBC-ENTMCNC: 27.6 G/DL — LOW (ref 32–36)
MCHC RBC-ENTMCNC: 27.8 G/DL — LOW (ref 32–36)
MCHC RBC-ENTMCNC: 28.6 PG — SIGNIFICANT CHANGE UP (ref 27–34)
MCHC RBC-ENTMCNC: 29.4 PG — SIGNIFICANT CHANGE UP (ref 27–34)
MCV RBC AUTO: 103.6 FL — HIGH (ref 80–100)
MCV RBC AUTO: 106 FL — HIGH (ref 80–100)
MONOCYTES # BLD AUTO: 0.61 K/UL — SIGNIFICANT CHANGE UP (ref 0–0.9)
MONOCYTES # BLD AUTO: 0.63 K/UL — SIGNIFICANT CHANGE UP (ref 0–0.9)
MONOCYTES NFR BLD AUTO: 2.7 % — SIGNIFICANT CHANGE UP (ref 2–14)
MONOCYTES NFR BLD AUTO: 2.7 % — SIGNIFICANT CHANGE UP (ref 2–14)
NEUTROPHILS # BLD AUTO: 20.07 K/UL — HIGH (ref 1.8–7.4)
NEUTROPHILS # BLD AUTO: 20.36 K/UL — HIGH (ref 1.8–7.4)
NEUTROPHILS NFR BLD AUTO: 85.3 % — HIGH (ref 43–77)
NEUTROPHILS NFR BLD AUTO: 86.5 % — HIGH (ref 43–77)
NRBC BLD AUTO-RTO: SIGNIFICANT CHANGE UP /100 WBCS (ref 0–0)
PHOSPHATE SERPL-MCNC: 2.3 MG/DL — LOW (ref 2.5–4.5)
PHOSPHATE SERPL-MCNC: 4.1 MG/DL — SIGNIFICANT CHANGE UP (ref 2.5–4.5)
PLATELET # BLD AUTO: 147 K/UL — LOW (ref 150–400)
PLATELET # BLD AUTO: 159 K/UL — SIGNIFICANT CHANGE UP (ref 150–400)
POTASSIUM SERPL-MCNC: 3.7 MMOL/L — SIGNIFICANT CHANGE UP (ref 3.5–5.3)
POTASSIUM SERPL-MCNC: 6.5 MMOL/L — CRITICAL HIGH (ref 3.5–5.3)
POTASSIUM SERPL-SCNC: 3.7 MMOL/L — SIGNIFICANT CHANGE UP (ref 3.5–5.3)
POTASSIUM SERPL-SCNC: 6.5 MMOL/L — CRITICAL HIGH (ref 3.5–5.3)
PROT SERPL-MCNC: 5.1 G/DL — LOW (ref 6–8.3)
RBC # BLD: 3.57 M/UL — LOW (ref 3.8–5.2)
RBC # BLD: 3.67 M/UL — LOW (ref 3.8–5.2)
RBC # FLD: 18.7 % — HIGH (ref 10.3–14.5)
RBC # FLD: 19.1 % — HIGH (ref 10.3–14.5)
SODIUM SERPL-SCNC: >180 MMOL/L — CRITICAL HIGH (ref 135–145)
SODIUM SERPL-SCNC: >181 MMOL/L — CRITICAL HIGH (ref 135–145)
SPECIMEN SOURCE: SIGNIFICANT CHANGE UP
VALPROATE FREE SERPL-MCNC: 17.2 UG/ML — SIGNIFICANT CHANGE UP (ref 6–22)
VALPROATE FREE SERPL-MCNC: 39.2 UG/ML — HIGH (ref 6–22)
WBC # BLD: 22.6 K/UL — HIGH (ref 3.8–10.5)
WBC # BLD: 23.28 K/UL — HIGH (ref 3.8–10.5)
WBC # FLD AUTO: 22.6 K/UL — HIGH (ref 3.8–10.5)
WBC # FLD AUTO: 23.28 K/UL — HIGH (ref 3.8–10.5)

## 2025-02-17 PROCEDURE — 36000 PLACE NEEDLE IN VEIN: CPT

## 2025-02-17 PROCEDURE — 76937 US GUIDE VASCULAR ACCESS: CPT | Mod: 26,59

## 2025-02-17 PROCEDURE — 99291 CRITICAL CARE FIRST HOUR: CPT

## 2025-02-17 RX ORDER — INSULIN GLARGINE-YFGN 100 [IU]/ML
20 INJECTION, SOLUTION SUBCUTANEOUS AT BEDTIME
Refills: 0 | Status: DISCONTINUED | OUTPATIENT
Start: 2025-02-17 | End: 2025-02-17

## 2025-02-17 RX ORDER — DEXTROSE 50 % IN WATER 50 %
25 SYRINGE (ML) INTRAVENOUS ONCE
Refills: 0 | Status: DISCONTINUED | OUTPATIENT
Start: 2025-02-17 | End: 2025-02-25

## 2025-02-17 RX ORDER — DESMOPRESSIN ACETATE 4 UG/ML
1 INJECTION INTRAVENOUS EVERY 12 HOURS
Refills: 0 | Status: DISCONTINUED | OUTPATIENT
Start: 2025-02-17 | End: 2025-02-19

## 2025-02-17 RX ORDER — SODIUM ZIRCONIUM CYCLOSILICATE 5 G/5G
10 POWDER, FOR SUSPENSION ORAL ONCE
Refills: 0 | Status: COMPLETED | OUTPATIENT
Start: 2025-02-17 | End: 2025-02-17

## 2025-02-17 RX ADMIN — Medication 9 UNIT(S): at 11:41

## 2025-02-17 RX ADMIN — Medication 9 UNIT(S): at 18:20

## 2025-02-17 RX ADMIN — INSULIN LISPRO 12: 100 INJECTION, SOLUTION INTRAVENOUS; SUBCUTANEOUS at 06:18

## 2025-02-17 RX ADMIN — LACOSAMIDE 100 MILLIGRAM(S): 150 TABLET, FILM COATED ORAL at 10:31

## 2025-02-17 RX ADMIN — Medication 250 MILLIGRAM(S): at 13:33

## 2025-02-17 RX ADMIN — Medication 1 APPLICATION(S): at 10:18

## 2025-02-17 RX ADMIN — Medication 15 MILLILITER(S): at 21:29

## 2025-02-17 RX ADMIN — Medication 250 MILLIGRAM(S): at 03:08

## 2025-02-17 RX ADMIN — Medication 500 MILLIGRAM(S): at 05:48

## 2025-02-17 RX ADMIN — LEVETIRACETAM 1500 MILLIGRAM(S): 10 INJECTION, SOLUTION INTRAVENOUS at 10:15

## 2025-02-17 RX ADMIN — SODIUM ZIRCONIUM CYCLOSILICATE 10 GRAM(S): 5 POWDER, FOR SUSPENSION ORAL at 22:19

## 2025-02-17 RX ADMIN — DEXAMETHASONE 4 MILLIGRAM(S): 0.5 TABLET ORAL at 17:59

## 2025-02-17 RX ADMIN — NOREPINEPHRINE BITARTRATE 4.89 MICROGRAM(S)/KG/MIN: 8 SOLUTION at 21:49

## 2025-02-17 RX ADMIN — Medication 1 DROP(S): at 10:19

## 2025-02-17 RX ADMIN — Medication 100 MILLIEQUIVALENT(S): at 15:38

## 2025-02-17 RX ADMIN — Medication 1 APPLICATION(S): at 21:43

## 2025-02-17 RX ADMIN — LACOSAMIDE 100 MILLIGRAM(S): 150 TABLET, FILM COATED ORAL at 21:29

## 2025-02-17 RX ADMIN — Medication 100 MILLIEQUIVALENT(S): at 17:43

## 2025-02-17 RX ADMIN — DESMOPRESSIN ACETATE 1 MICROGRAM(S): 4 INJECTION INTRAVENOUS at 10:15

## 2025-02-17 RX ADMIN — Medication 1 DROP(S): at 21:34

## 2025-02-17 RX ADMIN — POLYETHYLENE GLYCOL 3350 17 GRAM(S): 17 POWDER, FOR SOLUTION ORAL at 17:59

## 2025-02-17 RX ADMIN — ENOXAPARIN SODIUM 40 MILLIGRAM(S): 100 INJECTION SUBCUTANEOUS at 17:59

## 2025-02-17 RX ADMIN — Medication 3 UNIT(S): at 06:20

## 2025-02-17 RX ADMIN — INSULIN LISPRO 10: 100 INJECTION, SOLUTION INTRAVENOUS; SUBCUTANEOUS at 11:43

## 2025-02-17 RX ADMIN — Medication 5 UNIT(S)/HR: at 21:48

## 2025-02-17 RX ADMIN — Medication 15 MILLILITER(S): at 10:16

## 2025-02-17 RX ADMIN — INSULIN LISPRO 10: 100 INJECTION, SOLUTION INTRAVENOUS; SUBCUTANEOUS at 18:17

## 2025-02-17 RX ADMIN — Medication 160 MILLILITER(S): at 21:47

## 2025-02-17 RX ADMIN — Medication 100 MILLIEQUIVALENT(S): at 16:33

## 2025-02-17 RX ADMIN — DEXAMETHASONE 4 MILLIGRAM(S): 0.5 TABLET ORAL at 11:31

## 2025-02-17 RX ADMIN — LEVETIRACETAM 1500 MILLIGRAM(S): 10 INJECTION, SOLUTION INTRAVENOUS at 21:29

## 2025-02-17 RX ADMIN — Medication 10 MILLILITER(S): at 21:38

## 2025-02-17 RX ADMIN — DEXAMETHASONE 4 MILLIGRAM(S): 0.5 TABLET ORAL at 05:49

## 2025-02-17 RX ADMIN — Medication 40 MILLIEQUIVALENT(S): at 15:38

## 2025-02-17 RX ADMIN — Medication 500 MILLIGRAM(S): at 17:59

## 2025-02-17 RX ADMIN — DESMOPRESSIN ACETATE 1 MICROGRAM(S): 4 INJECTION INTRAVENOUS at 21:33

## 2025-02-17 RX ADMIN — Medication 40 MILLIGRAM(S): at 10:15

## 2025-02-17 RX ADMIN — NOREPINEPHRINE BITARTRATE 4.89 MICROGRAM(S)/KG/MIN: 8 SOLUTION at 03:08

## 2025-02-17 RX ADMIN — Medication 500 MILLIGRAM(S): at 11:31

## 2025-02-17 NOTE — PROCEDURE NOTE - NSAPPROACH_GEN_A_CORE
via natural/artificial opening
percutaneous
percutaneous
via natural/artificial opening
peripheral

## 2025-02-17 NOTE — PROGRESS NOTE ADULT - ASSESSMENT
75 yo female w PMH GBM on hospice care (diagnosed in 2024, follows at [facility] under Dr. Hernandez, with reported attempted resection of tumor, on chemotherapy with last chemo 2 months ago), admitted for ams likely 2/2 disease progression After admission to [facility], Upon evaluation pt was obtunded with tremors to RUE, response to noxious stimuli but otherwise no response. Pupils were responsive with saccadic movement. She was slumped to her right sided with gurgling breath sounds.  At that point rapid was called and patient was inevitably intubated. Transferred to [facility]. Discussed with pts daughters post procedure poor prognosis and advised to have formal family meeting to clarify and document wishes.       NEURO  Intubated. Not sedated. Minimally responsive despite not being on sedation.     Continues to have seizures, concern for airway protection as GBM progresses.    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 There is evidence of focal cortical hyperexcitability, more prominent in the right than the left frontotemporal regions. The background pattern shows a burst suppression ratio of 10:1, indicating severe diffuse or multifocal cerebral dysfunction, potentially exacerbated by IV sedatives. Additionally, there is severe generalized and multifocal slowing, further supporting significant underlying cerebral dysfunction. CT head with worsening midline shift. Patient got CT head that shows complete effacement of the cerebral and cerebellar sulci consistent with diffuse edema with effacement of the basal cisterns, worsening subfalcine and uncal herniation with new descending transtentorial herniation.    Continued EEG findings of seizures.  Plan:   s/p valproic acid loading dose 1200 mg, vimpat 200 mg loading  - epilepsy consulted, appreciate recs      -  Keppra 1500mg BID      - Vimpat 100mg BID (monitor tele to ensure no heart block)      - C/w Depakote 500mg q6hrs      - seizure precautions      #GBM  last chemo ~2months ago, follows at [facility].  Per Neurosurgery consult on 1/31, no acute interventions. Worsening midline shift on CT. On CT today, concern for worsening neurological status. Patient got CT head that shows complete effacement of the cerebral and cerebellar sulci consistent with diffuse edema with effacement of the basal cisterns, worsening subfalcine and uncal herniation with new descending transtentorial herniation.  -c/w decadron 4 IV q6h  -epilepsy aware  -palliative care consult       CARDIAC  #Chronic atrial fibrillation.   #afib with RVR   Plan  -bladder scans q6h   -ctm BMP  -s/p john   Home med: metoprolol 12.5 BID   - c/w Lovenox ( per NSGY, no increased risk of hemorrhage)  -start Lopressor 12.5 mg BID     PULM  #intubated  - intubated on 2/1  - GOC  - consider trach moving forward    GI  - NPO with tube feeds   - consider PEG moving forward  -Miralax and Senna standing    RENAL  - currently with normal renal function    #hypernatremia  Sodium in the high 160's. Likely 2/2 diabetes insipidus iso advancing brain cancer that is leading to advanced brain swelling.   Plan  - Consult palliative   - the brain swelling will worsen with worsening sodium as time progresses. CLIVE indicated at this time.   - s/p 0.25mcg DDAVP 2/16    Currently with John.     Endo  -maintain on iSS, continue to monitor glucose for further insulin regimen planning as glucose is currently greater than 180 and out of range for goal between 140-180.  - lantus 16 units QHS  - regular insulin 9 units q6hrs  Diet changed to Glucerna due to elevated glucose, CTM.     ID  #SIRS  #ESBL Ecoli Bacteremia  #R arm cellulitis   #ESBL ecoli UTI   Systemic inflammatory response syndrome (SIRS).   Meeting SIRS 2/4 (HR>90, WBC>12k) likely iso of UTI.   Bcx with ESBL E. coli, s/p ertapenem treatment. Patient continuing to be tachycardic, tenuous course between fevering and hypothermia.   -c/w Levofloxacin (2/13-)   -c/w Vancomycin (2/13-)        F: none  E: replete K<4, Mg<2  N: npo with tube feeds  VTE Prophylaxis: lovenox 40mg QD  GI: pantoprazole 40 IVP  C: Full Code  D: micu    Lines: Peripheral IV L 22g (2/4) IV L 18 g (2/4), NG tube, John (2/14)     73 yo female w PMH GBM on hospice care (diagnosed in 2024, follows at [facility] under Dr. Hernandez, with reported attempted resection of tumor, on chemotherapy with last chemo 2 months ago), admitted for ams likely 2/2 disease progression After admission to [facility], Upon evaluation pt was obtunded with tremors to RUE, response to noxious stimuli but otherwise no response. Pupils were responsive with saccadic movement. She was slumped to her right sided with gurgling breath sounds.  At that point rapid was called and patient was inevitably intubated. Transferred to [facility]. Discussed with pts daughters post procedure poor prognosis and advised to have formal family meeting to clarify and document wishes.       NEURO  Intubated. Not sedated. Minimally responsive despite not being on sedation.     Continues to have seizures, concern for airway protection as GBM progresses.    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 There is evidence of focal cortical hyperexcitability, more prominent in the right than the left frontotemporal regions. The background pattern shows a burst suppression ratio of 10:1, indicating severe diffuse or multifocal cerebral dysfunction, potentially exacerbated by IV sedatives. Additionally, there is severe generalized and multifocal slowing, further supporting significant underlying cerebral dysfunction. CT head with worsening midline shift. Patient got CT head that shows complete effacement of the cerebral and cerebellar sulci consistent with diffuse edema with effacement of the basal cisterns, worsening subfalcine and uncal herniation with new descending transtentorial herniation.    Continued EEG findings of seizures.  Plan:   s/p valproic acid loading dose 1200 mg, vimpat 200 mg loading  - epilepsy consulted, appreciate recs      -  Keppra 1500mg BID      - Vimpat 100mg BID (monitor tele to ensure no heart block)      - C/w Depakote 500mg q6hrs      - seizure precautions      #GBM  last chemo ~2months ago, follows at [facility].  Per Neurosurgery consult on 1/31, no acute interventions. Worsening midline shift on CT. On CT today, concern for worsening neurological status. Patient got CT head that shows complete effacement of the cerebral and cerebellar sulci consistent with diffuse edema with effacement of the basal cisterns, worsening subfalcine and uncal herniation with new descending transtentorial herniation.  -c/w decadron 4 IV q6h  -epilepsy aware  -palliative care consult       CARDIAC  #Chronic atrial fibrillation.   #afib with RVR   Plan  -bladder scans q6h   -ctm BMP  -s/p john   Home med: metoprolol 12.5 BID   - c/w Lovenox ( per NSGY, no increased risk of hemorrhage)  -start Lopressor 12.5 mg BID     PULM  #intubated  - intubated on 2/1  - GOC  - consider trach moving forward    GI  - NPO with tube feeds   - consider PEG moving forward  -Miralax and Senna standing    RENAL  - currently with normal renal function    #hypernatremia  Sodium in the high 160's. Likely 2/2 diabetes insipidus iso advancing brain cancer that is leading to advanced brain swelling.   Plan  - Consult palliative   - the brain swelling will worsen with worsening sodium as time progresses. CLIVE indicated at this time.   - standing DDAVP 2 mcg q12 hrs     Currently with John.     Endo  -maintain on iSS, continue to monitor glucose for further insulin regimen planning as glucose is currently greater than 180 and out of range for goal between 140-180.  - lantus 20 units QHS  - regular insulin 9 units q6hrs  Diet changed to Glucerna due to elevated glucose, CTM.     ID  #SIRS  #ESBL Ecoli Bacteremia  #R arm cellulitis   #ESBL ecoli UTI   Systemic inflammatory response syndrome (SIRS).   Meeting SIRS 2/4 (HR>90, WBC>12k) likely iso of UTI.   Bcx with ESBL E. coli, s/p ertapenem treatment. Patient continuing to be tachycardic, tenuous course between fevering and hypothermia.   -c/w Levofloxacin (2/13-)   -c/w Vancomycin (2/13-)        F: none  E: replete K<4, Mg<2  N: npo with tube feeds  VTE Prophylaxis: lovenox 40mg QD  GI: pantoprazole 40 IVP  C: Full Code  D: micu    Lines: Peripheral IV L 22g (2/4) IV L 18 g (2/4), NG tube, John (2/14)

## 2025-02-17 NOTE — PROGRESS NOTE ADULT - SUBJECTIVE AND OBJECTIVE BOX
Patient is a 74y old  Female who presents with a chief complaint of AMS (17 Feb 2025 07:55)      INTERVAL HPI/OVERNIGHT EVENTS:   No overnight events   Afebrile, hemodynamically stable     ICU Vital Signs Last 24 Hrs  T(C): 34.6 (17 Feb 2025 11:00), Max: 36.7 (16 Feb 2025 17:13)  T(F): 94.3 (17 Feb 2025 11:00), Max: 98.1 (16 Feb 2025 17:13)  HR: 107 (17 Feb 2025 13:00) (88 - 127)  BP: 96/55 (17 Feb 2025 13:00) (80/50 - 125/70)  BP(mean): 72 (17 Feb 2025 13:00) (61 - 92)  ABP: --  ABP(mean): --  RR: 12 (17 Feb 2025 13:00) (7 - 12)  SpO2: 97% (17 Feb 2025 13:00) (95% - 100%)    O2 Parameters below as of 17 Feb 2025 13:00  Patient On (Oxygen Delivery Method): ventilator    O2 Concentration (%): 40      I&O's Summary    16 Feb 2025 07:01  -  17 Feb 2025 07:00  --------------------------------------------------------  IN: 1254.1 mL / OUT: 1330 mL / NET: -75.9 mL    17 Feb 2025 07:01  -  17 Feb 2025 13:36  --------------------------------------------------------  IN: 28.1 mL / OUT: 82 mL / NET: -53.9 mL      Mode: AC/ CMV (Assist Control/ Continuous Mandatory Ventilation)  RR (machine): 12  TV (machine): 350  FiO2: 40  PEEP: 5  ITime: 1  MAP: 6.9  PIP: 15      LABS:                        10.2   23.28 )-----------( 159      ( 17 Feb 2025 05:17 )             37.0     02-17    >180[HH]  |  138[H]  |  33[H]  ----------------------------<  489[HH]  3.7   |  36[H]  |  0.41[L]    Ca    8.9      17 Feb 2025 05:17  Phos  2.3     02-17  Mg     3.4     02-17    TPro  5.1[L]  /  Alb  2.6[L]  /  TBili  0.2  /  DBili  x   /  AST  83[H]  /  ALT  128[H]  /  AlkPhos  243[H]  02-17      Urinalysis Basic - ( 17 Feb 2025 05:17 )    Color: x / Appearance: x / SG: x / pH: x  Gluc: 489 mg/dL / Ketone: x  / Bili: x / Urobili: x   Blood: x / Protein: x / Nitrite: x   Leuk Esterase: x / RBC: x / WBC x   Sq Epi: x / Non Sq Epi: x / Bacteria: x      CAPILLARY BLOOD GLUCOSE      POCT Blood Glucose.: 354 mg/dL (17 Feb 2025 11:35)  POCT Blood Glucose.: 423 mg/dL (17 Feb 2025 05:51)  POCT Blood Glucose.: 385 mg/dL (16 Feb 2025 22:30)  POCT Blood Glucose.: 354 mg/dL (16 Feb 2025 16:54)        RADIOLOGY & ADDITIONAL TESTS:    Consultant(s) Notes Reviewed:  [x ] YES  [ ] NO    MEDICATIONS  (STANDING):  artificial  tears Solution 1 Drop(s) Both EYES every 12 hours  chlorhexidine 0.12% Liquid 15 milliLiter(s) Oral Mucosa every 12 hours  chlorhexidine 2% Cloths 1 Application(s) Topical <User Schedule>  desmopressin Injectable 1 MICROGram(s) IV Push every 12 hours  dexAMETHasone  Injectable 4 milliGRAM(s) IV Push every 6 hours  dextrose 5%. 1000 milliLiter(s) (50 mL/Hr) IV Continuous <Continuous>  dextrose 5%. 1000 milliLiter(s) (100 mL/Hr) IV Continuous <Continuous>  dextrose 50% Injectable 25 Gram(s) IV Push once  dextrose 50% Injectable 12.5 Gram(s) IV Push once  dextrose 50% Injectable 25 Gram(s) IV Push once  enoxaparin Injectable 40 milliGRAM(s) SubCutaneous every 24 hours  glucagon  Injectable 1 milliGRAM(s) IntraMuscular once  influenza  Vaccine (HIGH DOSE) 0.5 milliLiter(s) IntraMuscular once  insulin glargine Injectable (LANTUS) 20 Unit(s) SubCutaneous at bedtime  insulin lispro (ADMELOG) corrective regimen sliding scale   SubCutaneous every 6 hours  insulin regular  human recombinant 9 Unit(s) SubCutaneous every 6 hours  lacosamide Solution 100 milliGRAM(s) Oral every 12 hours  levETIRAcetam  Solution 1500 milliGRAM(s) Oral every 12 hours  levoFLOXacin IVPB 750 milliGRAM(s) IV Intermittent every 24 hours  norepinephrine Infusion 0.05 MICROgram(s)/kG/Min (4.89 mL/Hr) IV Continuous <Continuous>  pantoprazole  Injectable 40 milliGRAM(s) IV Push every 24 hours  petrolatum Ophthalmic Ointment 1 Application(s) Both EYES every 12 hours  polyethylene glycol 3350 17 Gram(s) Oral two times a day  senna Syrup 10 milliLiter(s) Oral at bedtime  valproic  acid Syrup 500 milliGRAM(s) Oral every 6 hours  vancomycin  IVPB 750 milliGRAM(s) IV Intermittent every 12 hours    MEDICATIONS  (PRN):  acetaminophen   Oral Liquid .. 520 milliGRAM(s) Oral every 6 hours PRN Temp greater or equal to 38C (100.4F)  dextrose Oral Gel 15 Gram(s) Oral once PRN Blood Glucose LESS THAN 70 milliGRAM(s)/deciliter      PHYSICAL EXAM:  GENERAL:   HEAD:  Atraumatic, Normocephalic  EYES: EOMI, PERRLA, conjunctiva and sclera clear  NECK: Supple, No JVD, Normal thyroid, no enlarged nodes  NERVOUS SYSTEM:  Alert & Awake.   CHEST/LUNG: B/L good air entry; No rales, rhonchi, or wheezing  HEART: S1S2 normal, no S3, Regular rate and rhythm; No murmurs  ABDOMEN: Soft, Nontender, Nondistended; Bowel sounds present  EXTREMITIES:  2+ Peripheral Pulses, No clubbing, cyanosis, or edema  LYMPH: No lymphadenopathy noted  SKIN: No rashes or lesions    Care Discussed with Consultants/Other Providers [ x] YES  [ ] NO Patient is a 74y old  Female who presents with a chief complaint of AMS (17 Feb 2025 07:55)      INTERVAL HPI/OVERNIGHT EVENTS:   No overnight events   Afebrile, hemodynamically stable.     Subjective: Patient seen and examined at bedside, negative brain stem reflexes, no pupillary response, no gag reflex. Patient did not take any breaths by herself during trial of SBT today.       ICU Vital Signs Last 24 Hrs  T(C): 34.6 (17 Feb 2025 11:00), Max: 36.7 (16 Feb 2025 17:13)  T(F): 94.3 (17 Feb 2025 11:00), Max: 98.1 (16 Feb 2025 17:13)  HR: 107 (17 Feb 2025 13:00) (88 - 127)  BP: 96/55 (17 Feb 2025 13:00) (80/50 - 125/70)  BP(mean): 72 (17 Feb 2025 13:00) (61 - 92)  ABP: --  ABP(mean): --  RR: 12 (17 Feb 2025 13:00) (7 - 12)  SpO2: 97% (17 Feb 2025 13:00) (95% - 100%)    O2 Parameters below as of 17 Feb 2025 13:00  Patient On (Oxygen Delivery Method): ventilator    O2 Concentration (%): 40      I&O's Summary    16 Feb 2025 07:01  -  17 Feb 2025 07:00  --------------------------------------------------------  IN: 1254.1 mL / OUT: 1330 mL / NET: -75.9 mL    17 Feb 2025 07:01  -  17 Feb 2025 13:36  --------------------------------------------------------  IN: 28.1 mL / OUT: 82 mL / NET: -53.9 mL      Mode: AC/ CMV (Assist Control/ Continuous Mandatory Ventilation)  RR (machine): 12  TV (machine): 350  FiO2: 40  PEEP: 5  ITime: 1  MAP: 6.9  PIP: 15      LABS:                        10.2   23.28 )-----------( 159      ( 17 Feb 2025 05:17 )             37.0     02-17    >180[HH]  |  138[H]  |  33[H]  ----------------------------<  489[HH]  3.7   |  36[H]  |  0.41[L]    Ca    8.9      17 Feb 2025 05:17  Phos  2.3     02-17  Mg     3.4     02-17    TPro  5.1[L]  /  Alb  2.6[L]  /  TBili  0.2  /  DBili  x   /  AST  83[H]  /  ALT  128[H]  /  AlkPhos  243[H]  02-17      Urinalysis Basic - ( 17 Feb 2025 05:17 )    Color: x / Appearance: x / SG: x / pH: x  Gluc: 489 mg/dL / Ketone: x  / Bili: x / Urobili: x   Blood: x / Protein: x / Nitrite: x   Leuk Esterase: x / RBC: x / WBC x   Sq Epi: x / Non Sq Epi: x / Bacteria: x      CAPILLARY BLOOD GLUCOSE      POCT Blood Glucose.: 354 mg/dL (17 Feb 2025 11:35)  POCT Blood Glucose.: 423 mg/dL (17 Feb 2025 05:51)  POCT Blood Glucose.: 385 mg/dL (16 Feb 2025 22:30)  POCT Blood Glucose.: 354 mg/dL (16 Feb 2025 16:54)        RADIOLOGY & ADDITIONAL TESTS:    Consultant(s) Notes Reviewed:  [x ] YES  [ ] NO    MEDICATIONS  (STANDING):  artificial  tears Solution 1 Drop(s) Both EYES every 12 hours  chlorhexidine 0.12% Liquid 15 milliLiter(s) Oral Mucosa every 12 hours  chlorhexidine 2% Cloths 1 Application(s) Topical <User Schedule>  desmopressin Injectable 1 MICROGram(s) IV Push every 12 hours  dexAMETHasone  Injectable 4 milliGRAM(s) IV Push every 6 hours  dextrose 5%. 1000 milliLiter(s) (50 mL/Hr) IV Continuous <Continuous>  dextrose 5%. 1000 milliLiter(s) (100 mL/Hr) IV Continuous <Continuous>  dextrose 50% Injectable 25 Gram(s) IV Push once  dextrose 50% Injectable 12.5 Gram(s) IV Push once  dextrose 50% Injectable 25 Gram(s) IV Push once  enoxaparin Injectable 40 milliGRAM(s) SubCutaneous every 24 hours  glucagon  Injectable 1 milliGRAM(s) IntraMuscular once  influenza  Vaccine (HIGH DOSE) 0.5 milliLiter(s) IntraMuscular once  insulin glargine Injectable (LANTUS) 20 Unit(s) SubCutaneous at bedtime  insulin lispro (ADMELOG) corrective regimen sliding scale   SubCutaneous every 6 hours  insulin regular  human recombinant 9 Unit(s) SubCutaneous every 6 hours  lacosamide Solution 100 milliGRAM(s) Oral every 12 hours  levETIRAcetam  Solution 1500 milliGRAM(s) Oral every 12 hours  levoFLOXacin IVPB 750 milliGRAM(s) IV Intermittent every 24 hours  norepinephrine Infusion 0.05 MICROgram(s)/kG/Min (4.89 mL/Hr) IV Continuous <Continuous>  pantoprazole  Injectable 40 milliGRAM(s) IV Push every 24 hours  petrolatum Ophthalmic Ointment 1 Application(s) Both EYES every 12 hours  polyethylene glycol 3350 17 Gram(s) Oral two times a day  senna Syrup 10 milliLiter(s) Oral at bedtime  valproic  acid Syrup 500 milliGRAM(s) Oral every 6 hours  vancomycin  IVPB 750 milliGRAM(s) IV Intermittent every 12 hours    MEDICATIONS  (PRN):  acetaminophen   Oral Liquid .. 520 milliGRAM(s) Oral every 6 hours PRN Temp greater or equal to 38C (100.4F)  dextrose Oral Gel 15 Gram(s) Oral once PRN Blood Glucose LESS THAN 70 milliGRAM(s)/deciliter      PHYSICAL EXAM:  General: intubated, not sedated. no response.   HEENT: Pupils non reactive and fixed at about 2mm.   Heart: RRR, no murmurs, rubs, gallops   Lungs: CTAB   Abdomen: soft, non tender, no bowel sounds heard   Extremities: No edema, cyanosis.   Neuro: No gag reflex, no pupillary response, no brainstem reflexes    Care Discussed with Consultants/Other Providers [ x] YES  [ ] NO

## 2025-02-18 LAB
ALBUMIN SERPL ELPH-MCNC: 2.3 G/DL — LOW (ref 3.3–5)
ALP SERPL-CCNC: 301 U/L — HIGH (ref 40–120)
ALT FLD-CCNC: 174 U/L — HIGH (ref 10–45)
ANION GAP SERPL CALC-SCNC: 14 MMOL/L — SIGNIFICANT CHANGE UP (ref 5–17)
ANION GAP SERPL CALC-SCNC: 8 MMOL/L — SIGNIFICANT CHANGE UP (ref 5–17)
ANION GAP SERPL CALC-SCNC: SIGNIFICANT CHANGE UP MMOL/L (ref 5–17)
AST SERPL-CCNC: 166 U/L — HIGH (ref 10–40)
BASOPHILS # BLD AUTO: 0 K/UL — SIGNIFICANT CHANGE UP (ref 0–0.2)
BASOPHILS NFR BLD AUTO: 0 % — SIGNIFICANT CHANGE UP (ref 0–2)
BILIRUB SERPL-MCNC: 0.4 MG/DL — SIGNIFICANT CHANGE UP (ref 0.2–1.2)
BUN SERPL-MCNC: 44 MG/DL — HIGH (ref 7–23)
BUN SERPL-MCNC: 46 MG/DL — HIGH (ref 7–23)
BUN SERPL-MCNC: 47 MG/DL — HIGH (ref 7–23)
CALCIUM SERPL-MCNC: 8.2 MG/DL — LOW (ref 8.4–10.5)
CALCIUM SERPL-MCNC: 8.3 MG/DL — LOW (ref 8.4–10.5)
CALCIUM SERPL-MCNC: 8.7 MG/DL — SIGNIFICANT CHANGE UP (ref 8.4–10.5)
CHLORIDE SERPL-SCNC: 128 MMOL/L — HIGH (ref 96–108)
CHLORIDE SERPL-SCNC: 134 MMOL/L — HIGH (ref 96–108)
CHLORIDE SERPL-SCNC: 137 MMOL/L — HIGH (ref 96–108)
CO2 SERPL-SCNC: 27 MMOL/L — SIGNIFICANT CHANGE UP (ref 22–31)
CO2 SERPL-SCNC: 28 MMOL/L — SIGNIFICANT CHANGE UP (ref 22–31)
CO2 SERPL-SCNC: 33 MMOL/L — HIGH (ref 22–31)
CREAT SERPL-MCNC: 0.54 MG/DL — SIGNIFICANT CHANGE UP (ref 0.5–1.3)
CREAT SERPL-MCNC: 0.57 MG/DL — SIGNIFICANT CHANGE UP (ref 0.5–1.3)
CREAT SERPL-MCNC: 0.57 MG/DL — SIGNIFICANT CHANGE UP (ref 0.5–1.3)
EGFR: 95 ML/MIN/1.73M2 — SIGNIFICANT CHANGE UP
EGFR: 95 ML/MIN/1.73M2 — SIGNIFICANT CHANGE UP
EGFR: 97 ML/MIN/1.73M2 — SIGNIFICANT CHANGE UP
EOSINOPHIL # BLD AUTO: 0 K/UL — SIGNIFICANT CHANGE UP (ref 0–0.5)
EOSINOPHIL NFR BLD AUTO: 0 % — SIGNIFICANT CHANGE UP (ref 0–6)
GLUCOSE SERPL-MCNC: 299 MG/DL — HIGH (ref 70–99)
GLUCOSE SERPL-MCNC: 343 MG/DL — HIGH (ref 70–99)
GLUCOSE SERPL-MCNC: 395 MG/DL — HIGH (ref 70–99)
HCT VFR BLD CALC: 35.1 % — SIGNIFICANT CHANGE UP (ref 34.5–45)
HGB BLD-MCNC: 9.6 G/DL — LOW (ref 11.5–15.5)
LYMPHOCYTES # BLD AUTO: 0.85 K/UL — LOW (ref 1–3.3)
LYMPHOCYTES # BLD AUTO: 3.8 % — LOW (ref 13–44)
MAGNESIUM SERPL-MCNC: 3.4 MG/DL — HIGH (ref 1.6–2.6)
MAGNESIUM SERPL-MCNC: 3.7 MG/DL — HIGH (ref 1.6–2.6)
MCHC RBC-ENTMCNC: 27.4 G/DL — LOW (ref 32–36)
MCHC RBC-ENTMCNC: 28.2 PG — SIGNIFICANT CHANGE UP (ref 27–34)
MCV RBC AUTO: 103.2 FL — HIGH (ref 80–100)
MONOCYTES # BLD AUTO: 1.05 K/UL — HIGH (ref 0–0.9)
MONOCYTES NFR BLD AUTO: 4.7 % — SIGNIFICANT CHANGE UP (ref 2–14)
NEUTROPHILS # BLD AUTO: 19.52 K/UL — HIGH (ref 1.8–7.4)
NEUTROPHILS NFR BLD AUTO: 87.7 % — HIGH (ref 43–77)
NRBC BLD AUTO-RTO: SIGNIFICANT CHANGE UP /100 WBCS (ref 0–0)
PHOSPHATE SERPL-MCNC: 3.1 MG/DL — SIGNIFICANT CHANGE UP (ref 2.5–4.5)
PHOSPHATE SERPL-MCNC: 3.3 MG/DL — SIGNIFICANT CHANGE UP (ref 2.5–4.5)
PLATELET # BLD AUTO: 125 K/UL — LOW (ref 150–400)
POTASSIUM SERPL-MCNC: 5 MMOL/L — SIGNIFICANT CHANGE UP (ref 3.5–5.3)
POTASSIUM SERPL-MCNC: 6.2 MMOL/L — CRITICAL HIGH (ref 3.5–5.3)
POTASSIUM SERPL-MCNC: SIGNIFICANT CHANGE UP (ref 3.5–5.3)
POTASSIUM SERPL-SCNC: 5 MMOL/L — SIGNIFICANT CHANGE UP (ref 3.5–5.3)
POTASSIUM SERPL-SCNC: 6.2 MMOL/L — CRITICAL HIGH (ref 3.5–5.3)
POTASSIUM SERPL-SCNC: SIGNIFICANT CHANGE UP (ref 3.5–5.3)
PROT SERPL-MCNC: 5 G/DL — LOW (ref 6–8.3)
RBC # BLD: 3.4 M/UL — LOW (ref 3.8–5.2)
RBC # FLD: 19.4 % — HIGH (ref 10.3–14.5)
SODIUM SERPL-SCNC: 169 MMOL/L — CRITICAL HIGH (ref 135–145)
SODIUM SERPL-SCNC: 176 MMOL/L — CRITICAL HIGH (ref 135–145)
SODIUM SERPL-SCNC: >180 MMOL/L — CRITICAL HIGH (ref 135–145)
VALPROATE SERPL-MCNC: 80 UG/ML — SIGNIFICANT CHANGE UP (ref 50–100)
VANCOMYCIN TROUGH SERPL-MCNC: 26.5 UG/ML — CRITICAL HIGH (ref 10–20)
WBC # BLD: 22.26 K/UL — HIGH (ref 3.8–10.5)
WBC # FLD AUTO: 22.26 K/UL — HIGH (ref 3.8–10.5)

## 2025-02-18 PROCEDURE — 36556 INSERT NON-TUNNEL CV CATH: CPT

## 2025-02-18 PROCEDURE — 99233 SBSQ HOSP IP/OBS HIGH 50: CPT

## 2025-02-18 PROCEDURE — 76937 US GUIDE VASCULAR ACCESS: CPT | Mod: 26,59,76

## 2025-02-18 PROCEDURE — 99291 CRITICAL CARE FIRST HOUR: CPT

## 2025-02-18 RX ORDER — DEXTROSE 50 % IN WATER 50 %
50 SYRINGE (ML) INTRAVENOUS ONCE
Refills: 0 | Status: DISCONTINUED | OUTPATIENT
Start: 2025-02-18 | End: 2025-02-18

## 2025-02-18 RX ORDER — INSULIN LISPRO 100 U/ML
3 INJECTION, SOLUTION INTRAVENOUS; SUBCUTANEOUS
Refills: 0 | Status: DISCONTINUED | OUTPATIENT
Start: 2025-02-18 | End: 2025-02-18

## 2025-02-18 RX ORDER — DEXTROSE 50 % IN WATER 50 %
15 SYRINGE (ML) INTRAVENOUS ONCE
Refills: 0 | Status: DISCONTINUED | OUTPATIENT
Start: 2025-02-18 | End: 2025-02-25

## 2025-02-18 RX ORDER — INSULIN LISPRO 100 U/ML
10 INJECTION, SOLUTION INTRAVENOUS; SUBCUTANEOUS ONCE
Refills: 0 | Status: COMPLETED | OUTPATIENT
Start: 2025-02-18 | End: 2025-02-18

## 2025-02-18 RX ORDER — INSULIN GLARGINE-YFGN 100 [IU]/ML
20 INJECTION, SOLUTION SUBCUTANEOUS AT BEDTIME
Refills: 0 | Status: DISCONTINUED | OUTPATIENT
Start: 2025-02-18 | End: 2025-02-20

## 2025-02-18 RX ORDER — INSULIN GLARGINE-YFGN 100 [IU]/ML
10 INJECTION, SOLUTION SUBCUTANEOUS AT BEDTIME
Refills: 0 | Status: DISCONTINUED | OUTPATIENT
Start: 2025-02-18 | End: 2025-02-18

## 2025-02-18 RX ADMIN — Medication 500 MILLIGRAM(S): at 00:07

## 2025-02-18 RX ADMIN — DESMOPRESSIN ACETATE 1 MICROGRAM(S): 4 INJECTION INTRAVENOUS at 17:17

## 2025-02-18 RX ADMIN — Medication 1 DROP(S): at 23:26

## 2025-02-18 RX ADMIN — INSULIN LISPRO 10 UNIT(S): 100 INJECTION, SOLUTION INTRAVENOUS; SUBCUTANEOUS at 06:59

## 2025-02-18 RX ADMIN — ENOXAPARIN SODIUM 40 MILLIGRAM(S): 100 INJECTION SUBCUTANEOUS at 17:17

## 2025-02-18 RX ADMIN — LEVETIRACETAM 1500 MILLIGRAM(S): 10 INJECTION, SOLUTION INTRAVENOUS at 09:38

## 2025-02-18 RX ADMIN — Medication 9 UNIT(S): at 17:28

## 2025-02-18 RX ADMIN — Medication 15 MILLILITER(S): at 22:57

## 2025-02-18 RX ADMIN — Medication 40 MILLIGRAM(S): at 09:38

## 2025-02-18 RX ADMIN — LEVETIRACETAM 1500 MILLIGRAM(S): 10 INJECTION, SOLUTION INTRAVENOUS at 23:46

## 2025-02-18 RX ADMIN — Medication 1 DROP(S): at 09:57

## 2025-02-18 RX ADMIN — DEXAMETHASONE 4 MILLIGRAM(S): 0.5 TABLET ORAL at 06:53

## 2025-02-18 RX ADMIN — Medication 9 UNIT(S): at 11:37

## 2025-02-18 RX ADMIN — Medication 10 MILLILITER(S): at 23:46

## 2025-02-18 RX ADMIN — Medication 500 MILLIGRAM(S): at 08:25

## 2025-02-18 RX ADMIN — DEXAMETHASONE 4 MILLIGRAM(S): 0.5 TABLET ORAL at 11:37

## 2025-02-18 RX ADMIN — Medication 12: at 23:39

## 2025-02-18 RX ADMIN — DESMOPRESSIN ACETATE 1 MICROGRAM(S): 4 INJECTION INTRAVENOUS at 06:54

## 2025-02-18 RX ADMIN — Medication 1 APPLICATION(S): at 07:05

## 2025-02-18 RX ADMIN — Medication 10 UNIT(S): at 02:10

## 2025-02-18 RX ADMIN — Medication 500 MILLIGRAM(S): at 22:57

## 2025-02-18 RX ADMIN — DEXAMETHASONE 4 MILLIGRAM(S): 0.5 TABLET ORAL at 00:05

## 2025-02-18 RX ADMIN — Medication 160 MILLILITER(S): at 18:54

## 2025-02-18 RX ADMIN — Medication 9 UNIT(S): at 06:53

## 2025-02-18 RX ADMIN — LACOSAMIDE 100 MILLIGRAM(S): 150 TABLET, FILM COATED ORAL at 09:38

## 2025-02-18 RX ADMIN — DEXAMETHASONE 4 MILLIGRAM(S): 0.5 TABLET ORAL at 23:48

## 2025-02-18 RX ADMIN — Medication 1 APPLICATION(S): at 23:26

## 2025-02-18 RX ADMIN — Medication 9 UNIT(S): at 23:39

## 2025-02-18 RX ADMIN — LACOSAMIDE 100 MILLIGRAM(S): 150 TABLET, FILM COATED ORAL at 22:58

## 2025-02-18 RX ADMIN — Medication 1 APPLICATION(S): at 09:58

## 2025-02-18 RX ADMIN — Medication 500 MILLIGRAM(S): at 14:39

## 2025-02-18 RX ADMIN — INSULIN GLARGINE-YFGN 20 UNIT(S): 100 INJECTION, SOLUTION SUBCUTANEOUS at 23:46

## 2025-02-18 RX ADMIN — DEXAMETHASONE 4 MILLIGRAM(S): 0.5 TABLET ORAL at 17:17

## 2025-02-18 RX ADMIN — Medication 15 MILLILITER(S): at 09:38

## 2025-02-18 NOTE — PROCEDURE NOTE - NSPROCNAME_GEN_A_CORE
Peripheral Line Insertion
Point of Care Ultrasound Cardiac
Peripheral Line Insertion
Point of Care Ultrasound Cardiac
Point of Care Ultrasound Lung
Point of Care Ultrasound Lung
Arterial Puncture/Cannulation
Tracheal Intubation
Peripheral Line Insertion
Arterial Puncture/Cannulation
Feeding Tube Placement
External Jugular Line Insertion
Gastric Intubation/Gastric Lavage

## 2025-02-18 NOTE — PROGRESS NOTE ADULT - PROBLEM SELECTOR PROBLEM 1
Status epilepticus
Functional quadriplegia
Statement Selected
Glioblastoma
Glioblastoma
Functional quadriplegia
Glioblastoma
Functional quadriplegia
Glioblastoma
Functional quadriplegia

## 2025-02-18 NOTE — PROCEDURE NOTE - ADDITIONAL PROCEDURE DETAILS
R Radial arterial puncture to obtain ABG x 1 attempt, patient tolerated well, direct pressure applied post, pressure dressing applied and hemostasis obtained.
US guided EJ 20g 1.88inch placed to R EJ
Ultrasound guided arterial puncture for labs
USGPIV placement
Nasogastric tube placement requiring one attempt via right nare to depth of 60cm
USGPIV 20g 1.88inch x 1 attempt and labs drawn.
USGPIV placement with labs obtained
SARA reese place post intubation

## 2025-02-18 NOTE — PROGRESS NOTE ADULT - SUBJECTIVE AND OBJECTIVE BOX
Patient is a 74y old  Female who presents with a chief complaint of AMS (18 Feb 2025 15:25)  Hospital Course:   Patient with hx of GBM presented to the floors with altered mental status, tachypnea, and lethargy. Found to have ESBL E Coli bacteremia 2/2 to UTI and started on ertapenem, now s/p tx. While on RMF, she was found to be in status epilepticus confirmed by VEEG. Immediately dosed with ativan and valproic acid, intubated and upgraded to MICU. Here, she was monitored on VEEG and maintained on seizure medications per Epilepsy team guidance. Patient was found to have cerebral edema and initially started on Hypertonic saline, which was then discontinued due to futility (tumor was continuing to grow). Patient weaned off sedation but did not work with worsening mental status, found to have fixed pupils, no breathing off vent, and no gag reflex. 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 Family is aware of the prognosis at this time and would still like to continue with current measures. Ethics consulted (See note from 2/14).     INTERVAL HPI/OVERNIGHT EVENTS:   No overnight events   Afebrile.    Subjective: Patient seen at bedside, unchanged status. Patient without pupillary response, no signs of gag reflex.     ICU Vital Signs Last 24 Hrs  T(C): 36.6 (18 Feb 2025 17:04), Max: 36.6 (18 Feb 2025 13:30)  T(F): 97.9 (18 Feb 2025 17:04), Max: 97.9 (18 Feb 2025 13:30)  HR: 80 (18 Feb 2025 18:00) (80 - 127)  BP: 107/68 (18 Feb 2025 18:00) (88/55 - 156/96)  BP(mean): 82 (18 Feb 2025 18:00) (66 - 120)  ABP: --  ABP(mean): --  RR: 12 (18 Feb 2025 18:00) (12 - 15)  SpO2: 96% (18 Feb 2025 18:00) (78% - 99%)    O2 Parameters below as of 18 Feb 2025 18:00  Patient On (Oxygen Delivery Method): ventilator    O2 Concentration (%): 40      I&O's Summary    17 Feb 2025 07:01  -  18 Feb 2025 07:00  --------------------------------------------------------  IN: 3510.4 mL / OUT: 425 mL / NET: 3085.4 mL    18 Feb 2025 07:01  -  18 Feb 2025 18:39  --------------------------------------------------------  IN: 1344.6 mL / OUT: 280 mL / NET: 1064.6 mL      Mode: AC/ CMV (Assist Control/ Continuous Mandatory Ventilation)  RR (machine): 12  TV (machine): 350  FiO2: 40  PEEP: 5  ITime: 1  MAP: 6.7  PIP: 13      LABS:                        9.6    22.26 )-----------( 125      ( 18 Feb 2025 05:26 )             35.1     02-18    169[HH]  |  128[H]  |  46[H]  ----------------------------<  343[H]  5.0   |  33[H]  |  0.54    Ca    8.3[L]      18 Feb 2025 09:29  Phos  3.1     02-18  Mg     3.4     02-18    TPro  5.0[L]  /  Alb  2.3[L]  /  TBili  0.4  /  DBili  x   /  AST  166[H]  /  ALT  174[H]  /  AlkPhos  301[H]  02-18      Urinalysis Basic - ( 18 Feb 2025 09:29 )    Color: x / Appearance: x / SG: x / pH: x  Gluc: 343 mg/dL / Ketone: x  / Bili: x / Urobili: x   Blood: x / Protein: x / Nitrite: x   Leuk Esterase: x / RBC: x / WBC x   Sq Epi: x / Non Sq Epi: x / Bacteria: x      CAPILLARY BLOOD GLUCOSE      POCT Blood Glucose.: 325 mg/dL (18 Feb 2025 17:21)  POCT Blood Glucose.: 310 mg/dL (18 Feb 2025 11:18)  POCT Blood Glucose.: 371 mg/dL (18 Feb 2025 06:41)  POCT Blood Glucose.: 258 mg/dL (18 Feb 2025 02:08)  POCT Blood Glucose.: 249 mg/dL (18 Feb 2025 01:06)  POCT Blood Glucose.: 291 mg/dL (18 Feb 2025 00:04)  POCT Blood Glucose.: 305 mg/dL (17 Feb 2025 23:07)  POCT Blood Glucose.: 330 mg/dL (17 Feb 2025 22:05)  POCT Blood Glucose.: 336 mg/dL (17 Feb 2025 21:29)        RADIOLOGY & ADDITIONAL TESTS:    Consultant(s) Notes Reviewed:  [x ] YES  [ ] NO    MEDICATIONS  (STANDING):  artificial  tears Solution 1 Drop(s) Both EYES every 12 hours  chlorhexidine 0.12% Liquid 15 milliLiter(s) Oral Mucosa every 12 hours  chlorhexidine 2% Cloths 1 Application(s) Topical <User Schedule>  desmopressin Injectable 1 MICROGram(s) IV Push every 12 hours  dexAMETHasone  Injectable 4 milliGRAM(s) IV Push every 6 hours  dextrose 5%. 1000 milliLiter(s) (50 mL/Hr) IV Continuous <Continuous>  dextrose 5%. 1000 milliLiter(s) (100 mL/Hr) IV Continuous <Continuous>  dextrose 50% Injectable 25 Gram(s) IV Push once  dextrose 50% Injectable 25 Gram(s) IV Push once  dextrose 50% Injectable 12.5 Gram(s) IV Push once  dextrose 50% Injectable 25 Gram(s) IV Push once  enoxaparin Injectable 40 milliGRAM(s) SubCutaneous every 24 hours  glucagon  Injectable 1 milliGRAM(s) IntraMuscular once  influenza  Vaccine (HIGH DOSE) 0.5 milliLiter(s) IntraMuscular once  insulin glargine Injectable (LANTUS) 20 Unit(s) SubCutaneous at bedtime  insulin regular  human corrective regimen sliding scale   SubCutaneous every 6 hours  insulin regular  human recombinant 9 Unit(s) SubCutaneous every 6 hours  lacosamide Solution 100 milliGRAM(s) Oral every 12 hours  levETIRAcetam  Solution 1500 milliGRAM(s) Oral every 12 hours  levoFLOXacin IVPB 750 milliGRAM(s) IV Intermittent every 24 hours  norepinephrine Infusion 0.05 MICROgram(s)/kG/Min (4.89 mL/Hr) IV Continuous <Continuous>  pantoprazole  Injectable 40 milliGRAM(s) IV Push every 24 hours  petrolatum Ophthalmic Ointment 1 Application(s) Both EYES every 12 hours  senna Syrup 10 milliLiter(s) Oral at bedtime  sodium chloride 0.225%. 1000 milliLiter(s) (160 mL/Hr) IV Continuous <Continuous>  valproic  acid Syrup 500 milliGRAM(s) Oral every 6 hours    MEDICATIONS  (PRN):  acetaminophen   Oral Liquid .. 520 milliGRAM(s) Oral every 6 hours PRN Temp greater or equal to 38C (100.4F)  dextrose Oral Gel 15 Gram(s) Oral once PRN Blood Glucose LESS THAN 70 milliGRAM(s)/deciliter  dextrose Oral Gel 15 Gram(s) Oral once PRN Blood Glucose LESS THAN 70 milliGRAM(s)/deciliter      PHYSICAL EXAM:  General: intubated, not sedated. no response.   HEENT: Pupils non reactive and fixed at about 2mm.   Heart: RRR, no murmurs, rubs, gallops   Lungs: CTAB   Abdomen: soft, non tender, no bowel sounds heard   Extremities: No edema, cyanosis.   Neuro: No gag reflex, no pupillary response, no brainstem reflexes noted on exam      Care Discussed with Consultants/Other Providers [ x] YES  [ ] NO

## 2025-02-18 NOTE — PROGRESS NOTE ADULT - ASSESSMENT
75 yo female w PMH GBM on hospice care (diagnosed in 2024, follows at [facility] under Dr. Hernandez, with reported attempted resection of tumor, on chemotherapy with last chemo 2 months ago), admitted for ams likely 2/2 disease progression, found to be in status epilepticus, decision for DNR/DNI reversed by family, patient intubated and stepped up to MICU, now s/p brain herniation, pending further conversations with family.     NEURO  Intubated. Not sedated. Minimally responsive despite not being on sedation.     Continues to have seizures, concern for airway protection as GBM progresses.    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 There is evidence of focal cortical hyperexcitability, more prominent in the right than the left frontotemporal regions. The background pattern shows a burst suppression ratio of 10:1, indicating severe diffuse or multifocal cerebral dysfunction, potentially exacerbated by IV sedatives. Additionally, there is severe generalized and multifocal slowing, further supporting significant underlying cerebral dysfunction. CT head with worsening midline shift. Patient got CT head that shows complete effacement of the cerebral and cerebellar sulci consistent with diffuse edema with effacement of the basal cisterns, worsening subfalcine and uncal herniation with new descending transtentorial herniation.    Continued EEG findings of seizures.  Plan:   s/p valproic acid loading dose 1200 mg, vimpat 200 mg loading  - epilepsy consulted, appreciate recs      -  Keppra 1500mg BID      - Vimpat 100mg BID (monitor tele to ensure no heart block)      - C/w Depakote 500mg q6hrs      - seizure precautions      #GBM  last chemo ~2months ago, follows at [facility].  Per Neurosurgery consult on 1/31, no acute interventions. Worsening midline shift on CT. On CT today, concern for worsening neurological status. Patient got CT head that shows complete effacement of the cerebral and cerebellar sulci consistent with diffuse edema with effacement of the basal cisterns, worsening subfalcine and uncal herniation with new descending transtentorial herniation.  -c/w decadron 4 IV q6h  -epilepsy aware  -palliative care consult     CARDIAC  #Chronic atrial fibrillation.   #afib with RVR   Plan  -bladder scans q6h   -ctm BMP  -s/p john   Home med: metoprolol 12.5 BID   - c/w Lovenox ( per NSGY, no increased risk of hemorrhage)  -start Lopressor 12.5 mg BID     PULM  #intubated  - intubated on 2/1  - GOC  - consider trach moving forward    GI  - NPO with tube feeds   -Miralax and Senna standing    RENAL  - currently with normal renal function    #hypernatremia  Sodium in the high 160's. Likely 2/2 diabetes insipidus iso advancing brain cancer that is leading to advanced brain swelling.   Plan  - the brain swelling will worsen with worsening sodium as time progresses. Can attempt to correct Hypernatremia but will likely be futile iso central diabetes insipidus and worsening brain herniation.   - standing DDAVP 1 mcg q12 hrs     Currently with John.     Endo  -maintain on iSS, continue to monitor glucose for further insulin regimen planning as glucose is currently greater than 180 and out of range for goal between 140-180.  - lantus 20 units QHS  - regular insulin 9 units q6hrs  Diet changed to Glucerna due to elevated glucose, CTM.     ID  #SIRS  #ESBL Ecoli Bacteremia  #R arm cellulitis   #ESBL ecoli UTI   Systemic inflammatory response syndrome (SIRS).   Meeting SIRS 2/4 (HR>90, WBC>12k) likely iso of UTI.   Bcx with ESBL E. coli, s/p ertapenem treatment. Patient continuing to be tachycardic, tenuous course between fevering and hypothermia.   -c/w Levofloxacin (2/13-)   -c/w Vancomycin (2/13-)      F: none  E: replete K<4, Mg<2  N: npo with tube feeds  VTE Prophylaxis: lovenox 40mg QD  GI: pantoprazole 40 IVP q24hrs  C: Full Code  D: micu    Lines: Peripheral IV L 22g (2/4) IV L 18 g (2/4), NG tube, John (2/14)

## 2025-02-18 NOTE — PROGRESS NOTE ADULT - PROBLEM SELECTOR PROBLEM 6
Prophylactic measure
Chronic atrial fibrillation
Prophylactic measure
Encounter for palliative care
Encounter for palliative care
Prophylactic measure
Encounter for palliative care
Prophylactic measure
Encounter for palliative care

## 2025-02-18 NOTE — PROGRESS NOTE ADULT - PROBLEM SELECTOR PLAN 4
UA shows WBC and bacteria.       Plan  -c/w CTX 1g x2 more days   -f/u urine cx  -f/u blood cx
Pt seizures characterized by right hand shaking. Appears to be having focal seizures.     Plan  -c/w valproic acid 500mg BID, switched to 250mg x6h   -f/u valproic acid levels 7.4,  01/31 00h55  -c/w ativan 0,5mg q4h prn for seizures  -vEEG w/ epilepsy consult
Advanced disease  -hospice qualifying diagnosis  -no longer a candidate for DMT
UA shows WBC and bacteria.       Plan  -c/w CTX 1g x2 more days   -f/u urine cx  -f/u blood cx
Advanced disease  -hospice qualifying diagnosis  -no longer a candidate for DMT

## 2025-02-18 NOTE — PROGRESS NOTE ADULT - ASSESSMENT
74 F with advanced GBM, previously on hospice for 3-4 weeks, encephalopathy, dysphagia, functional quadriplegia, encounter for palliative care.    ·	concern for progression to brain death, patient's family suggesting that this would not be relevant in the context for their denzel tradition (i.e. the belief that life continues if the heart is beating)  ·	family cannot prevent primary team from performing brain death testing if deemed appropriate but [employer] Exemption would allow family to delay withdrawal of life support  ·	the primary team has no obligation to escalate care or perform CPR if patient develops hemodynamic instability

## 2025-02-18 NOTE — PROCEDURE NOTE - NSINDICATIONS_GEN_A_CORE
antibiotic therapy/emergency venous access/fluid administration
arterial puncture to obtain ABG's/blood sampling
emergency venous access
feeding tube replacement
drainage
arterial puncture to obtain ABG's
emergency venous access
airway protection
blood sampling
blood sampling
emergency venous access/inadequate peripheral access

## 2025-02-18 NOTE — PROCEDURE NOTE - PROCEDURE DATE TIME, MLM
09-Feb-2025 17:00
17-Feb-2025 21:15
04-Feb-2025 07:30
01-Feb-2025 20:37
07-Feb-2025 16:30
09-Feb-2025 22:29
01-Feb-2025 09:40
17-Feb-2025 21:00
10-Feb-2025 04:12
01-Feb-2025 09:30
09-Feb-2025 18:25

## 2025-02-18 NOTE — PROGRESS NOTE ADULT - PROBLEM SELECTOR PLAN 1
Pt found obtunded in status epilepticus 2/1 GOC as above in note, pt made full code bedside and decision was made to intubate. Given 4mg of ativan and 2gm of valproic acid, currently on propofol gtt.     -C/W propofol gtt  -C/W veeg  -Epilepsy recommendations  -Plan as beolw
PPSV = 10%, requires total assistance for all ADLs  -c/w supportive care, turning and positioning
last chemo ~2months ago, follows at [facility].     Plan  -MRI brain  -f/u records from [facility]   -c/w decadron 4mg q6h (home med: decadron 2mg BID)   -c/w mISS while inpatient for high sugars 2/2 decadron   -palliative care consult   -NSGY recs
last chemo ~2months ago, follows at [facility].     Plan  -MRI brain  -f/u records from [facility]   -c/w decadron 4mg q6h (home med: decadron 2mg BID)   -c/w mISS while inpatient for high sugars 2/2 decadron   -palliative care consult   -NSGY recs
last chemo ~2months ago, follows at [facility].     Plan  -MRI brain  -f/u records from [facility]   -c/w decadron 4mg q6h (home med: decadron 2mg BID)   -c/w mISS while inpatient for high sugars 2/2 decadron   -palliative care consult   -NSGY recs
last chemo ~2months ago, follows at [facility].     Plan  -MRI brain  -f/u records from [facility]   -c/w decadron 4mg q6h (home med: decadron 2mg BID)   -c/w mISS while inpatient for high sugars 2/2 decadron   -palliative care consult   -NSGY recs
PPSV = 10%, requires total assistance for all ADLs  -c/w supportive care, turning and positioning

## 2025-02-18 NOTE — PROGRESS NOTE ADULT - PROBLEM SELECTOR PLAN 5
Afib w/ rvr seen on EKG. Home med: metoprolol 12.5 BID   Today, HR ranging . responding to lopressor 5. R/o retention, constipation, pain    Plan  -c/w home metoprolol, will convert to IV as pt obtunded---> 5mg q8h lopressor IVP  -bladder scans q6h   -consider john (currently on primafit) if pt retaining   -last BM yesterday, make sure pt having BMs  -pain control with dilaudid 0.5 q4h PRN
Patient is Full Code  -compassionate extubation is currently not within [facility] due to denzel tradition  -see above regarding brain death    Multiple discussions with family re: patient's prognosis and disease trajectory. Patient is in the dying phase. Any invasive/aggressive intervention that would be indicated now has risks that outweigh benefits. While all involved medical providers agree that a comfort-centric approach to patient's care is most appropriate, we recognize the family is unable to accept this plan and instead is leaving it "up to God" and their denzel tradition. Regardless, the medical team do not have legal/ethical obligation to apply any intervention (including CPR) that carries a higher risk than benefit. In this case, CPR will absolutely prolong suffering and will not provide any meaningful benefit.    While most involved medical providers would agree that a comfort-centric approach to patient's care is most appropriate, we recognize the family may be unable to formalize DNR due to denzel tradition. Balancing patient autonomy and nonmaleficence, the medical team does not have legal/ethical obligation to apply interventions where risks outweigh benefits such as CPR at end of life.
UA shows WBC and bacteria.   Bcx with ESBL E. coli      Plan  -C/W ertapenem  -f/u urine cx
Patient is Full Code  -ongoing exploration of GOC
Patient is Full Code  -ongoing exploration of GOC
Patient is Full Code  -family is preparing for poor outcome while balancing their denzel tradition  -will need further discussion regarding trach/PEG vs compassionate extubation  -will recommend extubation if tolerable with denzel tradition    Multiple discussion with family re: patient's prognosis and disease trajectory. Patient is in the dying phase. Any invasive/aggressive intervention that would be indicated now has risks that outweigh benefits. While all involved medical providers agree that a comfort-centric approach to patient's care is most appropriate, we recognize the family is unable to accept this plan and instead is leaving it "up to God" and their denzel tradition. Regardless, the medical team do not have legal/ethical obligation to apply any intervention (including CPR) that carries a higher risk than benefit. In this case, CPR will absolutely prolong the patient's suffering and will not provide any meaningful benefit.    While most involved medical providers agree that a comfort-centric approach to patient's care is most appropriate, we recognize the family may be unable to formalize DNR due to denzel tradition. Balancing patient autonomy and nonmaleficence, the medical team does not have legal/ethical obligation to apply interventions where risks outweigh benefits such as CPR at end of life.

## 2025-02-18 NOTE — PROGRESS NOTE ADULT - PROBLEM SELECTOR PLAN 2
Meeting SIRS 2/4 (HR>90, WBC>12k) likely iso of UTI vs. cancer. s/p 1L NS in ED. Pt obtunded GSC score 3. VBG CO2 36, not retaining which would be leading to AMS. Pt afebrile       Plan  -c/w CTX 1g x2 more days  -IVF as needed  -f/u blood cultures   -NSGY consult
In the setting of encephalopathy  -poor candidate for long-term feeding tube, will not change overall disease trajectory
last chemo ~2months ago, follows at [facility].     Plan  -MRI brain  -f/u records from [facility]   -c/w decadron 4mg q6h (home med: decadron 2mg BID)   -c/w mISS while inpatient for high sugars 2/2 decadron   -palliative care consult   -NSGY recs
In the setting of encephalopathy  -poor candidate for long-term feeding tube, will not change overall disease trajectory
Meeting SIRS 2/4 (HR>90, WBC>12k) likely iso of UTI vs. cancer. s/p 1L NS in ED. Pt obtunded GSC score 3. VBG CO2 36, not retaining which would be leading to AMS. Pt afebrile       Plan  -c/w CTX 1g x2 more days  -IVF as needed  -f/u blood cultures   -NSGY consult
In the setting of encephalopathy  -poor candidate for long-term feeding tube but will need to discuss if patient is stabilizing
In the setting of encephalopathy  -poor candidate for long-term feeding tube but will need to discuss if patient is stabilizing

## 2025-02-18 NOTE — PROGRESS NOTE ADULT - PROBLEM SELECTOR PROBLEM 5
Advanced care planning/counseling discussion
Chronic atrial fibrillation
Advanced care planning/counseling discussion
Chronic atrial fibrillation
Advanced care planning/counseling discussion
Acute UTI
Chronic atrial fibrillation
Advanced care planning/counseling discussion
Chronic atrial fibrillation

## 2025-02-18 NOTE — PROGRESS NOTE ADULT - SUBJECTIVE AND OBJECTIVE BOX
[facility] Geriatrics and Palliative Medicine  Naresh Meeks, Palliative Care Attending  Contact Info: Call [phone] (HEAL Line) or message on Microsoft Teams (Naresh Meeks)    SUBJECTIVE AND OBJECTIVE:  INTERVAL HPI/OVERNIGHT EVENTS: Interval events noted. Unable to participate in interview due to encephalopathy. Comprehensive symptom assessment and GOC exploration as noted below. Extensive time spent discussing plan of care with family. Concern for brain death.    ALLERGIES:  No Known Allergies    MEDICATIONS  (STANDING):  artificial  tears Solution 1 Drop(s) Both EYES every 12 hours  chlorhexidine 0.12% Liquid 15 milliLiter(s) Oral Mucosa every 12 hours  chlorhexidine 2% Cloths 1 Application(s) Topical <User Schedule>  desmopressin Injectable 1 MICROGram(s) IV Push every 12 hours  dexAMETHasone  Injectable 4 milliGRAM(s) IV Push every 6 hours  dextrose 5%. 1000 milliLiter(s) (50 mL/Hr) IV Continuous <Continuous>  dextrose 5%. 1000 milliLiter(s) (100 mL/Hr) IV Continuous <Continuous>  dextrose 50% Injectable 25 Gram(s) IV Push once  dextrose 50% Injectable 25 Gram(s) IV Push once  dextrose 50% Injectable 12.5 Gram(s) IV Push once  dextrose 50% Injectable 25 Gram(s) IV Push once  enoxaparin Injectable 40 milliGRAM(s) SubCutaneous every 24 hours  glucagon  Injectable 1 milliGRAM(s) IntraMuscular once  influenza  Vaccine (HIGH DOSE) 0.5 milliLiter(s) IntraMuscular once  insulin glargine Injectable (LANTUS) 20 Unit(s) SubCutaneous at bedtime  insulin regular  human corrective regimen sliding scale   SubCutaneous every 6 hours  insulin regular  human recombinant 9 Unit(s) SubCutaneous every 6 hours  lacosamide Solution 100 milliGRAM(s) Oral every 12 hours  levETIRAcetam  Solution 1500 milliGRAM(s) Oral every 12 hours  levoFLOXacin IVPB 750 milliGRAM(s) IV Intermittent every 24 hours  pantoprazole  Injectable 40 milliGRAM(s) IV Push every 24 hours  petrolatum Ophthalmic Ointment 1 Application(s) Both EYES every 12 hours  senna Syrup 10 milliLiter(s) Oral at bedtime  sodium chloride 0.225%. 1000 milliLiter(s) (160 mL/Hr) IV Continuous <Continuous>  valproic  acid Syrup 500 milliGRAM(s) Oral every 6 hours    MEDICATIONS  (PRN):  acetaminophen   Oral Liquid .. 520 milliGRAM(s) Oral every 6 hours PRN Temp greater or equal to 38C (100.4F)  dextrose Oral Gel 15 Gram(s) Oral once PRN Blood Glucose LESS THAN 70 milliGRAM(s)/deciliter  dextrose Oral Gel 15 Gram(s) Oral once PRN Blood Glucose LESS THAN 70 milliGRAM(s)/deciliter      Analgesic Use (Scheduled and PRNs) for past 24 hours:    lacosamide Solution   100 milliGRAM(s) Oral (02-18-25 @ 09:38)   100 milliGRAM(s) Oral (02-17-25 @ 21:29)    levETIRAcetam  Solution   1500 milliGRAM(s) Oral (02-18-25 @ 09:38)   1500 milliGRAM(s) Oral (02-17-25 @ 21:29)    sodium zirconium cyclosilicate   10 Gram(s) Oral (02-17-25 @ 22:19)    valproic  acid Syrup   500 milliGRAM(s) Oral (02-18-25 @ 14:39)   500 milliGRAM(s) Oral (02-18-25 @ 08:25)   500 milliGRAM(s) Oral (02-18-25 @ 00:07)      ITEMS UNCHECKED ARE NOT PRESENT  PRESENT SYMPTOMS/REVIEW OF SYSTEMS: []Unable to obtain due to poor mentation   Source if other than patient:  []Family   []Team         Vital Signs Last 24 Hrs  T(C): 36.6 (18 Feb 2025 17:04), Max: 36.6 (18 Feb 2025 13:30)  T(F): 97.9 (18 Feb 2025 17:04), Max: 97.9 (18 Feb 2025 13:30)  HR: 74 (18 Feb 2025 20:00) (74 - 121)  BP: 85/57 (18 Feb 2025 19:00) (85/57 - 156/96)  BP(mean): 66 (18 Feb 2025 19:00) (66 - 120)  RR: 12 (18 Feb 2025 20:00) (12 - 24)  SpO2: 97% (18 Feb 2025 20:00) (95% - 99%)    Parameters below as of 18 Feb 2025 20:00  Patient On (Oxygen Delivery Method): ventilator    O2 Concentration (%): 40    LABS: Personally reviewed and interpreted                        9.6    22.26 )-----------( 125      ( 18 Feb 2025 05:26 )             35.1   02-18    169[HH]  |  128[H]  |  46[H]  ----------------------------<  343[H]  5.0   |  33[H]  |  0.54    Ca    8.3[L]      18 Feb 2025 09:29  Phos  3.1     02-18  Mg     3.4     02-18    TPro  5.0[L]  /  Alb  2.3[L]  /  TBili  0.4  /  DBili  x   /  AST  166[H]  /  ALT  174[H]  /  AlkPhos  301[H]  02-18      RADIOLOGY & ADDITIONAL STUDIES: Personally reviewed and interpreted  None new    DISCUSSION OF CASE: Family - to provide updates and emotional support; Primary Team/RN - to discuss plan of care [facility] Geriatrics and Palliative Medicine  Naresh Meeks, Palliative Care Attending  Contact Info: Call [phone] (HEAL Line) or message on [employer] Teams (Naresh Meeks)    SUBJECTIVE AND OBJECTIVE:  INTERVAL HPI/OVERNIGHT EVENTS: Interval events noted. Unable to participate in interview due to encephalopathy. Comprehensive symptom assessment and GOC exploration as noted below. Extensive time spent discussing plan of care with family. Concern for brain death.    ALLERGIES:  No Known Allergies    MEDICATIONS  (STANDING):  artificial  tears Solution 1 Drop(s) Both EYES every 12 hours  chlorhexidine 0.12% Liquid 15 milliLiter(s) Oral Mucosa every 12 hours  chlorhexidine 2% Cloths 1 Application(s) Topical <User Schedule>  desmopressin Injectable 1 MICROGram(s) IV Push every 12 hours  dexAMETHasone  Injectable 4 milliGRAM(s) IV Push every 6 hours  dextrose 5%. 1000 milliLiter(s) (50 mL/Hr) IV Continuous <Continuous>  dextrose 5%. 1000 milliLiter(s) (100 mL/Hr) IV Continuous <Continuous>  dextrose 50% Injectable 25 Gram(s) IV Push once  dextrose 50% Injectable 25 Gram(s) IV Push once  dextrose 50% Injectable 12.5 Gram(s) IV Push once  dextrose 50% Injectable 25 Gram(s) IV Push once  enoxaparin Injectable 40 milliGRAM(s) SubCutaneous every 24 hours  glucagon  Injectable 1 milliGRAM(s) IntraMuscular once  influenza  Vaccine (HIGH DOSE) 0.5 milliLiter(s) IntraMuscular once  insulin glargine Injectable (LANTUS) 20 Unit(s) SubCutaneous at bedtime  insulin regular  human corrective regimen sliding scale   SubCutaneous every 6 hours  insulin regular  human recombinant 9 Unit(s) SubCutaneous every 6 hours  lacosamide Solution 100 milliGRAM(s) Oral every 12 hours  levETIRAcetam  Solution 1500 milliGRAM(s) Oral every 12 hours  levoFLOXacin IVPB 750 milliGRAM(s) IV Intermittent every 24 hours  pantoprazole  Injectable 40 milliGRAM(s) IV Push every 24 hours  petrolatum Ophthalmic Ointment 1 Application(s) Both EYES every 12 hours  senna Syrup 10 milliLiter(s) Oral at bedtime  sodium chloride 0.225%. 1000 milliLiter(s) (160 mL/Hr) IV Continuous <Continuous>  valproic  acid Syrup 500 milliGRAM(s) Oral every 6 hours    MEDICATIONS  (PRN):  acetaminophen   Oral Liquid .. 520 milliGRAM(s) Oral every 6 hours PRN Temp greater or equal to 38C (100.4F)  dextrose Oral Gel 15 Gram(s) Oral once PRN Blood Glucose LESS THAN 70 milliGRAM(s)/deciliter  dextrose Oral Gel 15 Gram(s) Oral once PRN Blood Glucose LESS THAN 70 milliGRAM(s)/deciliter    Analgesic Use (Scheduled and PRNs) for past 24 hours:  lacosamide Solution   100 milliGRAM(s) Oral (02-18-25 @ 09:38)   100 milliGRAM(s) Oral (02-17-25 @ 21:29)  levETIRAcetam  Solution   1500 milliGRAM(s) Oral (02-18-25 @ 09:38)   1500 milliGRAM(s) Oral (02-17-25 @ 21:29)  sodium zirconium cyclosilicate   10 Gram(s) Oral (02-17-25 @ 22:19)  valproic  acid Syrup   500 milliGRAM(s) Oral (02-18-25 @ 14:39)   500 milliGRAM(s) Oral (02-18-25 @ 08:25)   500 milliGRAM(s) Oral (02-18-25 @ 00:07)      ITEMS UNCHECKED ARE NOT PRESENT  PRESENT SYMPTOMS/REVIEW OF SYSTEMS: [x]Unable to obtain due to poor mentation   Source if other than patient:  []Family   []Team     Pain: [] yes [x] no - see PAINAD  QOL impact -  Location -  Aggravating factors -  Quality -  Radiation -  Timing -  Severity (0-10 scale) -  Minimal acceptable level (0-10 scale) -    Dyspnea:                           []Mild  []Moderate []Severe  Anxiety:                             []Mild []Moderate []Severe  Fatigue:                             []Mild []Moderate []Severe  Nausea:                             []Mild []Moderate []Severe  Loss of appetite:              []Mild []Moderate []Severe  Constipation:                    []Mild []Moderate []Severe    Other Symptoms:  [x]All other review of systems negative     GENERAL:  [] NAD []Alert [x]Lethargic  []Cachexia  [x]Unarousable  []Verbal  [x]Non-Verbal  BEHAVIORAL:   []Anxiety  [x]Delirium []Agitation []Cooperative []Oriented x  HEENT:  []Normal  [] Moist Mucous Membranes []Dry mouth   [x]ET Tube/Trach  []Oral lesions  PULMONARY:   []Clear []Tachypnea  []Audible excessive secretions  []Normal Work of Breathing []Labored Breathing  [x]Rhonchi []Crackles []Wheezing  CARDIOVASCULAR:    [x]Regular Rate [x]Regular Rhythm []Irregular []Tachy  []Mumtaz  GASTROINTESTINAL:  [x]Soft  [x]Distended   [x]+BS  [x]Non tender []Tender  []PEG [x]OGT/ NGT  Last BM:  GENITOURINARY:  []Normal [] Incontinent   []Oliguria/Anuria   [x]Wade  MUSCULOSKELETAL:   []Normal Extremities  [x]Weakness  [x]Bed/Wheelchair bound []Edema  NEUROLOGIC:   []No focal deficits  []Cognitive impairment  [x]Dysphagia []Dysarthria []Paresis [x]Encephalopathic  SKIN:   [x]Normal   []Pressure ulcer(s)  []Rash    CRITICAL CARE:  []Shock Present  [ ]Septic [ ]Cardiogenic [ ]Neurologic [ ]Hypovolemic  [] Vasopressors [ ]Inotropes   [x]Respiratory failure present [x]Mechanical Ventilation [ ]Non-invasive ventilatory support [ ]High-Flow  [x]Acute  [ ]Chronic [x]Hypoxic  [ ]Hypercarbic   [ ]Other organ failure    Vital Signs Last 24 Hrs  T(C): 36.6 (18 Feb 2025 17:04), Max: 36.6 (18 Feb 2025 13:30)  T(F): 97.9 (18 Feb 2025 17:04), Max: 97.9 (18 Feb 2025 13:30)  HR: 74 (18 Feb 2025 20:00) (74 - 121)  BP: 85/57 (18 Feb 2025 19:00) (85/57 - 156/96)  BP(mean): 66 (18 Feb 2025 19:00) (66 - 120)  RR: 12 (18 Feb 2025 20:00) (12 - 24)  SpO2: 97% (18 Feb 2025 20:00) (95% - 99%)    Parameters below as of 18 Feb 2025 20:00  Patient On (Oxygen Delivery Method): ventilator  O2 Concentration (%): 40    LABS: Personally reviewed and interpreted                      9.6    22.26 )-----------( 125      ( 18 Feb 2025 05:26 )             35.1   02-18    169[HH]  |  128[H]  |  46[H]  ----------------------------<  343[H]  5.0   |  33[H]  |  0.54    Ca    8.3[L]      18 Feb 2025 09:29  Phos  3.1     02-18  Mg     3.4     02-18  TPro  5.0[L]  /  Alb  2.3[L]  /  TBili  0.4  /  DBili  x   /  AST  166[H]  /  ALT  174[H]  /  AlkPhos  301[H]  02-18    RADIOLOGY & ADDITIONAL STUDIES: Personally reviewed and interpreted  None new    DISCUSSION OF CASE: Family - to provide updates and emotional support; Primary Team/RN - to discuss plan of care [facility] Geriatrics and Palliative Medicine  Naresh Meeks, Palliative Care Attending  Contact Info: Call [phone] (HEAL Line) or message on [employer] Teams (Naresh Meeks)    SUBJECTIVE AND OBJECTIVE:  INTERVAL HPI/OVERNIGHT EVENTS: Interval events noted. Unable to participate in interview due to encephalopathy. Comprehensive symptom assessment and GOC exploration as noted below. Extensive time spent discussing plan of care with family. Concern for brain death.    ALLERGIES:  No Known Allergies    MEDICATIONS  (STANDING):  artificial  tears Solution 1 Drop(s) Both EYES every 12 hours  chlorhexidine 0.12% Liquid 15 milliLiter(s) Oral Mucosa every 12 hours  chlorhexidine 2% Cloths 1 Application(s) Topical <User Schedule>  desmopressin Injectable 1 MICROGram(s) IV Push every 12 hours  dexAMETHasone  Injectable 4 milliGRAM(s) IV Push every 6 hours  dextrose 5%. 1000 milliLiter(s) (50 mL/Hr) IV Continuous <Continuous>  dextrose 5%. 1000 milliLiter(s) (100 mL/Hr) IV Continuous <Continuous>  dextrose 50% Injectable 25 Gram(s) IV Push once  dextrose 50% Injectable 25 Gram(s) IV Push once  dextrose 50% Injectable 12.5 Gram(s) IV Push once  dextrose 50% Injectable 25 Gram(s) IV Push once  enoxaparin Injectable 40 milliGRAM(s) SubCutaneous every 24 hours  glucagon  Injectable 1 milliGRAM(s) IntraMuscular once  influenza  Vaccine (HIGH DOSE) 0.5 milliLiter(s) IntraMuscular once  insulin glargine Injectable (LANTUS) 20 Unit(s) SubCutaneous at bedtime  insulin regular  human corrective regimen sliding scale   SubCutaneous every 6 hours  insulin regular  human recombinant 9 Unit(s) SubCutaneous every 6 hours  lacosamide Solution 100 milliGRAM(s) Oral every 12 hours  levETIRAcetam  Solution 1500 milliGRAM(s) Oral every 12 hours  levoFLOXacin IVPB 750 milliGRAM(s) IV Intermittent every 24 hours  pantoprazole  Injectable 40 milliGRAM(s) IV Push every 24 hours  petrolatum Ophthalmic Ointment 1 Application(s) Both EYES every 12 hours  senna Syrup 10 milliLiter(s) Oral at bedtime  sodium chloride 0.225%. 1000 milliLiter(s) (160 mL/Hr) IV Continuous <Continuous>  valproic  acid Syrup 500 milliGRAM(s) Oral every 6 hours    MEDICATIONS  (PRN):  acetaminophen   Oral Liquid .. 520 milliGRAM(s) Oral every 6 hours PRN Temp greater or equal to 38C (100.4F)  dextrose Oral Gel 15 Gram(s) Oral once PRN Blood Glucose LESS THAN 70 milliGRAM(s)/deciliter  dextrose Oral Gel 15 Gram(s) Oral once PRN Blood Glucose LESS THAN 70 milliGRAM(s)/deciliter    Analgesic Use (Scheduled and PRNs) for past 24 hours:  lacosamide Solution   100 milliGRAM(s) Oral (02-18-25 @ 09:38)   100 milliGRAM(s) Oral (02-17-25 @ 21:29)  levETIRAcetam  Solution   1500 milliGRAM(s) Oral (02-18-25 @ 09:38)   1500 milliGRAM(s) Oral (02-17-25 @ 21:29)  sodium zirconium cyclosilicate   10 Gram(s) Oral (02-17-25 @ 22:19)  valproic  acid Syrup   500 milliGRAM(s) Oral (02-18-25 @ 14:39)   500 milliGRAM(s) Oral (02-18-25 @ 08:25)   500 milliGRAM(s) Oral (02-18-25 @ 00:07)      ITEMS UNCHECKED ARE NOT PRESENT  PRESENT SYMPTOMS/REVIEW OF SYSTEMS: [x]Unable to obtain due to poor mentation   Source if other than patient:  []Family   []Team     Pain: [] yes [x] no - see PAINAD  QOL impact -  Location -  Aggravating factors -  Quality -  Radiation -  Timing -  Severity (0-10 scale) -  Minimal acceptable level (0-10 scale) -    Dyspnea:                           []Mild  []Moderate []Severe  Anxiety:                             []Mild []Moderate []Severe  Fatigue:                             []Mild []Moderate []Severe  Nausea:                             []Mild []Moderate []Severe  Loss of appetite:              []Mild []Moderate []Severe  Constipation:                    []Mild []Moderate []Severe    Other Symptoms:  [x]All other review of systems negative     GENERAL:  [] NAD []Alert [x]Lethargic  []Cachexia  [x]Unarousable  []Verbal  [x]Non-Verbal  BEHAVIORAL:   []Anxiety  [x]Delirium []Agitation []Cooperative []Oriented x  HEENT:  []Normal  [] Moist Mucous Membranes []Dry mouth   [x]ET Tube/Trach  []Oral lesions  PULMONARY:   []Clear []Tachypnea  []Audible excessive secretions  []Normal Work of Breathing []Labored Breathing  [x]Rhonchi []Crackles []Wheezing  CARDIOVASCULAR:    [x]Regular Rate [x]Regular Rhythm []Irregular []Tachy  []Mumtaz  GASTROINTESTINAL:  [x]Soft  [x]Distended   [x]+BS  [x]Non tender []Tender  []PEG [x]OGT/ NGT  Last BM:  GENITOURINARY:  []Normal [] Incontinent   []Oliguria/Anuria   [x]Wade  MUSCULOSKELETAL:   []Normal Extremities  [x]Weakness  [x]Bed/Wheelchair bound []Edema  NEUROLOGIC:   []No focal deficits  []Cognitive impairment  [x]Dysphagia []Dysarthria []Paresis [x]Encephalopathic  SKIN:   [x]Normal   []Pressure ulcer(s)  []Rash    CRITICAL CARE:  []Shock Present  [ ]Septic [ ]Cardiogenic [ ]Neurologic [ ]Hypovolemic  [] Vasopressors [ ]Inotropes   [x]Respiratory failure present [x]Mechanical Ventilation [ ]Non-invasive ventilatory support [ ]High-Flow  [x]Acute  [ ]Chronic [x]Hypoxic  [ ]Hypercarbic   [ ]Other organ failure    Vital Signs Last 24 Hrs  T(C): 36.6 (18 Feb 2025 17:04), Max: 36.6 (18 Feb 2025 13:30)  T(F): 97.9 (18 Feb 2025 17:04), Max: 97.9 (18 Feb 2025 13:30)  HR: 74 (18 Feb 2025 20:00) (74 - 121)  BP: 85/57 (18 Feb 2025 19:00) (85/57 - 156/96)  BP(mean): 66 (18 Feb 2025 19:00) (66 - 120)  RR: 12 (18 Feb 2025 20:00) (12 - 24)  SpO2: 97% (18 Feb 2025 20:00) (95% - 99%)    Parameters below as of 18 Feb 2025 20:00  Patient On (Oxygen Delivery Method): ventilator  O2 Concentration (%): 40    LABS: Personally reviewed and interpreted                      9.6    22.26 )-----------( 125      ( 18 Feb 2025 05:26 )             35.1   02-18    169[HH]  |  128[H]  |  46[H]  ----------------------------<  343[H]  5.0   |  33[H]  |  0.54    Ca    8.3[L]      18 Feb 2025 09:29  Phos  3.1     02-18  Mg     3.4     02-18  TPro  5.0[L]  /  Alb  2.3[L]  /  TBili  0.4  /  DBili  x   /  AST  166[H]  /  ALT  174[H]  /  AlkPhos  301[H]  02-18    RADIOLOGY & ADDITIONAL STUDIES: Personally reviewed and interpreted  None new    DISCUSSION OF CASE: Family - to provide updates and emotional support; Ethics/Primary Team/RN - to discuss plan of care

## 2025-02-18 NOTE — PROCEDURE NOTE - NSSITEPREP_SKIN_A_CORE
chlorhexidine
chlorhexidine/Adherence to aseptic technique: hand hygiene prior to donning barriers (gown, gloves), don cap and mask, sterile drape over patient
chlorhexidine
povidone iodine (if allergic to chlorhexidine)

## 2025-02-18 NOTE — PROGRESS NOTE ADULT - PROBLEM SELECTOR PROBLEM 3
Seizure
Seizure
Encephalopathy, metabolic
Seizure
Encephalopathy, metabolic
Systemic inflammatory response syndrome (SIRS)
Encephalopathy, metabolic
Seizure
Encephalopathy, metabolic

## 2025-02-18 NOTE — PROGRESS NOTE ADULT - PROBLEM SELECTOR PLAN 6
Complex medical decision making / symptom management in the setting of critical illness.    Will continue to follow for ongoing GOC discussion / titration of palliative regimen. Emotional support provided, questions answered.  Active Psychosocial Referrals:  [x]Social Work/Case management []PT/OT [x]Chaplaincy []Hospice  []Patient/Family Support []Holistic RN []Massage Therapy []Music Therapy []Ethics  Coping: [] well [] with difficulty [] poor coping [x] unable to assess  Support system: [x] strong [] adequate [] inadequate    For new or uncontrolled symptoms, please call Palliative Care at [phone] The service is available 24/7 (including nights & weekends) to provide symptom management recommendations over the phone as appropriate
F: tolerating oral  E: replete PRN Mg <2, K > 4  DVT:   N: pantoprazole 40mg daily while on steroids   GI: none  Code Status: full code
F: tolerating oral  E: replete PRN Mg <2, K > 4  DVT:   N: pantoprazole 40mg daily while on steroids   GI: none  Code Status: full code
Afib w/ rvr seen on EKG. Home med: metoprolol 12.5 BID   Today, HR ranging . responding to lopressor 5. R/o retention, constipation, pain    Plan  -c/w home metoprolol, will convert to IV as pt obtunded---> 5mg q8h lopressor IVP  -bladder scans q6h   -consider john (currently on primafit) if pt retaining   -last BM yesterday, make sure pt having BMs  -pain control with dilaudid 0.5 q4h PRN
Complex medical decision making / symptom management in the setting of critical illness.    Will continue to follow for ongoing GOC discussion / titration of palliative regimen. Emotional support provided, questions answered.  Active Psychosocial Referrals:  [x]Social Work/Case management []PT/OT [x]Chaplaincy []Hospice  []Patient/Family Support []Holistic RN []Massage Therapy []Music Therapy [x]Ethics  Coping: [] well [] with difficulty [] poor coping [x] unable to assess  Support system: [x] strong [] adequate [] inadequate    For new or uncontrolled symptoms, please call Palliative Care at [phone] The service is available 24/7 (including nights & weekends) to provide symptom management recommendations over the phone as appropriate
F: tolerating oral  E: replete PRN Mg <2, K > 4  DVT:   N: pantoprazole 40mg daily while on steroids   GI: none  Code Status: full code
F: tolerating oral  E: replete PRN Mg <2, K > 4  DVT:   N: pantoprazole 40mg daily while on steroids   GI: none  Code Status: full code
Complex medical decision making / symptom management in the setting of critical illness.    Will continue to follow for ongoing GOC discussion / titration of palliative regimen. Emotional support provided, questions answered.  Active Psychosocial Referrals:  [x]Social Work/Case management []PT/OT [x]Chaplaincy []Hospice  []Patient/Family Support []Holistic RN []Massage Therapy []Music Therapy []Ethics  Coping: [] well [] with difficulty [] poor coping [x] unable to assess  Support system: [x] strong [] adequate [] inadequate    For new or uncontrolled symptoms, please call Palliative Care at [phone] The service is available 24/7 (including nights & weekends) to provide symptom management recommendations over the phone as appropriate
Complex medical decision making / symptom management in the setting of critical illness.    Will continue to follow for ongoing GOC discussion / titration of palliative regimen. Emotional support provided, questions answered.  Active Psychosocial Referrals:  [x]Social Work/Case management []PT/OT [x]Chaplaincy []Hospice  []Patient/Family Support []Holistic RN []Massage Therapy []Music Therapy []Ethics  Coping: [] well [] with difficulty [] poor coping [x] unable to assess  Support system: [x] strong [] adequate [] inadequate    For new or uncontrolled symptoms, please call Palliative Care at [phone] The service is available 24/7 (including nights & weekends) to provide symptom management recommendations over the phone as appropriate

## 2025-02-18 NOTE — PROCEDURE NOTE - PRACTITIONER PERFORMING THE TIME OUT
myself
Julio Cesar, NP
Julio Cesar, NP
Dr. Latif
myself
myself
Todd, NP
Julio Cesar, NP
Todd, NP
myself
Julio Cesar, NP

## 2025-02-18 NOTE — PROGRESS NOTE ADULT - PROBLEM SELECTOR PROBLEM 2
Dysphagia
Glioblastoma
Systemic inflammatory response syndrome (SIRS)
Dysphagia

## 2025-02-18 NOTE — PROCEDURE NOTE - NSPOSTPRCRAD_GEN_A_CORE
+R heart filling on POCUS w flush of agitated saline/ultrasound
chest radiograph/post-procedure radiography performed
post-procedure radiography performed

## 2025-02-18 NOTE — PROCEDURE NOTE - NSPROCDETAILS_GEN_ALL_CORE
location identified, draped/prepped, sterile technique used/blood seen on insertion/dressing applied/flushes easily/secured in place/sterile technique, catheter placed
location identified, draped/prepped, sterile technique used, needle inserted/introduced/all materials/supplies accounted for at end of procedure
location identified, draped/prepped, sterile technique used/blood seen on insertion/dressing applied/flushes easily/secured in place
nasogastric/audible air bolus
patient pre-oxygenated, tube inserted, placement confirmed
all materials/supplies accounted for at end of procedure
location identified, draped/prepped, sterile technique used/blood seen on insertion/dressing applied/flushes easily/secured in place
location identified, draped/prepped, sterile technique used, needle inserted/introduced/all materials/supplies accounted for at end of procedure
positive blood return obtained via catheter/hemostasis with direct pressure, dressing applied
lumen(s) aspirated and flushed

## 2025-02-18 NOTE — PROCEDURE NOTE - NSANATOMICLLOCATION_GEN_A_CORE
left/radial artery
left/radial artery
medial/left/upper arm
right/radial artery
right
medial/right/upper arm
right/upper arm
right/external jugular

## 2025-02-18 NOTE — PROGRESS NOTE ADULT - TIME BILLING
Emotional Support/Supportive Care and Clarification of Potential Disease Trajectory related to GBM and Seizures  Assessment of Symptom [place] and Palliative regimen  Exploration of GOC/Advanced Directives including HCP designation, code status, and hospice eligibility    Time inclusive of chart review, medication ordering, discussion with primary team, clinical documentation, and communication with family/caregiver
Emotional Support/Supportive Care and Clarification of Potential Disease Trajectory related to GBM and Seizures  Assessment of Symptom [place] and Palliative regimen  Exploration of GOC/Advanced Directives including HCP designation, code status, and hospice eligibility    Time inclusive of chart review, medication ordering, discussion with primary team, clinical documentation, and communication with family/caregiver
Emotional Support/Supportive Care and Clarification of Potential Disease Trajectory related to GBM and Seizures  Assessment of Symptom [place] and Palliative regimen  Exploration of GOC/Advanced Directives including HCP designation, code status, and hospice eligibility    Time inclusive of chart review, medication ordering, discussion with primary team, clinical documentation, and communication with family/caregiver
Emotional Support/Supportive Care and Clarification of Potential Disease Trajectory related to GBM and Seizures  Assessment of Symptom [place] and Palliative regimen  Exploration of GOC/Advanced Directives including HCP designation, code status, and hospice eligibility    Time inclusive of chart review, medication ordering, discussion with primary team, clinical documentation, and communication with family/caregiver
med rec performed  labs reviewed  2 sets of ICU rounds  direct patient care

## 2025-02-18 NOTE — PROCEDURE NOTE - NSANESTHESIA_GEN_A_CORE
no anesthesia administered

## 2025-02-18 NOTE — PROCEDURE NOTE - NSINFORMCONSENT_GEN_A_CORE
This was an emergent procedure.
Benefits, risks, and possible complications of procedure explained to patient/caregiver who verbalized understanding and gave verbal consent.
This was an emergent procedure.
Benefits, risks, and possible complications of procedure explained to patient/caregiver who verbalized understanding and gave written consent.
This was an emergent procedure.
This was an emergent procedure.
Benefits, risks, and possible complications of procedure explained to patient/caregiver who verbalized understanding and gave written consent.
Family/Benefits, risks, and possible complications of procedure explained to patient/caregiver who verbalized understanding and gave verbal consent.

## 2025-02-18 NOTE — PROGRESS NOTE ADULT - PROBLEM SELECTOR PLAN 3
Meeting SIRS 2/4 (HR>90, WBC>12k) likely iso of UTI vs. cancer. s/p 1L NS in ED. Pt obtunded GSC score 3. VBG CO2 36, not retaining which would be leading to AMS. Pt afebrile   Bcx with ESBL E. coli    Plan  -c/w Ertapenem  -IVF as needed  -NSGY consult
Pt seizures characterized by right hand shaking. Appears to be having focal seizures.     Plan  -c/w valproic acid 500mg BID, switched to 250mg x6h   -f/u valproic acid levels 7.4,  01/31 00h55  -c/w ativan 0,5mg q4h prn for seizures  -vEEG w/ epilepsy consult
In the setting of seizures, sepsis, and GBM  -poor expectation for meaningful recovery
In the setting of seizures, sepsis, and GBM  -ongoing titration of AED  -c/w Abx
In the setting of seizures, sepsis, and GBM  -ongoing titration of AED  -c/w Abx
In the setting of seizures, sepsis, and GBM  -no expectation for meaningful recovery

## 2025-02-18 NOTE — PROGRESS NOTE ADULT - PROBLEM/PLAN-4
No pertinent past medical history
DISPLAY PLAN FREE TEXT

## 2025-02-18 NOTE — PROCEDURE NOTE - NSPOSTCAREGUIDE_GEN_A_CORE
Verbal/written post procedure instructions were given to patient/caregiver
Verbal/written post procedure instructions were given to patient/caregiver
Verbal/written post procedure instructions were given to patient/caregiver/Care for catheter as per unit/ICU protocols
Verbal/written post procedure instructions were given to patient/caregiver
Verbal/written post procedure instructions were given to patient/caregiver/Care for catheter as per unit/ICU protocols

## 2025-02-19 LAB
ALBUMIN SERPL ELPH-MCNC: 1.9 G/DL — LOW (ref 3.3–5)
ALBUMIN SERPL ELPH-MCNC: 2 G/DL — LOW (ref 3.3–5)
ALP SERPL-CCNC: 245 U/L — HIGH (ref 40–120)
ALP SERPL-CCNC: SIGNIFICANT CHANGE UP U/L (ref 40–120)
ALT FLD-CCNC: 141 U/L — HIGH (ref 10–45)
ALT FLD-CCNC: SIGNIFICANT CHANGE UP U/L (ref 10–45)
ANION GAP SERPL CALC-SCNC: 6 MMOL/L — SIGNIFICANT CHANGE UP (ref 5–17)
ANION GAP SERPL CALC-SCNC: 9 MMOL/L — SIGNIFICANT CHANGE UP (ref 5–17)
AST SERPL-CCNC: 141 U/L — HIGH (ref 10–40)
AST SERPL-CCNC: SIGNIFICANT CHANGE UP U/L (ref 10–40)
BASOPHILS # BLD AUTO: 0 K/UL — SIGNIFICANT CHANGE UP (ref 0–0.2)
BASOPHILS # BLD AUTO: 0 K/UL — SIGNIFICANT CHANGE UP (ref 0–0.2)
BASOPHILS NFR BLD AUTO: 0 % — SIGNIFICANT CHANGE UP (ref 0–2)
BASOPHILS NFR BLD AUTO: 0 % — SIGNIFICANT CHANGE UP (ref 0–2)
BILIRUB SERPL-MCNC: 0.4 MG/DL — SIGNIFICANT CHANGE UP (ref 0.2–1.2)
BILIRUB SERPL-MCNC: 0.7 MG/DL — SIGNIFICANT CHANGE UP (ref 0.2–1.2)
BUN SERPL-MCNC: 38 MG/DL — HIGH (ref 7–23)
BUN SERPL-MCNC: 47 MG/DL — HIGH (ref 7–23)
CALCIUM SERPL-MCNC: 7.6 MG/DL — LOW (ref 8.4–10.5)
CALCIUM SERPL-MCNC: 7.8 MG/DL — LOW (ref 8.4–10.5)
CHLORIDE SERPL-SCNC: 110 MMOL/L — HIGH (ref 96–108)
CHLORIDE SERPL-SCNC: 111 MMOL/L — HIGH (ref 96–108)
CO2 SERPL-SCNC: 29 MMOL/L — SIGNIFICANT CHANGE UP (ref 22–31)
CO2 SERPL-SCNC: 32 MMOL/L — HIGH (ref 22–31)
CREAT SERPL-MCNC: 0.4 MG/DL — LOW (ref 0.5–1.3)
CREAT SERPL-MCNC: 0.48 MG/DL — LOW (ref 0.5–1.3)
EGFR: 104 ML/MIN/1.73M2 — SIGNIFICANT CHANGE UP
EGFR: 99 ML/MIN/1.73M2 — SIGNIFICANT CHANGE UP
EOSINOPHIL # BLD AUTO: 0 K/UL — SIGNIFICANT CHANGE UP (ref 0–0.5)
EOSINOPHIL # BLD AUTO: 0.16 K/UL — SIGNIFICANT CHANGE UP (ref 0–0.5)
EOSINOPHIL NFR BLD AUTO: 0 % — SIGNIFICANT CHANGE UP (ref 0–6)
EOSINOPHIL NFR BLD AUTO: 0.9 % — SIGNIFICANT CHANGE UP (ref 0–6)
GLUCOSE SERPL-MCNC: 280 MG/DL — HIGH (ref 70–99)
GLUCOSE SERPL-MCNC: 370 MG/DL — HIGH (ref 70–99)
HCT VFR BLD CALC: 26 % — LOW (ref 34.5–45)
HCT VFR BLD CALC: 26.4 % — LOW (ref 34.5–45)
HGB BLD-MCNC: 7.6 G/DL — LOW (ref 11.5–15.5)
HGB BLD-MCNC: 7.7 G/DL — LOW (ref 11.5–15.5)
LYMPHOCYTES # BLD AUTO: 1 K/UL — SIGNIFICANT CHANGE UP (ref 1–3.3)
LYMPHOCYTES # BLD AUTO: 1.17 K/UL — SIGNIFICANT CHANGE UP (ref 1–3.3)
LYMPHOCYTES # BLD AUTO: 5.7 % — LOW (ref 13–44)
LYMPHOCYTES # BLD AUTO: 7.7 % — LOW (ref 13–44)
MAGNESIUM SERPL-MCNC: 3.1 MG/DL — HIGH (ref 1.6–2.6)
MAGNESIUM SERPL-MCNC: 3.2 MG/DL — HIGH (ref 1.6–2.6)
MCHC RBC-ENTMCNC: 28.1 PG — SIGNIFICANT CHANGE UP (ref 27–34)
MCHC RBC-ENTMCNC: 28.4 PG — SIGNIFICANT CHANGE UP (ref 27–34)
MCHC RBC-ENTMCNC: 28.8 G/DL — LOW (ref 32–36)
MCHC RBC-ENTMCNC: 29.6 G/DL — LOW (ref 32–36)
MCV RBC AUTO: 95.9 FL — SIGNIFICANT CHANGE UP (ref 80–100)
MCV RBC AUTO: 97.8 FL — SIGNIFICANT CHANGE UP (ref 80–100)
MONOCYTES # BLD AUTO: 0.14 K/UL — SIGNIFICANT CHANGE UP (ref 0–0.9)
MONOCYTES # BLD AUTO: 0.18 K/UL — SIGNIFICANT CHANGE UP (ref 0–0.9)
MONOCYTES NFR BLD AUTO: 0.9 % — LOW (ref 2–14)
MONOCYTES NFR BLD AUTO: 1 % — LOW (ref 2–14)
NEUTROPHILS # BLD AUTO: 13.09 K/UL — HIGH (ref 1.8–7.4)
NEUTROPHILS # BLD AUTO: 15.33 K/UL — HIGH (ref 1.8–7.4)
NEUTROPHILS NFR BLD AUTO: 83.8 % — HIGH (ref 43–77)
NEUTROPHILS NFR BLD AUTO: 84.5 % — HIGH (ref 43–77)
PHOSPHATE SERPL-MCNC: 3.7 MG/DL — SIGNIFICANT CHANGE UP (ref 2.5–4.5)
PHOSPHATE SERPL-MCNC: 4.3 MG/DL — SIGNIFICANT CHANGE UP (ref 2.5–4.5)
PLATELET # BLD AUTO: 56 K/UL — LOW (ref 150–400)
PLATELET # BLD AUTO: 83 K/UL — LOW (ref 150–400)
POTASSIUM SERPL-MCNC: 5.2 MMOL/L — SIGNIFICANT CHANGE UP (ref 3.5–5.3)
POTASSIUM SERPL-MCNC: SIGNIFICANT CHANGE UP MMOL/L (ref 3.5–5.3)
POTASSIUM SERPL-SCNC: 5.2 MMOL/L — SIGNIFICANT CHANGE UP (ref 3.5–5.3)
POTASSIUM SERPL-SCNC: SIGNIFICANT CHANGE UP MMOL/L (ref 3.5–5.3)
PROT SERPL-MCNC: 4.2 G/DL — LOW (ref 6–8.3)
PROT SERPL-MCNC: 4.5 G/DL — LOW (ref 6–8.3)
RBC # BLD: 2.7 M/UL — LOW (ref 3.8–5.2)
RBC # BLD: 2.71 M/UL — LOW (ref 3.8–5.2)
RBC # FLD: 18.5 % — HIGH (ref 10.3–14.5)
RBC # FLD: 19 % — HIGH (ref 10.3–14.5)
SODIUM SERPL-SCNC: 146 MMOL/L — HIGH (ref 135–145)
SODIUM SERPL-SCNC: 151 MMOL/L — HIGH (ref 135–145)
VALPROATE SERPL-MCNC: 49.5 UG/ML — LOW (ref 50–100)
VANCOMYCIN TROUGH SERPL-MCNC: 13 UG/ML — SIGNIFICANT CHANGE UP (ref 10–20)
WBC # BLD: 15.19 K/UL — HIGH (ref 3.8–10.5)
WBC # BLD: 17.5 K/UL — HIGH (ref 3.8–10.5)
WBC # FLD AUTO: 15.19 K/UL — HIGH (ref 3.8–10.5)
WBC # FLD AUTO: 17.5 K/UL — HIGH (ref 3.8–10.5)

## 2025-02-19 PROCEDURE — 99291 CRITICAL CARE FIRST HOUR: CPT

## 2025-02-19 RX ADMIN — Medication 8: at 05:58

## 2025-02-19 RX ADMIN — DEXAMETHASONE 4 MILLIGRAM(S): 0.5 TABLET ORAL at 23:25

## 2025-02-19 RX ADMIN — Medication 1 APPLICATION(S): at 06:04

## 2025-02-19 RX ADMIN — Medication 6: at 11:19

## 2025-02-19 RX ADMIN — DEXAMETHASONE 4 MILLIGRAM(S): 0.5 TABLET ORAL at 11:21

## 2025-02-19 RX ADMIN — Medication 1 APPLICATION(S): at 09:49

## 2025-02-19 RX ADMIN — Medication 1 DROP(S): at 23:13

## 2025-02-19 RX ADMIN — Medication 6: at 17:37

## 2025-02-19 RX ADMIN — Medication 500 MILLIGRAM(S): at 23:11

## 2025-02-19 RX ADMIN — DEXAMETHASONE 4 MILLIGRAM(S): 0.5 TABLET ORAL at 05:19

## 2025-02-19 RX ADMIN — Medication 15 MILLILITER(S): at 23:13

## 2025-02-19 RX ADMIN — DESMOPRESSIN ACETATE 1 MICROGRAM(S): 4 INJECTION INTRAVENOUS at 05:30

## 2025-02-19 RX ADMIN — Medication 500 MILLIGRAM(S): at 15:13

## 2025-02-19 RX ADMIN — Medication 160 MILLILITER(S): at 08:36

## 2025-02-19 RX ADMIN — LEVETIRACETAM 1500 MILLIGRAM(S): 10 INJECTION, SOLUTION INTRAVENOUS at 23:12

## 2025-02-19 RX ADMIN — Medication 9 UNIT(S): at 23:24

## 2025-02-19 RX ADMIN — LEVETIRACETAM 1500 MILLIGRAM(S): 10 INJECTION, SOLUTION INTRAVENOUS at 09:48

## 2025-02-19 RX ADMIN — Medication 40 MILLIGRAM(S): at 09:48

## 2025-02-19 RX ADMIN — Medication 500 MILLIGRAM(S): at 05:19

## 2025-02-19 RX ADMIN — Medication 9 UNIT(S): at 17:36

## 2025-02-19 RX ADMIN — LACOSAMIDE 100 MILLIGRAM(S): 150 TABLET, FILM COATED ORAL at 09:48

## 2025-02-19 RX ADMIN — INSULIN GLARGINE-YFGN 20 UNIT(S): 100 INJECTION, SOLUTION SUBCUTANEOUS at 23:24

## 2025-02-19 RX ADMIN — Medication 9 UNIT(S): at 05:58

## 2025-02-19 RX ADMIN — Medication 15 MILLILITER(S): at 09:48

## 2025-02-19 RX ADMIN — Medication 1 APPLICATION(S): at 23:12

## 2025-02-19 RX ADMIN — DEXAMETHASONE 4 MILLIGRAM(S): 0.5 TABLET ORAL at 17:36

## 2025-02-19 RX ADMIN — Medication 500 MILLIGRAM(S): at 09:48

## 2025-02-19 RX ADMIN — Medication 9 UNIT(S): at 11:19

## 2025-02-19 RX ADMIN — ENOXAPARIN SODIUM 40 MILLIGRAM(S): 100 INJECTION SUBCUTANEOUS at 17:36

## 2025-02-19 RX ADMIN — LACOSAMIDE 100 MILLIGRAM(S): 150 TABLET, FILM COATED ORAL at 23:12

## 2025-02-19 RX ADMIN — Medication 160 MILLILITER(S): at 01:23

## 2025-02-19 RX ADMIN — Medication 1 DROP(S): at 09:50

## 2025-02-19 RX ADMIN — Medication 4: at 23:24

## 2025-02-19 NOTE — PROGRESS NOTE ADULT - SUBJECTIVE AND OBJECTIVE BOX
Patient is a 74y old  Female who presents with a chief complaint of AMS (18 Feb 2025 17:04)      INTERVAL HPI/OVERNIGHT EVENTS:   No overnight events   Afebrile, hemodynamically stable  Patient non-responsive, unable to participate in ROS    ICU Vital Signs Last 24 Hrs  T(C): 37.1 (19 Feb 2025 06:02), Max: 37.1 (19 Feb 2025 06:02)  T(F): 98.7 (19 Feb 2025 06:02), Max: 98.7 (19 Feb 2025 06:02)  HR: 80 (19 Feb 2025 05:00) (70 - 105)  BP: 125/72 (19 Feb 2025 05:00) (85/57 - 145/63)  BP(mean): 92 (19 Feb 2025 05:00) (66 - 92)  ABP: --  ABP(mean): --  RR: 12 (19 Feb 2025 05:00) (12 - 24)  SpO2: 98% (19 Feb 2025 05:00) (95% - 99%)    O2 Parameters below as of 19 Feb 2025 05:00  Patient On (Oxygen Delivery Method): ventilator    O2 Concentration (%): 40      I&O's Summary    17 Feb 2025 07:01  -  18 Feb 2025 07:00  --------------------------------------------------------  IN: 3510.4 mL / OUT: 425 mL / NET: 3085.4 mL    18 Feb 2025 07:01  -  19 Feb 2025 06:25  --------------------------------------------------------  IN: 4064.6 mL / OUT: 730 mL / NET: 3334.6 mL      Mode: AC/ CMV (Assist Control/ Continuous Mandatory Ventilation)  RR (machine): 12  TV (machine): 350  FiO2: 40  PEEP: 5  ITime: 1  MAP: 7  PIP: 14      LABS:                        7.6    17.50 )-----------( 83       ( 19 Feb 2025 05:18 )             26.4     02-19    151[H]  |  110[H]  |  47[H]  ----------------------------<  370[H]  see note   |  32[H]  |  0.48[L]    Ca    7.8[L]      19 Feb 2025 05:18  Phos  4.3     02-19  Mg     3.2     02-19    TPro  4.5[L]  /  Alb  1.9[L]  /  TBili  0.7  /  DBili  x   /  AST  see note  /  ALT  see note  /  AlkPhos  see note  02-19      Urinalysis Basic - ( 19 Feb 2025 05:18 )    Color: x / Appearance: x / SG: x / pH: x  Gluc: 370 mg/dL / Ketone: x  / Bili: x / Urobili: x   Blood: x / Protein: x / Nitrite: x   Leuk Esterase: x / RBC: x / WBC x   Sq Epi: x / Non Sq Epi: x / Bacteria: x      CAPILLARY BLOOD GLUCOSE      POCT Blood Glucose.: 346 mg/dL (19 Feb 2025 05:34)  POCT Blood Glucose.: 419 mg/dL (18 Feb 2025 23:20)  POCT Blood Glucose.: 325 mg/dL (18 Feb 2025 17:21)  POCT Blood Glucose.: 310 mg/dL (18 Feb 2025 11:18)  POCT Blood Glucose.: 371 mg/dL (18 Feb 2025 06:41)        RADIOLOGY & ADDITIONAL TESTS:    Consultant(s) Notes Reviewed:  [x ] YES  [ ] NO    MEDICATIONS  (STANDING):  artificial  tears Solution 1 Drop(s) Both EYES every 12 hours  chlorhexidine 0.12% Liquid 15 milliLiter(s) Oral Mucosa every 12 hours  chlorhexidine 2% Cloths 1 Application(s) Topical <User Schedule>  desmopressin Injectable 1 MICROGram(s) IV Push every 12 hours  dexAMETHasone  Injectable 4 milliGRAM(s) IV Push every 6 hours  dextrose 5%. 1000 milliLiter(s) (50 mL/Hr) IV Continuous <Continuous>  dextrose 5%. 1000 milliLiter(s) (100 mL/Hr) IV Continuous <Continuous>  dextrose 50% Injectable 25 Gram(s) IV Push once  dextrose 50% Injectable 25 Gram(s) IV Push once  dextrose 50% Injectable 12.5 Gram(s) IV Push once  dextrose 50% Injectable 25 Gram(s) IV Push once  enoxaparin Injectable 40 milliGRAM(s) SubCutaneous every 24 hours  glucagon  Injectable 1 milliGRAM(s) IntraMuscular once  influenza  Vaccine (HIGH DOSE) 0.5 milliLiter(s) IntraMuscular once  insulin glargine Injectable (LANTUS) 20 Unit(s) SubCutaneous at bedtime  insulin regular  human corrective regimen sliding scale   SubCutaneous every 6 hours  insulin regular  human recombinant 9 Unit(s) SubCutaneous every 6 hours  lacosamide Solution 100 milliGRAM(s) Oral every 12 hours  levETIRAcetam  Solution 1500 milliGRAM(s) Oral every 12 hours  levoFLOXacin IVPB 750 milliGRAM(s) IV Intermittent every 24 hours  pantoprazole  Injectable 40 milliGRAM(s) IV Push every 24 hours  petrolatum Ophthalmic Ointment 1 Application(s) Both EYES every 12 hours  senna Syrup 10 milliLiter(s) Oral at bedtime  sodium chloride 0.225%. 1000 milliLiter(s) (160 mL/Hr) IV Continuous <Continuous>  valproic  acid Syrup 500 milliGRAM(s) Oral every 6 hours    MEDICATIONS  (PRN):  acetaminophen   Oral Liquid .. 520 milliGRAM(s) Oral every 6 hours PRN Temp greater or equal to 38C (100.4F)  dextrose Oral Gel 15 Gram(s) Oral once PRN Blood Glucose LESS THAN 70 milliGRAM(s)/deciliter  dextrose Oral Gel 15 Gram(s) Oral once PRN Blood Glucose LESS THAN 70 milliGRAM(s)/deciliter      PHYSICAL EXAM:  Constitutional - NAD, Comfortable-appearing  HEENT - NCAT, (+)Pupils fixed at 5mm not responsive to light,  Chest - Breathing on mechanical ventilation, (+)b/l rhonchi  Cardio - Warm and well perfused, RRR, Right Radial pulse palpable, Left wrist covered with dressing.  Abdomen - Soft, NTND, +BS  Extremities - No peripheral edema, No calf tenderness   Neurologic - Non-responsive to voice or touch      Care Discussed with Consultants/Other Providers [ x] YES  [ ] NO

## 2025-02-19 NOTE — CHART NOTE - NSCHARTNOTEFT_GEN_A_CORE
Admitting Diagnosis:   Patient is a 74y old  Female who presents with a chief complaint of AMS (19 Feb 2025 06:24)      PAST MEDICAL & SURGICAL HISTORY:  GBM (glioblastoma multiforme)      Seizure          Current Nutrition Order: Glucerna 1.5 @30 ml/hr from 5281-3554    PO Intake: N/A    GI Issues: Abdomen distended, +BS x4, hypoactive bowel sounds x4, last bowel movement 2/19    Pain: No non-verbal indicators     Skin Integrity: Skin tear R arm, +1 generalized, +4 bilateral arm         02-18-25 @ 07:01  -  02-19-25 @ 07:00  --------------------------------------------------------  IN: 5274.6 mL / OUT: 885 mL / NET: 4389.6 mL    02-19-25 @ 07:01  -  02-19-25 @ 15:46  --------------------------------------------------------  IN: 1320 mL / OUT: 465 mL / NET: 855 mL        Labs:   02-19    151[H]  |  110[H]  |  47[H]  ----------------------------<  370[H]  see note   |  32[H]  |  0.48[L]    Ca    7.8[L]      19 Feb 2025 05:18  Phos  4.3     02-19  Mg     3.2     02-19    TPro  4.5[L]  /  Alb  1.9[L]  /  TBili  0.7  /  DBili  x   /  AST  see note  /  ALT  see note  /  AlkPhos  see note  02-19    CAPILLARY BLOOD GLUCOSE      POCT Blood Glucose.: 275 mg/dL (19 Feb 2025 11:10)  POCT Blood Glucose.: 346 mg/dL (19 Feb 2025 05:34)  POCT Blood Glucose.: 419 mg/dL (18 Feb 2025 23:20)  POCT Blood Glucose.: 325 mg/dL (18 Feb 2025 17:21)      Medications:  MEDICATIONS  (STANDING):  artificial  tears Solution 1 Drop(s) Both EYES every 12 hours  chlorhexidine 0.12% Liquid 15 milliLiter(s) Oral Mucosa every 12 hours  chlorhexidine 2% Cloths 1 Application(s) Topical <User Schedule>  desmopressin Injectable 1 MICROGram(s) IV Push every 12 hours  dexAMETHasone  Injectable 4 milliGRAM(s) IV Push every 6 hours  dextrose 5%. 1000 milliLiter(s) (50 mL/Hr) IV Continuous <Continuous>  dextrose 5%. 1000 milliLiter(s) (100 mL/Hr) IV Continuous <Continuous>  dextrose 50% Injectable 25 Gram(s) IV Push once  dextrose 50% Injectable 25 Gram(s) IV Push once  dextrose 50% Injectable 12.5 Gram(s) IV Push once  dextrose 50% Injectable 25 Gram(s) IV Push once  enoxaparin Injectable 40 milliGRAM(s) SubCutaneous every 24 hours  glucagon  Injectable 1 milliGRAM(s) IntraMuscular once  influenza  Vaccine (HIGH DOSE) 0.5 milliLiter(s) IntraMuscular once  insulin glargine Injectable (LANTUS) 20 Unit(s) SubCutaneous at bedtime  insulin regular  human corrective regimen sliding scale   SubCutaneous every 6 hours  insulin regular  human recombinant 9 Unit(s) SubCutaneous every 6 hours  lacosamide Solution 100 milliGRAM(s) Oral every 12 hours  levETIRAcetam  Solution 1500 milliGRAM(s) Oral every 12 hours  levoFLOXacin IVPB 750 milliGRAM(s) IV Intermittent every 24 hours  pantoprazole  Injectable 40 milliGRAM(s) IV Push every 24 hours  petrolatum Ophthalmic Ointment 1 Application(s) Both EYES every 12 hours  senna Syrup 10 milliLiter(s) Oral at bedtime  valproic  acid Syrup 500 milliGRAM(s) Oral every 6 hours    MEDICATIONS  (PRN):  acetaminophen   Oral Liquid .. 520 milliGRAM(s) Oral every 6 hours PRN Temp greater or equal to 38C (100.4F)  dextrose Oral Gel 15 Gram(s) Oral once PRN Blood Glucose LESS THAN 70 milliGRAM(s)/deciliter  dextrose Oral Gel 15 Gram(s) Oral once PRN Blood Glucose LESS THAN 70 milliGRAM(s)/deciliter    Height for BMI (CENTIMETERS)	157.5 Centimeter(s)  Weight for BMI (lbs)	115.1 lb  Weight for BMI (kg)	52.2 kg  Body Mass Index	21     Weight Change: No new weights since admit. Recommend weigh pt weekly at minimum.     Estimated energy needs:   Weight used for calculations	IBW   Estimated Energy Needs Weight (lbs)	110.2 lb  Estimated Energy Needs Weight (kg)	50 kg  Estimated Energy Needs From (ignacio/kg)	25   Estimated Energy Needs To (ignacio/kg)	30   Estimated Energy Needs Calculated From (ignacio/kg)	1250   Estimated Energy Needs Calculated To (ignacio/kg)	1500     Estimated Protein Needs Weight (lbs)	110.2 lb  Estimated Protein Needs Weight (kg)	50 kg  Estimated Protein Needs From (g/kg)	1.4   Estimated Protein Needs To (g/kg)	1.6   Estimated Protein Needs Calculated From (g/kg)	70   Estimated Protein Needs Calculated To (g/kg)	80     Estimated Fluid Needs Weight (lbs)	110.2 lb  Estimated Fluid Needs Weight (kg)	50 kg  Estimated Fluid Needs From (ml/kg)	25   Estimated Fluid Needs To (ml/kg)	30   Estimated Fluid Needs Calculated From (ml/kg)	1250   Estimated Fluid Needs Calculated To (ml/kg)	1500     Other Calculations	Needs determined using ideal body weight 50 kg (104%IBW) adjusted for intubation and critical condition.     Subjective:   75 yo female w PMH GBM on hospice care (diagnosed in 2024, follows at [facility] under Dr. Hernandez, with reported attempted resection of tumor, on chemotherapy with last chemo 2 months ago), admitted for ams likely secondary to disease progression. 2/3: Hypertonic saline. 2/4: vEEG, no more seizures. 2/5: Added free water flushes. 2/8: Phos repleted. 2/9: NGT confirmed. 2/10: Repleted phos. 2/11: Increased Lantus to 5, lactulose to 30q4. Changed to Glucerna. 2/12: Abdominal xray ordered- very diffuse gaseous distention. Large BM. Vomited. Feeds held. 2/13: Restarted feeds. Hypotensive (MAP in 50s). 2/14: Increased lantus to 8 units. Per MICU resident progress noted, pt minimally responsive despite not being on sedation. 2/17: Increased insulin regimen. tube feed decreased. Hyperkalemic, given lokelma.     Pt care discussed in interdisciplinary care team rounds. Rx and labs reviewed. Pt remains intubated at time of assessment with vent set to VC-AC, MAP 64, no pressors, and no propofol at time of assessment. C/f renal failure; increasing K and Mg today. BG control remains elevated (370 today). C/f diabetes insipidus in setting of glioblastoma progression. Ongoing goals of care discussions; ethics consulted. Consider formula change to Nepro in setting of worsening electrolyte control in setting of possible renal failure; see recs below. No other reports GI distress or further nutritional concerns at this time. RDN to remain available, see recommendations below.     Previous Nutrition Diagnosis: Increased nutrient needs related to increased physiological demands as evidenced by Glioblastoma and recent chemotherapy.    Active [ x ]  Resolved [   ]    Goal:  Pt will meet 75% or more of protein/energy needs via most appropriate route for nutrition.     Recommendations:  -Continue enteral nutrition support via nasogastric tube   *Recommend Nepro with goal rate of 42 ml/hr with LPS x1/day from 7400-1796 to provide 756 ml tube feed, 1461 calories, 76 gProt., and 550 ml free water. This is 23.1 nonprotein calories and 1.52 gProt. per kg ideal body weight 50 kg.    *FWF per medical team   -Monitor pressor and propofol demands    *Goal MAP >65 and lactate <2.0 for generally safe provision of nutrition   -Align nutrition with goals of care at all times  -Weekly wts  -Monitor chemistry, GI function, and skin integrity     Risk Level: High [ x ] Moderate [   ] Low [   ].

## 2025-02-19 NOTE — PROGRESS NOTE ADULT - ASSESSMENT
73 yo female w PMH GBM on hospice care (diagnosed in 2024, follows at [facility] under Dr. Hernandez, with reported attempted resection of tumor, on chemotherapy with last chemo 2 months ago), admitted for ams likely 2/2 disease progression, found to be in status epilepticus, decision for DNR/DNI reversed by family, patient intubated and stepped up to MICU, now s/p brain herniation, pending further conversations with family.     NEURO  Intubated. Not sedated. Minimally responsive despite not being on sedation.     Continues to have seizures, concern for airway protection as GBM progresses.    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 There is evidence of focal cortical hyperexcitability, more prominent in the right than the left frontotemporal regions. The background pattern shows a burst suppression ratio of 10:1, indicating severe diffuse or multifocal cerebral dysfunction, potentially exacerbated by IV sedatives. Additionally, there is severe generalized and multifocal slowing, further supporting significant underlying cerebral dysfunction. CT head with worsening midline shift. Patient got CT head that shows complete effacement of the cerebral and cerebellar sulci consistent with diffuse edema with effacement of the basal cisterns, worsening subfalcine and uncal herniation with new descending transtentorial herniation.    Continued EEG findings of seizures.  Plan:   s/p valproic acid loading dose 1200 mg, vimpat 200 mg loading  - epilepsy consulted, appreciate recs      -  Keppra 1500mg BID      - Vimpat 100mg BID (monitor tele to ensure no heart block)      - C/w Depakote 500mg q6hrs      - seizure precautions      #GBM  last chemo ~2months ago, follows at [facility].  Per Neurosurgery consult on 1/31, no acute interventions. Worsening midline shift on CT. On CT today, concern for worsening neurological status. Patient got CT head that shows complete effacement of the cerebral and cerebellar sulci consistent with diffuse edema with effacement of the basal cisterns, worsening subfalcine and uncal herniation with new descending transtentorial herniation.  -c/w decadron 4 IV q6h  -epilepsy aware  -palliative care consult     CARDIAC  #Chronic atrial fibrillation.   #afib with RVR   Plan  -bladder scans q6h   -ctm BMP  -s/p john   Home med: metoprolol 12.5 BID   - c/w Lovenox ( per NSGY, no increased risk of hemorrhage)  -start Lopressor 12.5 mg BID     PULM  #intubated  - intubated on 2/1  - GOC  - consider trach moving forward    GI  - NPO with tube feeds   -Miralax and Senna standing    RENAL  - currently with normal renal function    #hypernatremia  Sodium in the high 160's. Likely 2/2 diabetes insipidus iso advancing brain cancer that is leading to advanced brain swelling.   Plan  - the brain swelling will worsen with worsening sodium as time progresses. Can attempt to correct Hypernatremia but will likely be futile iso central diabetes insipidus and worsening brain herniation.   - standing DDAVP 1 mcg q12 hrs     Currently with John.     Endo  -maintain on iSS, continue to monitor glucose for further insulin regimen planning as glucose is currently greater than 180 and out of range for goal between 140-180.  - lantus 20 units QHS  - regular insulin 9 units q6hrs  Diet changed to Glucerna due to elevated glucose, CTM.     ID  #SIRS  #ESBL Ecoli Bacteremia  #R arm cellulitis   #ESBL ecoli UTI   Systemic inflammatory response syndrome (SIRS).   Meeting SIRS 2/4 (HR>90, WBC>12k) likely iso of UTI.   Bcx with ESBL E. coli, s/p ertapenem treatment. Patient continuing to be tachycardic, tenuous course between fevering and hypothermia.   -c/w Levofloxacin (2/13-)   -c/w Vancomycin (2/13-)      F: none  E: replete K<4, Mg<2  N: npo with tube feeds  VTE Prophylaxis: lovenox 40mg QD  GI: pantoprazole 40 IVP q24hrs  C: Full Code  D: micu    Lines: Peripheral IV L 22g (2/4) IV L 18 g (2/4), NG tube, John (2/14)     75 yo female w PMH GBM on hospice care (diagnosed in 2024, follows at [facility] under Dr. Hernandez, with reported attempted resection of tumor, on chemotherapy with last chemo 2 months ago), admitted for ams likely 2/2 disease progression, found to be in status epilepticus, decision for DNR/DNI reversed by family, patient intubated and stepped up to MICU, now s/p brain herniation, pending further conversations with family.     NEURO  Intubated. Not sedated. Minimally responsive despite not being on sedation.     Continues to have seizures, concern for airway protection as GBM progresses.    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 There is evidence of focal cortical hyperexcitability, more prominent in the right than the left frontotemporal regions. The background pattern shows a burst suppression ratio of 10:1, indicating severe diffuse or multifocal cerebral dysfunction, potentially exacerbated by IV sedatives. Additionally, there is severe generalized and multifocal slowing, further supporting significant underlying cerebral dysfunction. CT head with worsening midline shift. Patient got CT head that shows complete effacement of the cerebral and cerebellar sulci consistent with diffuse edema with effacement of the basal cisterns, worsening subfalcine and uncal herniation with new descending transtentorial herniation.    Continued EEG findings of seizures.  Plan:   s/p valproic acid loading dose 1200 mg, vimpat 200 mg loading  - epilepsy consulted, appreciate recs      -  Keppra 1500mg BID      - Vimpat 100mg BID (monitor tele to ensure no heart block)      - C/w Depakote 500mg q6hrs      - seizure precautions      #GBM  last chemo ~2months ago, follows at [facility].  Per Neurosurgery consult on 1/31, no acute interventions. Worsening midline shift on CT. On CT today, concern for worsening neurological status. Patient got CT head that shows complete effacement of the cerebral and cerebellar sulci consistent with diffuse edema with effacement of the basal cisterns, worsening subfalcine and uncal herniation with new descending transtentorial herniation.  -c/w decadron 4 IV q6h  -epilepsy aware  -palliative care consult     CARDIAC  #Chronic atrial fibrillation.   #afib with RVR   Plan  -bladder scans q6h   -ctm BMP  -s/p john   Home med: metoprolol 12.5 BID   - c/w Lovenox ( per NSGY, no increased risk of hemorrhage)  -start Lopressor 12.5 mg BID     PULM  #intubated  - intubated on 2/1  - GOC  - consider trach moving forward    GI  - NPO with tube feeds   -Miralax and Senna standing    RENAL  - currently with normal renal function    #hypernatremia  Sodium >180, now downtrending. Likely 2/2 diabetes insipidus iso advancing brain cancer that is leading to advanced brain swelling.   Plan  - the brain swelling will worsen with worsening sodium as time progresses. Can attempt to correct Hypernatremia but will likely be futile iso central diabetes insipidus and worsening brain herniation.   - standing DDAVP 1 mcg q12 hrs   - Stopped NaCl 0.225% 160cc/hr on 2/19 due to overcorrection of Na 176 --> 151 from 2/18 to 2/19    Currently with John.     Endo  -maintain on iSS, continue to monitor glucose for further insulin regimen planning as glucose is currently greater than 180 and out of range for goal between 140-180.  - lantus 20 units QHS  - regular insulin 9 units q6hrs  Diet changed to Glucerna due to elevated glucose, CTM.     ID  #SIRS  #ESBL Ecoli Bacteremia  #R arm cellulitis   #ESBL ecoli UTI   Systemic inflammatory response syndrome (SIRS).   Meeting SIRS 2/4 (HR>90, WBC>12k) likely iso of UTI.   Bcx with ESBL E. coli, s/p ertapenem treatment. Patient continuing to be tachycardic, tenuous course between fevering and hypothermia.   -c/w Levofloxacin (2/13-)   -c/w Vancomycin (2/13-)      F: none  E: replete K<4, Mg<2  N: npo with tube feeds  VTE Prophylaxis: lovenox 40mg QD  GI: pantoprazole 40 IVP q24hrs  C: Full Code  D: micu    Lines: Peripheral IV L 22g (2/4) IV L 18 g (2/4), NG tube, John (2/14)

## 2025-02-20 LAB
ALBUMIN SERPL ELPH-MCNC: 2.2 G/DL — LOW (ref 3.3–5)
ALP SERPL-CCNC: 235 U/L — HIGH (ref 40–120)
ALT FLD-CCNC: 129 U/L — HIGH (ref 10–45)
ANION GAP SERPL CALC-SCNC: 5 MMOL/L — SIGNIFICANT CHANGE UP (ref 5–17)
AST SERPL-CCNC: 69 U/L — HIGH (ref 10–40)
BILIRUB SERPL-MCNC: 0.2 MG/DL — SIGNIFICANT CHANGE UP (ref 0.2–1.2)
BLD GP AB SCN SERPL QL: NEGATIVE — SIGNIFICANT CHANGE UP
BUN SERPL-MCNC: 34 MG/DL — HIGH (ref 7–23)
CALCIUM SERPL-MCNC: 7.6 MG/DL — LOW (ref 8.4–10.5)
CHLORIDE SERPL-SCNC: 110 MMOL/L — HIGH (ref 96–108)
CO2 SERPL-SCNC: 31 MMOL/L — SIGNIFICANT CHANGE UP (ref 22–31)
CREAT SERPL-MCNC: 0.35 MG/DL — LOW (ref 0.5–1.3)
EGFR: 107 ML/MIN/1.73M2 — SIGNIFICANT CHANGE UP
GLUCOSE SERPL-MCNC: 157 MG/DL — HIGH (ref 70–99)
HAPTOGLOB SERPL-MCNC: 78 MG/DL — SIGNIFICANT CHANGE UP (ref 34–200)
HCT VFR BLD CALC: 24.2 % — LOW (ref 34.5–45)
HGB BLD-MCNC: 7.5 G/DL — LOW (ref 11.5–15.5)
LDH SERPL L TO P-CCNC: >2500 U/L — HIGH (ref 50–242)
MAGNESIUM SERPL-MCNC: 3.1 MG/DL — HIGH (ref 1.6–2.6)
MCHC RBC-ENTMCNC: 28.8 PG — SIGNIFICANT CHANGE UP (ref 27–34)
MCHC RBC-ENTMCNC: 31 G/DL — LOW (ref 32–36)
MCV RBC AUTO: 93.1 FL — SIGNIFICANT CHANGE UP (ref 80–100)
NRBC BLD AUTO-RTO: 0 /100 WBCS — SIGNIFICANT CHANGE UP (ref 0–0)
PHOSPHATE SERPL-MCNC: 3.5 MG/DL — SIGNIFICANT CHANGE UP (ref 2.5–4.5)
PLATELET # BLD AUTO: 70 K/UL — LOW (ref 150–400)
POTASSIUM SERPL-MCNC: 4.6 MMOL/L — SIGNIFICANT CHANGE UP (ref 3.5–5.3)
POTASSIUM SERPL-SCNC: 4.6 MMOL/L — SIGNIFICANT CHANGE UP (ref 3.5–5.3)
PROT SERPL-MCNC: 4.2 G/DL — LOW (ref 6–8.3)
RBC # BLD: 2.6 M/UL — LOW (ref 3.8–5.2)
RBC # BLD: 2.6 M/UL — LOW (ref 3.8–5.2)
RBC # FLD: 18 % — HIGH (ref 10.3–14.5)
RETICS #: 11.4 K/UL — LOW (ref 25–125)
RETICS/RBC NFR: 0.4 % — LOW (ref 0.5–2.5)
RH IG SCN BLD-IMP: POSITIVE — SIGNIFICANT CHANGE UP
SODIUM SERPL-SCNC: 146 MMOL/L — HIGH (ref 135–145)
VALPROATE FREE SERPL-MCNC: 52.9 UG/ML — HIGH (ref 6–22)
WBC # BLD: 14.3 K/UL — HIGH (ref 3.8–10.5)
WBC # FLD AUTO: 14.3 K/UL — HIGH (ref 3.8–10.5)

## 2025-02-20 PROCEDURE — 99291 CRITICAL CARE FIRST HOUR: CPT

## 2025-02-20 RX ORDER — MIDODRINE HYDROCHLORIDE 5 MG/1
5 TABLET ORAL EVERY 8 HOURS
Refills: 0 | Status: DISCONTINUED | OUTPATIENT
Start: 2025-02-20 | End: 2025-02-25

## 2025-02-20 RX ADMIN — Medication 500 MILLIGRAM(S): at 15:49

## 2025-02-20 RX ADMIN — DEXAMETHASONE 4 MILLIGRAM(S): 0.5 TABLET ORAL at 17:27

## 2025-02-20 RX ADMIN — MIDODRINE HYDROCHLORIDE 5 MILLIGRAM(S): 5 TABLET ORAL at 12:53

## 2025-02-20 RX ADMIN — DEXAMETHASONE 4 MILLIGRAM(S): 0.5 TABLET ORAL at 23:16

## 2025-02-20 RX ADMIN — LACOSAMIDE 100 MILLIGRAM(S): 150 TABLET, FILM COATED ORAL at 10:03

## 2025-02-20 RX ADMIN — Medication 500 MILLIGRAM(S): at 10:03

## 2025-02-20 RX ADMIN — ENOXAPARIN SODIUM 40 MILLIGRAM(S): 100 INJECTION SUBCUTANEOUS at 17:26

## 2025-02-20 RX ADMIN — Medication 1 APPLICATION(S): at 10:04

## 2025-02-20 RX ADMIN — Medication 40 MILLIGRAM(S): at 10:03

## 2025-02-20 RX ADMIN — DEXAMETHASONE 4 MILLIGRAM(S): 0.5 TABLET ORAL at 11:52

## 2025-02-20 RX ADMIN — Medication 500 MILLIGRAM(S): at 23:15

## 2025-02-20 RX ADMIN — LEVETIRACETAM 1500 MILLIGRAM(S): 10 INJECTION, SOLUTION INTRAVENOUS at 23:16

## 2025-02-20 RX ADMIN — Medication 9 UNIT(S): at 05:30

## 2025-02-20 RX ADMIN — DEXAMETHASONE 4 MILLIGRAM(S): 0.5 TABLET ORAL at 05:13

## 2025-02-20 RX ADMIN — Medication 500 MILLIGRAM(S): at 05:13

## 2025-02-20 RX ADMIN — LACOSAMIDE 100 MILLIGRAM(S): 150 TABLET, FILM COATED ORAL at 23:16

## 2025-02-20 RX ADMIN — Medication 1 DROP(S): at 22:39

## 2025-02-20 RX ADMIN — Medication 1 APPLICATION(S): at 05:14

## 2025-02-20 RX ADMIN — Medication 1 APPLICATION(S): at 22:39

## 2025-02-20 RX ADMIN — MIDODRINE HYDROCHLORIDE 5 MILLIGRAM(S): 5 TABLET ORAL at 23:16

## 2025-02-20 RX ADMIN — LEVETIRACETAM 1500 MILLIGRAM(S): 10 INJECTION, SOLUTION INTRAVENOUS at 10:03

## 2025-02-20 RX ADMIN — Medication 1 DROP(S): at 10:03

## 2025-02-20 RX ADMIN — Medication 6: at 23:39

## 2025-02-20 RX ADMIN — Medication 2: at 18:21

## 2025-02-20 RX ADMIN — Medication 2: at 05:30

## 2025-02-20 RX ADMIN — Medication 15 MILLILITER(S): at 10:03

## 2025-02-20 RX ADMIN — Medication 15 MILLILITER(S): at 22:39

## 2025-02-20 NOTE — PROGRESS NOTE ADULT - ASSESSMENT
75 yo female w PMH GBM on hospice care (diagnosed in 2024, follows at [facility] under Dr. Hernandez, with reported attempted resection of tumor, on chemotherapy with last chemo 2 months ago), admitted for ams likely 2/2 disease progression, found to be in status epilepticus, decision for DNR/DNI reversed by family, patient intubated and stepped up to MICU, now s/p brain herniation, pending further conversations with family.     NEURO  Intubated. Not sedated. Minimally responsive despite not being on sedation.     Continues to have seizures, concern for airway protection as GBM progresses.    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 There is evidence of focal cortical hyperexcitability, more prominent in the right than the left frontotemporal regions. The background pattern shows a burst suppression ratio of 10:1, indicating severe diffuse or multifocal cerebral dysfunction, potentially exacerbated by IV sedatives. Additionally, there is severe generalized and multifocal slowing, further supporting significant underlying cerebral dysfunction. CT head with worsening midline shift. Patient got CT head that shows complete effacement of the cerebral and cerebellar sulci consistent with diffuse edema with effacement of the basal cisterns, worsening subfalcine and uncal herniation with new descending transtentorial herniation.    Continued EEG findings of seizures.  Plan:   s/p valproic acid loading dose 1200 mg, vimpat 200 mg loading  - epilepsy consulted, appreciate recs      -  Keppra 1500mg BID      - Vimpat 100mg BID (monitor tele to ensure no heart block)      - C/w Depakote 500mg q6hrs      - seizure precautions      #GBM  last chemo ~2months ago, follows at [facility].  Per Neurosurgery consult on 1/31, no acute interventions. Worsening midline shift on CT. On CT today, concern for worsening neurological status. Patient got CT head that shows complete effacement of the cerebral and cerebellar sulci consistent with diffuse edema with effacement of the basal cisterns, worsening subfalcine and uncal herniation with new descending transtentorial herniation.  -c/w decadron 4 IV q6h  -epilepsy aware  -palliative care consult     CARDIAC  #Chronic atrial fibrillation.   #afib with RVR   Plan  -bladder scans q6h   -ctm BMP  -s/p john   Home med: metoprolol 12.5 BID   - c/w Lovenox ( per NSGY, no increased risk of hemorrhage)  -start Lopressor 12.5 mg BID     PULM  #intubated  - intubated on 2/1  - GOC  - consider trach moving forward    GI  - NPO with tube feeds   -Miralax and Senna standing    RENAL  - currently with normal renal function    #hypernatremia  Sodium >180, now downtrending. Likely 2/2 diabetes insipidus iso advancing brain cancer that is leading to advanced brain swelling.   Plan  - the brain swelling will worsen with worsening sodium as time progresses. Can attempt to correct Hypernatremia but will likely be futile iso central diabetes insipidus and worsening brain herniation.   - standing DDAVP 1 mcg q12 hrs   - Stopped NaCl 0.225% 160cc/hr on 2/19 due to overcorrection of Na 176 --> 151 from 2/18 to 2/19    Currently with John.     Endo  -maintain on iSS, continue to monitor glucose for further insulin regimen planning as glucose is currently greater than 180 and out of range for goal between 140-180.  - lantus 20 units QHS  - regular insulin 9 units q6hrs  Diet changed to Glucerna due to elevated glucose, CTM.     ID  #SIRS  #ESBL Ecoli Bacteremia  #R arm cellulitis   #ESBL ecoli UTI   Systemic inflammatory response syndrome (SIRS).   Meeting SIRS 2/4 (HR>90, WBC>12k) likely iso of UTI.   Bcx with ESBL E. coli, s/p ertapenem treatment. Patient continuing to be tachycardic, tenuous course between fevering and hypothermia.   -c/w Levofloxacin (2/13-)   -c/w Vancomycin (2/13-)      F: none  E: replete K<4, Mg<2  N: npo with tube feeds  VTE Prophylaxis: lovenox 40mg QD  GI: pantoprazole 40 IVP q24hrs  C: Full Code  D: micu    Lines: Peripheral IV L 22g (2/4) IV L 18 g (2/4), NG tube, John (2/14)     73 yo female w PMH GBM on hospice care (diagnosed in 2024, follows at [facility] under Dr. Hernandez, with reported attempted resection of tumor, on chemotherapy with last chemo 2 months ago), admitted for ams likely 2/2 disease progression, found to be in status epilepticus, decision for DNR/DNI reversed by family, patient intubated and stepped up to MICU, now s/p brain herniation, pending further conversations with family.     NEURO  Intubated. Not sedated. Minimally responsive despite not being on sedation.     Continues to have seizures, concern for airway protection as GBM progresses.    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 There is evidence of focal cortical hyperexcitability, more prominent in the right than the left frontotemporal regions. The background pattern shows a burst suppression ratio of 10:1, indicating severe diffuse or multifocal cerebral dysfunction, potentially exacerbated by IV sedatives. Additionally, there is severe generalized and multifocal slowing, further supporting significant underlying cerebral dysfunction. CT head with worsening midline shift. Patient got CT head that shows complete effacement of the cerebral and cerebellar sulci consistent with diffuse edema with effacement of the basal cisterns, worsening subfalcine and uncal herniation with new descending transtentorial herniation.    Continued EEG findings of seizures.  Plan:   s/p valproic acid loading dose 1200 mg, vimpat 200 mg loading  - epilepsy consulted, appreciate recs      - Keppra 1500mg BID      - Vimpat 100mg BID (monitor tele to ensure no heart block)      - Depakote 500mg q6hrs      - seizure precautions      #GBM  last chemo ~2months ago, follows at [facility].  Per Neurosurgery consult on 1/31, no acute interventions. Worsening midline shift on CT. On CT today, concern for worsening neurological status. Patient got CT head that shows complete effacement of the cerebral and cerebellar sulci consistent with diffuse edema with effacement of the basal cisterns, worsening subfalcine and uncal herniation with new descending transtentorial herniation.  -c/w decadron 4 IV q6h  -epilepsy aware  -palliative care consult     CARDIAC  #Chronic atrial fibrillation.   #afib with RVR   Plan  -bladder scans q6h   -ctm BMP  -s/p john   Home med: metoprolol 12.5 BID   - c/w Lovenox ( per NSGY, no increased risk of hemorrhage)  -start Lopressor 12.5 mg BID     PULM  #intubated  - intubated on 2/1  - GOC  - consider trach moving forward    GI  - NPO with tube feeds   -Miralax and Senna standing    RENAL  - currently with normal renal function    #hypernatremia  Sodium >180, now downtrending. Likely 2/2 diabetes insipidus iso advancing brain cancer that is leading to advanced brain swelling.   Stopped NaCl 0.225% 160cc/hr and DDAVP 1mcg q12h on 2/19 due to overcorrection of Na 176 --> 151 from 2/18 to 2/19  - the brain swelling will worsen with worsening sodium as time progresses. Can attempt to correct Hypernatremia but will likely be futile iso central diabetes insipidus and worsening brain herniation.     Currently with John.     Endo  Lantus 20U qhs and Regular Insulin 9U q6h d/sam on 2/20 due to downtrending FSGs.  Diet changed to Glucerna due to elevated glucose, CTM.   - Continue FSG and ISS q6h only    ID  #SIRS  #ESBL Ecoli Bacteremia  #R arm cellulitis   #ESBL ecoli UTI   Systemic inflammatory response syndrome (SIRS).   Meeting SIRS 2/4 (HR>90, WBC>12k) likely iso of UTI.   Bcx with ESBL E. coli, s/p ertapenem treatment. Patient continuing to be tachycardic, tenuous course between fevering and hypothermia.   -c/w Levofloxacin (2/13-)   -c/w Vancomycin (2/13-)      F: none  E: replete K<4, Mg<2  N: npo with tube feeds  VTE Prophylaxis: lovenox 40mg QD  GI: pantoprazole 40 IVP q24hrs  C: Full Code  D: micu    Lines: Peripheral IV L 22g (2/4) IV L 18 g (2/4), NG tube, John (2/14)     75 yo female w PMH GBM on hospice care (diagnosed in 2024, follows at [facility] under Dr. Hernandez, with reported attempted resection of tumor, on chemotherapy with last chemo 2 months ago), admitted for ams likely 2/2 disease progression, found to be in status epilepticus, decision for DNR/DNI reversed by family, patient intubated and stepped up to MICU, now s/p brain herniation, pending further conversations with family.     NEURO  Intubated. Not sedated. Minimally responsive despite not being on sedation.     Continues to have seizures, concern for airway protection as GBM progresses.    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 There is evidence of focal cortical hyperexcitability, more prominent in the right than the left frontotemporal regions. The background pattern shows a burst suppression ratio of 10:1, indicating severe diffuse or multifocal cerebral dysfunction, potentially exacerbated by IV sedatives. Additionally, there is severe generalized and multifocal slowing, further supporting significant underlying cerebral dysfunction. CT head with worsening midline shift. Patient got CT head that shows complete effacement of the cerebral and cerebellar sulci consistent with diffuse edema with effacement of the basal cisterns, worsening subfalcine and uncal herniation with new descending transtentorial herniation.    Continued EEG findings of seizures.  Plan:   s/p valproic acid loading dose 1200 mg, vimpat 200 mg loading  - epilepsy consulted, appreciate recs      - Keppra 1500mg BID      - Vimpat 100mg BID (monitor tele to ensure no heart block)      - Depakote 500mg q6hrs      - seizure precautions      #GBM  last chemo ~2months ago, follows at [facility].  Per Neurosurgery consult on 1/31, no acute interventions. Worsening midline shift on CT. On CT today, concern for worsening neurological status. Patient got CT head that shows complete effacement of the cerebral and cerebellar sulci consistent with diffuse edema with effacement of the basal cisterns, worsening subfalcine and uncal herniation with new descending transtentorial herniation.  -c/w decadron 4 IV q6h  -epilepsy aware  -palliative care consult     CARDIAC  #Chronic atrial fibrillation.   #afib with RVR   Plan  -bladder scans q6h   -ctm BMP  -s/p john   Home med: metoprolol 12.5 BID   - c/w Lovenox ( per NSGY, no increased risk of hemorrhage)  -start Lopressor 12.5 mg BID     #Shock  S/p Levophed, weaned on 2/17  - Started Midodrine 5mg TID (2/20 - ) for BP support    PULM  #intubated  - intubated on 2/1  - GOC  - consider trach moving forward    GI  - NPO with tube feeds   -Miralax and Senna standing    RENAL  - currently with normal renal function    #hypernatremia  Sodium >180, now downtrending. Likely 2/2 diabetes insipidus iso advancing brain cancer that is leading to advanced brain swelling.   Stopped NaCl 0.225% 160cc/hr and DDAVP 1mcg q12h on 2/19 due to overcorrection of Na 176 --> 151 from 2/18 to 2/19  - the brain swelling will worsen with worsening sodium as time progresses. Can attempt to correct Hypernatremia but will likely be futile iso central diabetes insipidus and worsening brain herniation.     Currently with John.     Endo  Lantus 20U qhs and Regular Insulin 9U q6h d/sam on 2/20 due to downtrending FSGs.  Diet changed to Glucerna due to elevated glucose, CTM.   - Continue FSG and ISS q6h only    ID  #SIRS  #ESBL Ecoli Bacteremia  #R arm cellulitis   #ESBL ecoli UTI   Systemic inflammatory response syndrome (SIRS).   Meeting SIRS 2/4 (HR>90, WBC>12k) likely iso of UTI.   Bcx with ESBL E. coli, s/p ertapenem treatment. Patient continuing to be tachycardic, tenuous course between fevering and hypothermia.   -c/w Levofloxacin (2/13-)   -c/w Vancomycin (2/13-)      F: none  E: replete K<4, Mg<2  N: npo with tube feeds  VTE Prophylaxis: lovenox 40mg QD  GI: pantoprazole 40 IVP q24hrs  C: Full Code  D: micu    Lines: Peripheral IV L 22g (2/4) IV L 18 g (2/4), NG tube, John (2/14)

## 2025-02-20 NOTE — PROGRESS NOTE ADULT - SUBJECTIVE AND OBJECTIVE BOX
***INCOMPLETE NOTE*** Patient is a 74y old  Female who presents with a chief complaint of AMS (20 Feb 2025 05:32)      INTERVAL HPI/OVERNIGHT EVENTS:   No overnight events   Patient seen and examined this morning. She is non-responsive, unable to participate in ROS.    ICU Vital Signs Last 24 Hrs  T(C): 36.5 (20 Feb 2025 14:00), Max: 36.5 (20 Feb 2025 14:00)  T(F): 97.7 (20 Feb 2025 14:00), Max: 97.7 (20 Feb 2025 14:00)  HR: 71 (20 Feb 2025 11:45) (64 - 93)  BP: 78/53 (20 Feb 2025 11:00) (78/53 - 128/83)  BP(mean): 61 (20 Feb 2025 11:00) (60 - 101)  ABP: --  ABP(mean): --  RR: 12 (20 Feb 2025 11:00) (12 - 12)  SpO2: 93% (20 Feb 2025 11:45) (93% - 97%)    O2 Parameters below as of 20 Feb 2025 11:45  Patient On (Oxygen Delivery Method): ventilator    O2 Concentration (%): 40      I&O's Summary    19 Feb 2025 07:01  -  20 Feb 2025 07:00  --------------------------------------------------------  IN: 1470 mL / OUT: 1015 mL / NET: 455 mL    20 Feb 2025 07:01  -  20 Feb 2025 15:54  --------------------------------------------------------  IN: 270 mL / OUT: 270 mL / NET: 0 mL      Mode: AC/ CMV (Assist Control/ Continuous Mandatory Ventilation)  RR (machine): 12  TV (machine): 350  FiO2: 40  PEEP: 5  ITime: 1  MAP: 9  PIP: 14      LABS:                        7.5    14.30 )-----------( 70       ( 20 Feb 2025 04:39 )             24.2     02-20    146[H]  |  110[H]  |  34[H]  ----------------------------<  157[H]  4.6   |  31  |  0.35[L]    Ca    7.6[L]      20 Feb 2025 04:39  Phos  3.5     02-20  Mg     3.1     02-20    TPro  4.2[L]  /  Alb  2.2[L]  /  TBili  0.2  /  DBili  x   /  AST  69[H]  /  ALT  129[H]  /  AlkPhos  235[H]  02-20      Urinalysis Basic - ( 20 Feb 2025 04:39 )    Color: x / Appearance: x / SG: x / pH: x  Gluc: 157 mg/dL / Ketone: x  / Bili: x / Urobili: x   Blood: x / Protein: x / Nitrite: x   Leuk Esterase: x / RBC: x / WBC x   Sq Epi: x / Non Sq Epi: x / Bacteria: x      CAPILLARY BLOOD GLUCOSE      POCT Blood Glucose.: 90 mg/dL (20 Feb 2025 12:08)  POCT Blood Glucose.: 205 mg/dL (19 Feb 2025 23:22)  POCT Blood Glucose.: 294 mg/dL (19 Feb 2025 17:24)        RADIOLOGY & ADDITIONAL TESTS:    Consultant(s) Notes Reviewed:  [x ] YES  [ ] NO    MEDICATIONS  (STANDING):  artificial  tears Solution 1 Drop(s) Both EYES every 12 hours  chlorhexidine 0.12% Liquid 15 milliLiter(s) Oral Mucosa every 12 hours  chlorhexidine 2% Cloths 1 Application(s) Topical <User Schedule>  dexAMETHasone  Injectable 4 milliGRAM(s) IV Push every 6 hours  dextrose 5%. 1000 milliLiter(s) (50 mL/Hr) IV Continuous <Continuous>  dextrose 5%. 1000 milliLiter(s) (100 mL/Hr) IV Continuous <Continuous>  dextrose 50% Injectable 25 Gram(s) IV Push once  dextrose 50% Injectable 12.5 Gram(s) IV Push once  dextrose 50% Injectable 25 Gram(s) IV Push once  dextrose 50% Injectable 25 Gram(s) IV Push once  enoxaparin Injectable 40 milliGRAM(s) SubCutaneous every 24 hours  glucagon  Injectable 1 milliGRAM(s) IntraMuscular once  influenza  Vaccine (HIGH DOSE) 0.5 milliLiter(s) IntraMuscular once  insulin regular  human corrective regimen sliding scale   SubCutaneous every 6 hours  lacosamide Solution 100 milliGRAM(s) Oral every 12 hours  levETIRAcetam  Solution 1500 milliGRAM(s) Oral every 12 hours  levoFLOXacin IVPB 750 milliGRAM(s) IV Intermittent every 24 hours  midodrine 5 milliGRAM(s) Oral every 8 hours  pantoprazole  Injectable 40 milliGRAM(s) IV Push every 24 hours  petrolatum Ophthalmic Ointment 1 Application(s) Both EYES every 12 hours  senna Syrup 10 milliLiter(s) Oral at bedtime  valproic  acid Syrup 500 milliGRAM(s) Oral every 6 hours    MEDICATIONS  (PRN):  acetaminophen   Oral Liquid .. 520 milliGRAM(s) Oral every 6 hours PRN Temp greater or equal to 38C (100.4F)  dextrose Oral Gel 15 Gram(s) Oral once PRN Blood Glucose LESS THAN 70 milliGRAM(s)/deciliter  dextrose Oral Gel 15 Gram(s) Oral once PRN Blood Glucose LESS THAN 70 milliGRAM(s)/deciliter      PHYSICAL EXAM:  Constitutional - NAD, Comfortable-appearing  HEENT - NCAT, (+)Pupils fixed at 5mm, symmetric but not responsive to light  Chest - Breathing on mechanical ventilation, (+)b/l rhonchi  Cardio - Warm and well perfused, RRR  Abdomen - Soft, NTND, +BS  Extremities - (+)Edema and discoloration of b/l hands. No lower extremity edema, No calf tenderness.   Neurologic - Non-responsive to voice or touch      Care Discussed with Consultants/Other Providers [ x] YES  [ ] NO Patient is a 74y old  Female who presents with a chief complaint of AMS (20 Feb 2025 05:32)      INTERVAL HPI/OVERNIGHT EVENTS:   Overnight, patient with soft BPs (MAP 60-65).  Patient seen and examined this morning. She is still non-responsive, unable to participate in ROS, unchanged from prior exam.    ICU Vital Signs Last 24 Hrs  T(C): 36.5 (20 Feb 2025 14:00), Max: 36.5 (20 Feb 2025 14:00)  T(F): 97.7 (20 Feb 2025 14:00), Max: 97.7 (20 Feb 2025 14:00)  HR: 71 (20 Feb 2025 11:45) (64 - 93)  BP: 78/53 (20 Feb 2025 11:00) (78/53 - 128/83)  BP(mean): 61 (20 Feb 2025 11:00) (60 - 101)  ABP: --  ABP(mean): --  RR: 12 (20 Feb 2025 11:00) (12 - 12)  SpO2: 93% (20 Feb 2025 11:45) (93% - 97%)    O2 Parameters below as of 20 Feb 2025 11:45  Patient On (Oxygen Delivery Method): ventilator    O2 Concentration (%): 40      I&O's Summary    19 Feb 2025 07:01  -  20 Feb 2025 07:00  --------------------------------------------------------  IN: 1470 mL / OUT: 1015 mL / NET: 455 mL    20 Feb 2025 07:01  -  20 Feb 2025 15:54  --------------------------------------------------------  IN: 270 mL / OUT: 270 mL / NET: 0 mL      Mode: AC/ CMV (Assist Control/ Continuous Mandatory Ventilation)  RR (machine): 12  TV (machine): 350  FiO2: 40  PEEP: 5  ITime: 1  MAP: 9  PIP: 14      LABS:                        7.5    14.30 )-----------( 70       ( 20 Feb 2025 04:39 )             24.2     02-20    146[H]  |  110[H]  |  34[H]  ----------------------------<  157[H]  4.6   |  31  |  0.35[L]    Ca    7.6[L]      20 Feb 2025 04:39  Phos  3.5     02-20  Mg     3.1     02-20    TPro  4.2[L]  /  Alb  2.2[L]  /  TBili  0.2  /  DBili  x   /  AST  69[H]  /  ALT  129[H]  /  AlkPhos  235[H]  02-20      Urinalysis Basic - ( 20 Feb 2025 04:39 )    Color: x / Appearance: x / SG: x / pH: x  Gluc: 157 mg/dL / Ketone: x  / Bili: x / Urobili: x   Blood: x / Protein: x / Nitrite: x   Leuk Esterase: x / RBC: x / WBC x   Sq Epi: x / Non Sq Epi: x / Bacteria: x      CAPILLARY BLOOD GLUCOSE      POCT Blood Glucose.: 90 mg/dL (20 Feb 2025 12:08)  POCT Blood Glucose.: 205 mg/dL (19 Feb 2025 23:22)  POCT Blood Glucose.: 294 mg/dL (19 Feb 2025 17:24)        RADIOLOGY & ADDITIONAL TESTS:    Consultant(s) Notes Reviewed:  [x ] YES  [ ] NO    MEDICATIONS  (STANDING):  artificial  tears Solution 1 Drop(s) Both EYES every 12 hours  chlorhexidine 0.12% Liquid 15 milliLiter(s) Oral Mucosa every 12 hours  chlorhexidine 2% Cloths 1 Application(s) Topical <User Schedule>  dexAMETHasone  Injectable 4 milliGRAM(s) IV Push every 6 hours  dextrose 5%. 1000 milliLiter(s) (50 mL/Hr) IV Continuous <Continuous>  dextrose 5%. 1000 milliLiter(s) (100 mL/Hr) IV Continuous <Continuous>  dextrose 50% Injectable 25 Gram(s) IV Push once  dextrose 50% Injectable 12.5 Gram(s) IV Push once  dextrose 50% Injectable 25 Gram(s) IV Push once  dextrose 50% Injectable 25 Gram(s) IV Push once  enoxaparin Injectable 40 milliGRAM(s) SubCutaneous every 24 hours  glucagon  Injectable 1 milliGRAM(s) IntraMuscular once  influenza  Vaccine (HIGH DOSE) 0.5 milliLiter(s) IntraMuscular once  insulin regular  human corrective regimen sliding scale   SubCutaneous every 6 hours  lacosamide Solution 100 milliGRAM(s) Oral every 12 hours  levETIRAcetam  Solution 1500 milliGRAM(s) Oral every 12 hours  levoFLOXacin IVPB 750 milliGRAM(s) IV Intermittent every 24 hours  midodrine 5 milliGRAM(s) Oral every 8 hours  pantoprazole  Injectable 40 milliGRAM(s) IV Push every 24 hours  petrolatum Ophthalmic Ointment 1 Application(s) Both EYES every 12 hours  senna Syrup 10 milliLiter(s) Oral at bedtime  valproic  acid Syrup 500 milliGRAM(s) Oral every 6 hours    MEDICATIONS  (PRN):  acetaminophen   Oral Liquid .. 520 milliGRAM(s) Oral every 6 hours PRN Temp greater or equal to 38C (100.4F)  dextrose Oral Gel 15 Gram(s) Oral once PRN Blood Glucose LESS THAN 70 milliGRAM(s)/deciliter  dextrose Oral Gel 15 Gram(s) Oral once PRN Blood Glucose LESS THAN 70 milliGRAM(s)/deciliter      PHYSICAL EXAM:  Constitutional - NAD, non-responsive  HEENT - NCAT, (+)Pupils fixed at 5mm, symmetric but not responsive to light  Chest - Breathing on mechanical ventilation, (+)b/l rhonchi  Cardio - Warm and well perfused, RRR  Abdomen - Soft, NTND, +BS  Extremities - (+)Edema and discoloration of b/l hands. No lower extremity edema, No calf tenderness.   Neurologic - Non-responsive to voice or touch      Care Discussed with Consultants/Other Providers [ x] YES  [ ] NO

## 2025-02-20 NOTE — PROGRESS NOTE ADULT - ATTENDING COMMENTS
74F inoperable glioblastoma, status epilepticus, respiratory failure requiring intubation, herniation  pupils fixed and dilated, no gag  vent support

## 2025-02-21 ENCOUNTER — TRANSCRIPTION ENCOUNTER (OUTPATIENT)
Age: 75
End: 2025-02-21

## 2025-02-21 LAB
ALBUMIN SERPL ELPH-MCNC: 2 G/DL — LOW (ref 3.3–5)
ALP SERPL-CCNC: 214 U/L — HIGH (ref 40–120)
ALT FLD-CCNC: 103 U/L — HIGH (ref 10–45)
ANION GAP SERPL CALC-SCNC: 8 MMOL/L — SIGNIFICANT CHANGE UP (ref 5–17)
AST SERPL-CCNC: 44 U/L — HIGH (ref 10–40)
BASOPHILS # BLD AUTO: 0 K/UL — SIGNIFICANT CHANGE UP (ref 0–0.2)
BASOPHILS NFR BLD AUTO: 0 % — SIGNIFICANT CHANGE UP (ref 0–2)
BILIRUB SERPL-MCNC: 0.2 MG/DL — SIGNIFICANT CHANGE UP (ref 0.2–1.2)
BUN SERPL-MCNC: 31 MG/DL — HIGH (ref 7–23)
CALCIUM SERPL-MCNC: 7.8 MG/DL — LOW (ref 8.4–10.5)
CHLORIDE SERPL-SCNC: 111 MMOL/L — HIGH (ref 96–108)
CO2 SERPL-SCNC: 29 MMOL/L — SIGNIFICANT CHANGE UP (ref 22–31)
CREAT SERPL-MCNC: 0.34 MG/DL — LOW (ref 0.5–1.3)
EGFR: 108 ML/MIN/1.73M2 — SIGNIFICANT CHANGE UP
EOSINOPHIL # BLD AUTO: 0 K/UL — SIGNIFICANT CHANGE UP (ref 0–0.5)
EOSINOPHIL NFR BLD AUTO: 0 % — SIGNIFICANT CHANGE UP (ref 0–6)
GLUCOSE SERPL-MCNC: 235 MG/DL — HIGH (ref 70–99)
HCT VFR BLD CALC: 25.4 % — LOW (ref 34.5–45)
HGB BLD-MCNC: 7.7 G/DL — LOW (ref 11.5–15.5)
LYMPHOCYTES # BLD AUTO: 0.48 K/UL — LOW (ref 1–3.3)
LYMPHOCYTES # BLD AUTO: 3.6 % — LOW (ref 13–44)
MAGNESIUM SERPL-MCNC: 3 MG/DL — HIGH (ref 1.6–2.6)
MCHC RBC-ENTMCNC: 28.2 PG — SIGNIFICANT CHANGE UP (ref 27–34)
MCHC RBC-ENTMCNC: 30.3 G/DL — LOW (ref 32–36)
MCV RBC AUTO: 93 FL — SIGNIFICANT CHANGE UP (ref 80–100)
MONOCYTES # BLD AUTO: 0.24 K/UL — SIGNIFICANT CHANGE UP (ref 0–0.9)
MONOCYTES NFR BLD AUTO: 1.8 % — LOW (ref 2–14)
NEUTROPHILS # BLD AUTO: 11.49 K/UL — HIGH (ref 1.8–7.4)
NEUTROPHILS NFR BLD AUTO: 85.7 % — HIGH (ref 43–77)
NRBC BLD AUTO-RTO: SIGNIFICANT CHANGE UP /100 WBCS (ref 0–0)
PHOSPHATE SERPL-MCNC: 3.1 MG/DL — SIGNIFICANT CHANGE UP (ref 2.5–4.5)
PLATELET # BLD AUTO: 65 K/UL — LOW (ref 150–400)
POTASSIUM SERPL-MCNC: 4.4 MMOL/L — SIGNIFICANT CHANGE UP (ref 3.5–5.3)
POTASSIUM SERPL-SCNC: 4.4 MMOL/L — SIGNIFICANT CHANGE UP (ref 3.5–5.3)
PROT SERPL-MCNC: 4.1 G/DL — LOW (ref 6–8.3)
RBC # BLD: 2.73 M/UL — LOW (ref 3.8–5.2)
RBC # FLD: 18 % — HIGH (ref 10.3–14.5)
SODIUM SERPL-SCNC: 148 MMOL/L — HIGH (ref 135–145)
VALPROATE FREE SERPL-MCNC: 45.8 UG/ML — HIGH (ref 6–22)
WBC # BLD: 13.27 K/UL — HIGH (ref 3.8–10.5)
WBC # FLD AUTO: 13.27 K/UL — HIGH (ref 3.8–10.5)

## 2025-02-21 PROCEDURE — 99291 CRITICAL CARE FIRST HOUR: CPT

## 2025-02-21 RX ADMIN — Medication 6: at 23:39

## 2025-02-21 RX ADMIN — LEVETIRACETAM 1500 MILLIGRAM(S): 10 INJECTION, SOLUTION INTRAVENOUS at 21:15

## 2025-02-21 RX ADMIN — LACOSAMIDE 100 MILLIGRAM(S): 150 TABLET, FILM COATED ORAL at 21:16

## 2025-02-21 RX ADMIN — Medication 15 MILLILITER(S): at 09:07

## 2025-02-21 RX ADMIN — Medication 1 APPLICATION(S): at 21:31

## 2025-02-21 RX ADMIN — MIDODRINE HYDROCHLORIDE 5 MILLIGRAM(S): 5 TABLET ORAL at 21:15

## 2025-02-21 RX ADMIN — Medication 1 APPLICATION(S): at 04:34

## 2025-02-21 RX ADMIN — Medication 10 MILLILITER(S): at 21:15

## 2025-02-21 RX ADMIN — Medication 1 DROP(S): at 09:08

## 2025-02-21 RX ADMIN — DEXAMETHASONE 4 MILLIGRAM(S): 0.5 TABLET ORAL at 11:01

## 2025-02-21 RX ADMIN — Medication 15 MILLILITER(S): at 21:15

## 2025-02-21 RX ADMIN — DEXAMETHASONE 4 MILLIGRAM(S): 0.5 TABLET ORAL at 23:23

## 2025-02-21 RX ADMIN — DEXAMETHASONE 4 MILLIGRAM(S): 0.5 TABLET ORAL at 17:22

## 2025-02-21 RX ADMIN — Medication 1 DROP(S): at 21:16

## 2025-02-21 RX ADMIN — Medication 500 MILLIGRAM(S): at 21:15

## 2025-02-21 RX ADMIN — Medication 500 MILLIGRAM(S): at 09:07

## 2025-02-21 RX ADMIN — Medication 500 MILLIGRAM(S): at 04:35

## 2025-02-21 RX ADMIN — MIDODRINE HYDROCHLORIDE 5 MILLIGRAM(S): 5 TABLET ORAL at 04:35

## 2025-02-21 RX ADMIN — Medication 4: at 05:02

## 2025-02-21 RX ADMIN — Medication 6: at 17:36

## 2025-02-21 RX ADMIN — Medication 500 MILLIGRAM(S): at 14:26

## 2025-02-21 RX ADMIN — Medication 40 MILLIGRAM(S): at 09:07

## 2025-02-21 RX ADMIN — LEVETIRACETAM 1500 MILLIGRAM(S): 10 INJECTION, SOLUTION INTRAVENOUS at 09:08

## 2025-02-21 RX ADMIN — LACOSAMIDE 100 MILLIGRAM(S): 150 TABLET, FILM COATED ORAL at 09:08

## 2025-02-21 RX ADMIN — ENOXAPARIN SODIUM 40 MILLIGRAM(S): 100 INJECTION SUBCUTANEOUS at 17:22

## 2025-02-21 RX ADMIN — Medication 1 APPLICATION(S): at 09:09

## 2025-02-21 RX ADMIN — MIDODRINE HYDROCHLORIDE 5 MILLIGRAM(S): 5 TABLET ORAL at 14:27

## 2025-02-21 RX ADMIN — DEXAMETHASONE 4 MILLIGRAM(S): 0.5 TABLET ORAL at 04:35

## 2025-02-21 NOTE — DISCHARGE NOTE PROVIDER - NSDCMRMEDTOKEN_GEN_ALL_CORE_FT
dexAMETHasone 2 mg oral tablet: 1 tab(s) orally 2 times a day  LORazepam 0.5 mg oral tablet: 0.5 tab(s) orally every 4 hours as needed for  agitation  metoprolol succinate 25 mg oral capsule, extended release: 0.5 cap(s) orally 2 times a day  valproic acid: 500 milligram(s) orally 2 times a day Take 10mL by mouth every 12 hours   acetaminophen 160 mg/5 mL oral suspension: 16.25 milliliter(s) orally every 6 hours As needed Temp greater or equal to 38C (100.4F)  chlorhexidine 0.12% mucous membrane liquid: 15 milliliter(s) mucous membrane every 12 hours  chlorhexidine 2% topical pad: Apply topically to affected area once a day 1 Apply topically to affected area  dexAMETHasone 6 mg/25 mL-NaCl 0.9% intravenous solution: 16.67 milliliter(s) intravenous every 6 hours  enoxaparin: 40 unit(s) subcutaneous once a day (at bedtime) For DVT ppx  insulin regular 100 units/mL human recombinant injectable solution: 2 unit(s) injectable every 6 hours 2 Unit(s) if Glucose 151 - 200  4 Unit(s) if Glucose 201 - 250  6 Unit(s) if Glucose 251 - 300  8 Unit(s) if Glucose 301 - 350  10 Unit(s) if Glucose 351 - 400  12 Unit(s) if Glucose Greater than 400  lacosamide 10 mg/mL oral solution: 10 milliliter(s) orally every 12 hours  levETIRAcetam 100 mg/mL oral solution: 15 milliliter(s) orally every 12 hours  levoFLOXacin 25 mg/mL intravenous solution: 30 milliliter(s) intravenous every 24 hours For ESBL bacteremia, last dose on 2/26/25  midodrine 5 mg oral tablet: 1 tab(s) orally every 8 hours  ocular lubricant ophthalmic ointment: 1 application to each affected eye every 12 hours  ocular lubricant preservative-free ophthalmic solution: 1 drop(s) to each affected eye every 12 hours  pantoprazole 40 mg intravenous injection: 40 milligram(s) intravenous every 24 hours  senna (sennosides) 8.8 mg/5 mL oral syrup: 10 milliliter(s) orally once a day (at bedtime)  valproic acid 250 mg/5 mL oral liquid: 500 milligram(s) by nasogastric tube every 6 hours

## 2025-02-21 NOTE — PROGRESS NOTE ADULT - SUBJECTIVE AND OBJECTIVE BOX
***INCOMPLETE NOTE*** Patient is a 74y old  Female who presents with a chief complaint of AMS (21 Feb 2025 05:30)      INTERVAL HPI/OVERNIGHT EVENTS:   No overnight events   This morning, patient remains unresponsive. Breathing on mechanical ventilation    ICU Vital Signs Last 24 Hrs  T(C): 35.7 (21 Feb 2025 09:24), Max: 37.4 (20 Feb 2025 17:39)  T(F): 96.3 (21 Feb 2025 09:24), Max: 99.3 (20 Feb 2025 17:39)  HR: 83 (21 Feb 2025 12:24) (77 - 93)  BP: 92/60 (21 Feb 2025 13:00) (89/53 - 92/60)  BP(mean): 71 (21 Feb 2025 13:00) (65 - 71)  ABP: --  ABP(mean): --  RR: 12 (21 Feb 2025 05:40) (12 - 14)  SpO2: 95% (21 Feb 2025 12:24) (95% - 100%)    O2 Parameters below as of 21 Feb 2025 13:00  Patient On (Oxygen Delivery Method): ventilator    O2 Concentration (%): 40      I&O's Summary    20 Feb 2025 07:01  -  21 Feb 2025 07:00  --------------------------------------------------------  IN: 798 mL / OUT: 775 mL / NET: 23 mL    21 Feb 2025 07:01  -  21 Feb 2025 13:30  --------------------------------------------------------  IN: 186 mL / OUT: 305 mL / NET: -119 mL      Mode: AC/ CMV (Assist Control/ Continuous Mandatory Ventilation)  RR (machine): 12  TV (machine): 350  FiO2: 40  PEEP: 5  ITime: 1  MAP: 7  PIP: 13      LABS:                        7.7    13.27 )-----------( 65       ( 21 Feb 2025 04:26 )             25.4     02-21    148[H]  |  111[H]  |  31[H]  ----------------------------<  235[H]  4.4   |  29  |  0.34[L]    Ca    7.8[L]      21 Feb 2025 04:26  Phos  3.1     02-21  Mg     3.0     02-21    TPro  4.1[L]  /  Alb  2.0[L]  /  TBili  0.2  /  DBili  x   /  AST  44[H]  /  ALT  103[H]  /  AlkPhos  214[H]  02-21      Urinalysis Basic - ( 21 Feb 2025 04:26 )    Color: x / Appearance: x / SG: x / pH: x  Gluc: 235 mg/dL / Ketone: x  / Bili: x / Urobili: x   Blood: x / Protein: x / Nitrite: x   Leuk Esterase: x / RBC: x / WBC x   Sq Epi: x / Non Sq Epi: x / Bacteria: x      CAPILLARY BLOOD GLUCOSE      POCT Blood Glucose.: 132 mg/dL (21 Feb 2025 11:34)  POCT Blood Glucose.: 291 mg/dL (20 Feb 2025 23:36)  POCT Blood Glucose.: 192 mg/dL (20 Feb 2025 17:31)        RADIOLOGY & ADDITIONAL TESTS:    Consultant(s) Notes Reviewed:  [x ] YES  [ ] NO    MEDICATIONS  (STANDING):  artificial  tears Solution 1 Drop(s) Both EYES every 12 hours  chlorhexidine 0.12% Liquid 15 milliLiter(s) Oral Mucosa every 12 hours  chlorhexidine 2% Cloths 1 Application(s) Topical <User Schedule>  dexAMETHasone  Injectable 4 milliGRAM(s) IV Push every 6 hours  dextrose 5%. 1000 milliLiter(s) (50 mL/Hr) IV Continuous <Continuous>  dextrose 5%. 1000 milliLiter(s) (100 mL/Hr) IV Continuous <Continuous>  dextrose 50% Injectable 25 Gram(s) IV Push once  dextrose 50% Injectable 12.5 Gram(s) IV Push once  dextrose 50% Injectable 25 Gram(s) IV Push once  dextrose 50% Injectable 25 Gram(s) IV Push once  enoxaparin Injectable 40 milliGRAM(s) SubCutaneous every 24 hours  glucagon  Injectable 1 milliGRAM(s) IntraMuscular once  influenza  Vaccine (HIGH DOSE) 0.5 milliLiter(s) IntraMuscular once  insulin regular  human corrective regimen sliding scale   SubCutaneous every 6 hours  lacosamide Solution 100 milliGRAM(s) Oral every 12 hours  levETIRAcetam  Solution 1500 milliGRAM(s) Oral every 12 hours  levoFLOXacin IVPB 750 milliGRAM(s) IV Intermittent every 24 hours  midodrine 5 milliGRAM(s) Oral every 8 hours  pantoprazole  Injectable 40 milliGRAM(s) IV Push every 24 hours  petrolatum Ophthalmic Ointment 1 Application(s) Both EYES every 12 hours  senna Syrup 10 milliLiter(s) Oral at bedtime  valproic  acid Syrup 500 milliGRAM(s) Oral every 6 hours    MEDICATIONS  (PRN):  acetaminophen   Oral Liquid .. 520 milliGRAM(s) Oral every 6 hours PRN Temp greater or equal to 38C (100.4F)  dextrose Oral Gel 15 Gram(s) Oral once PRN Blood Glucose LESS THAN 70 milliGRAM(s)/deciliter  dextrose Oral Gel 15 Gram(s) Oral once PRN Blood Glucose LESS THAN 70 milliGRAM(s)/deciliter      PHYSICAL EXAM:  Constitutional - NAD, non-responsive  HEENT - NCAT, (+)Pupils fixed at 5mm, symmetric but not responsive to light  Chest - Breathing on mechanical ventilation, (+)mild b/l rhonchi  Cardio - Warm and well perfused, RRR  Abdomen - Soft, NTND, +BS  Extremities - (+)Distal b/l upper extremity edema. Discoloration of hands. Mild b/l lower extremity edema, No calf tenderness.   Neurologic - Non-responsive to voice or touch      Care Discussed with Consultants/Other Providers [ x] YES  [ ] NO

## 2025-02-21 NOTE — DISCHARGE NOTE PROVIDER - HOSPITAL COURSE
[Assessment]    Hospital course (by problem):     Patient was discharged to: (home/[facility]/acute rehab/hospice, etc, and with what services – home health PT/RN? Home O2?)    New medications:   Changes to old medications:  Medications that were stopped:    Items to follow up as outpatient:    Physical exam at the time of discharge:       75 yo female w PMH GBM on hospice care (diagnosed in 2024, follows at [facility] under Dr. Hernandez, with reported attempted resection of tumor, on chemotherapy with last chemo 2 months ago), admitted for ams likely 2/2 disease progression, found to be in status epilepticus, decision for DNR/DNI reversed by family, patient intubated and stepped up to MICU, now s/p brain herniation, now medically ready for transfer to LTAC facility.      Hospital course (by problem):     Patient was discharged to: (home/[facility]/acute rehab/hospice, etc, and with what services – home health PT/RN? Home O2?)    New medications:   Changes to old medications:  Medications that were stopped:    Items to follow up as outpatient:    Physical exam at the time of discharge:       75 yo female w PMH GBM on hospice care (diagnosed in 2024, follows at [facility] under Dr. Hernandez, with reported attempted resection of tumor, on chemotherapy with last chemo 2 months ago), admitted for ams likely 2/2 disease progression, found to be in status epilepticus, decision for DNR/DNI reversed by family, patient intubated and stepped up to MICU, now s/p brain herniation, now medically ready for transfer to LTAC facility.      Hospital course (by problem):     #Sepsis with septic shock 2/2 ESBL E. Coli Bacteremia  Meeting SIRS 2/4 (HR>90, WBC>12k) likely iso of UTI.   Bcx with ESBL E. coli, s/p ertapenem treatment. Patient continuing to be tachycardic, tenuous course between fevering and hypothermia.   Intubated on 2/1/5. Now off pressors  S/p Vancomycin (2/13-2/17)  - Levofloxacin (2/13 - 2/26)   - Started Midodrine 5mg TID (2/20 - ) for BP support    #GBM  last chemo ~2months ago, follows at [facility].  Per Neurosurgery consult on 1/31, no acute interventions. Worsening midline shift on CT. On CT today, concern for worsening neurological status. Patient got CT head that shows complete effacement of the cerebral and cerebellar sulci consistent with diffuse edema with effacement of the basal cisterns, worsening subfalcine and uncal herniation with new descending transtentorial herniation.  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 There is evidence of focal cortical hyperexcitability, more prominent in the right than the left frontotemporal regions. The background pattern shows a burst suppression ratio of 10:1, indicating severe diffuse or multifocal cerebral dysfunction, potentially exacerbated by IV sedatives. Additionally, there is severe generalized and multifocal slowing, further supporting significant underlying cerebral dysfunction. CT head with worsening midline shift. Patient got CT head that shows complete effacement of the cerebral and cerebellar sulci consistent with diffuse edema with effacement of the basal cisterns, worsening subfalcine and uncal herniation with new descending transtentorial herniation.  s/p valproic acid loading dose 1200 mg, vimpat 200 mg loading  - epilepsy consulted, appreciate recs      - Keppra 1500mg BID      - Vimpat 100mg BID (monitor tele to ensure no heart block)      - Depakote 500mg q6hrs    #Chronic atrial fibrillation.   Home med: metoprolol 12.5 BID   - c/w Lovenox ( per NSGY, no increased risk of hemorrhage)  - Lopressor 12.5 mg BID     #NPO with tube feeds  Nepro with goal rate of 42 ml/hr with LPS x1/day from 5056-7005 to provide 756 ml tube feed, 1461 calories, 76 gProt., and 550 ml free water. This is 23.1 nonprotein calories and 1.52 gProt. per kg ideal body weight 50 kg  - Continue tube feeds  - Regular insulin mISS    #Hypernatremia, stable  Sodium >180, now downtrending. Likely 2/2 diabetes insipidus iso advancing brain cancer that is leading to advanced brain swelling.   Stopped NaCl 0.225% 160cc/hr and DDAVP 1mcg q12h on 2/19 due to overcorrection of Na 176 --> 151 from 2/18 to 2/19  - the brain swelling will worsen with worsening sodium as time progresses. Can attempt to correct Hypernatremia but will likely be futile iso central diabetes insipidus and worsening brain herniation.     Patient was discharged to: LTAC    New medications: Keppra, Vimpat, Levofloxacin, Metoprolol tartrate  Changes to old medications:  Medications that were stopped: Metoprolol succinate    Physical exam at the time of discharge:  Constitutional - NAD, non-responsive  HEENT - NCAT, (+)Pupils fixed at 5mm, symmetric but not responsive to light  Chest - Breathing on mechanical ventilation, CTAB  Cardio - Warm and well perfused, RRR  Abdomen - Soft, NTND, +BS  Extremities - (+)Distal b/l upper extremity edema. Discoloration of hands. Mild b/l lower extremity edema, No calf tenderness.   Neurologic - Non-responsive to voice or touch 75 yo female w PMH GBM on hospice care (diagnosed in 2024, follows at [facility] under Dr. Hernandez, with reported attempted resection of tumor, on chemotherapy with last chemo 2 months ago), admitted for ams likely 2/2 disease progression, found to be in status epilepticus, decision for DNR/DNI reversed by family, patient intubated and stepped up to MICU, now s/p brain herniation, now medically ready for transfer to LTAC facility.      Hospital course (by problem):     #Sepsis with septic shock 2/2 ESBL E. Coli Bacteremia  Meeting SIRS 2/4 (HR>90, WBC>12k) likely iso of UTI.   Bcx with ESBL E. coli, s/p ertapenem treatment. Patient continuing to be tachycardic, tenuous course between fevering and hypothermia.   Intubated on 2/1/5. Now off pressors  S/p Vancomycin (2/13-2/17)  - Levofloxacin (2/13 - 2/26)   - Started Midodrine 5mg TID (2/20 - ) for BP support    #GBM  last chemo ~2months ago, follows at [facility].  Per Neurosurgery consult on 1/31, no acute interventions. Worsening midline shift on CT. On CT today, concern for worsening neurological status. Patient got CT head that shows complete effacement of the cerebral and cerebellar sulci consistent with diffuse edema with effacement of the basal cisterns, worsening subfalcine and uncal herniation with new descending transtentorial herniation.  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 There is evidence of focal cortical hyperexcitability, more prominent in the right than the left frontotemporal regions. The background pattern shows a burst suppression ratio of 10:1, indicating severe diffuse or multifocal cerebral dysfunction, potentially exacerbated by IV sedatives. Additionally, there is severe generalized and multifocal slowing, further supporting significant underlying cerebral dysfunction. CT head with worsening midline shift. Patient got CT head that shows complete effacement of the cerebral and cerebellar sulci consistent with diffuse edema with effacement of the basal cisterns, worsening subfalcine and uncal herniation with new descending transtentorial herniation.  s/p valproic acid loading dose 1200 mg, vimpat 200 mg loading  - epilepsy consulted, appreciate recs      - Keppra 1500mg BID      - Vimpat 100mg BID (monitor tele to ensure no heart block)      - Depakote 500mg q6hrs    #Chronic atrial fibrillation.   Home med: metoprolol 12.5 BID  Lopressor 12.5mg BID d/sam  - c/w Lovenox ( per NSGY, no increased risk of hemorrhage)    #NPO with tube feeds  Nepro with goal rate of 42 ml/hr with LPS x1/day from 8866-8192 to provide 756 ml tube feed, 1461 calories, 76 gProt., and 550 ml free water. This is 23.1 nonprotein calories and 1.52 gProt. per kg ideal body weight 50 kg  - Continue tube feeds  - Regular insulin mISS    #Hypernatremia, stable  Sodium >180, now downtrending. Likely 2/2 diabetes insipidus iso advancing brain cancer that is leading to advanced brain swelling.   Stopped NaCl 0.225% 160cc/hr and DDAVP 1mcg q12h on 2/19 due to overcorrection of Na 176 --> 151 from 2/18 to 2/19  - the brain swelling will worsen with worsening sodium as time progresses. Can attempt to correct Hypernatremia but will likely be futile iso central diabetes insipidus and worsening brain herniation.     Patient was discharged to: LTAC    New medications: Keppra, Vimpat, Levofloxacin  Changes to old medications:  Medications that were stopped:    Physical exam at the time of discharge:  Constitutional - NAD, non-responsive  HEENT - NCAT, (+)Pupils fixed at 5mm, symmetric but not responsive to light  Chest - Breathing on mechanical ventilation, CTAB  Cardio - Warm and well perfused, RRR  Abdomen - Soft, NTND, +BS  Extremities - (+)Distal b/l upper extremity edema. Discoloration of hands. Mild b/l lower extremity edema, No calf tenderness.   Neurologic - Non-responsive to voice or touch 73 yo female w PMH GBM on hospice care (diagnosed in 2024, follows at [facility] under Dr. Hernandez, with reported attempted resection of tumor, on chemotherapy with last chemo 2 months ago), admitted for ams likely 2/2 disease progression, found to be in status epilepticus, decision for DNR/DNI reversed by family, patient intubated and stepped up to MICU, now s/p brain herniation, now medically ready for transfer to LTAC facility.    Hospital course (by problem):     #Sepsis with septic shock 2/2 ESBL E. Coli Bacteremia  Meeting SIRS 2/4 (HR>90, WBC>12k) likely iso of UTI.   Bcx with ESBL E. coli, s/p ertapenem treatment. Patient continuing to be tachycardic, tenuous course between fevering and hypothermia.   Intubated on 2/1/5. Now off pressors  S/p Vancomycin (2/13-2/17)  - Levofloxacin (2/13 - 2/26)   - Started Midodrine 5mg TID (2/20 - ) for BP support    #GBM  last chemo ~2months ago, follows at [facility].  Per Neurosurgery consult on 1/31, no acute interventions. Worsening midline shift on CT. On CT today, concern for worsening neurological status. Patient got CT head that shows complete effacement of the cerebral and cerebellar sulci consistent with diffuse edema with effacement of the basal cisterns, worsening subfalcine and uncal herniation with new descending transtentorial herniation.  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 There is evidence of focal cortical hyperexcitability, more prominent in the right than the left frontotemporal regions. The background pattern shows a burst suppression ratio of 10:1, indicating severe diffuse or multifocal cerebral dysfunction, potentially exacerbated by IV sedatives. Additionally, there is severe generalized and multifocal slowing, further supporting significant underlying cerebral dysfunction. CT head with worsening midline shift. Patient got CT head that shows complete effacement of the cerebral and cerebellar sulci consistent with diffuse edema with effacement of the basal cisterns, worsening subfalcine and uncal herniation with new descending transtentorial herniation.  s/p valproic acid loading dose 1200 mg, vimpat 200 mg loading  - epilepsy consulted:      - Keppra 1500mg BID      - Vimpat 100mg BID (monitored on tele, no heart block seen)      - Depakote 500mg q6hrs    #Chronic atrial fibrillation.   Home med: metoprolol 12.5 BID  Lopressor 12.5mg BID given inpatient, now dc'd.   - Only on subq prophylactic dose of Lovenox  - No benefit of full AC    #NPO with tube feeds  Nepro with goal rate of 42 ml/hr with LPS x1/day from 1666-0297 to provide 756 ml tube feed, 1461 calories, 76 gProt., and 550 ml free water. This is 23.1 nonprotein calories and 1.52 gProt. per kg ideal body weight 50 kg  - Continue tube feeds  - Regular insulin mISS    #Hypernatremia, stable  Sodium >180, now downtrending. Likely 2/2 diabetes insipidus iso advancing brain cancer that is leading to advanced brain swelling.   Stopped NaCl 0.225% 160cc/hr and DDAVP 1mcg q12h on 2/19 due to overcorrection of Na 176 --> 151 from 2/18 to 2/19  - the brain swelling will worsen with worsening sodium as time progresses. Can attempt to correct Hypernatremia but will likely be futile iso central diabetes insipidus and worsening brain herniation.     Patient was discharged to: LTAC  New medications: Keppra, Vimpat, Levofloxacin  Medications that were stopped: Metoprolol    Physical exam at the time of discharge:  Constitutional - NAD, non-responsive  HEENT - NCAT, (+)Pupils fixed at 5mm, symmetric but not responsive to light  Chest - Breathing on mechanical ventilation, CTAB  Cardio - Warm and well perfused, RRR  Abdomen - Soft, NTND, +BS  Extremities - (+)Distal b/l upper extremity edema. Discoloration of hands. Mild b/l lower extremity edema, No calf tenderness.   Neurologic - Non-responsive to voice or touch 75 yo female w PMH GBM on hospice care (diagnosed in 2024, follows at [facility] under Dr. Hernandez, with reported attempted resection of tumor, on chemotherapy with last chemo 2 months ago), admitted for ams likely 2/2 disease progression, found to be in status epilepticus, decision for DNR/DNI reversed by family, patient intubated and stepped up to MICU, now s/p brain herniation, now medically ready for transfer to LTAC facility.    Hospital course (by problem):     #Sepsis with septic shock 2/2 ESBL E. Coli Bacteremia  Meeting SIRS 2/4 (HR>90, WBC>12k) likely iso of UTI.   Bcx with ESBL E. coli, s/p ertapenem treatment. Patient continuing to be tachycardic, tenuous course between fevering and hypothermia.   Intubated on 2/1/5. Now off pressors  S/p Vancomycin (2/13 - 2/17)  - Levofloxacin 750mg IV q24h (2/13 - 2/26)   - Started Midodrine 5mg TID (on 2/20) for BP support    #GBM  last chemo ~2months ago, follows at [facility].  Per Neurosurgery consult on 1/31, no acute interventions. Worsening midline shift on CT. On CT today, concern for worsening neurological status. Patient got CT head that shows complete effacement of the cerebral and cerebellar sulci consistent with diffuse edema with effacement of the basal cisterns, worsening subfalcine and uncal herniation with new descending transtentorial herniation.  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 There is evidence of focal cortical hyperexcitability, more prominent in the right than the left frontotemporal regions. The background pattern shows a burst suppression ratio of 10:1, indicating severe diffuse or multifocal cerebral dysfunction, potentially exacerbated by IV sedatives. Additionally, there is severe generalized and multifocal slowing, further supporting significant underlying cerebral dysfunction. CT head with worsening midline shift. Patient got CT head that shows complete effacement of the cerebral and cerebellar sulci consistent with diffuse edema with effacement of the basal cisterns, worsening subfalcine and uncal herniation with new descending transtentorial herniation.  s/p valproic acid loading dose 1200 mg, vimpat 200 mg loading  - epilepsy consulted:      - Keppra 1500mg BID      - Vimpat 100mg BID (monitored on tele, no heart block seen)      - Depakote 500mg q6hrs    #Chronic atrial fibrillation.   Home med: metoprolol 12.5 BID  Lopressor 12.5mg BID given inpatient, now dc'd.   - Only on subq prophylactic dose of Lovenox  - No benefit of full AC    #NPO with tube feeds  Nepro with goal rate of 42 ml/hr with LPS x1/day from 9749-5624 to provide 756 ml tube feed, 1461 calories, 76 gProt., and 550 ml free water. This is 23.1 nonprotein calories and 1.52 gProt. per kg ideal body weight 50 kg  - Continue tube feeds  - Regular insulin mISS    #Hypernatremia, stable  Sodium >180, now downtrending. Likely 2/2 diabetes insipidus iso advancing brain cancer that is leading to advanced brain swelling.   Stopped NaCl 0.225% 160cc/hr and DDAVP 1mcg q12h on 2/19 due to overcorrection of Na 176 --> 151 from 2/18 to 2/19  - the brain swelling will worsen with worsening sodium as time progresses. Can attempt to correct Hypernatremia but will likely be futile iso central diabetes insipidus and worsening brain herniation.     Patient was discharged to: [facility]  New medications: Keppra, Vimpat, Levofloxacin  Medications that were stopped: Metoprolol    Physical exam at the time of discharge:  Constitutional - NAD, non-responsive  HEENT - NCAT, (+)Pupils fixed at 5mm, symmetric but not responsive to light  Chest - Breathing on mechanical ventilation, CTAB  Cardio - Warm and well perfused, RRR  Abdomen - Soft, NTND, +BS  Extremities - (+)Distal b/l upper extremity edema. Discoloration of hands. Mild b/l lower extremity edema, No calf tenderness.   Neurologic - Non-responsive to voice or touch

## 2025-02-21 NOTE — PROGRESS NOTE ADULT - ASSESSMENT
75 yo female w PMH GBM on hospice care (diagnosed in 2024, follows at [facility] under Dr. Hernandez, with reported attempted resection of tumor, on chemotherapy with last chemo 2 months ago), admitted for ams likely 2/2 disease progression, found to be in status epilepticus, decision for DNR/DNI reversed by family, patient intubated and stepped up to MICU, now s/p brain herniation, pending further conversations with family.     NEURO  Intubated. Not sedated. Minimally responsive despite not being on sedation.     Continues to have seizures, concern for airway protection as GBM progresses.    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 There is evidence of focal cortical hyperexcitability, more prominent in the right than the left frontotemporal regions. The background pattern shows a burst suppression ratio of 10:1, indicating severe diffuse or multifocal cerebral dysfunction, potentially exacerbated by IV sedatives. Additionally, there is severe generalized and multifocal slowing, further supporting significant underlying cerebral dysfunction. CT head with worsening midline shift. Patient got CT head that shows complete effacement of the cerebral and cerebellar sulci consistent with diffuse edema with effacement of the basal cisterns, worsening subfalcine and uncal herniation with new descending transtentorial herniation.    Continued EEG findings of seizures.  Plan:   s/p valproic acid loading dose 1200 mg, vimpat 200 mg loading  - epilepsy consulted, appreciate recs      - Keppra 1500mg BID      - Vimpat 100mg BID (monitor tele to ensure no heart block)      - Depakote 500mg q6hrs      - seizure precautions      #GBM  last chemo ~2months ago, follows at [facility].  Per Neurosurgery consult on 1/31, no acute interventions. Worsening midline shift on CT. On CT today, concern for worsening neurological status. Patient got CT head that shows complete effacement of the cerebral and cerebellar sulci consistent with diffuse edema with effacement of the basal cisterns, worsening subfalcine and uncal herniation with new descending transtentorial herniation.  -c/w decadron 4 IV q6h  -epilepsy aware  -palliative care consult     CARDIAC  #Chronic atrial fibrillation.   #afib with RVR   Plan  -bladder scans q6h   -ctm BMP  -s/p john   Home med: metoprolol 12.5 BID   - c/w Lovenox ( per NSGY, no increased risk of hemorrhage)  -start Lopressor 12.5 mg BID     #Shock  S/p Levophed, weaned on 2/17  - Started Midodrine 5mg TID (2/20 - ) for BP support    PULM  #intubated  - intubated on 2/1  - GOC  - consider trach moving forward    GI  - NPO with tube feeds   -Miralax and Senna standing    RENAL  - currently with normal renal function    #hypernatremia  Sodium >180, now downtrending. Likely 2/2 diabetes insipidus iso advancing brain cancer that is leading to advanced brain swelling.   Stopped NaCl 0.225% 160cc/hr and DDAVP 1mcg q12h on 2/19 due to overcorrection of Na 176 --> 151 from 2/18 to 2/19  - the brain swelling will worsen with worsening sodium as time progresses. Can attempt to correct Hypernatremia but will likely be futile iso central diabetes insipidus and worsening brain herniation.     Currently with John.     Endo  Lantus 20U qhs and Regular Insulin 9U q6h d/sam on 2/20 due to downtrending FSGs.  Diet changed to Glucerna due to elevated glucose, CTM.   - Continue FSG and ISS q6h only    ID  #SIRS  #ESBL Ecoli Bacteremia  #R arm cellulitis   #ESBL ecoli UTI   Systemic inflammatory response syndrome (SIRS).   Meeting SIRS 2/4 (HR>90, WBC>12k) likely iso of UTI.   Bcx with ESBL E. coli, s/p ertapenem treatment. Patient continuing to be tachycardic, tenuous course between fevering and hypothermia.   -c/w Levofloxacin (2/13-)   -c/w Vancomycin (2/13-)      F: none  E: replete K<4, Mg<2  N: npo with tube feeds  VTE Prophylaxis: lovenox 40mg QD  GI: pantoprazole 40 IVP q24hrs  C: Full Code  D: micu    Lines: Peripheral IV L 22g (2/4) IV L 18 g (2/4), NG tube, John (2/14)     75 yo female w PMH GBM on hospice care (diagnosed in 2024, follows at [facility] under Dr. Hernandez, with reported attempted resection of tumor, on chemotherapy with last chemo 2 months ago), admitted for ams likely 2/2 disease progression, found to be in status epilepticus, decision for DNR/DNI reversed by family, patient intubated and stepped up to MICU, now s/p brain herniation, pending further conversations with family.     NEURO  Intubated. Not sedated. Minimally responsive despite not being on sedation.     Continues to have seizures, concern for airway protection as GBM progresses.    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 There is evidence of focal cortical hyperexcitability, more prominent in the right than the left frontotemporal regions. The background pattern shows a burst suppression ratio of 10:1, indicating severe diffuse or multifocal cerebral dysfunction, potentially exacerbated by IV sedatives. Additionally, there is severe generalized and multifocal slowing, further supporting significant underlying cerebral dysfunction. CT head with worsening midline shift. Patient got CT head that shows complete effacement of the cerebral and cerebellar sulci consistent with diffuse edema with effacement of the basal cisterns, worsening subfalcine and uncal herniation with new descending transtentorial herniation.    Continued EEG findings of seizures.  Plan:   s/p valproic acid loading dose 1200 mg, vimpat 200 mg loading  - epilepsy consulted, appreciate recs      - Keppra 1500mg BID      - Vimpat 100mg BID (monitor tele to ensure no heart block)      - Depakote 500mg q6hrs      - seizure precautions      #GBM  last chemo ~2months ago, follows at [facility].  Per Neurosurgery consult on 1/31, no acute interventions. Worsening midline shift on CT. On CT today, concern for worsening neurological status. Patient got CT head that shows complete effacement of the cerebral and cerebellar sulci consistent with diffuse edema with effacement of the basal cisterns, worsening subfalcine and uncal herniation with new descending transtentorial herniation.  -c/w decadron 4 IV q6h  -epilepsy aware  -palliative care consult     CARDIAC  #Chronic atrial fibrillation.   #afib with RVR   Plan  -bladder scans q6h   -ctm BMP  -s/p john   Home med: metoprolol 12.5 BID   - c/w Lovenox ( per NSGY, no increased risk of hemorrhage)  -start Lopressor 12.5 mg BID     #Shock  S/p Levophed, weaned on 2/17  - Started Midodrine 5mg TID (2/20 - ) for BP support    PULM  #intubated  - intubated on 2/1  - GOC  - consider trach moving forward    GI  - NPO with tube feeds   -Miralax and Senna standing    RENAL  - currently with normal renal function    #hypernatremia  Sodium >180, now downtrending. Likely 2/2 diabetes insipidus iso advancing brain cancer that is leading to advanced brain swelling.   Stopped NaCl 0.225% 160cc/hr and DDAVP 1mcg q12h on 2/19 due to overcorrection of Na 176 --> 151 from 2/18 to 2/19  - the brain swelling will worsen with worsening sodium as time progresses. Can attempt to correct Hypernatremia but will likely be futile iso central diabetes insipidus and worsening brain herniation.     Currently with John.     Endo  Lantus 20U qhs and Regular Insulin 9U q6h d/sam on 2/20 due to downtrending FSGs.  Diet changed to Glucerna due to elevated glucose, CTM.   - Continue FSG and ISS q6h only    ID  #SIRS  #ESBL Ecoli Bacteremia  #R arm cellulitis   #ESBL ecoli UTI   Systemic inflammatory response syndrome (SIRS).   Meeting SIRS 2/4 (HR>90, WBC>12k) likely iso of UTI.   Bcx with ESBL E. coli, s/p ertapenem treatment. Patient continuing to be tachycardic, tenuous course between fevering and hypothermia.   S/p Vancomycin (2/13-2/17)  -c/w Levofloxacin (2/13-)       F: none  E: replete K<4, Mg<2  N: npo with tube feeds  VTE Prophylaxis: lovenox 40mg QD  GI: pantoprazole 40 IVP q24hrs  C: Full Code  D: micu    Lines: Peripheral IV L 22g (2/4) IV L 18 g (2/4), NG tube, John (2/14)     73 yo female w PMH GBM on hospice care (diagnosed in 2024, follows at [facility] under Dr. Hernandez, with reported attempted resection of tumor, on chemotherapy with last chemo 2 months ago), admitted for ams likely 2/2 disease progression, found to be in status epilepticus, decision for DNR/DNI reversed by family, patient intubated and stepped up to MICU, now s/p brain herniation, pending further conversations with family.     NEURO  Intubated. Not sedated. Minimally responsive despite not being on sedation.     Continues to have seizures, concern for airway protection as GBM progresses.    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 There is evidence of focal cortical hyperexcitability, more prominent in the right than the left frontotemporal regions. The background pattern shows a burst suppression ratio of 10:1, indicating severe diffuse or multifocal cerebral dysfunction, potentially exacerbated by IV sedatives. Additionally, there is severe generalized and multifocal slowing, further supporting significant underlying cerebral dysfunction. CT head with worsening midline shift. Patient got CT head that shows complete effacement of the cerebral and cerebellar sulci consistent with diffuse edema with effacement of the basal cisterns, worsening subfalcine and uncal herniation with new descending transtentorial herniation.    Continued EEG findings of seizures.  Plan:   s/p valproic acid loading dose 1200 mg, vimpat 200 mg loading  - epilepsy consulted, appreciate recs      - Keppra 1500mg BID      - Vimpat 100mg BID (monitor tele to ensure no heart block)      - Depakote 500mg q6hrs      - seizure precautions      #GBM  last chemo ~2months ago, follows at [facility].  Per Neurosurgery consult on 1/31, no acute interventions. Worsening midline shift on CT. On CT today, concern for worsening neurological status. Patient got CT head that shows complete effacement of the cerebral and cerebellar sulci consistent with diffuse edema with effacement of the basal cisterns, worsening subfalcine and uncal herniation with new descending transtentorial herniation.  -c/w decadron 4 IV q6h  -epilepsy aware  -palliative care consult     CARDIAC  #Chronic atrial fibrillation.   #afib with RVR   Plan  -bladder scans q6h   -ctm BMP  -s/p john   Home med: metoprolol 12.5 BID   - c/w Lovenox ( per NSGY, no increased risk of hemorrhage)  -start Lopressor 12.5 mg BID     #Shock  S/p Levophed, weaned on 2/17  - Started Midodrine 5mg TID (2/20 - ) for BP support    PULM  #intubated  - intubated on 2/1  - GOC  - consider trach moving forward    GI  - NPO with tube feeds   -Miralax and Senna standing    RENAL  - currently with normal renal function    #hypernatremia  Sodium >180, now downtrending. Likely 2/2 diabetes insipidus iso advancing brain cancer that is leading to advanced brain swelling.   Stopped NaCl 0.225% 160cc/hr and DDAVP 1mcg q12h on 2/19 due to overcorrection of Na 176 --> 151 from 2/18 to 2/19  - the brain swelling will worsen with worsening sodium as time progresses. Can attempt to correct Hypernatremia but will likely be futile iso central diabetes insipidus and worsening brain herniation.     Currently with John.     Endo  Lantus 20U qhs and Regular Insulin 9U q6h d/sam on 2/20 due to downtrending FSGs.  Diet changed to Glucerna due to elevated glucose, CTM.   - Continue FSG and ISS q6h only    ID  #SIRS  #ESBL Ecoli Bacteremia  #R arm cellulitis   #ESBL ecoli UTI   Systemic inflammatory response syndrome (SIRS).   Meeting SIRS 2/4 (HR>90, WBC>12k) likely iso of UTI.   Bcx with ESBL E. coli, s/p ertapenem treatment. Patient continuing to be tachycardic, tenuous course between fevering and hypothermia.   S/p Vancomycin (2/13-2/17)  -c/w Levofloxacin (2/13-)       F: none  E: replete K<4, Mg<2  N: npo with tube feeds  VTE Prophylaxis: lovenox 40mg QD  GI: pantoprazole 40 IVP q24hrs  C: Full Code  D: micu    Lines: Peripheral IV L 22g (2/4) IV L 18 g (2/4), NG tube, John (2/14)     75 yo female w PMH GBM on hospice care (diagnosed in 2024, follows at [facility] under Dr. Hernandez, with reported attempted resection of tumor, on chemotherapy with last chemo 2 months ago), admitted for ams likely 2/2 disease progression, found to be in status epilepticus, decision for DNR/DNI reversed by family, patient intubated and stepped up to MICU, now s/p brain herniation, pending further conversations with family.     NEURO  Intubated. Not sedated. Minimally responsive despite not being on sedation.     Continues to have seizures, concern for airway protection as GBM progresses.    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 There is evidence of focal cortical hyperexcitability, more prominent in the right than the left frontotemporal regions. The background pattern shows a burst suppression ratio of 10:1, indicating severe diffuse or multifocal cerebral dysfunction, potentially exacerbated by IV sedatives. Additionally, there is severe generalized and multifocal slowing, further supporting significant underlying cerebral dysfunction. CT head with worsening midline shift. Patient got CT head that shows complete effacement of the cerebral and cerebellar sulci consistent with diffuse edema with effacement of the basal cisterns, worsening subfalcine and uncal herniation with new descending transtentorial herniation.    Continued EEG findings of seizures.  Plan:   s/p valproic acid loading dose 1200 mg, vimpat 200 mg loading  - epilepsy consulted, appreciate recs      - Keppra 1500mg BID      - Vimpat 100mg BID (monitor tele to ensure no heart block)      - Depakote 500mg q6hrs      - seizure precautions      #GBM  last chemo ~2months ago, follows at [facility].  Per Neurosurgery consult on 1/31, no acute interventions. Worsening midline shift on CT. On CT today, concern for worsening neurological status. Patient got CT head that shows complete effacement of the cerebral and cerebellar sulci consistent with diffuse edema with effacement of the basal cisterns, worsening subfalcine and uncal herniation with new descending transtentorial herniation.  -c/w decadron 4 IV q6h  -epilepsy aware  -palliative care consult     CARDIAC  #Chronic atrial fibrillation.   #afib with RVR   Plan  -bladder scans q6h   -ctm BMP  -s/p john   Home med: metoprolol 12.5 BID   - c/w Lovenox ( per NSGY, no increased risk of hemorrhage)  -start Lopressor 12.5 mg BID     #Septic shock  S/p Levophed, weaned on 2/17  - Started Midodrine 5mg TID (2/20 - ) for BP support    PULM  #intubated  - intubated on 2/1  - GOC  - consider trach moving forward    GI  - NPO with tube feeds   -Miralax and Senna standing    RENAL  - currently with normal renal function    #hypernatremia  Sodium >180, now downtrending. Likely 2/2 diabetes insipidus iso advancing brain cancer that is leading to advanced brain swelling.   Stopped NaCl 0.225% 160cc/hr and DDAVP 1mcg q12h on 2/19 due to overcorrection of Na 176 --> 151 from 2/18 to 2/19  - the brain swelling will worsen with worsening sodium as time progresses. Can attempt to correct Hypernatremia but will likely be futile iso central diabetes insipidus and worsening brain herniation.     Currently with John.     Endo  Lantus 20U qhs and Regular Insulin 9U q6h d/sam on 2/20 due to downtrending FSGs.  Diet changed to Glucerna due to elevated glucose, CTM.   - Continue FSG and ISS q6h only    ID  #Sepsis with septic shock  #ESBL Ecoli Bacteremia  #R arm cellulitis   #ESBL ecoli UTI   Systemic inflammatory response syndrome (SIRS).   Meeting SIRS 2/4 (HR>90, WBC>12k) likely iso of UTI.   Bcx with ESBL E. coli, s/p ertapenem treatment. Patient continuing to be tachycardic, tenuous course between fevering and hypothermia.   S/p Vancomycin (2/13-2/17)  -c/w Levofloxacin (2/13-)       F: none  E: replete K<4, Mg<2  N: npo with tube feeds  VTE Prophylaxis: lovenox 40mg QD  GI: pantoprazole 40 IVP q24hrs  C: Full Code  D: micu    Lines: Peripheral IV L 22g (2/4) IV L 18 g (2/4), NG tube, John (2/14)

## 2025-02-21 NOTE — DISCHARGE NOTE PROVIDER - INSTRUCTIONS
Nepro with goal rate of 42 ml/hr with LPS x1/day from [id number] to provide 756 ml tube feed, 1461 calories, 76 gProt., and 550 ml free water. This is 23.1 nonprotein calories and 1.52 gProt. per kg ideal body weight 50 kg

## 2025-02-21 NOTE — PROGRESS NOTE ADULT - ATTENDING COMMENTS
74F inoperable glioblastoma, status epilepticus, respiratory failure requiring intubation, herniation  pupils fixed and dilated, no gag  vent support  on midodrine

## 2025-02-21 NOTE — DISCHARGE NOTE PROVIDER - NSDCCPCAREPLAN_GEN_ALL_CORE_FT
PRINCIPAL DISCHARGE DIAGNOSIS  Diagnosis: GBM (glioblastoma multiforme)  Assessment and Plan of Treatment: Ms. Valentino was admitted to the hospital for severe weakness and seizure-like activity in the setting of her worsening brain cancer, called Glioblastoma multiforme (GBM). In the hospital, she has been receiving steroids as well as anti-seizure medications. It was also found in the hospital that she had a bacterial infection in her blood, caused by E. coli. She has been receiving antibiotics for this infection. Sadly, the cancer has progressed to a point where the typical signs of healthy brain functioning are no longer detectable. However, with the assistance of intubation and mechanical ventilation, her blood flow to her vital organs has been preserved for the time-being.

## 2025-02-22 PROCEDURE — 99291 CRITICAL CARE FIRST HOUR: CPT

## 2025-02-22 RX ORDER — ACETAMINOPHEN 500 MG/5ML
1000 LIQUID (ML) ORAL ONCE
Refills: 0 | Status: COMPLETED | OUTPATIENT
Start: 2025-02-22 | End: 2025-02-22

## 2025-02-22 RX ADMIN — Medication 4: at 23:20

## 2025-02-22 RX ADMIN — Medication 1 APPLICATION(S): at 23:12

## 2025-02-22 RX ADMIN — Medication 400 MILLIGRAM(S): at 23:21

## 2025-02-22 RX ADMIN — DEXAMETHASONE 4 MILLIGRAM(S): 0.5 TABLET ORAL at 11:28

## 2025-02-22 RX ADMIN — DEXAMETHASONE 4 MILLIGRAM(S): 0.5 TABLET ORAL at 17:57

## 2025-02-22 RX ADMIN — LACOSAMIDE 100 MILLIGRAM(S): 150 TABLET, FILM COATED ORAL at 09:47

## 2025-02-22 RX ADMIN — Medication 10 MILLILITER(S): at 23:20

## 2025-02-22 RX ADMIN — Medication 1 DROP(S): at 09:49

## 2025-02-22 RX ADMIN — LEVETIRACETAM 1500 MILLIGRAM(S): 10 INJECTION, SOLUTION INTRAVENOUS at 23:20

## 2025-02-22 RX ADMIN — Medication 500 MILLIGRAM(S): at 15:00

## 2025-02-22 RX ADMIN — DEXAMETHASONE 4 MILLIGRAM(S): 0.5 TABLET ORAL at 23:20

## 2025-02-22 RX ADMIN — DEXAMETHASONE 4 MILLIGRAM(S): 0.5 TABLET ORAL at 06:58

## 2025-02-22 RX ADMIN — Medication 40 MILLIGRAM(S): at 09:47

## 2025-02-22 RX ADMIN — Medication 500 MILLIGRAM(S): at 09:48

## 2025-02-22 RX ADMIN — ENOXAPARIN SODIUM 40 MILLIGRAM(S): 100 INJECTION SUBCUTANEOUS at 17:57

## 2025-02-22 RX ADMIN — LEVETIRACETAM 1500 MILLIGRAM(S): 10 INJECTION, SOLUTION INTRAVENOUS at 09:47

## 2025-02-22 RX ADMIN — Medication 8: at 05:34

## 2025-02-22 RX ADMIN — MIDODRINE HYDROCHLORIDE 5 MILLIGRAM(S): 5 TABLET ORAL at 23:19

## 2025-02-22 RX ADMIN — Medication 15 MILLILITER(S): at 09:48

## 2025-02-22 RX ADMIN — MIDODRINE HYDROCHLORIDE 5 MILLIGRAM(S): 5 TABLET ORAL at 15:00

## 2025-02-22 RX ADMIN — Medication 500 MILLIGRAM(S): at 03:05

## 2025-02-22 RX ADMIN — Medication 500 MILLIGRAM(S): at 23:19

## 2025-02-22 RX ADMIN — Medication 4: at 11:26

## 2025-02-22 RX ADMIN — Medication 4: at 18:13

## 2025-02-22 RX ADMIN — Medication 1 APPLICATION(S): at 09:49

## 2025-02-22 RX ADMIN — MIDODRINE HYDROCHLORIDE 5 MILLIGRAM(S): 5 TABLET ORAL at 05:14

## 2025-02-22 RX ADMIN — Medication 15 MILLILITER(S): at 23:12

## 2025-02-22 RX ADMIN — Medication 1 APPLICATION(S): at 05:14

## 2025-02-22 RX ADMIN — Medication 1000 MILLIGRAM(S): at 23:55

## 2025-02-22 RX ADMIN — Medication 1 DROP(S): at 23:12

## 2025-02-22 RX ADMIN — LACOSAMIDE 100 MILLIGRAM(S): 150 TABLET, FILM COATED ORAL at 23:20

## 2025-02-22 NOTE — PROGRESS NOTE ADULT - SUBJECTIVE AND OBJECTIVE BOX
**INCOMPLETE NOTE    INTERVAL/OVERNIGHT EVENTS: None    SUBJECTIVE:  Patient seen and examined at bedside, comfortable, NAD. Denied fever, chest pain, dyspnea, abdominal pain.     Vital Signs Last 12 Hrs  T(F): 99 (02-22-25 @ 01:04), Max: 99.3 (02-21-25 @ 21:20)  HR: 80 (02-22-25 @ 06:50) (79 - 85)  BP: 113/59 (02-22-25 @ 05:00) (109/60 - 113/59)  BP(mean): 80 (02-22-25 @ 05:00) (78 - 80)  RR: 12 (02-22-25 @ 06:50) (12 - 12)  SpO2: 96% (02-22-25 @ 06:50) (96% - 100%)  I&O's Summary    21 Feb 2025 07:01  -  22 Feb 2025 07:00  --------------------------------------------------------  IN: 1164 mL / OUT: 1635 mL / NET: -471 mL        PHYSICAL EXAM:  General: NAD  HEENT: PERRL, EOM intact, sclera anicteric, MMM  Cardiovascular: RRR; no MRG; no JVD  Respiratory: CTAB; no WRR  GI/: soft; NTND; BS x4  Extremities: WWP; 2+ peripheral pulses bilaterally; no LE edema  Skin: normal color & turgor; no rash  Neurologic: aox3; no focal deficits    LABS:                        7.7    13.27 )-----------( 65       ( 21 Feb 2025 04:26 )             25.4     02-21    148[H]  |  111[H]  |  31[H]  ----------------------------<  235[H]  4.4   |  29  |  0.34[L]    Ca    7.8[L]      21 Feb 2025 04:26  Phos  3.1     02-21  Mg     3.0     02-21    TPro  4.1[L]  /  Alb  2.0[L]  /  TBili  0.2  /  DBili  x   /  AST  44[H]  /  ALT  103[H]  /  AlkPhos  214[H]  02-21      Urinalysis Basic - ( 21 Feb 2025 04:26 )    Color: x / Appearance: x / SG: x / pH: x  Gluc: 235 mg/dL / Ketone: x  / Bili: x / Urobili: x   Blood: x / Protein: x / Nitrite: x   Leuk Esterase: x / RBC: x / WBC x   Sq Epi: x / Non Sq Epi: x / Bacteria: x          RADIOLOGY & ADDITIONAL TESTS:    MEDICATIONS  (STANDING):  artificial  tears Solution 1 Drop(s) Both EYES every 12 hours  chlorhexidine 0.12% Liquid 15 milliLiter(s) Oral Mucosa every 12 hours  chlorhexidine 2% Cloths 1 Application(s) Topical <User Schedule>  dexAMETHasone  Injectable 4 milliGRAM(s) IV Push every 6 hours  dextrose 5%. 1000 milliLiter(s) (50 mL/Hr) IV Continuous <Continuous>  dextrose 5%. 1000 milliLiter(s) (100 mL/Hr) IV Continuous <Continuous>  dextrose 50% Injectable 25 Gram(s) IV Push once  dextrose 50% Injectable 25 Gram(s) IV Push once  dextrose 50% Injectable 12.5 Gram(s) IV Push once  dextrose 50% Injectable 25 Gram(s) IV Push once  enoxaparin Injectable 40 milliGRAM(s) SubCutaneous every 24 hours  glucagon  Injectable 1 milliGRAM(s) IntraMuscular once  influenza  Vaccine (HIGH DOSE) 0.5 milliLiter(s) IntraMuscular once  insulin regular  human corrective regimen sliding scale   SubCutaneous every 6 hours  lacosamide Solution 100 milliGRAM(s) Oral every 12 hours  levETIRAcetam  Solution 1500 milliGRAM(s) Oral every 12 hours  levoFLOXacin IVPB 750 milliGRAM(s) IV Intermittent every 24 hours  midodrine 5 milliGRAM(s) Oral every 8 hours  pantoprazole  Injectable 40 milliGRAM(s) IV Push every 24 hours  petrolatum Ophthalmic Ointment 1 Application(s) Both EYES every 12 hours  senna Syrup 10 milliLiter(s) Oral at bedtime  valproic  acid Syrup 500 milliGRAM(s) Oral every 6 hours    MEDICATIONS  (PRN):  acetaminophen   Oral Liquid .. 520 milliGRAM(s) Oral every 6 hours PRN Temp greater or equal to 38C (100.4F)  dextrose Oral Gel 15 Gram(s) Oral once PRN Blood Glucose LESS THAN 70 milliGRAM(s)/deciliter  dextrose Oral Gel 15 Gram(s) Oral once PRN Blood Glucose LESS THAN 70 milliGRAM(s)/deciliter   INTERVAL/OVERNIGHT EVENTS: None    SUBJECTIVE:  Patient seen and examined at bedside, comfortable, NAD. Intubated, not following commands    Vital Signs Last 12 Hrs  T(F): 99 (02-22-25 @ 01:04), Max: 99.3 (02-21-25 @ 21:20)  HR: 80 (02-22-25 @ 06:50) (79 - 85)  BP: 113/59 (02-22-25 @ 05:00) (109/60 - 113/59)  BP(mean): 80 (02-22-25 @ 05:00) (78 - 80)  RR: 12 (02-22-25 @ 06:50) (12 - 12)  SpO2: 96% (02-22-25 @ 06:50) (96% - 100%)  I&O's Summary    21 Feb 2025 07:01  -  22 Feb 2025 07:00  --------------------------------------------------------  IN: 1164 mL / OUT: 1635 mL / NET: -471 mL        PHYSICAL EXAM:  Constitutional - NAD, non-responsive  HEENT - NCAT, (+)Pupils fixed at 5mm, symmetric but not responsive to light  Chest - Breathing on mechanical ventilation, (+)mild b/l rhonchi  Cardio - Warm and well perfused, RRR  Abdomen - Soft, NTND, +BS  Extremities - (+)Distal b/l upper extremity edema. Discoloration of hands. Mild b/l lower extremity edema, No calf tenderness.   Neurologic - Non-responsive to voice or touch    LABS:                        7.7    13.27 )-----------( 65       ( 21 Feb 2025 04:26 )             25.4     02-21    148[H]  |  111[H]  |  31[H]  ----------------------------<  235[H]  4.4   |  29  |  0.34[L]    Ca    7.8[L]      21 Feb 2025 04:26  Phos  3.1     02-21  Mg     3.0     02-21    TPro  4.1[L]  /  Alb  2.0[L]  /  TBili  0.2  /  DBili  x   /  AST  44[H]  /  ALT  103[H]  /  AlkPhos  214[H]  02-21      Urinalysis Basic - ( 21 Feb 2025 04:26 )    Color: x / Appearance: x / SG: x / pH: x  Gluc: 235 mg/dL / Ketone: x  / Bili: x / Urobili: x   Blood: x / Protein: x / Nitrite: x   Leuk Esterase: x / RBC: x / WBC x   Sq Epi: x / Non Sq Epi: x / Bacteria: x          RADIOLOGY & ADDITIONAL TESTS:    MEDICATIONS  (STANDING):  artificial  tears Solution 1 Drop(s) Both EYES every 12 hours  chlorhexidine 0.12% Liquid 15 milliLiter(s) Oral Mucosa every 12 hours  chlorhexidine 2% Cloths 1 Application(s) Topical <User Schedule>  dexAMETHasone  Injectable 4 milliGRAM(s) IV Push every 6 hours  dextrose 5%. 1000 milliLiter(s) (50 mL/Hr) IV Continuous <Continuous>  dextrose 5%. 1000 milliLiter(s) (100 mL/Hr) IV Continuous <Continuous>  dextrose 50% Injectable 25 Gram(s) IV Push once  dextrose 50% Injectable 25 Gram(s) IV Push once  dextrose 50% Injectable 12.5 Gram(s) IV Push once  dextrose 50% Injectable 25 Gram(s) IV Push once  enoxaparin Injectable 40 milliGRAM(s) SubCutaneous every 24 hours  glucagon  Injectable 1 milliGRAM(s) IntraMuscular once  influenza  Vaccine (HIGH DOSE) 0.5 milliLiter(s) IntraMuscular once  insulin regular  human corrective regimen sliding scale   SubCutaneous every 6 hours  lacosamide Solution 100 milliGRAM(s) Oral every 12 hours  levETIRAcetam  Solution 1500 milliGRAM(s) Oral every 12 hours  levoFLOXacin IVPB 750 milliGRAM(s) IV Intermittent every 24 hours  midodrine 5 milliGRAM(s) Oral every 8 hours  pantoprazole  Injectable 40 milliGRAM(s) IV Push every 24 hours  petrolatum Ophthalmic Ointment 1 Application(s) Both EYES every 12 hours  senna Syrup 10 milliLiter(s) Oral at bedtime  valproic  acid Syrup 500 milliGRAM(s) Oral every 6 hours    MEDICATIONS  (PRN):  acetaminophen   Oral Liquid .. 520 milliGRAM(s) Oral every 6 hours PRN Temp greater or equal to 38C (100.4F)  dextrose Oral Gel 15 Gram(s) Oral once PRN Blood Glucose LESS THAN 70 milliGRAM(s)/deciliter  dextrose Oral Gel 15 Gram(s) Oral once PRN Blood Glucose LESS THAN 70 milliGRAM(s)/deciliter

## 2025-02-22 NOTE — PROGRESS NOTE ADULT - ASSESSMENT
75 yo female w PMH GBM on hospice care (diagnosed in 2024, follows at [facility] under Dr. Hernandez, with reported attempted resection of tumor, on chemotherapy with last chemo 2 months ago), admitted for ams likely 2/2 disease progression, found to be in status epilepticus, decision for DNR/DNI reversed by family, patient intubated and stepped up to MICU, now s/p brain herniation, pending further conversations with family.     NEURO  Intubated. Not sedated. Minimally responsive despite not being on sedation.     Continues to have seizures, concern for airway protection as GBM progresses.    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 There is evidence of focal cortical hyperexcitability, more prominent in the right than the left frontotemporal regions. The background pattern shows a burst suppression ratio of 10:1, indicating severe diffuse or multifocal cerebral dysfunction, potentially exacerbated by IV sedatives. Additionally, there is severe generalized and multifocal slowing, further supporting significant underlying cerebral dysfunction. CT head with worsening midline shift. Patient got CT head that shows complete effacement of the cerebral and cerebellar sulci consistent with diffuse edema with effacement of the basal cisterns, worsening subfalcine and uncal herniation with new descending transtentorial herniation.    Continued EEG findings of seizures.  Plan:   s/p valproic acid loading dose 1200 mg, vimpat 200 mg loading  - epilepsy consulted, appreciate recs      - Keppra 1500mg BID      - Vimpat 100mg BID (monitor tele to ensure no heart block)      - Depakote 500mg q6hrs      - seizure precautions      #GBM  last chemo ~2months ago, follows at [facility].  Per Neurosurgery consult on 1/31, no acute interventions. Worsening midline shift on CT. On CT today, concern for worsening neurological status. Patient got CT head that shows complete effacement of the cerebral and cerebellar sulci consistent with diffuse edema with effacement of the basal cisterns, worsening subfalcine and uncal herniation with new descending transtentorial herniation.  -c/w decadron 4 IV q6h  -epilepsy aware  -palliative care consult     CARDIAC  #Chronic atrial fibrillation.   #afib with RVR   Plan  -bladder scans q6h   -ctm BMP  -s/p john   Home med: metoprolol 12.5 BID   - c/w Lovenox ( per NSGY, no increased risk of hemorrhage)  -start Lopressor 12.5 mg BID     #Septic shock  S/p Levophed, weaned on 2/17  - Started Midodrine 5mg TID (2/20 - ) for BP support    PULM  #intubated  - intubated on 2/1  - GOC  - consider trach moving forward    GI  - NPO with tube feeds   -Miralax and Senna standing    RENAL  - currently with normal renal function    #hypernatremia  Sodium >180, now downtrending. Likely 2/2 diabetes insipidus iso advancing brain cancer that is leading to advanced brain swelling.   Stopped NaCl 0.225% 160cc/hr and DDAVP 1mcg q12h on 2/19 due to overcorrection of Na 176 --> 151 from 2/18 to 2/19  - the brain swelling will worsen with worsening sodium as time progresses. Can attempt to correct Hypernatremia but will likely be futile iso central diabetes insipidus and worsening brain herniation.     Currently with John.     Endo  Lantus 20U qhs and Regular Insulin 9U q6h d/sam on 2/20 due to downtrending FSGs.  Diet changed to Glucerna due to elevated glucose, CTM.   - Continue FSG and ISS q6h only    ID  #Sepsis with septic shock  #ESBL Ecoli Bacteremia  #R arm cellulitis   #ESBL ecoli UTI   Systemic inflammatory response syndrome (SIRS).   Meeting SIRS 2/4 (HR>90, WBC>12k) likely iso of UTI.   Bcx with ESBL E. coli, s/p ertapenem treatment. Patient continuing to be tachycardic, tenuous course between fevering and hypothermia.   S/p Vancomycin (2/13-2/17)  -c/w Levofloxacin (2/13-)       F: none  E: replete K<4, Mg<2  N: npo with tube feeds  VTE Prophylaxis: lovenox 40mg QD  GI: pantoprazole 40 IVP q24hrs  C: Full Code  D: micu    Lines: Peripheral IV L 22g (2/4) IV L 18 g (2/4), NG tube, John (2/14)

## 2025-02-23 LAB
ALBUMIN SERPL ELPH-MCNC: 2.1 G/DL — LOW (ref 3.3–5)
ALP SERPL-CCNC: 219 U/L — HIGH (ref 40–120)
ALT FLD-CCNC: 70 U/L — HIGH (ref 10–45)
ANION GAP SERPL CALC-SCNC: 4 MMOL/L — LOW (ref 5–17)
AST SERPL-CCNC: 49 U/L — HIGH (ref 10–40)
BASOPHILS # BLD AUTO: 0 K/UL — SIGNIFICANT CHANGE UP (ref 0–0.2)
BASOPHILS NFR BLD AUTO: 0 % — SIGNIFICANT CHANGE UP (ref 0–2)
BILIRUB SERPL-MCNC: 0.2 MG/DL — SIGNIFICANT CHANGE UP (ref 0.2–1.2)
BUN SERPL-MCNC: 29 MG/DL — HIGH (ref 7–23)
CALCIUM SERPL-MCNC: 9 MG/DL — SIGNIFICANT CHANGE UP (ref 8.4–10.5)
CHLORIDE SERPL-SCNC: 116 MMOL/L — HIGH (ref 96–108)
CO2 SERPL-SCNC: 38 MMOL/L — HIGH (ref 22–31)
CREAT SERPL-MCNC: 0.42 MG/DL — LOW (ref 0.5–1.3)
EGFR: 103 ML/MIN/1.73M2 — SIGNIFICANT CHANGE UP
EOSINOPHIL # BLD AUTO: 0 K/UL — SIGNIFICANT CHANGE UP (ref 0–0.5)
EOSINOPHIL NFR BLD AUTO: 0 % — SIGNIFICANT CHANGE UP (ref 0–6)
GLUCOSE SERPL-MCNC: 137 MG/DL — HIGH (ref 70–99)
HCT VFR BLD CALC: 28.5 % — LOW (ref 34.5–45)
HGB BLD-MCNC: 8.5 G/DL — LOW (ref 11.5–15.5)
LYMPHOCYTES # BLD AUTO: 0.59 K/UL — LOW (ref 1–3.3)
LYMPHOCYTES # BLD AUTO: 5 % — LOW (ref 13–44)
MAGNESIUM SERPL-MCNC: 2.5 MG/DL — SIGNIFICANT CHANGE UP (ref 1.6–2.6)
MCHC RBC-ENTMCNC: 28.5 PG — SIGNIFICANT CHANGE UP (ref 27–34)
MCHC RBC-ENTMCNC: 29.8 G/DL — LOW (ref 32–36)
MCV RBC AUTO: 95.6 FL — SIGNIFICANT CHANGE UP (ref 80–100)
MONOCYTES # BLD AUTO: 0.36 K/UL — SIGNIFICANT CHANGE UP (ref 0–0.9)
MONOCYTES NFR BLD AUTO: 3 % — SIGNIFICANT CHANGE UP (ref 2–14)
NEUTROPHILS # BLD AUTO: 9.83 K/UL — HIGH (ref 1.8–7.4)
NEUTROPHILS NFR BLD AUTO: 76 % — SIGNIFICANT CHANGE UP (ref 43–77)
NRBC BLD AUTO-RTO: SIGNIFICANT CHANGE UP /100 WBCS (ref 0–0)
PHOSPHATE SERPL-MCNC: 3.5 MG/DL — SIGNIFICANT CHANGE UP (ref 2.5–4.5)
PLATELET # BLD AUTO: 85 K/UL — LOW (ref 150–400)
POTASSIUM SERPL-MCNC: 4.4 MMOL/L — SIGNIFICANT CHANGE UP (ref 3.5–5.3)
POTASSIUM SERPL-SCNC: 4.4 MMOL/L — SIGNIFICANT CHANGE UP (ref 3.5–5.3)
PROT SERPL-MCNC: 4.6 G/DL — LOW (ref 6–8.3)
RBC # BLD: 2.98 M/UL — LOW (ref 3.8–5.2)
RBC # FLD: 18.3 % — HIGH (ref 10.3–14.5)
SODIUM SERPL-SCNC: 158 MMOL/L — HIGH (ref 135–145)
WBC # BLD: 11.84 K/UL — HIGH (ref 3.8–10.5)
WBC # FLD AUTO: 11.84 K/UL — HIGH (ref 3.8–10.5)

## 2025-02-23 PROCEDURE — 99291 CRITICAL CARE FIRST HOUR: CPT

## 2025-02-23 RX ADMIN — MIDODRINE HYDROCHLORIDE 5 MILLIGRAM(S): 5 TABLET ORAL at 14:37

## 2025-02-23 RX ADMIN — Medication 15 MILLILITER(S): at 09:19

## 2025-02-23 RX ADMIN — Medication 500 MILLIGRAM(S): at 14:36

## 2025-02-23 RX ADMIN — Medication 500 MILLIGRAM(S): at 05:02

## 2025-02-23 RX ADMIN — Medication 40 MILLIGRAM(S): at 09:19

## 2025-02-23 RX ADMIN — Medication 500 MILLIGRAM(S): at 09:18

## 2025-02-23 RX ADMIN — LEVETIRACETAM 1500 MILLIGRAM(S): 10 INJECTION, SOLUTION INTRAVENOUS at 09:19

## 2025-02-23 RX ADMIN — Medication 15 MILLILITER(S): at 23:16

## 2025-02-23 RX ADMIN — MIDODRINE HYDROCHLORIDE 5 MILLIGRAM(S): 5 TABLET ORAL at 05:49

## 2025-02-23 RX ADMIN — Medication 8: at 23:19

## 2025-02-23 RX ADMIN — Medication 1 APPLICATION(S): at 09:21

## 2025-02-23 RX ADMIN — DEXAMETHASONE 4 MILLIGRAM(S): 0.5 TABLET ORAL at 12:50

## 2025-02-23 RX ADMIN — Medication 1 APPLICATION(S): at 23:16

## 2025-02-23 RX ADMIN — Medication 500 MILLIGRAM(S): at 23:19

## 2025-02-23 RX ADMIN — DEXAMETHASONE 4 MILLIGRAM(S): 0.5 TABLET ORAL at 17:02

## 2025-02-23 RX ADMIN — LACOSAMIDE 100 MILLIGRAM(S): 150 TABLET, FILM COATED ORAL at 23:19

## 2025-02-23 RX ADMIN — ENOXAPARIN SODIUM 40 MILLIGRAM(S): 100 INJECTION SUBCUTANEOUS at 17:02

## 2025-02-23 RX ADMIN — LEVETIRACETAM 1500 MILLIGRAM(S): 10 INJECTION, SOLUTION INTRAVENOUS at 23:19

## 2025-02-23 RX ADMIN — MIDODRINE HYDROCHLORIDE 5 MILLIGRAM(S): 5 TABLET ORAL at 23:17

## 2025-02-23 RX ADMIN — LACOSAMIDE 100 MILLIGRAM(S): 150 TABLET, FILM COATED ORAL at 09:19

## 2025-02-23 RX ADMIN — DEXAMETHASONE 4 MILLIGRAM(S): 0.5 TABLET ORAL at 05:49

## 2025-02-23 RX ADMIN — Medication 1 DROP(S): at 23:15

## 2025-02-23 RX ADMIN — Medication 1 APPLICATION(S): at 05:43

## 2025-02-23 RX ADMIN — DEXAMETHASONE 4 MILLIGRAM(S): 0.5 TABLET ORAL at 23:18

## 2025-02-23 RX ADMIN — Medication 1 DROP(S): at 09:20

## 2025-02-23 RX ADMIN — Medication 8: at 17:02

## 2025-02-23 NOTE — PROGRESS NOTE ADULT - SUBJECTIVE AND OBJECTIVE BOX
***INCOMPLETE NOTE*** Patient is a 74y old  Female who presents with a chief complaint of AMS (23 Feb 2025 06:56)      INTERVAL HPI/OVERNIGHT EVENTS:   Overnight, 100.9F fever, 1 g IV ofirmev given  This morning, patient remained intubated and unresponsive.    ICU Vital Signs Last 24 Hrs  T(C): 36.5 (23 Feb 2025 18:36), Max: 38.3 (22 Feb 2025 21:33)  T(F): 97.7 (23 Feb 2025 18:36), Max: 100.9 (22 Feb 2025 21:33)  HR: 86 (23 Feb 2025 20:00) (86 - 105)  BP: 76/52 (23 Feb 2025 20:00) (76/52 - 98/50)  BP(mean): 60 (23 Feb 2025 20:00) (60 - 71)  ABP: --  ABP(mean): --  RR: 12 (23 Feb 2025 20:00) (12 - 12)  SpO2: 96% (23 Feb 2025 20:00) (89% - 96%)    O2 Parameters below as of 23 Feb 2025 20:00  Patient On (Oxygen Delivery Method): ventilator    O2 Concentration (%): 60      I&O's Summary    22 Feb 2025 07:01  -  23 Feb 2025 07:00  --------------------------------------------------------  IN: 420 mL / OUT: 1590 mL / NET: -1170 mL    23 Feb 2025 07:01  -  23 Feb 2025 20:23  --------------------------------------------------------  IN: 420 mL / OUT: 535 mL / NET: -115 mL      Mode: AC/ CMV (Assist Control/ Continuous Mandatory Ventilation)  RR (machine): 12  TV (machine): 350  FiO2: 60  PEEP: 5  ITime: 1  MAP: 6.8  PIP: 14      LABS:                        8.5    11.84 )-----------( 85       ( 23 Feb 2025 05:19 )             28.5     02-23    158[H]  |  116[H]  |  29[H]  ----------------------------<  137[H]  4.4   |  38[H]  |  0.42[L]    Ca    9.0      23 Feb 2025 05:19  Phos  3.5     02-23  Mg     2.5     02-23    TPro  4.6[L]  /  Alb  2.1[L]  /  TBili  0.2  /  DBili  x   /  AST  49[H]  /  ALT  70[H]  /  AlkPhos  219[H]  02-23      Urinalysis Basic - ( 23 Feb 2025 05:19 )    Color: x / Appearance: x / SG: x / pH: x  Gluc: 137 mg/dL / Ketone: x  / Bili: x / Urobili: x   Blood: x / Protein: x / Nitrite: x   Leuk Esterase: x / RBC: x / WBC x   Sq Epi: x / Non Sq Epi: x / Bacteria: x      CAPILLARY BLOOD GLUCOSE      POCT Blood Glucose.: 345 mg/dL (23 Feb 2025 17:00)  POCT Blood Glucose.: 136 mg/dL (23 Feb 2025 11:06)  POCT Blood Glucose.: 235 mg/dL (22 Feb 2025 23:16)          RADIOLOGY & ADDITIONAL TESTS:    Consultant(s) Notes Reviewed:  [x ] YES  [ ] NO    MEDICATIONS  (STANDING):  artificial  tears Solution 1 Drop(s) Both EYES every 12 hours  chlorhexidine 0.12% Liquid 15 milliLiter(s) Oral Mucosa every 12 hours  chlorhexidine 2% Cloths 1 Application(s) Topical <User Schedule>  dexAMETHasone  Injectable 4 milliGRAM(s) IV Push every 6 hours  dextrose 5%. 1000 milliLiter(s) (50 mL/Hr) IV Continuous <Continuous>  dextrose 5%. 1000 milliLiter(s) (100 mL/Hr) IV Continuous <Continuous>  dextrose 50% Injectable 25 Gram(s) IV Push once  dextrose 50% Injectable 25 Gram(s) IV Push once  dextrose 50% Injectable 12.5 Gram(s) IV Push once  dextrose 50% Injectable 25 Gram(s) IV Push once  enoxaparin Injectable 40 milliGRAM(s) SubCutaneous every 24 hours  glucagon  Injectable 1 milliGRAM(s) IntraMuscular once  influenza  Vaccine (HIGH DOSE) 0.5 milliLiter(s) IntraMuscular once  insulin regular  human corrective regimen sliding scale   SubCutaneous every 6 hours  lacosamide Solution 100 milliGRAM(s) Oral every 12 hours  levETIRAcetam  Solution 1500 milliGRAM(s) Oral every 12 hours  levoFLOXacin IVPB 750 milliGRAM(s) IV Intermittent every 24 hours  midodrine 5 milliGRAM(s) Oral every 8 hours  pantoprazole  Injectable 40 milliGRAM(s) IV Push every 24 hours  petrolatum Ophthalmic Ointment 1 Application(s) Both EYES every 12 hours  senna Syrup 10 milliLiter(s) Oral at bedtime  valproic  acid Syrup 500 milliGRAM(s) Oral every 6 hours    MEDICATIONS  (PRN):  acetaminophen   Oral Liquid .. 520 milliGRAM(s) Oral every 6 hours PRN Temp greater or equal to 38C (100.4F)  dextrose Oral Gel 15 Gram(s) Oral once PRN Blood Glucose LESS THAN 70 milliGRAM(s)/deciliter  dextrose Oral Gel 15 Gram(s) Oral once PRN Blood Glucose LESS THAN 70 milliGRAM(s)/deciliter      PHYSICAL EXAM:  Constitutional - NAD, non-responsive  HEENT - NCAT, (+)Pupils fixed at 5mm, symmetric but not responsive to light  Chest - Breathing on mechanical ventilation, CTAB  Cardio - Warm and well perfused, RRR  Abdomen - Soft, NTND, +BS  Extremities - (+)Distal b/l upper extremity edema. Discoloration of hands. Mild b/l lower extremity edema, No calf tenderness.   Neurologic - Non-responsive to voice or touch Unknown

## 2025-02-24 PROCEDURE — 99291 CRITICAL CARE FIRST HOUR: CPT

## 2025-02-24 RX ADMIN — Medication 500 MILLIGRAM(S): at 17:21

## 2025-02-24 RX ADMIN — LACOSAMIDE 100 MILLIGRAM(S): 150 TABLET, FILM COATED ORAL at 21:29

## 2025-02-24 RX ADMIN — ENOXAPARIN SODIUM 40 MILLIGRAM(S): 100 INJECTION SUBCUTANEOUS at 17:20

## 2025-02-24 RX ADMIN — MIDODRINE HYDROCHLORIDE 5 MILLIGRAM(S): 5 TABLET ORAL at 15:49

## 2025-02-24 RX ADMIN — LEVETIRACETAM 1500 MILLIGRAM(S): 10 INJECTION, SOLUTION INTRAVENOUS at 08:50

## 2025-02-24 RX ADMIN — DEXAMETHASONE 4 MILLIGRAM(S): 0.5 TABLET ORAL at 23:56

## 2025-02-24 RX ADMIN — Medication 40 MILLIGRAM(S): at 08:49

## 2025-02-24 RX ADMIN — LEVETIRACETAM 1500 MILLIGRAM(S): 10 INJECTION, SOLUTION INTRAVENOUS at 21:28

## 2025-02-24 RX ADMIN — Medication 500 MILLIGRAM(S): at 05:06

## 2025-02-24 RX ADMIN — Medication 500 MILLIGRAM(S): at 08:49

## 2025-02-24 RX ADMIN — Medication 1 APPLICATION(S): at 04:43

## 2025-02-24 RX ADMIN — DEXAMETHASONE 4 MILLIGRAM(S): 0.5 TABLET ORAL at 05:07

## 2025-02-24 RX ADMIN — DEXAMETHASONE 4 MILLIGRAM(S): 0.5 TABLET ORAL at 11:43

## 2025-02-24 RX ADMIN — Medication 15 MILLILITER(S): at 08:48

## 2025-02-24 RX ADMIN — Medication 2: at 11:43

## 2025-02-24 RX ADMIN — Medication 1 APPLICATION(S): at 21:47

## 2025-02-24 RX ADMIN — LACOSAMIDE 100 MILLIGRAM(S): 150 TABLET, FILM COATED ORAL at 08:49

## 2025-02-24 RX ADMIN — MIDODRINE HYDROCHLORIDE 5 MILLIGRAM(S): 5 TABLET ORAL at 05:07

## 2025-02-24 RX ADMIN — Medication 500 MILLIGRAM(S): at 23:56

## 2025-02-24 RX ADMIN — Medication 1 DROP(S): at 21:47

## 2025-02-24 RX ADMIN — Medication 6: at 17:21

## 2025-02-24 RX ADMIN — Medication 15 MILLILITER(S): at 21:28

## 2025-02-24 RX ADMIN — Medication 1 DROP(S): at 08:55

## 2025-02-24 RX ADMIN — MIDODRINE HYDROCHLORIDE 5 MILLIGRAM(S): 5 TABLET ORAL at 21:28

## 2025-02-24 RX ADMIN — Medication 8: at 23:56

## 2025-02-24 RX ADMIN — Medication 1 APPLICATION(S): at 08:55

## 2025-02-24 RX ADMIN — Medication 2: at 05:25

## 2025-02-24 RX ADMIN — DEXAMETHASONE 4 MILLIGRAM(S): 0.5 TABLET ORAL at 17:20

## 2025-02-24 RX ADMIN — Medication 10 MILLILITER(S): at 21:28

## 2025-02-24 NOTE — PROGRESS NOTE ADULT - ASSESSMENT
75 yo female w PMH GBM on hospice care (diagnosed in 2024, follows at [facility] under Dr. Hernandez, with reported attempted resection of tumor, on chemotherapy with last chemo 2 months ago), admitted for ams likely 2/2 disease progression, found to be in status epilepticus, decision for DNR/DNI reversed by family, patient intubated and stepped up to MICU, now s/p brain herniation, pending further conversations with family.     NEURO  Intubated. Not sedated. Minimally responsive despite not being on sedation. Continues to have seizures, concern for airway protection as GBM progresses.    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 There is evidence of focal cortical hyperexcitability, more prominent in the right than the left frontotemporal regions. The background pattern shows a burst suppression ratio of 10:1, indicating severe diffuse or multifocal cerebral dysfunction, potentially exacerbated by IV sedatives. Additionally, there is severe generalized and multifocal slowing, further supporting significant underlying cerebral dysfunction. CT head with worsening midline shift. Patient got CT head that shows complete effacement of the cerebral and cerebellar sulci consistent with diffuse edema with effacement of the basal cisterns, worsening subfalcine and uncal herniation with new descending transtentorial herniation.  Continued EEG findings of seizures.  s/p valproic acid loading dose 1200 mg, vimpat 200 mg loading  - epilepsy consulted, appreciate recs      - Keppra 1500mg BID      - Vimpat 100mg BID (monitor tele to ensure no heart block)      - Depakote 500mg q6hrs      - seizure precautions    #GBM  last chemo ~2months ago, follows at [facility].  Per Neurosurgery consult on 1/31, no acute interventions. Worsening midline shift on CT. On CT today, concern for worsening neurological status. Patient got CT head that shows complete effacement of the cerebral and cerebellar sulci consistent with diffuse edema with effacement of the basal cisterns, worsening subfalcine and uncal herniation with new descending transtentorial herniation.  -c/w decadron 4 IV q6h  -epilepsy aware  -palliative care consult       CARDIAC  #Chronic atrial fibrillation.   #afib with RVR   Plan  -bladder scans q6h   -ctm BMP  -s/p john   Home med: metoprolol 12.5 BID   - c/w Lovenox ( per NSGY, no increased risk of hemorrhage)  -start Lopressor 12.5 mg BID     #Septic shock  S/p Levophed, weaned on 2/17  - Started Midodrine 5mg TID (2/20 - ) for BP support      PULM  #intubated  - intubated on 2/1  - GOC  - consider trach moving forward      GI  - NPO with tube feeds   -Miralax and Senna standing      RENAL  - currently with normal renal function    #hypernatremia  Sodium >180, now downtrending. Likely 2/2 diabetes insipidus iso advancing brain cancer that is leading to advanced brain swelling.   Stopped NaCl 0.225% 160cc/hr and DDAVP 1mcg q12h on 2/19 due to overcorrection of Na 176 --> 151 from 2/18 to 2/19  - the brain swelling will worsen with worsening sodium as time progresses. Can attempt to correct Hypernatremia but will likely be futile iso central diabetes insipidus and worsening brain herniation.     Currently with John.       ENDO  Lantus 20U qhs and Regular Insulin 9U q6h d/sam on 2/20 due to downtrending FSGs.  Diet changed to Glucerna due to elevated glucose, CTM.   - Continue FSG and ISS q6h only      ID  #Sepsis with septic shock  #ESBL Ecoli Bacteremia  #R arm cellulitis   #ESBL ecoli UTI   Systemic inflammatory response syndrome (SIRS).   Meeting SIRS 2/4 (HR>90, WBC>12k) likely iso of UTI.   Bcx with ESBL E. coli, s/p ertapenem treatment. Patient continuing to be tachycardic, tenuous course between fevering and hypothermia.   S/p Vancomycin (2/13-2/17)  -c/w Levofloxacin (2/13-)       F: none  E: replete K<4, Mg<2  N: npo with tube feeds  VTE Prophylaxis: lovenox 40mg QD  GI: pantoprazole 40 IVP q24hrs  C: Full Code  D: micu    Lines: Peripheral IV L 22g (2/4) IV L 18 g (2/4), NG tube, John (2/14)     73 yo female w PMH GBM on hospice care (diagnosed in 2024, follows at [facility] under Dr. Hernandez, with reported attempted resection of tumor, on chemotherapy with last chemo 2 months ago), admitted for ams likely 2/2 disease progression, found to be in status epilepticus, decision for DNR/DNI reversed by family, patient intubated and stepped up to MICU, now s/p brain herniation, pending further conversations with family.     NEURO  Intubated. Not sedated. Minimally responsive despite not being on sedation. Continues to have seizures, concern for airway protection as GBM progresses.    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 There is evidence of focal cortical hyperexcitability, more prominent in the right than the left frontotemporal regions. The background pattern shows a burst suppression ratio of 10:1, indicating severe diffuse or multifocal cerebral dysfunction, potentially exacerbated by IV sedatives. Additionally, there is severe generalized and multifocal slowing, further supporting significant underlying cerebral dysfunction. CT head with worsening midline shift. Patient got CT head that shows complete effacement of the cerebral and cerebellar sulci consistent with diffuse edema with effacement of the basal cisterns, worsening subfalcine and uncal herniation with new descending transtentorial herniation.  Continued EEG findings of seizures.  s/p valproic acid loading dose 1200 mg, vimpat 200 mg loading  - epilepsy consulted, appreciate recs      - Keppra 1500mg BID      - Vimpat 100mg BID (monitor tele to ensure no heart block)      - Depakote 500mg q6hrs      - seizure precautions    #GBM  last chemo ~2months ago, follows at [facility].  Per Neurosurgery consult on 1/31, no acute interventions. Worsening midline shift on CT. On CT today, concern for worsening neurological status. Patient got CT head that shows complete effacement of the cerebral and cerebellar sulci consistent with diffuse edema with effacement of the basal cisterns, worsening subfalcine and uncal herniation with new descending transtentorial herniation.  -c/w decadron 4 IV q6h  -epilepsy aware  -palliative care consult       CARDIAC  #Chronic atrial fibrillation.   #afib with RVR   Plan  -bladder scans q6h   -ctm BMP  -s/p john   Home med: metoprolol 12.5 BID   - c/w Lovenox ( per NSGY, no increased risk of hemorrhage)  -start Lopressor 12.5 mg BID     #Septic shock  S/p Levophed, weaned on 2/17  - Started Midodrine 5mg TID (2/20 - ) for BP support      PULM  #intubated  - intubated on 2/1  - GOC  - consider trach moving forward      GI  - NPO with tube feeds   -Miralax and Senna standing      RENAL  - currently with normal renal function    #hypernatremia  Sodium >180, now downtrending. Likely 2/2 diabetes insipidus iso advancing brain cancer that is leading to advanced brain swelling.   Stopped NaCl 0.225% 160cc/hr and DDAVP 1mcg q12h on 2/19 due to overcorrection of Na 176 --> 151 from 2/18 to 2/19  - the brain swelling will worsen with worsening sodium as time progresses. Can attempt to correct Hypernatremia but will likely be futile iso central diabetes insipidus and worsening brain herniation.     Currently with John.       ENDO  Lantus 20U qhs and Regular Insulin 9U q6h d/sam on 2/20 due to downtrending FSGs.  Diet changed to Glucerna due to elevated glucose, CTM.   - Continue FSG and ISS q6h only      ID  #Sepsis with septic shock  #ESBL Ecoli Bacteremia  #R arm cellulitis   #ESBL ecoli UTI   Systemic inflammatory response syndrome (SIRS).   Meeting SIRS 2/4 (HR>90, WBC>12k) likely iso of UTI.   Bcx with ESBL E. coli, s/p ertapenem treatment. Patient continuing to be tachycardic, tenuous course between fevering and hypothermia.   S/p Vancomycin (2/13-2/17)  -c/w Levofloxacin (2/13 - 2/26)       F: none  E: replete K<4, Mg<2  N: npo with tube feeds  VTE Prophylaxis: lovenox 40mg QD  GI: pantoprazole 40 IVP q24hrs  C: Full Code  D: micu    Lines: Peripheral IV L 22g (2/4) IV L 18 g (2/4), NG tube, John (2/14)

## 2025-02-24 NOTE — PROGRESS NOTE ADULT - CRITICAL CARE ATTENDING COMMENT
glioblastoma, pupils fixed and dilated  respiratory failure  extensions discussions with family
74F inoperable glioblastoma, status epilepticus, respiratory failure requiring intubation, herniation  pupils fixed and dilated, no gag  vent support  on midodrine.
GBM c/b seizures and cerebral edema, acute respiratory failure requiring mechanical ventilation. Patient cannot be extubated due to continued intermittent seizures despite adjustment to meds as well as progressive neurologic changes. Has a progressive leukocytosis and fevers; started abx. Family has not been able to agree/refuse trach. GOC conversations ongoing. Rest of plan as per above.
74F inoperable glioblastoma, status epilepticus, respiratory failure requiring intubation, herniation  pupils fixed and dilated, no gag  vent support
GBM c/b seizures and cerebral edema, acute respiratory failure requiring mechanical ventilation. Decompensated overnight with worsening respiratory status, change in neuro exam and acute hypernatremia. CTH with worsening herniation. Discussions with family ongoing. Rest of plan as per above.
GBM c/b seizures and cerebral edema, acute respiratory failure requiring mechanical ventilation. Patient cannot be extubated due to continued intermittent seizures despite adjustment to meds as well as progressive neurologic changes. Conversation started about tracheostomy with family. Rest of plan as per above. Prognosis guarded.
GBM, not surgical candidate. Family wants full code.  Status epilepticus  AF    intubated  vEEG  dc fentanyl, wean propofol    ESBL bacteremia on ertapenem, risk benefit of seizure threshold discussed with ID. Continue erta  fluids
GBM c/b seizures and cerebral edema, acute respiratory failure requiring mechanical ventilation. Patient cannot be extubated due to continued intermittent seizures despite adjustment to meds as well as progressive neurologic changes. Conversation started about tracheostomy with family. Rest of plan as per above. Prognosis guarded.
GBM c/b seizures and cerebral edema, acute respiratory failure requiring mechanical ventilation. Transtentorial herniation has progressed. Brainstem intact; has spontaneous respirations on pressure support. Discussions with family ongoing. Rest of plan as per above.
74F inoperable glioblastoma, status epilepticus, respiratory failure requiring intubation/vent support, herniation  pupils fixed and dilated, no gag.
GBM c/b seizures and cerebral edema, acute respiratory failure requiring mechanical ventilation. Transtentorial herniation has progressed. Brainstem intact; has spontaneous respirations on pressure support. Aggressive glucose control.  Discussions with family ongoing. Rest of plan as per above.
GBM c/b seizures and cerebral edema, acute respiratory failure requiring mechanical ventilation. Transtentorial herniation has progressed. Did not have any spontaneous respirations on vent today. Does not have a gag reflex and has fixed pupils. Once glucose is better controlled will need evaluation for brain death. Hypernatremia due to central DI due to intracranial catastrophe; will continue with DDAVP but unlikely to be significantly impactful. Discussions with family ongoing. Rest of plan as per above.
brain herniation

## 2025-02-24 NOTE — CHART NOTE - NSCHARTNOTESELECT_GEN_ALL_CORE
Anti-infective Approval Note/Event Note
Epilepsy/Off Service Note
Nutrition Services
Neurosurgery/Off Service Note
Nutrition Services
Off Service Note
anti-infective approval/Event Note

## 2025-02-24 NOTE — CHART NOTE - NSCHARTNOTEFT_GEN_A_CORE
Admitting Diagnosis:   Patient is a 74y old  Female who presents with a chief complaint of AMS (24 Feb 2025 05:09)      PAST MEDICAL & SURGICAL HISTORY:  GBM (glioblastoma multiforme)      Seizure          Current Nutrition Order: Nepro @42 ml/hr with LPS x1/day from 7927-5298    PO Intake: N/A    GI Issues: Abdomen rounded, +BS x4, last bowel movement 2/24    Pain: 0 per chart review     Skin Integrity: Skin tear R arm, +3 L hand, +4 R hand         02-23-25 @ 07:01  -  02-24-25 @ 07:00  --------------------------------------------------------  IN: 546 mL / OUT: 1220 mL / NET: -674 mL    02-24-25 @ 07:01  -  02-24-25 @ 14:10  --------------------------------------------------------  IN: 0 mL / OUT: 290 mL / NET: -290 mL        Labs:   02-23    158[H]  |  116[H]  |  29[H]  ----------------------------<  137[H]  4.4   |  38[H]  |  0.42[L]    Ca    9.0      23 Feb 2025 05:19  Phos  3.5     02-23  Mg     2.5     02-23    TPro  4.6[L]  /  Alb  2.1[L]  /  TBili  0.2  /  DBili  x   /  AST  49[H]  /  ALT  70[H]  /  AlkPhos  219[H]  02-23    CAPILLARY BLOOD GLUCOSE      POCT Blood Glucose.: 157 mg/dL (24 Feb 2025 11:25)  POCT Blood Glucose.: 192 mg/dL (24 Feb 2025 05:09)  POCT Blood Glucose.: 319 mg/dL (23 Feb 2025 23:16)  POCT Blood Glucose.: 345 mg/dL (23 Feb 2025 17:00)      Medications:  MEDICATIONS  (STANDING):  artificial  tears Solution 1 Drop(s) Both EYES every 12 hours  chlorhexidine 0.12% Liquid 15 milliLiter(s) Oral Mucosa every 12 hours  chlorhexidine 2% Cloths 1 Application(s) Topical <User Schedule>  dexAMETHasone  Injectable 4 milliGRAM(s) IV Push every 6 hours  dextrose 5%. 1000 milliLiter(s) (50 mL/Hr) IV Continuous <Continuous>  dextrose 5%. 1000 milliLiter(s) (100 mL/Hr) IV Continuous <Continuous>  dextrose 50% Injectable 25 Gram(s) IV Push once  dextrose 50% Injectable 25 Gram(s) IV Push once  dextrose 50% Injectable 12.5 Gram(s) IV Push once  dextrose 50% Injectable 25 Gram(s) IV Push once  enoxaparin Injectable 40 milliGRAM(s) SubCutaneous every 24 hours  glucagon  Injectable 1 milliGRAM(s) IntraMuscular once  influenza  Vaccine (HIGH DOSE) 0.5 milliLiter(s) IntraMuscular once  insulin regular  human corrective regimen sliding scale   SubCutaneous every 6 hours  lacosamide Solution 100 milliGRAM(s) Oral every 12 hours  levETIRAcetam  Solution 1500 milliGRAM(s) Oral every 12 hours  levoFLOXacin IVPB 750 milliGRAM(s) IV Intermittent every 24 hours  midodrine 5 milliGRAM(s) Oral every 8 hours  pantoprazole  Injectable 40 milliGRAM(s) IV Push every 24 hours  petrolatum Ophthalmic Ointment 1 Application(s) Both EYES every 12 hours  senna Syrup 10 milliLiter(s) Oral at bedtime  valproic  acid Syrup 500 milliGRAM(s) Oral every 6 hours    MEDICATIONS  (PRN):  acetaminophen   Oral Liquid .. 520 milliGRAM(s) Oral every 6 hours PRN Temp greater or equal to 38C (100.4F)  dextrose Oral Gel 15 Gram(s) Oral once PRN Blood Glucose LESS THAN 70 milliGRAM(s)/deciliter  dextrose Oral Gel 15 Gram(s) Oral once PRN Blood Glucose LESS THAN 70 milliGRAM(s)/deciliter    Height for BMI (CENTIMETERS)	157.5 Centimeter(s)  Weight for BMI (lbs)	115.1 lb  Weight for BMI (kg)	52.2 kg  Body Mass Index	21     Weight Change: No new weights since admit. Recommend weigh pt weekly at minimum.     Estimated energy needs:   Weight used for calculations	IBW   Estimated Energy Needs Weight (lbs)	110.2 lb  Estimated Energy Needs Weight (kg)	50 kg  Estimated Energy Needs From (ignacio/kg)	25   Estimated Energy Needs To (ignacio/kg)	30   Estimated Energy Needs Calculated From (ignacio/kg)	1250   Estimated Energy Needs Calculated To (ignacio/kg)	1500     Estimated Protein Needs Weight (lbs)	110.2 lb  Estimated Protein Needs Weight (kg)	50 kg  Estimated Protein Needs From (g/kg)	1.4   Estimated Protein Needs To (g/kg)	1.6   Estimated Protein Needs Calculated From (g/kg)	70   Estimated Protein Needs Calculated To (g/kg)	80     Estimated Fluid Needs Weight (lbs)	110.2 lb  Estimated Fluid Needs Weight (kg)	50 kg  Estimated Fluid Needs From (ml/kg)	25   Estimated Fluid Needs To (ml/kg)	30   Estimated Fluid Needs Calculated From (ml/kg)	1250   Estimated Fluid Needs Calculated To (ml/kg)	1500     Other Calculations	Needs determined using ideal body weight 50 kg (104%IBW) adjusted for intubation and critical condition.     Subjective:   73 yo female w PMH GBM on hospice care (diagnosed in 2024, follows at [facility] under Dr. Hernandez, with reported attempted resection of tumor, on chemotherapy with last chemo 2 months ago), admitted for ams likely secondary to disease progression. 2/3: Hypertonic saline. 2/4: vEEG, no more seizures. 2/5: Added free water flushes. 2/8: Phos repleted. 2/9: NGT confirmed. 2/10: Repleted phos. 2/11: Increased Lantus to 5, lactulose to 30q4. Changed to Glucerna. 2/12: Abdominal xray ordered- very diffuse gaseous distention. Large BM. Vomited. Feeds held. 2/13: Restarted feeds. Hypotensive (MAP in 50s). 2/14: Increased lantus to 8 units. Per MICU resident progress noted, pt minimally responsive despite not being on sedation. 2/17: Increased insulin regimen. tube feed decreased. Hyperkalemic, given lokelma.     Pt care discussed in interdisciplinary care team rounds. Rx and labs reviewed. Pt received resting in bed, remains on vent set to VC-AC, no active MAP reading on telemetry monitor, no pressor or propofol running at time of assessment. Pt continues on enteral nutrition support via nasogastric tube with Nepro; K, Mg, and Phos remain WNL, continue. Ongoing goals of care discussions; continues to have seizures and c/f glioblastoma progression. No other reports GI distress or further nutritional concerns at this time. RDN to remain available, see recommendations below.     Previous Nutrition Diagnosis: Increased nutrient needs related to increased physiological demands as evidenced by Glioblastoma and recent chemotherapy.    Active [ x ]  Resolved [   ]    Goal:  Pt will meet 75% or more of protein/energy needs via most appropriate route for nutrition.     Recommendations:  -Continue enteral nutrition support via nasogastric tube   *Recommend Nepro with goal rate of 42 ml/hr with LPS x1/day from 7877-3751 to provide 756 ml tube feed, 1461 calories, 76 gProt., and 550 ml free water. This is 23.1 nonprotein calories and 1.52 gProt. per kg ideal body weight 50 kg.    *FWF per medical team   -Monitor pressor and propofol demands    *Goal MAP >65 and lactate <2.0 for generally safe provision of nutrition   -Align nutrition with goals of care at all times  -Weekly wts  -Monitor chemistry, GI function, and skin integrity     Risk Level: High [ x ] Moderate [   ] Low [   ].

## 2025-02-24 NOTE — PROGRESS NOTE ADULT - SUBJECTIVE AND OBJECTIVE BOX
***INCOMPLETE NOTE*** Patient is a 74y old  Female who presents with a chief complaint of AMS (24 Feb 2025 05:09)      INTERVAL HPI/OVERNIGHT EVENTS:   No acute events overnight.  This morning, patient remained intubated and unresponsive.    ICU Vital Signs Last 24 Hrs  T(C): 36.5 (24 Feb 2025 08:51), Max: 37.4 (23 Feb 2025 22:32)  T(F): 97.7 (24 Feb 2025 08:51), Max: 99.3 (23 Feb 2025 22:32)  HR: 80 (24 Feb 2025 18:06) (67 - 86)  BP: 89/53 (24 Feb 2025 11:00) (76/52 - 95/57)  BP(mean): 65 (24 Feb 2025 11:00) (60 - 71)  ABP: --  ABP(mean): --  RR: 12 (24 Feb 2025 11:00) (12 - 12)  SpO2: 98% (24 Feb 2025 18:06) (92% - 98%)    O2 Parameters below as of 24 Feb 2025 11:00  Patient On (Oxygen Delivery Method): ventilator    O2 Concentration (%): 60      I&O's Summary    23 Feb 2025 07:01  -  24 Feb 2025 07:00  --------------------------------------------------------  IN: 546 mL / OUT: 1220 mL / NET: -674 mL    24 Feb 2025 07:01  -  24 Feb 2025 19:20  --------------------------------------------------------  IN: 0 mL / OUT: 355 mL / NET: -355 mL      Mode: AC/ CMV (Assist Control/ Continuous Mandatory Ventilation)  RR (machine): 12  TV (machine): 350  FiO2: 60  PEEP: 5  ITime: 1  MAP: 6.7  PIP: 14      LABS:                        8.5    11.84 )-----------( 85       ( 23 Feb 2025 05:19 )             28.5     02-23    158[H]  |  116[H]  |  29[H]  ----------------------------<  137[H]  4.4   |  38[H]  |  0.42[L]    Ca    9.0      23 Feb 2025 05:19  Phos  3.5     02-23  Mg     2.5     02-23    TPro  4.6[L]  /  Alb  2.1[L]  /  TBili  0.2  /  DBili  x   /  AST  49[H]  /  ALT  70[H]  /  AlkPhos  219[H]  02-23      Urinalysis Basic - ( 23 Feb 2025 05:19 )    Color: x / Appearance: x / SG: x / pH: x  Gluc: 137 mg/dL / Ketone: x  / Bili: x / Urobili: x   Blood: x / Protein: x / Nitrite: x   Leuk Esterase: x / RBC: x / WBC x   Sq Epi: x / Non Sq Epi: x / Bacteria: x      CAPILLARY BLOOD GLUCOSE      POCT Blood Glucose.: 287 mg/dL (24 Feb 2025 16:57)  POCT Blood Glucose.: 157 mg/dL (24 Feb 2025 11:25)  POCT Blood Glucose.: 192 mg/dL (24 Feb 2025 05:09)  POCT Blood Glucose.: 319 mg/dL (23 Feb 2025 23:16)          RADIOLOGY & ADDITIONAL TESTS:    Consultant(s) Notes Reviewed:  [x ] YES  [ ] NO    MEDICATIONS  (STANDING):  artificial  tears Solution 1 Drop(s) Both EYES every 12 hours  chlorhexidine 0.12% Liquid 15 milliLiter(s) Oral Mucosa every 12 hours  chlorhexidine 2% Cloths 1 Application(s) Topical <User Schedule>  dexAMETHasone  Injectable 4 milliGRAM(s) IV Push every 6 hours  dextrose 5%. 1000 milliLiter(s) (50 mL/Hr) IV Continuous <Continuous>  dextrose 5%. 1000 milliLiter(s) (100 mL/Hr) IV Continuous <Continuous>  dextrose 50% Injectable 25 Gram(s) IV Push once  dextrose 50% Injectable 25 Gram(s) IV Push once  dextrose 50% Injectable 12.5 Gram(s) IV Push once  dextrose 50% Injectable 25 Gram(s) IV Push once  enoxaparin Injectable 40 milliGRAM(s) SubCutaneous every 24 hours  glucagon  Injectable 1 milliGRAM(s) IntraMuscular once  influenza  Vaccine (HIGH DOSE) 0.5 milliLiter(s) IntraMuscular once  insulin regular  human corrective regimen sliding scale   SubCutaneous every 6 hours  lacosamide Solution 100 milliGRAM(s) Oral every 12 hours  levETIRAcetam  Solution 1500 milliGRAM(s) Oral every 12 hours  levoFLOXacin IVPB 750 milliGRAM(s) IV Intermittent every 24 hours  midodrine 5 milliGRAM(s) Oral every 8 hours  pantoprazole  Injectable 40 milliGRAM(s) IV Push every 24 hours  petrolatum Ophthalmic Ointment 1 Application(s) Both EYES every 12 hours  senna Syrup 10 milliLiter(s) Oral at bedtime  valproic  acid Syrup 500 milliGRAM(s) Oral every 6 hours    MEDICATIONS  (PRN):  acetaminophen   Oral Liquid .. 520 milliGRAM(s) Oral every 6 hours PRN Temp greater or equal to 38C (100.4F)  dextrose Oral Gel 15 Gram(s) Oral once PRN Blood Glucose LESS THAN 70 milliGRAM(s)/deciliter  dextrose Oral Gel 15 Gram(s) Oral once PRN Blood Glucose LESS THAN 70 milliGRAM(s)/deciliter      PHYSICAL EXAM:  Constitutional - NAD, non-responsive  HEENT - NCAT, (+)Pupils fixed at 5mm, symmetric but not responsive to light  Chest - Breathing on mechanical ventilation, CTAB  Cardio - Warm and well perfused, RRR  Abdomen - Soft, NTND, +BS  Extremities - (+)Distal b/l upper extremity edema. Discoloration of hands. Mild b/l lower extremity edema, No calf tenderness.   Neurologic - Non-responsive to voice or touch

## 2025-02-25 ENCOUNTER — TRANSCRIPTION ENCOUNTER (OUTPATIENT)
Age: 75
End: 2025-02-25

## 2025-02-25 VITALS — TEMPERATURE: 98 F

## 2025-02-25 PROCEDURE — 85025 COMPLETE CBC W/AUTO DIFF WBC: CPT

## 2025-02-25 PROCEDURE — 87077 CULTURE AEROBIC IDENTIFY: CPT

## 2025-02-25 PROCEDURE — 93306 TTE W/DOPPLER COMPLETE: CPT

## 2025-02-25 PROCEDURE — 83605 ASSAY OF LACTIC ACID: CPT

## 2025-02-25 PROCEDURE — 87640 STAPH A DNA AMP PROBE: CPT

## 2025-02-25 PROCEDURE — 87641 MR-STAPH DNA AMP PROBE: CPT

## 2025-02-25 PROCEDURE — 80202 ASSAY OF VANCOMYCIN: CPT

## 2025-02-25 PROCEDURE — 80048 BASIC METABOLIC PNL TOTAL CA: CPT

## 2025-02-25 PROCEDURE — 82803 BLOOD GASES ANY COMBINATION: CPT

## 2025-02-25 PROCEDURE — 87637 SARSCOV2&INF A&B&RSV AMP PRB: CPT

## 2025-02-25 PROCEDURE — 94003 VENT MGMT INPAT SUBQ DAY: CPT

## 2025-02-25 PROCEDURE — 74019 RADEX ABDOMEN 2 VIEWS: CPT

## 2025-02-25 PROCEDURE — 85045 AUTOMATED RETICULOCYTE COUNT: CPT

## 2025-02-25 PROCEDURE — 83010 ASSAY OF HAPTOGLOBIN QUANT: CPT

## 2025-02-25 PROCEDURE — 96366 THER/PROPH/DIAG IV INF ADDON: CPT

## 2025-02-25 PROCEDURE — 74018 RADEX ABDOMEN 1 VIEW: CPT

## 2025-02-25 PROCEDURE — 82962 GLUCOSE BLOOD TEST: CPT

## 2025-02-25 PROCEDURE — 99238 HOSP IP/OBS DSCHRG MGMT 30/<: CPT

## 2025-02-25 PROCEDURE — 93005 ELECTROCARDIOGRAM TRACING: CPT

## 2025-02-25 PROCEDURE — C9254: CPT

## 2025-02-25 PROCEDURE — 36415 COLL VENOUS BLD VENIPUNCTURE: CPT

## 2025-02-25 PROCEDURE — 0225U NFCT DS DNA&RNA 21 SARSCOV2: CPT

## 2025-02-25 PROCEDURE — 82330 ASSAY OF CALCIUM: CPT

## 2025-02-25 PROCEDURE — 82140 ASSAY OF AMMONIA: CPT

## 2025-02-25 PROCEDURE — 95708 EEG WO VID EA 12-26HR UNMNTR: CPT

## 2025-02-25 PROCEDURE — 71045 X-RAY EXAM CHEST 1 VIEW: CPT

## 2025-02-25 PROCEDURE — 85027 COMPLETE CBC AUTOMATED: CPT

## 2025-02-25 PROCEDURE — 84484 ASSAY OF TROPONIN QUANT: CPT

## 2025-02-25 PROCEDURE — 83690 ASSAY OF LIPASE: CPT

## 2025-02-25 PROCEDURE — 95705 EEG W/O VID 2-12 HR UNMNTR: CPT

## 2025-02-25 PROCEDURE — 96375 TX/PRO/DX INJ NEW DRUG ADDON: CPT

## 2025-02-25 PROCEDURE — 80053 COMPREHEN METABOLIC PANEL: CPT

## 2025-02-25 PROCEDURE — 86901 BLOOD TYPING SEROLOGIC RH(D): CPT

## 2025-02-25 PROCEDURE — 84100 ASSAY OF PHOSPHORUS: CPT

## 2025-02-25 PROCEDURE — 81003 URINALYSIS AUTO W/O SCOPE: CPT

## 2025-02-25 PROCEDURE — 96368 THER/DIAG CONCURRENT INF: CPT

## 2025-02-25 PROCEDURE — 85610 PROTHROMBIN TIME: CPT

## 2025-02-25 PROCEDURE — 93971 EXTREMITY STUDY: CPT

## 2025-02-25 PROCEDURE — 99291 CRITICAL CARE FIRST HOUR: CPT | Mod: 25

## 2025-02-25 PROCEDURE — 70450 CT HEAD/BRAIN W/O DYE: CPT | Mod: MC

## 2025-02-25 PROCEDURE — 83615 LACTATE (LD) (LDH) ENZYME: CPT

## 2025-02-25 PROCEDURE — 96376 TX/PRO/DX INJ SAME DRUG ADON: CPT

## 2025-02-25 PROCEDURE — 83735 ASSAY OF MAGNESIUM: CPT

## 2025-02-25 PROCEDURE — 95700 EEG CONT REC W/VID EEG TECH: CPT

## 2025-02-25 PROCEDURE — 86850 RBC ANTIBODY SCREEN: CPT

## 2025-02-25 PROCEDURE — 86900 BLOOD TYPING SEROLOGIC ABO: CPT

## 2025-02-25 PROCEDURE — 83036 HEMOGLOBIN GLYCOSYLATED A1C: CPT

## 2025-02-25 PROCEDURE — 80165 DIPROPYLACETIC ACID FREE: CPT

## 2025-02-25 PROCEDURE — 83880 ASSAY OF NATRIURETIC PEPTIDE: CPT

## 2025-02-25 PROCEDURE — 73206 CT ANGIO UPR EXTRM W/O&W/DYE: CPT | Mod: MC

## 2025-02-25 PROCEDURE — 87150 DNA/RNA AMPLIFIED PROBE: CPT

## 2025-02-25 PROCEDURE — 85730 THROMBOPLASTIN TIME PARTIAL: CPT

## 2025-02-25 PROCEDURE — 80164 ASSAY DIPROPYLACETIC ACD TOT: CPT

## 2025-02-25 PROCEDURE — 96365 THER/PROPH/DIAG IV INF INIT: CPT

## 2025-02-25 PROCEDURE — 81001 URINALYSIS AUTO W/SCOPE: CPT

## 2025-02-25 PROCEDURE — 87040 BLOOD CULTURE FOR BACTERIA: CPT

## 2025-02-25 PROCEDURE — 84132 ASSAY OF SERUM POTASSIUM: CPT

## 2025-02-25 PROCEDURE — 94002 VENT MGMT INPAT INIT DAY: CPT

## 2025-02-25 PROCEDURE — 87086 URINE CULTURE/COLONY COUNT: CPT

## 2025-02-25 PROCEDURE — 84295 ASSAY OF SERUM SODIUM: CPT

## 2025-02-25 PROCEDURE — 87186 SC STD MICRODIL/AGAR DIL: CPT

## 2025-02-25 RX ORDER — ENOXAPARIN SODIUM 100 MG/ML
40 INJECTION SUBCUTANEOUS
Qty: 0 | Refills: 0 | DISCHARGE
Start: 2025-02-25

## 2025-02-25 RX ORDER — LORAZEPAM 4 MG/ML
0.5 VIAL (ML) INJECTION
Refills: 0 | DISCHARGE

## 2025-02-25 RX ORDER — ACETAMINOPHEN 500 MG/5ML
16.25 LIQUID (ML) ORAL
Qty: 0 | Refills: 0 | DISCHARGE
Start: 2025-02-25

## 2025-02-25 RX ORDER — SENNA 187 MG
10 TABLET ORAL
Qty: 0 | Refills: 0 | DISCHARGE
Start: 2025-02-25

## 2025-02-25 RX ORDER — HYPROMELLOSE 0.4 %
1 DROPS OPHTHALMIC (EYE)
Qty: 0 | Refills: 0 | DISCHARGE
Start: 2025-02-25

## 2025-02-25 RX ORDER — METOPROLOL SUCCINATE 50 MG/1
0.5 TABLET, EXTENDED RELEASE ORAL
Refills: 0 | DISCHARGE

## 2025-02-25 RX ORDER — MIDODRINE HYDROCHLORIDE 5 MG/1
1 TABLET ORAL
Qty: 0 | Refills: 0 | DISCHARGE
Start: 2025-02-25

## 2025-02-25 RX ORDER — LEVOFLOXACIN 25 MG/ML
30 SOLUTION ORAL
Qty: 0 | Refills: 0 | DISCHARGE
Start: 2025-02-25

## 2025-02-25 RX ORDER — DEXAMETHASONE 0.5 MG/1
1 TABLET ORAL
Refills: 0 | DISCHARGE

## 2025-02-25 RX ORDER — SODIUM CHLORIDE 9 G/1000ML
500 INJECTION, SOLUTION INTRAVENOUS ONCE
Refills: 0 | Status: COMPLETED | OUTPATIENT
Start: 2025-02-25 | End: 2025-02-25

## 2025-02-25 RX ORDER — LEVETIRACETAM 10 MG/ML
15 INJECTION, SOLUTION INTRAVENOUS
Qty: 0 | Refills: 0 | DISCHARGE
Start: 2025-02-25

## 2025-02-25 RX ORDER — DEXAMETHASONE 0.5 MG/1
16.67 TABLET ORAL
Qty: 0 | Refills: 0 | DISCHARGE
Start: 2025-02-25

## 2025-02-25 RX ORDER — LACOSAMIDE 150 MG/1
10 TABLET, FILM COATED ORAL
Qty: 0 | Refills: 0 | DISCHARGE
Start: 2025-02-25

## 2025-02-25 RX ORDER — MIDODRINE HYDROCHLORIDE 5 MG/1
5 TABLET ORAL ONCE
Refills: 0 | Status: COMPLETED | OUTPATIENT
Start: 2025-02-25 | End: 2025-02-25

## 2025-02-25 RX ADMIN — Medication 8: at 06:04

## 2025-02-25 RX ADMIN — SODIUM CHLORIDE 2000 MILLILITER(S): 9 INJECTION, SOLUTION INTRAVENOUS at 12:48

## 2025-02-25 RX ADMIN — MIDODRINE HYDROCHLORIDE 5 MILLIGRAM(S): 5 TABLET ORAL at 06:03

## 2025-02-25 RX ADMIN — Medication 1 APPLICATION(S): at 09:25

## 2025-02-25 RX ADMIN — Medication 6: at 11:23

## 2025-02-25 RX ADMIN — LEVETIRACETAM 1500 MILLIGRAM(S): 10 INJECTION, SOLUTION INTRAVENOUS at 09:20

## 2025-02-25 RX ADMIN — Medication 40 MILLIGRAM(S): at 09:21

## 2025-02-25 RX ADMIN — Medication 15 MILLILITER(S): at 09:21

## 2025-02-25 RX ADMIN — DEXAMETHASONE 4 MILLIGRAM(S): 0.5 TABLET ORAL at 11:23

## 2025-02-25 RX ADMIN — MIDODRINE HYDROCHLORIDE 5 MILLIGRAM(S): 5 TABLET ORAL at 12:38

## 2025-02-25 RX ADMIN — LACOSAMIDE 100 MILLIGRAM(S): 150 TABLET, FILM COATED ORAL at 09:21

## 2025-02-25 RX ADMIN — Medication 1 DROP(S): at 09:24

## 2025-02-25 RX ADMIN — DEXAMETHASONE 4 MILLIGRAM(S): 0.5 TABLET ORAL at 06:03

## 2025-02-25 RX ADMIN — Medication 1 APPLICATION(S): at 06:04

## 2025-02-25 RX ADMIN — Medication 500 MILLIGRAM(S): at 06:03

## 2025-02-25 RX ADMIN — Medication 500 MILLIGRAM(S): at 09:21

## 2025-02-25 NOTE — DISCHARGE NOTE NURSING/CASE MANAGEMENT/SOCIAL WORK - FINANCIAL ASSISTANCE
[facility] provides services at a reduced cost to those who are determined to be eligible through [facility]’s financial assistance program. Information regarding [facility]’s financial assistance program can be found by going to [email or web address] or by calling [phone].

## 2025-02-25 NOTE — PROGRESS NOTE ADULT - ASSESSMENT
75 yo female w PMH GBM on hospice care (diagnosed in 2024, follows at [facility] under Dr. Hernandez, with reported attempted resection of tumor, on chemotherapy with last chemo 2 months ago), admitted for ams likely 2/2 disease progression, found to be in status epilepticus, decision for DNR/DNI reversed by family, patient intubated and stepped up to MICU, now s/p brain herniation, pending further conversations with family.     NEURO  Intubated. Not sedated. Minimally responsive despite not being on sedation. Continues to have seizures, concern for airway protection as GBM progresses.    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 There is evidence of focal cortical hyperexcitability, more prominent in the right than the left frontotemporal regions. The background pattern shows a burst suppression ratio of 10:1, indicating severe diffuse or multifocal cerebral dysfunction, potentially exacerbated by IV sedatives. Additionally, there is severe generalized and multifocal slowing, further supporting significant underlying cerebral dysfunction. CT head with worsening midline shift. Patient got CT head that shows complete effacement of the cerebral and cerebellar sulci consistent with diffuse edema with effacement of the basal cisterns, worsening subfalcine and uncal herniation with new descending transtentorial herniation.  Continued EEG findings of seizures.  s/p valproic acid loading dose 1200 mg, vimpat 200 mg loading  - epilepsy consulted, appreciate recs      - Keppra 1500mg BID      - Vimpat 100mg BID (monitor tele to ensure no heart block)      - Depakote 500mg q6hrs      - seizure precautions    #GBM  last chemo ~2months ago, follows at [facility].  Per Neurosurgery consult on 1/31, no acute interventions. Worsening midline shift on CT. On CT today, concern for worsening neurological status. Patient got CT head that shows complete effacement of the cerebral and cerebellar sulci consistent with diffuse edema with effacement of the basal cisterns, worsening subfalcine and uncal herniation with new descending transtentorial herniation.  -c/w decadron 4 IV q6h  -epilepsy aware  -palliative care consult       CARDIAC  #Chronic atrial fibrillation.   #afib with RVR   Plan  -bladder scans q6h   -ctm BMP  -s/p john   Home med: metoprolol 12.5 BID   - c/w Lovenox ( per NSGY, no increased risk of hemorrhage)  -start Lopressor 12.5 mg BID     #Septic shock  S/p Levophed, weaned on 2/17  - Started Midodrine 5mg TID (2/20 - ) for BP support      PULM  #intubated  - intubated on 2/1  - GOC  - consider trach moving forward      GI  - NPO with tube feeds   -Miralax and Senna standing      RENAL  - currently with normal renal function    #hypernatremia  Sodium >180, now downtrending. Likely 2/2 diabetes insipidus iso advancing brain cancer that is leading to advanced brain swelling.   Stopped NaCl 0.225% 160cc/hr and DDAVP 1mcg q12h on 2/19 due to overcorrection of Na 176 --> 151 from 2/18 to 2/19  - the brain swelling will worsen with worsening sodium as time progresses. Can attempt to correct Hypernatremia but will likely be futile iso central diabetes insipidus and worsening brain herniation.     Currently with John.       ENDO  Lantus 20U qhs and Regular Insulin 9U q6h d/sam on 2/20 due to downtrending FSGs.  Diet changed to Glucerna due to elevated glucose, CTM.   - Continue FSG and ISS q6h only      ID  #Sepsis with septic shock  #ESBL Ecoli Bacteremia  #R arm cellulitis   #ESBL ecoli UTI   Systemic inflammatory response syndrome (SIRS).   Meeting SIRS 2/4 (HR>90, WBC>12k) likely iso of UTI.   Bcx with ESBL E. coli, s/p ertapenem treatment. Patient continuing to be tachycardic, tenuous course between fevering and hypothermia.   S/p Vancomycin (2/13-2/17)  -c/w Levofloxacin (2/13 - 2/26)       F: none  E: replete K<4, Mg<2  N: npo with tube feeds  VTE Prophylaxis: lovenox 40mg QD  GI: pantoprazole 40 IVP q24hrs  C: Full Code  D: micu    Lines: Peripheral IV L 22g (2/4) IV L 18 g (2/4), NG tube, John (2/14)     73 yo female w PMH GBM on hospice care (diagnosed in 2024, follows at [facility] under Dr. Hernandez, with reported attempted resection of tumor, on chemotherapy with last chemo 2 months ago), admitted for ams likely 2/2 disease progression, found to be in status epilepticus, decision for DNR/DNI reversed by family, patient intubated and stepped up to MICU, now s/p brain herniation, pending further conversations with family.     NEURO  Intubated. Not sedated. Minimally responsive despite not being on sedation. Continues to have seizures, concern for airway protection as GBM progresses.    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 There is evidence of focal cortical hyperexcitability, more prominent in the right than the left frontotemporal regions. The background pattern shows a burst suppression ratio of 10:1, indicating severe diffuse or multifocal cerebral dysfunction, potentially exacerbated by IV sedatives. Additionally, there is severe generalized and multifocal slowing, further supporting significant underlying cerebral dysfunction. CT head with worsening midline shift. Patient got CT head that shows complete effacement of the cerebral and cerebellar sulci consistent with diffuse edema with effacement of the basal cisterns, worsening subfalcine and uncal herniation with new descending transtentorial herniation.  s/p valproic acid loading dose 1200 mg, vimpat 200 mg loading  - epilepsy consulted, appreciate recs      - Keppra 1500mg BID      - Vimpat 100mg BID (monitor tele to ensure no heart block)      - Depakote 500mg q6hrs      - seizure precautions    #GBM  last chemo ~2months ago, follows at [facility].  Per Neurosurgery consult on 1/31, no acute interventions. Worsening midline shift on CT. On CT today, concern for worsening neurological status. Patient got CT head that shows complete effacement of the cerebral and cerebellar sulci consistent with diffuse edema with effacement of the basal cisterns, worsening subfalcine and uncal herniation with new descending transtentorial herniation.  -c/w decadron 4 IV q6h  -epilepsy aware      CARDIAC  #Chronic atrial fibrillation.   #afib with RVR   Home med: metoprolol 12.5 BID. Lopressor 12.5mg BID given inpatient, now dc'd.  - c/w Lovenox ( per NSGY, no increased risk of hemorrhage)    #Septic shock  S/p Levophed, weaned on 2/17  - Started Midodrine 5mg TID (2/20 - ) for BP support      PULM  #intubated  - intubated on 2/1      GI  - NPO with tube feeds   -Miralax and Senna standing      RENAL  - currently with normal renal function    #hypernatremia  Sodium >180, now downtrending. Likely 2/2 diabetes insipidus iso advancing brain cancer that is leading to advanced brain swelling.   Stopped NaCl 0.225% 160cc/hr and DDAVP 1mcg q12h on 2/19 due to overcorrection of Na 176 --> 151 from 2/18 to 2/19  - the brain swelling will worsen with worsening sodium as time progresses. Can attempt to correct Hypernatremia but will likely be futile iso central diabetes insipidus and worsening brain herniation.     Currently with Wade.       ENDO  Lantus 20U qhs and Regular Insulin 9U q6h d/sam on 2/20 due to downtrending FSGs.  Diet changed to Glucerna due to elevated glucose, CTM.   - Continue FSG and ISS q6h only      ID  #Sepsis with septic shock  #ESBL Ecoli Bacteremia  #R arm cellulitis   #ESBL ecoli UTI   Systemic inflammatory response syndrome (SIRS).   Meeting SIRS 2/4 (HR>90, WBC>12k) likely iso of UTI.   Bcx with ESBL E. coli, s/p ertapenem treatment. Patient continuing to be tachycardic, tenuous course between fevering and hypothermia.   S/p Vancomycin (2/13-2/17)  -c/w Levofloxacin (2/13 - 2/26)       F: none  E: replete K<4, Mg<2  N: npo with tube feeds  VTE Prophylaxis: lovenox 40mg QD  GI: pantoprazole 40 IVP q24hrs  C: Full Code  D: micu    Lines: Peripheral IV L 22g (2/4) IV L 18 g (2/4), NG tube, Wade (2/14)

## 2025-02-25 NOTE — DISCHARGE NOTE NURSING/CASE MANAGEMENT/SOCIAL WORK - PATIENT PORTAL LINK FT
You can access the FollowMyHealth Patient Portal offered by [facility] by registering at the following website: [email or web address]. By joining [employer]’s FollowMyHealth portal, you will also be able to view your health information using other applications (apps) compatible with our system.

## 2025-02-25 NOTE — PROGRESS NOTE ADULT - REASON FOR ADMISSION
AMS

## 2025-02-25 NOTE — PROGRESS NOTE ADULT - SUBJECTIVE AND OBJECTIVE BOX
***INCOMPLETE NOTE*** Patient is a 74y old  Female who presents with a chief complaint of AMS (25 Feb 2025 06:17)      INTERVAL HPI/OVERNIGHT EVENTS:   No acute overnight events.  This morning, the patient remained intubated and sedated.    ICU Vital Signs Last 24 Hrs  T(C): 36.7 (25 Feb 2025 13:00), Max: 37.4 (25 Feb 2025 04:56)  T(F): 98.1 (25 Feb 2025 13:00), Max: 99.3 (25 Feb 2025 04:56)  HR: 64 (25 Feb 2025 10:15) (64 - 80)  BP: 90/52 (25 Feb 2025 10:15) (82/52 - 91/54)  BP(mean): 68 (25 Feb 2025 10:15) (62 - 70)  ABP: --  ABP(mean): --  RR: 12 (25 Feb 2025 10:15) (12 - 14)  SpO2: 100% (25 Feb 2025 10:15) (97% - 100%)    O2 Parameters below as of 25 Feb 2025 10:15  Patient On (Oxygen Delivery Method): ventilator    O2 Concentration (%): 60      I&O's Summary    24 Feb 2025 07:01  -  25 Feb 2025 07:00  --------------------------------------------------------  IN: 834 mL / OUT: 1060 mL / NET: -226 mL    25 Feb 2025 07:01  -  25 Feb 2025 14:04  --------------------------------------------------------  IN: 0 mL / OUT: 75 mL / NET: -75 mL      Mode: AC/ CMV (Assist Control/ Continuous Mandatory Ventilation)  RR (machine): 12  TV (machine): 350  FiO2: 60  PEEP: 5  ITime: 1  MAP: 6.6  PIP: 13      LABS:              CAPILLARY BLOOD GLUCOSE      POCT Blood Glucose.: 273 mg/dL (25 Feb 2025 11:02)  POCT Blood Glucose.: 305 mg/dL (25 Feb 2025 05:18)  POCT Blood Glucose.: 315 mg/dL (24 Feb 2025 23:41)  POCT Blood Glucose.: 287 mg/dL (24 Feb 2025 16:57)          RADIOLOGY & ADDITIONAL TESTS:    Consultant(s) Notes Reviewed:  [x ] YES  [ ] NO    MEDICATIONS  (STANDING):  artificial  tears Solution 1 Drop(s) Both EYES every 12 hours  chlorhexidine 0.12% Liquid 15 milliLiter(s) Oral Mucosa every 12 hours  chlorhexidine 2% Cloths 1 Application(s) Topical <User Schedule>  dexAMETHasone  Injectable 4 milliGRAM(s) IV Push every 6 hours  dextrose 5%. 1000 milliLiter(s) (50 mL/Hr) IV Continuous <Continuous>  dextrose 5%. 1000 milliLiter(s) (100 mL/Hr) IV Continuous <Continuous>  dextrose 50% Injectable 25 Gram(s) IV Push once  dextrose 50% Injectable 25 Gram(s) IV Push once  dextrose 50% Injectable 12.5 Gram(s) IV Push once  dextrose 50% Injectable 25 Gram(s) IV Push once  enoxaparin Injectable 40 milliGRAM(s) SubCutaneous every 24 hours  glucagon  Injectable 1 milliGRAM(s) IntraMuscular once  influenza  Vaccine (HIGH DOSE) 0.5 milliLiter(s) IntraMuscular once  insulin regular  human corrective regimen sliding scale   SubCutaneous every 6 hours  lacosamide Solution 100 milliGRAM(s) Oral every 12 hours  levETIRAcetam  Solution 1500 milliGRAM(s) Oral every 12 hours  levoFLOXacin IVPB 750 milliGRAM(s) IV Intermittent every 24 hours  midodrine 5 milliGRAM(s) Oral every 8 hours  pantoprazole  Injectable 40 milliGRAM(s) IV Push every 24 hours  petrolatum Ophthalmic Ointment 1 Application(s) Both EYES every 12 hours  senna Syrup 10 milliLiter(s) Oral at bedtime  valproic  acid Syrup 500 milliGRAM(s) Oral every 6 hours    MEDICATIONS  (PRN):  acetaminophen   Oral Liquid .. 520 milliGRAM(s) Oral every 6 hours PRN Temp greater or equal to 38C (100.4F)  dextrose Oral Gel 15 Gram(s) Oral once PRN Blood Glucose LESS THAN 70 milliGRAM(s)/deciliter  dextrose Oral Gel 15 Gram(s) Oral once PRN Blood Glucose LESS THAN 70 milliGRAM(s)/deciliter      PHYSICAL EXAM:  Constitutional - NAD, unresponsive  HEENT - NCAT, (+)Pupils dilated and fixed at 5mm, symmetric but not responsive to light  Chest - Breathing on mechanical ventilation, CTAB  Cardio - Warm and well perfused, RRR  Abdomen - Soft, NTND, +BS  Extremities - (+)B/l upper extremity edema. Discoloration of hands. Mild b/l lower extremity edema, No calf tenderness.   Neurologic - Unresponsive to voice or touch

## 2025-02-28 DIAGNOSIS — R53.2 FUNCTIONAL QUADRIPLEGIA: ICD-10-CM

## 2025-02-28 DIAGNOSIS — R13.10 DYSPHAGIA, UNSPECIFIED: ICD-10-CM

## 2025-02-28 DIAGNOSIS — I48.20 CHRONIC ATRIAL FIBRILLATION, UNSPECIFIED: ICD-10-CM

## 2025-02-28 DIAGNOSIS — A41.51 SEPSIS DUE TO ESCHERICHIA COLI [E. COLI]: ICD-10-CM

## 2025-02-28 DIAGNOSIS — I95.89 OTHER HYPOTENSION: ICD-10-CM

## 2025-02-28 DIAGNOSIS — C71.8 MALIGNANT NEOPLASM OF OVERLAPPING SITES OF BRAIN: ICD-10-CM

## 2025-02-28 DIAGNOSIS — Z51.5 ENCOUNTER FOR PALLIATIVE CARE: ICD-10-CM

## 2025-02-28 DIAGNOSIS — K59.00 CONSTIPATION, UNSPECIFIED: ICD-10-CM

## 2025-02-28 DIAGNOSIS — R65.21 SEVERE SEPSIS WITH SEPTIC SHOCK: ICD-10-CM

## 2025-02-28 DIAGNOSIS — G93.5 COMPRESSION OF BRAIN: ICD-10-CM

## 2025-02-28 DIAGNOSIS — N39.0 URINARY TRACT INFECTION, SITE NOT SPECIFIED: ICD-10-CM

## 2025-02-28 DIAGNOSIS — Z79.4 LONG TERM (CURRENT) USE OF INSULIN: ICD-10-CM

## 2025-02-28 DIAGNOSIS — G93.6 CEREBRAL EDEMA: ICD-10-CM

## 2025-02-28 DIAGNOSIS — Z16.12 EXTENDED SPECTRUM BETA LACTAMASE (ESBL) RESISTANCE: ICD-10-CM

## 2025-02-28 DIAGNOSIS — G40.101 LOCALIZATION-RELATED (FOCAL) (PARTIAL) SYMPTOMATIC EPILEPSY AND EPILEPTIC SYNDROMES WITH SIMPLE PARTIAL SEIZURES, NOT INTRACTABLE, WITH STATUS EPILEPTICUS: ICD-10-CM

## 2025-02-28 DIAGNOSIS — J96.01 ACUTE RESPIRATORY FAILURE WITH HYPOXIA: ICD-10-CM

## 2025-02-28 DIAGNOSIS — Z79.01 LONG TERM (CURRENT) USE OF ANTICOAGULANTS: ICD-10-CM

## 2025-02-28 DIAGNOSIS — L03.113 CELLULITIS OF RIGHT UPPER LIMB: ICD-10-CM

## 2025-02-28 DIAGNOSIS — Z11.52 ENCOUNTER FOR SCREENING FOR COVID-19: ICD-10-CM

## 2025-02-28 DIAGNOSIS — E87.0 HYPEROSMOLALITY AND HYPERNATREMIA: ICD-10-CM

## 2025-02-28 DIAGNOSIS — G93.41 METABOLIC ENCEPHALOPATHY: ICD-10-CM
